# Patient Record
Sex: FEMALE | Race: BLACK OR AFRICAN AMERICAN | NOT HISPANIC OR LATINO
[De-identification: names, ages, dates, MRNs, and addresses within clinical notes are randomized per-mention and may not be internally consistent; named-entity substitution may affect disease eponyms.]

---

## 2017-01-30 PROBLEM — N93.9 ABNORMAL UTERINE AND VAGINAL BLEEDING, UNSPECIFIED: Status: ACTIVE | Noted: 2017-01-30

## 2017-01-30 PROBLEM — R09.89 OTHER SPECIFIED SYMPTOMS AND SIGNS INVOLVING THE CIRCULATORY AND RESPIRATORY SYSTEMS: Status: ACTIVE | Noted: 2017-01-30

## 2017-01-30 PROBLEM — K21.9 GASTRO-ESOPHAGEAL REFLUX DISEASE WITHOUT ESOPHAGITIS: Status: ACTIVE | Noted: 2017-01-30

## 2017-02-22 PROBLEM — K59.04 CHRONIC IDIOPATHIC CONSTIPATION: Status: ACTIVE | Noted: 2017-02-22

## 2017-03-04 PROBLEM — I42.0 DILATED CARDIOMYOPATHY: Status: ACTIVE | Noted: 2017-03-04

## 2017-03-04 PROBLEM — I10 ESSENTIAL (PRIMARY) HYPERTENSION: Status: ACTIVE | Noted: 2017-03-04

## 2017-03-07 PROBLEM — I34.0 NONRHEUMATIC MITRAL (VALVE) INSUFFICIENCY: Status: ACTIVE | Noted: 2017-03-07

## 2017-04-27 ENCOUNTER — RX ONLY (OUTPATIENT)
Age: 47
Setting detail: RX ONLY
End: 2017-04-27

## 2017-10-27 PROBLEM — D64.9 ANEMIA, UNSPECIFIED: Status: ACTIVE | Noted: 2017-10-27

## 2017-11-01 PROBLEM — D25.1 INTRAMURAL, SUBMUCOUS, AND SUBSEROUS LEIOMYOMA OF UTERUS: Status: ACTIVE | Noted: 2017-11-01

## 2017-11-01 PROBLEM — D25.2 INTRAMURAL, SUBMUCOUS, AND SUBSEROUS LEIOMYOMA OF UTERUS: Status: ACTIVE | Noted: 2017-11-01

## 2017-11-01 PROBLEM — D25.0 INTRAMURAL, SUBMUCOUS, AND SUBSEROUS LEIOMYOMA OF UTERUS: Status: ACTIVE | Noted: 2017-11-01

## 2017-12-18 ENCOUNTER — HOSPITAL ENCOUNTER (OUTPATIENT)
Dept: LAB | Age: 47
Discharge: HOME | End: 2017-12-18
Payer: COMMERCIAL

## 2017-12-18 DIAGNOSIS — I50.22 SYSTOLIC CHF, CHRONIC (HCC): ICD-10-CM

## 2017-12-18 PROBLEM — I49.3 PVC'S (PREMATURE VENTRICULAR CONTRACTIONS): Status: ACTIVE | Noted: 2017-12-18

## 2017-12-18 PROBLEM — I42.8 NICM (NONISCHEMIC CARDIOMYOPATHY) (HCC): Status: ACTIVE | Noted: 2017-12-18

## 2017-12-18 PROBLEM — I25.10 CORONARY ARTERY DISEASE INVOLVING NATIVE CORONARY ARTERY OF NATIVE HEART WITHOUT ANGINA PECTORIS: Status: ACTIVE | Noted: 2017-12-18

## 2017-12-18 PROBLEM — R53.82 CHRONIC FATIGUE: Status: ACTIVE | Noted: 2017-12-18

## 2017-12-18 LAB
ALBUMIN SERPL-MCNC: 3.2 G/DL (ref 3.5–5)
ALBUMIN/GLOB SERPL: 0.7 {RATIO}
ALP SERPL-CCNC: 69 U/L (ref 50–136)
ALT SERPL-CCNC: 19 U/L (ref 12–65)
ANION GAP SERPL CALC-SCNC: 5 MMOL/L
AST SERPL-CCNC: 14 U/L (ref 15–37)
BASOPHILS # BLD: 0.1 K/UL (ref 0–0.2)
BASOPHILS NFR BLD: 1 % (ref 0–2)
BILIRUB SERPL-MCNC: 0.2 MG/DL (ref 0.2–1.1)
BNP SERPL-MCNC: 12 PG/ML
BUN SERPL-MCNC: 16 MG/DL (ref 6–23)
CALCIUM SERPL-MCNC: 8.9 MG/DL (ref 8.3–10.4)
CHLORIDE SERPL-SCNC: 107 MMOL/L (ref 98–107)
CO2 SERPL-SCNC: 28 MMOL/L (ref 21–32)
CREAT SERPL-MCNC: 0.9 MG/DL (ref 0.6–1)
DIFFERENTIAL METHOD BLD: ABNORMAL
EOSINOPHIL # BLD: 0.2 K/UL (ref 0–0.8)
EOSINOPHIL NFR BLD: 2 % (ref 0.5–7.8)
ERYTHROCYTE [DISTWIDTH] IN BLOOD BY AUTOMATED COUNT: 17.9 % (ref 11.9–14.6)
GLOBULIN SER CALC-MCNC: 4.7 G/DL
GLUCOSE SERPL-MCNC: 118 MG/DL (ref 65–100)
HCT VFR BLD AUTO: 28.4 % (ref 35.8–46.3)
HGB BLD-MCNC: 8.9 G/DL (ref 11.7–15.4)
LYMPHOCYTES # BLD: 2 K/UL (ref 0.5–4.6)
LYMPHOCYTES NFR BLD: 23 % (ref 13–44)
MAGNESIUM SERPL-MCNC: 2.3 MG/DL (ref 1.8–2.4)
MCH RBC QN AUTO: 22.3 PG (ref 26.1–32.9)
MCHC RBC AUTO-ENTMCNC: 31.3 G/DL (ref 31.4–35)
MCV RBC AUTO: 71 FL (ref 79.6–97.8)
MONOCYTES # BLD: 0.7 K/UL (ref 0.1–1.3)
MONOCYTES NFR BLD: 7 % (ref 4–12)
NEUTS SEG # BLD: 6.1 K/UL (ref 1.7–8.2)
NEUTS SEG NFR BLD: 67 % (ref 43–78)
PLATELET # BLD AUTO: 475 K/UL (ref 150–450)
PMV BLD AUTO: 10.1 FL (ref 10.8–14.1)
POTASSIUM SERPL-SCNC: 3.6 MMOL/L (ref 3.5–5.1)
PROT SERPL-MCNC: 7.9 G/DL (ref 6.3–8.2)
RBC # BLD AUTO: 4 M/UL (ref 4.05–5.25)
SODIUM SERPL-SCNC: 140 MMOL/L (ref 136–145)
TSH SERPL DL<=0.005 MIU/L-ACNC: 0.75 UIU/ML (ref 0.36–3.74)
WBC # BLD AUTO: 9 K/UL (ref 4.3–11.1)

## 2017-12-18 PROCEDURE — 84443 ASSAY THYROID STIM HORMONE: CPT | Performed by: INTERNAL MEDICINE

## 2017-12-18 PROCEDURE — 83880 ASSAY OF NATRIURETIC PEPTIDE: CPT | Performed by: INTERNAL MEDICINE

## 2017-12-18 PROCEDURE — 85025 COMPLETE CBC W/AUTO DIFF WBC: CPT | Performed by: INTERNAL MEDICINE

## 2017-12-18 PROCEDURE — 80053 COMPREHEN METABOLIC PANEL: CPT | Performed by: INTERNAL MEDICINE

## 2017-12-18 PROCEDURE — 83735 ASSAY OF MAGNESIUM: CPT | Performed by: INTERNAL MEDICINE

## 2017-12-18 PROCEDURE — 36415 COLL VENOUS BLD VENIPUNCTURE: CPT | Performed by: INTERNAL MEDICINE

## 2017-12-19 NOTE — PROGRESS NOTES
Please call her, labs were good, but she can decrease lasix to every other day and PRN. DO not need lasix every day. If taking K replacement, only take on days she takes lasix. Will wait on echo and see back.    Thanks

## 2018-02-07 PROBLEM — L85.3 XEROSIS CUTIS: Status: ACTIVE | Noted: 2018-02-07

## 2018-02-07 PROBLEM — N93.9 ABNORMAL UTERINE AND VAGINAL BLEEDING, UNSPECIFIED: Status: ACTIVE | Noted: 2018-02-07

## 2018-02-07 PROBLEM — M25.569 PAIN IN UNSPECIFIED KNEE: Status: ACTIVE | Noted: 2018-02-07

## 2018-02-10 NOTE — PROGRESS NOTES
Called to pre-assess for PVC ablation with Dr Odilia Casillas, Scheduled 2/12/18. No answer & message left.

## 2018-02-11 ENCOUNTER — ANESTHESIA EVENT (OUTPATIENT)
Dept: SURGERY | Age: 48
End: 2018-02-11
Payer: COMMERCIAL

## 2018-02-12 ENCOUNTER — ANESTHESIA (OUTPATIENT)
Dept: SURGERY | Age: 48
End: 2018-02-12
Payer: COMMERCIAL

## 2018-02-12 ENCOUNTER — APPOINTMENT (OUTPATIENT)
Dept: GENERAL RADIOLOGY | Age: 48
End: 2018-02-12
Attending: INTERNAL MEDICINE
Payer: COMMERCIAL

## 2018-02-12 ENCOUNTER — APPOINTMENT (OUTPATIENT)
Dept: CARDIAC CATH/INVASIVE PROCEDURES | Age: 48
End: 2018-02-12
Payer: COMMERCIAL

## 2018-02-12 ENCOUNTER — HOSPITAL ENCOUNTER (OUTPATIENT)
Age: 48
Setting detail: OBSERVATION
Discharge: HOME | End: 2018-02-13
Attending: INTERNAL MEDICINE | Admitting: INTERNAL MEDICINE
Payer: COMMERCIAL

## 2018-02-12 PROBLEM — I48.91 ATRIAL FIBRILLATION (HCC): Status: ACTIVE | Noted: 2018-02-12

## 2018-02-12 PROBLEM — Z98.890 S/P ABLATION OF VENTRICULAR ARRHYTHMIA: Status: ACTIVE | Noted: 2018-02-12

## 2018-02-12 PROBLEM — Z86.79 S/P ABLATION OF VENTRICULAR ARRHYTHMIA: Status: ACTIVE | Noted: 2018-02-12

## 2018-02-12 LAB
ACT BLD: 164 SECS (ref 70–128)
ACT BLD: 213 SECS (ref 70–128)
ACT BLD: 219 SECS (ref 70–128)
ACT BLD: 257 SECS (ref 70–128)
ACT BLD: 307 SECS (ref 70–128)
ACT BLD: 307 SECS (ref 70–128)
ANION GAP SERPL CALC-SCNC: 8 MMOL/L (ref 7–16)
ATRIAL RATE: 61 BPM
ATRIAL RATE: 64 BPM
BUN SERPL-MCNC: 18 MG/DL (ref 6–23)
CALCIUM SERPL-MCNC: 8.8 MG/DL (ref 8.3–10.4)
CALCULATED P AXIS, ECG09: 25 DEGREES
CALCULATED P AXIS, ECG09: 61 DEGREES
CALCULATED R AXIS, ECG10: 37 DEGREES
CALCULATED R AXIS, ECG10: 50 DEGREES
CALCULATED T AXIS, ECG11: 32 DEGREES
CALCULATED T AXIS, ECG11: 39 DEGREES
CHLORIDE SERPL-SCNC: 107 MMOL/L (ref 98–107)
CO2 SERPL-SCNC: 24 MMOL/L (ref 21–32)
CREAT SERPL-MCNC: 1.23 MG/DL (ref 0.6–1)
DIAGNOSIS, 93000: NORMAL
DIAGNOSIS, 93000: NORMAL
ERYTHROCYTE [DISTWIDTH] IN BLOOD BY AUTOMATED COUNT: 18 % (ref 11.9–14.6)
GLUCOSE SERPL-MCNC: 88 MG/DL (ref 65–100)
HCT VFR BLD AUTO: 28.3 % (ref 35.8–46.3)
HGB BLD-MCNC: 8.8 G/DL (ref 11.7–15.4)
INR PPP: 1.1
MAGNESIUM SERPL-MCNC: 2.5 MG/DL (ref 1.8–2.4)
MCH RBC QN AUTO: 21.5 PG (ref 26.1–32.9)
MCHC RBC AUTO-ENTMCNC: 31.1 G/DL (ref 31.4–35)
MCV RBC AUTO: 69 FL (ref 79.6–97.8)
P-R INTERVAL, ECG05: 168 MS
P-R INTERVAL, ECG05: 176 MS
PLATELET # BLD AUTO: 455 K/UL (ref 150–450)
PMV BLD AUTO: 9.9 FL (ref 10.8–14.1)
POTASSIUM SERPL-SCNC: 4.9 MMOL/L (ref 3.5–5.1)
PROTHROMBIN TIME: 13.5 SEC (ref 11.5–14.5)
Q-T INTERVAL, ECG07: 422 MS
Q-T INTERVAL, ECG07: 430 MS
QRS DURATION, ECG06: 88 MS
QRS DURATION, ECG06: 90 MS
QTC CALCULATION (BEZET), ECG08: 432 MS
QTC CALCULATION (BEZET), ECG08: 435 MS
RBC # BLD AUTO: 4.1 M/UL (ref 4.05–5.25)
SODIUM SERPL-SCNC: 139 MMOL/L (ref 136–145)
VENTRICULAR RATE, ECG03: 61 BPM
VENTRICULAR RATE, ECG03: 64 BPM
WBC # BLD AUTO: 6.9 K/UL (ref 4.3–11.1)

## 2018-02-12 PROCEDURE — 77030027107 HC PTCH EXT REF CARTO3 J&J -F

## 2018-02-12 PROCEDURE — 85347 COAGULATION TIME ACTIVATED: CPT

## 2018-02-12 PROCEDURE — 80048 BASIC METABOLIC PNL TOTAL CA: CPT | Performed by: INTERNAL MEDICINE

## 2018-02-12 PROCEDURE — 74011250636 HC RX REV CODE- 250/636: Performed by: ANESTHESIOLOGY

## 2018-02-12 PROCEDURE — 76060000038 HC ANESTHESIA 3.5 TO 4 HR: Performed by: INTERNAL MEDICINE

## 2018-02-12 PROCEDURE — 93623 PRGRMD STIMJ&PACG IV RX NFS: CPT

## 2018-02-12 PROCEDURE — 85027 COMPLETE CBC AUTOMATED: CPT | Performed by: INTERNAL MEDICINE

## 2018-02-12 PROCEDURE — C1759 CATH, INTRA ECHOCARDIOGRAPHY: HCPCS

## 2018-02-12 PROCEDURE — 77030016570 HC BLNKT BAIR HGGR 3M -B: Performed by: NURSE ANESTHETIST, CERTIFIED REGISTERED

## 2018-02-12 PROCEDURE — 77030035291 HC TBNG PMP SMARTABLATE J&J -B

## 2018-02-12 PROCEDURE — C1733 CATH, EP, OTHR THAN COOL-TIP: HCPCS

## 2018-02-12 PROCEDURE — 51798 US URINE CAPACITY MEASURE: CPT

## 2018-02-12 PROCEDURE — 71045 X-RAY EXAM CHEST 1 VIEW: CPT

## 2018-02-12 PROCEDURE — 99218 HC RM OBSERVATION: CPT

## 2018-02-12 PROCEDURE — C1732 CATH, EP, DIAG/ABL, 3D/VECT: HCPCS

## 2018-02-12 PROCEDURE — 93654 COMPRE EP EVAL TX VT: CPT

## 2018-02-12 PROCEDURE — 77030034849

## 2018-02-12 PROCEDURE — 85610 PROTHROMBIN TIME: CPT | Performed by: INTERNAL MEDICINE

## 2018-02-12 PROCEDURE — 77030013687 HC GD NDL BARD -B

## 2018-02-12 PROCEDURE — C1894 INTRO/SHEATH, NON-LASER: HCPCS

## 2018-02-12 PROCEDURE — 76937 US GUIDE VASCULAR ACCESS: CPT

## 2018-02-12 PROCEDURE — 93662 INTRACARDIAC ECG (ICE): CPT

## 2018-02-12 PROCEDURE — 74011250636 HC RX REV CODE- 250/636

## 2018-02-12 PROCEDURE — 83735 ASSAY OF MAGNESIUM: CPT | Performed by: INTERNAL MEDICINE

## 2018-02-12 PROCEDURE — 93005 ELECTROCARDIOGRAM TRACING: CPT | Performed by: INTERNAL MEDICINE

## 2018-02-12 PROCEDURE — 93621 COMP EP EVL L PAC&REC C SINS: CPT

## 2018-02-12 PROCEDURE — 74011000250 HC RX REV CODE- 250

## 2018-02-12 PROCEDURE — 74011000258 HC RX REV CODE- 258

## 2018-02-12 RX ORDER — PROPOFOL 10 MG/ML
INJECTION, EMULSION INTRAVENOUS AS NEEDED
Status: DISCONTINUED | OUTPATIENT
Start: 2018-02-12 | End: 2018-02-12 | Stop reason: HOSPADM

## 2018-02-12 RX ORDER — DOBUTAMINE HYDROCHLORIDE 200 MG/100ML
INJECTION INTRAVENOUS
Status: DISCONTINUED | OUTPATIENT
Start: 2018-02-12 | End: 2018-02-12 | Stop reason: HOSPADM

## 2018-02-12 RX ORDER — PROPOFOL 10 MG/ML
INJECTION, EMULSION INTRAVENOUS
Status: DISCONTINUED | OUTPATIENT
Start: 2018-02-12 | End: 2018-02-12 | Stop reason: HOSPADM

## 2018-02-12 RX ORDER — FUROSEMIDE 40 MG/1
40 TABLET ORAL DAILY
Status: DISCONTINUED | OUTPATIENT
Start: 2018-02-13 | End: 2018-02-13 | Stop reason: HOSPADM

## 2018-02-12 RX ORDER — EPHEDRINE SULFATE 50 MG/ML
INJECTION, SOLUTION INTRAVENOUS AS NEEDED
Status: DISCONTINUED | OUTPATIENT
Start: 2018-02-12 | End: 2018-02-12 | Stop reason: HOSPADM

## 2018-02-12 RX ORDER — SODIUM CHLORIDE, SODIUM LACTATE, POTASSIUM CHLORIDE, CALCIUM CHLORIDE 600; 310; 30; 20 MG/100ML; MG/100ML; MG/100ML; MG/100ML
75 INJECTION, SOLUTION INTRAVENOUS CONTINUOUS
Status: DISCONTINUED | OUTPATIENT
Start: 2018-02-12 | End: 2018-02-13 | Stop reason: HOSPADM

## 2018-02-12 RX ORDER — HYDROMORPHONE HYDROCHLORIDE 2 MG/ML
0.5 INJECTION, SOLUTION INTRAMUSCULAR; INTRAVENOUS; SUBCUTANEOUS
Status: DISCONTINUED | OUTPATIENT
Start: 2018-02-12 | End: 2018-02-13 | Stop reason: HOSPADM

## 2018-02-12 RX ORDER — FLUMAZENIL 0.1 MG/ML
0.2 INJECTION INTRAVENOUS AS NEEDED
Status: DISCONTINUED | OUTPATIENT
Start: 2018-02-12 | End: 2018-02-13 | Stop reason: HOSPADM

## 2018-02-12 RX ORDER — DIPHENHYDRAMINE HYDROCHLORIDE 50 MG/ML
12.5 INJECTION, SOLUTION INTRAMUSCULAR; INTRAVENOUS
Status: DISCONTINUED | OUTPATIENT
Start: 2018-02-12 | End: 2018-02-13 | Stop reason: HOSPADM

## 2018-02-12 RX ORDER — ACETAMINOPHEN 325 MG/1
650 TABLET ORAL
Status: DISCONTINUED | OUTPATIENT
Start: 2018-02-12 | End: 2018-02-13 | Stop reason: HOSPADM

## 2018-02-12 RX ORDER — LOSARTAN POTASSIUM 50 MG/1
50 TABLET ORAL DAILY
Status: DISCONTINUED | OUTPATIENT
Start: 2018-02-13 | End: 2018-02-13 | Stop reason: HOSPADM

## 2018-02-12 RX ORDER — MIDAZOLAM HYDROCHLORIDE 1 MG/ML
2 INJECTION, SOLUTION INTRAMUSCULAR; INTRAVENOUS
Status: DISCONTINUED | OUTPATIENT
Start: 2018-02-12 | End: 2018-02-13 | Stop reason: HOSPADM

## 2018-02-12 RX ORDER — SODIUM CHLORIDE 0.9 % (FLUSH) 0.9 %
5-10 SYRINGE (ML) INJECTION AS NEEDED
Status: DISCONTINUED | OUTPATIENT
Start: 2018-02-12 | End: 2018-02-13 | Stop reason: HOSPADM

## 2018-02-12 RX ORDER — HEPARIN SODIUM 5000 [USP'U]/100ML
INJECTION, SOLUTION INTRAVENOUS
Status: DISCONTINUED | OUTPATIENT
Start: 2018-02-12 | End: 2018-02-12 | Stop reason: HOSPADM

## 2018-02-12 RX ORDER — NALOXONE HYDROCHLORIDE 0.4 MG/ML
0.1 INJECTION, SOLUTION INTRAMUSCULAR; INTRAVENOUS; SUBCUTANEOUS
Status: DISCONTINUED | OUTPATIENT
Start: 2018-02-12 | End: 2018-02-13 | Stop reason: HOSPADM

## 2018-02-12 RX ORDER — SODIUM CHLORIDE 0.9 % (FLUSH) 0.9 %
5-10 SYRINGE (ML) INJECTION EVERY 8 HOURS
Status: DISCONTINUED | OUTPATIENT
Start: 2018-02-12 | End: 2018-02-13 | Stop reason: HOSPADM

## 2018-02-12 RX ORDER — HYDROCODONE BITARTRATE AND ACETAMINOPHEN 5; 325 MG/1; MG/1
1 TABLET ORAL
Status: DISCONTINUED | OUTPATIENT
Start: 2018-02-12 | End: 2018-02-13 | Stop reason: HOSPADM

## 2018-02-12 RX ORDER — SPIRONOLACTONE 25 MG/1
25 TABLET ORAL DAILY
Status: DISCONTINUED | OUTPATIENT
Start: 2018-02-13 | End: 2018-02-13 | Stop reason: HOSPADM

## 2018-02-12 RX ORDER — OXYCODONE HYDROCHLORIDE 5 MG/1
10 TABLET ORAL
Status: DISCONTINUED | OUTPATIENT
Start: 2018-02-12 | End: 2018-02-13 | Stop reason: HOSPADM

## 2018-02-12 RX ORDER — SODIUM CHLORIDE, SODIUM LACTATE, POTASSIUM CHLORIDE, CALCIUM CHLORIDE 600; 310; 30; 20 MG/100ML; MG/100ML; MG/100ML; MG/100ML
75 INJECTION, SOLUTION INTRAVENOUS CONTINUOUS
Status: DISPENSED | OUTPATIENT
Start: 2018-02-12 | End: 2018-02-13

## 2018-02-12 RX ORDER — LIDOCAINE HYDROCHLORIDE 10 MG/ML
0.1 INJECTION INFILTRATION; PERINEURAL AS NEEDED
Status: DISCONTINUED | OUTPATIENT
Start: 2018-02-12 | End: 2018-02-13 | Stop reason: HOSPADM

## 2018-02-12 RX ORDER — HEPARIN SODIUM 1000 [USP'U]/ML
INJECTION, SOLUTION INTRAVENOUS; SUBCUTANEOUS AS NEEDED
Status: DISCONTINUED | OUTPATIENT
Start: 2018-02-12 | End: 2018-02-12 | Stop reason: HOSPADM

## 2018-02-12 RX ORDER — OXYCODONE HYDROCHLORIDE 5 MG/1
5 TABLET ORAL
Status: DISCONTINUED | OUTPATIENT
Start: 2018-02-12 | End: 2018-02-13 | Stop reason: HOSPADM

## 2018-02-12 RX ORDER — CARVEDILOL 6.25 MG/1
6.25 TABLET ORAL 2 TIMES DAILY WITH MEALS
Status: DISCONTINUED | OUTPATIENT
Start: 2018-02-12 | End: 2018-02-13 | Stop reason: HOSPADM

## 2018-02-12 RX ORDER — PROTAMINE SULFATE 10 MG/ML
INJECTION, SOLUTION INTRAVENOUS AS NEEDED
Status: DISCONTINUED | OUTPATIENT
Start: 2018-02-12 | End: 2018-02-12 | Stop reason: HOSPADM

## 2018-02-12 RX ADMIN — HYDROMORPHONE HYDROCHLORIDE 0.5 MG: 2 INJECTION, SOLUTION INTRAMUSCULAR; INTRAVENOUS; SUBCUTANEOUS at 16:32

## 2018-02-12 RX ADMIN — PROPOFOL 50 MG: 10 INJECTION, EMULSION INTRAVENOUS at 12:49

## 2018-02-12 RX ADMIN — PROPOFOL 50 MG: 10 INJECTION, EMULSION INTRAVENOUS at 16:24

## 2018-02-12 RX ADMIN — HEPARIN SODIUM 5000 UNITS: 1000 INJECTION, SOLUTION INTRAVENOUS; SUBCUTANEOUS at 14:24

## 2018-02-12 RX ADMIN — SODIUM CHLORIDE, SODIUM LACTATE, POTASSIUM CHLORIDE, AND CALCIUM CHLORIDE 75 ML/HR: 600; 310; 30; 20 INJECTION, SOLUTION INTRAVENOUS at 17:00

## 2018-02-12 RX ADMIN — PROTAMINE SULFATE 10 MG: 10 INJECTION, SOLUTION INTRAVENOUS at 15:31

## 2018-02-12 RX ADMIN — HEPARIN SODIUM 40 UNITS/KG/HR: 5000 INJECTION, SOLUTION INTRAVENOUS at 13:41

## 2018-02-12 RX ADMIN — DOBUTAMINE HYDROCHLORIDE 5 MCG/KG/MIN: 200 INJECTION INTRAVENOUS at 12:59

## 2018-02-12 RX ADMIN — CARVEDILOL 6.25 MG: 6.25 TABLET ORAL at 21:36

## 2018-02-12 RX ADMIN — PROPOFOL 30 MG: 10 INJECTION, EMULSION INTRAVENOUS at 14:34

## 2018-02-12 RX ADMIN — PROPOFOL 50 MG: 10 INJECTION, EMULSION INTRAVENOUS at 16:11

## 2018-02-12 RX ADMIN — PROPOFOL 50 MG: 10 INJECTION, EMULSION INTRAVENOUS at 16:03

## 2018-02-12 RX ADMIN — PROPOFOL 50 MG: 10 INJECTION, EMULSION INTRAVENOUS at 15:51

## 2018-02-12 RX ADMIN — PROPOFOL 50 MG: 10 INJECTION, EMULSION INTRAVENOUS at 16:19

## 2018-02-12 RX ADMIN — EPHEDRINE SULFATE 5 MG: 50 INJECTION, SOLUTION INTRAVENOUS at 14:18

## 2018-02-12 RX ADMIN — PROTAMINE SULFATE 10 MG: 10 INJECTION, SOLUTION INTRAVENOUS at 15:28

## 2018-02-12 RX ADMIN — PROTAMINE SULFATE 10 MG: 10 INJECTION, SOLUTION INTRAVENOUS at 15:52

## 2018-02-12 RX ADMIN — PROPOFOL 40 MG: 10 INJECTION, EMULSION INTRAVENOUS at 14:48

## 2018-02-12 RX ADMIN — PROPOFOL 50 MG: 10 INJECTION, EMULSION INTRAVENOUS at 16:15

## 2018-02-12 RX ADMIN — PROPOFOL 50 MG: 10 INJECTION, EMULSION INTRAVENOUS at 15:57

## 2018-02-12 RX ADMIN — PROPOFOL 160 MCG/KG/MIN: 10 INJECTION, EMULSION INTRAVENOUS at 12:49

## 2018-02-12 RX ADMIN — PROTAMINE SULFATE 10 MG: 10 INJECTION, SOLUTION INTRAVENOUS at 15:51

## 2018-02-12 RX ADMIN — Medication 10 ML: at 21:40

## 2018-02-12 RX ADMIN — SODIUM CHLORIDE, SODIUM LACTATE, POTASSIUM CHLORIDE, AND CALCIUM CHLORIDE: 600; 310; 30; 20 INJECTION, SOLUTION INTRAVENOUS at 14:59

## 2018-02-12 RX ADMIN — PROTAMINE SULFATE 10 MG: 10 INJECTION, SOLUTION INTRAVENOUS at 15:30

## 2018-02-12 RX ADMIN — PROTAMINE SULFATE 10 MG: 10 INJECTION, SOLUTION INTRAVENOUS at 15:32

## 2018-02-12 RX ADMIN — HEPARIN SODIUM 5000 UNITS: 1000 INJECTION, SOLUTION INTRAVENOUS; SUBCUTANEOUS at 13:59

## 2018-02-12 RX ADMIN — EPHEDRINE SULFATE 5 MG: 50 INJECTION, SOLUTION INTRAVENOUS at 14:13

## 2018-02-12 RX ADMIN — PROPOFOL 50 MG: 10 INJECTION, EMULSION INTRAVENOUS at 16:07

## 2018-02-12 RX ADMIN — HEPARIN SODIUM 18500 UNITS: 1000 INJECTION, SOLUTION INTRAVENOUS; SUBCUTANEOUS at 13:36

## 2018-02-12 RX ADMIN — PROTAMINE SULFATE 10 MG: 10 INJECTION, SOLUTION INTRAVENOUS at 15:29

## 2018-02-12 RX ADMIN — SODIUM CHLORIDE, SODIUM LACTATE, POTASSIUM CHLORIDE, AND CALCIUM CHLORIDE: 600; 310; 30; 20 INJECTION, SOLUTION INTRAVENOUS at 12:44

## 2018-02-12 NOTE — PROGRESS NOTES
TRANSFER - OUT REPORT:    Verbal report given to Dada Clancy RN(name) on Annie Harris  being transferred to telemetry(unit) for routine progression of care       Report consisted of patients Situation, Background, Assessment and   Recommendations(SBAR). Information from the following report(s) Kardex and Procedure Summary was reviewed with the receiving nurse. Lines:   Peripheral IV 02/12/18 Left Hand (Active)        Opportunity for questions and clarification was provided. Patient transported with:   Registered Nurse   Receiving RN in room to assess patient. Right groin without bleeding or hematoma.

## 2018-02-12 NOTE — IP AVS SNAPSHOT
[address] 
[phone] Patient: Tyler Daily MRN: [id number] QFY:4/73/[id number] About your hospitalization You were admitted on:  February 12, 2018 You last received care in the:  [facility] 3 TELEMETRY You were discharged on:  February 13, 2018 Why you were hospitalized Your primary diagnosis was:  Not on File Your diagnoses also included:  S/P Ablation Of Ventricular Arrhythmia, Atrial Fibrillation (Hcc) Follow-up Information Follow up With Details Comments Contact Info Tiffany Hernandez MD  As needed [address] [facility] A [facility] [address] 
[phone] Alisha Wood MD On 3/2/2018 1:15 pm 2 [address] [facility] [address] 
[phone] Your Scheduled Appointments Friday March 02, 2018  1:15 PM [facility] HOSPITAL FOLLOW-UP with Alisha Wood MD  
[facility] ([address] [place] [address]) 2 [facility] 
[address] [facility] 81  
[phone] Discharge Orders None A check hemant indicates which time of day the medication should be taken. My Medications CONTINUE taking these medications Instructions Each Dose to Equal  
 Morning Noon Evening Bedtime ATORVASTATIN PO Take  by mouth. Indications: PO once daily  
     
   
   
   
  
  
 carvedilol 6.25 mg tablet Commonly known as:  COREG  
   
 6.25 mg two (2) times a day. 6.25 mg  
    
  
   
   
  
   
  
 furosemide 40 mg tablet Commonly known as:  LASIX TAKE ONE TABLET BY MOUTH EVERY OTHER DAY  
     
  
   
   
   
  
 losartan 50 mg tablet Commonly known as:  COZAAR  
   
 50 mg daily. 50 mg MIRALAX 17 gram packet Generic drug:  polyethylene glycol Take 17 g by mouth daily. 17 g  
    
  
   
   
   
  
 spironolactone 25 mg tablet Commonly known as:  ALDACTONE Take 1 Tab by mouth daily. 25 mg Discharge Instructions Atrial Fibrillation: Care Instructions Your Care Instructions Atrial fibrillation is an irregular and often fast heartbeat. Treating this condition is important for several reasons. It can cause blood clots, which can travel from your heart to your brain and cause a stroke. If you have a fast heartbeat, you may feel lightheaded, dizzy, and weak. An irregular heartbeat can also increase your risk for heart failure. Atrial fibrillation is often the result of another heart condition, such as high blood pressure or coronary artery disease. Making changes to improve your heart condition will help you stay healthy and active. Follow-up care is a key part of your treatment and safety. Be sure to make and go to all appointments, and call your doctor if you are having problems. It's also a good idea to know your test results and keep a list of the medicines you take. How can you care for yourself at home? Medicines ? · Take your medicines exactly as prescribed. Call your doctor if you think you are having a problem with your medicine. You will get more details on the specific medicines your doctor prescribes. ? · If your doctor has given you a blood thinner to prevent a stroke, be sure you get instructions about how to take your medicine safely. Blood thinners can cause serious bleeding problems. ? · Do not take any vitamins, over-the-counter drugs, or herbal products without talking to your doctor first. ? Lifestyle changes ? · Do not smoke. Smoking can increase your chance of a stroke and heart attack. If you need help quitting, talk to your doctor about stop-smoking programs and medicines. These can increase your chances of quitting for good. ? · Eat a heart-healthy diet. ? · Stay at a healthy weight. Lose weight if you need to. ? · Limit alcohol to 2 drinks a day for men and 1 drink a day for women. Too much alcohol can cause health problems. ? · Avoid colds and flu. Get a pneumococcal vaccine shot. If you have had one before, ask your doctor whether you need another dose. Get a flu shot every year. If you must be around people with colds or flu, wash your hands often. Activity ? · If your doctor recommends it, get more exercise. Walking is a good choice. Bit by bit, increase the amount you walk every day. Try for at least 30 minutes on most days of the week. You also may want to swim, bike, or do other activities. Your doctor may suggest that you join a cardiac rehabilitation program so that you can have help increasing your physical activity safely. ? · Start light exercise if your doctor says it is okay. Even a small amount will help you get stronger, have more energy, and manage stress. Walking is an easy way to get exercise. Start out by walking a little more than you did in the hospital. Gradually increase the amount you walk. ? · When you exercise, watch for signs that your heart is working too hard. You are pushing too hard if you cannot talk while you are exercising. If you become short of breath or dizzy or have chest pain, sit down and rest immediately. ? · Check your pulse regularly. Place two fingers on the artery at the palm side of your wrist, in line with your thumb. If your heartbeat seems uneven or fast, talk to your doctor. When should you call for help? Call 911 anytime you think you may need emergency care. For example, call if: 
? · You have symptoms of a heart attack. These may include: ¨ Chest pain or pressure, or a strange feeling in the chest. 
¨ Sweating. ¨ Shortness of breath. ¨ Nausea or vomiting. ¨ Pain, pressure, or a strange feeling in the back, neck, jaw, or upper belly or in one or both shoulders or arms. ¨ Lightheadedness or sudden weakness. ¨ A fast or irregular heartbeat. After you call 911, the  may tell you to chew 1 adult-strength or 2 to 4 low-dose aspirin. Wait for an ambulance. Do not try to drive yourself. ? · You have symptoms of a stroke. These may include: 
¨ Sudden numbness, tingling, weakness, or loss of movement in your face, arm, or leg, especially on only one side of your body. ¨ Sudden vision changes. ¨ Sudden trouble speaking. ¨ Sudden confusion or trouble understanding simple statements. ¨ Sudden problems with walking or balance. ¨ A sudden, severe headache that is different from past headaches. ? · You passed out (lost consciousness). ?Call your doctor now or seek immediate medical care if: 
? · You have new or increased shortness of breath. ? · You feel dizzy or lightheaded, or you feel like you may faint. ? · Your heart rate becomes irregular. ? · You can feel your heart flutter in your chest or skip heartbeats. Tell your doctor if these symptoms are new or worse. ? Watch closely for changes in your health, and be sure to contact your doctor if you have any problems. Where can you learn more? Go to http://maddie-dilan.info/. Enter U020 in the search box to learn more about \"Atrial Fibrillation: Care Instructions. \" Current as of: September 21, 2016 Content Version: 11.4 © 3940-4548 [employer]. Care instructions adapted under license by [employer] (which disclaims liability or warranty for this information). If you have questions about a medical condition or this instruction, always ask your healthcare professional. [place] [address] any warranty or liability for your use of this inform DASH Diet: Care Instructions Your Care Instructions The DASH diet is an eating plan that can help lower your blood pressure. DASH stands for Dietary Approaches to Stop Hypertension. Hypertension is high blood pressure. The DASH diet focuses on eating foods that are high in calcium, potassium, and magnesium. These nutrients can lower blood pressure. The foods that are highest in these nutrients are fruits, vegetables, low-fat dairy products, nuts, seeds, and legumes. But taking calcium, potassium, and magnesium supplements instead of eating foods that are high in those nutrients does not have the same effect. The DASH diet also includes whole grains, fish, and poultry. The DASH diet is one of several lifestyle changes your doctor may recommend to lower your high blood pressure. Your doctor may also want you to decrease the amount of sodium in your diet. Lowering sodium while following the DASH diet can lower blood pressure even further than just the DASH diet alone. Follow-up care is a key part of your treatment and safety. Be sure to make and go to all appointments, and call your doctor if you are having problems. It's also a good idea to know your test results and keep a list of the medicines you take. How can you care for yourself at home? Following the DASH diet · Eat 4 to 5 servings of fruit each day. A serving is 1 medium-sized piece of fruit, ½ cup chopped or canned fruit, 1/4 cup dried fruit, or 4 ounces (½ cup) of fruit juice. Choose fruit more often than fruit juice. · Eat 4 to 5 servings of vegetables each day. A serving is 1 cup of lettuce or raw leafy vegetables, ½ cup of chopped or cooked vegetables, or 4 ounces (½ cup) of vegetable juice. Choose vegetables more often than vegetable juice. · Get 2 to 3 servings of low-fat and fat-free dairy each day. A serving is 8 ounces of milk, 1 cup of yogurt, or 1 ½ ounces of cheese. · Eat 6 to 8 servings of grains each day. A serving is 1 slice of bread, 1 ounce of dry cereal, or ½ cup of cooked rice, pasta, or cooked cereal. Try to choose whole-grain products as much as possible. · Limit lean meat, poultry, and fish to 2 servings each day.  A serving is 3 ounces, about the size of a deck of cards. · Eat 4 to 5 servings of nuts, seeds, and legumes (cooked dried beans, lentils, and split peas) each week. A serving is 1/3 cup of nuts, 2 tablespoons of seeds, or ½ cup of cooked beans or peas. · Limit fats and oils to 2 to 3 servings each day. A serving is 1 teaspoon of vegetable oil or 2 tablespoons of salad dressing. · Limit sweets and added sugars to 5 servings or less a week. A serving is 1 tablespoon jelly or jam, ½ cup sorbet, or 1 cup of lemonade. · Eat less than 2,300 milligrams (mg) of sodium a day. If you limit your sodium to 1,500 mg a day, you can lower your blood pressure even more. Tips for success · Start small. Do not try to make dramatic changes to your diet all at once. You might feel that you are missing out on your favorite foods and then be more likely to not follow the plan. Make small changes, and stick with them. Once those changes become habit, add a few more changes. · Try some of the following: ¨ Make it a goal to eat a fruit or vegetable at every meal and at snacks. This will make it easy to get the recommended amount of fruits and vegetables each day. ¨ Try yogurt topped with fruit and nuts for a snack or healthy dessert. ¨ Add lettuce, tomato, cucumber, and onion to sandwiches. ¨ Combine a ready-made pizza crust with low-fat mozzarella cheese and lots of vegetable toppings. Try using tomatoes, squash, spinach, broccoli, carrots, cauliflower, and onions. ¨ Have a variety of cut-up vegetables with a low-fat dip as an appetizer instead of chips and dip. ¨ Sprinkle sunflower seeds or chopped almonds over salads. Or try adding chopped walnuts or almonds to cooked vegetables. ¨ Try some vegetarian meals using beans and peas. Add garbanzo or kidney beans to salads. Make burritos and tacos with mashed mcdonald beans or black beans. Where can you learn more? Go to http://perkins-dilan.info/. Enter G797 in the search box to learn more about \"DASH Diet: Care Instructions. \" Current as of: September 21, 2016 Content Version: 11.4 © 1857-7921 10X10 Room. Care instructions adapted under license by [employer] (which disclaims liability or warranty for this information). If you have questions about a medical condition or this instruction, always ask your healthcare professional. [address] any warranty or liability for your use of this information. Cardiac Catheterization/Angiography Discharge Instructions *Check the puncture site frequently for swelling or bleeding. If you see any bleeding, lie down and apply pressure over the area with a clean town or washcloth. Notify your doctor for any redness, swelling, drainage or oozing from the puncture site. Notify your doctor for any fever or chills. *If the leg or arm with the puncture becomes cold, numb or painful, call Dr Chantelle Sweet at  [phone] *Activity should be limited for the next 48 hours. Climb stairs as little as possible and avoid any stooping, bending or strenuous activity for 48 hours. No heavy lifting (anything over 10 pounds) for three days. *Do not drive for 48 hours. *You may resume your usual diet. Drink more fluids than usual. 
 
*Have a responsible person drive you home and stay with you for at least 24 hours after your heart catheterization/angiography. *You may remove the bandage from your Right and Groin in 24 hours. You may shower in 24 hours. No tub baths, hot tubs or swimming for one week. Do not place any lotions, creams, powders, ointments over the puncture site for one week. You may place a clean band-aid over the puncture site each day for 5 days. Change this daily. [facility] [employer] [facility] Announcement  We are excited to announce that we are making your provider's discharge notes available to you in [employer]. You will see these notes when they are completed and signed by the physician that discharged you from your recent hospital stay. If you have any questions or concerns about any information you see in [employer], please call the Health Information Department where you were seen or reach out to your Primary Care Provider for more information about your plan of care. Introducing [facility]! Dear Mikayla: Thank you for requesting a [employer] account. Our records indicate that you already have an active [employer] account. You can access your account anytime at [email or web address] Did you know that you can access your hospital and ER discharge instructions at any time in [employer] Girls? You can also review all of your test results from your hospital stay or ER visit. Additional Information If you have questions, please visit the Frequently Asked Questions section of the [employer] website at [email or web address] Remember, [employer] is NOT to be used for urgent needs. For medical emergencies, dial 911. Now available from your iPhone and Android! Providers Seen During Your Hospitalization Provider Specialty Primary office phone Lashaun Webb MD Cardiology [phone] Your Primary Care Physician (PCP) Primary Care Physician Office Phone Office Fax [address] [phone] You are allergic to the following No active allergies Recent Documentation Height Weight Breastfeeding? BMI Smoking Status 1.778 m 114.9 kg No 36.33 kg/m2 Never Smoker Emergency Contacts Name Discharge Info Relation Home Work [employer] [facility]  Daughter [21] [phone] [facility]  Daughter [21] [phone] Patient Belongings The following personal items are in your possession at time of discharge: Dental Appliances: None  Visual Aid: Glasses, With patient      Home Medications: Locked   Jewelry: None  Clothing: Footwear, Shirt, Pants, With patient    Other Valuables: Cell Phone, With patient, Alana Munguia Please provide this summary of care documentation to your next provider. Signatures-by signing, you are acknowledging that this After Visit Summary has been reviewed with you and you have received a copy. Patient Signature:  ____________________________________________________________ Date:  ____________________________________________________________  
  
[facility] Provider Signature:  ____________________________________________________________ Date:  ____________________________________________________________

## 2018-02-12 NOTE — ANESTHESIA PREPROCEDURE EVALUATION
Anesthetic History   No history of anesthetic complications            Review of Systems / Medical History  Patient summary reviewed and pertinent labs reviewed    Pulmonary  Within defined limits                 Neuro/Psych   Within defined limits           Cardiovascular    Hypertension: well controlled      CHF  Dysrhythmias : PVC        Comments: Echo 1/18 - EF 30-35%, mild AI, mild MR   GI/Hepatic/Renal  Within defined limits              Endo/Other        Obesity     Other Findings            Physical Exam    Airway  Mallampati: II  TM Distance: > 6 cm  Neck ROM: normal range of motion   Mouth opening: Normal     Cardiovascular    Rhythm: irregular  Rate: normal         Dental  No notable dental hx       Pulmonary  Breath sounds clear to auscultation               Abdominal         Other Findings            Anesthetic Plan    ASA: 3  Anesthesia type: total IV anesthesia            Anesthetic plan and risks discussed with: Patient

## 2018-02-12 NOTE — IP AVS SNAPSHOT
[address] [place] [address] 
[phone] Patient: Lorenzo Dominique MRN: [id number] DEE:[birth year] A check hemant indicates which time of day the medication should be taken. My Medications CONTINUE taking these medications Instructions Each Dose to Equal  
 Morning Noon Evening Bedtime ATORVASTATIN PO Take  by mouth. Indications: PO once daily  
     
   
   
   
  
  
 carvedilol 6.25 mg tablet Commonly known as:  COREG  
   
 6.25 mg two (2) times a day. 6.25 mg  
    
  
   
   
  
   
  
 furosemide 40 mg tablet Commonly known as:  LASIX TAKE ONE TABLET BY MOUTH EVERY OTHER DAY  
     
  
   
   
   
  
 losartan 50 mg tablet Commonly known as:  COZAAR  
   
 50 mg daily. 50 mg MIRALAX 17 gram packet Generic drug:  polyethylene glycol Take 17 g by mouth daily. 17 g  
    
  
   
   
   
  
 spironolactone 25 mg tablet Commonly known as:  ALDACTONE Take 1 Tab by mouth daily.   
 25 mg

## 2018-02-12 NOTE — PROGRESS NOTES
TRANSFER - IN REPORT:    Verbal report received from [place] on Annie Harris being received from Cath Lab for routine progression of care. Report consisted of patients Situation, Background, Assessment and Recommendations(SBAR). Information from the following report(s) SBAR was reviewed. Opportunity for questions and clarification was provided. Assessment completed upon patients arrival to unit and care assumed. Patient received to room 303. Patient connected to monitor and assessment completed. Plan of care reviewed. Patient oriented to room and call light. Patient aware to use call light to communicate any chest pain or needs. Admission skin assessment completed with second RN and reveals the following: sacrum intact without redness or open areas. Heels intact and without redness bilaterally. Right groin site dressing clean, dry and intact.

## 2018-02-13 VITALS
HEART RATE: 58 BPM | BODY MASS INDEX: 36.25 KG/M2 | RESPIRATION RATE: 20 BRPM | DIASTOLIC BLOOD PRESSURE: 69 MMHG | SYSTOLIC BLOOD PRESSURE: 102 MMHG | OXYGEN SATURATION: 98 % | WEIGHT: 253.2 LBS | TEMPERATURE: 97.6 F | HEIGHT: 70 IN

## 2018-02-13 LAB
ANION GAP SERPL CALC-SCNC: 10 MMOL/L (ref 7–16)
ATRIAL RATE: 47 BPM
BUN SERPL-MCNC: 12 MG/DL (ref 6–23)
CALCIUM SERPL-MCNC: 8.4 MG/DL (ref 8.3–10.4)
CALCULATED P AXIS, ECG09: 27 DEGREES
CALCULATED R AXIS, ECG10: 37 DEGREES
CALCULATED T AXIS, ECG11: 31 DEGREES
CHLORIDE SERPL-SCNC: 108 MMOL/L (ref 98–107)
CO2 SERPL-SCNC: 22 MMOL/L (ref 21–32)
CREAT SERPL-MCNC: 0.85 MG/DL (ref 0.6–1)
DIAGNOSIS, 93000: NORMAL
GLUCOSE SERPL-MCNC: 77 MG/DL (ref 65–100)
MAGNESIUM SERPL-MCNC: 2.4 MG/DL (ref 1.8–2.4)
P-R INTERVAL, ECG05: 174 MS
POTASSIUM SERPL-SCNC: 4.4 MMOL/L (ref 3.5–5.1)
Q-T INTERVAL, ECG07: 486 MS
QRS DURATION, ECG06: 92 MS
QTC CALCULATION (BEZET), ECG08: 430 MS
SODIUM SERPL-SCNC: 140 MMOL/L (ref 136–145)
VENTRICULAR RATE, ECG03: 47 BPM

## 2018-02-13 PROCEDURE — 99218 HC RM OBSERVATION: CPT

## 2018-02-13 PROCEDURE — 83735 ASSAY OF MAGNESIUM: CPT | Performed by: INTERNAL MEDICINE

## 2018-02-13 PROCEDURE — 93005 ELECTROCARDIOGRAM TRACING: CPT | Performed by: INTERNAL MEDICINE

## 2018-02-13 PROCEDURE — 80048 BASIC METABOLIC PNL TOTAL CA: CPT | Performed by: INTERNAL MEDICINE

## 2018-02-13 PROCEDURE — 36415 COLL VENOUS BLD VENIPUNCTURE: CPT | Performed by: INTERNAL MEDICINE

## 2018-02-13 RX ADMIN — LOSARTAN POTASSIUM 50 MG: 50 TABLET ORAL at 08:28

## 2018-02-13 RX ADMIN — CARVEDILOL 6.25 MG: 6.25 TABLET ORAL at 08:27

## 2018-02-13 NOTE — DISCHARGE INSTRUCTIONS
Atrial Fibrillation: Care Instructions  Your Care Instructions    Atrial fibrillation is an irregular and often fast heartbeat. Treating this condition is important for several reasons. It can cause blood clots, which can travel from your heart to your brain and cause a stroke. If you have a fast heartbeat, you may feel lightheaded, dizzy, and weak. An irregular heartbeat can also increase your risk for heart failure. Atrial fibrillation is often the result of another heart condition, such as high blood pressure or coronary artery disease. Making changes to improve your heart condition will help you stay healthy and active. Follow-up care is a key part of your treatment and safety. Be sure to make and go to all appointments, and call your doctor if you are having problems. It's also a good idea to know your test results and keep a list of the medicines you take. How can you care for yourself at home? Medicines  ? · Take your medicines exactly as prescribed. Call your doctor if you think you are having a problem with your medicine. You will get more details on the specific medicines your doctor prescribes. ? · If your doctor has given you a blood thinner to prevent a stroke, be sure you get instructions about how to take your medicine safely. Blood thinners can cause serious bleeding problems. ? · Do not take any vitamins, over-the-counter drugs, or herbal products without talking to your doctor first.   ? Lifestyle changes  ? · Do not smoke. Smoking can increase your chance of a stroke and heart attack. If you need help quitting, talk to your doctor about stop-smoking programs and medicines. These can increase your chances of quitting for good. ? · Eat a heart-healthy diet. ? · Stay at a healthy weight. Lose weight if you need to.   ? · Limit alcohol to 2 drinks a day for men and 1 drink a day for women. Too much alcohol can cause health problems. ? · Avoid colds and flu.  Get a pneumococcal vaccine shot. If you have had one before, ask your doctor whether you need another dose. Get a flu shot every year. If you must be around people with colds or flu, wash your hands often. Activity  ? · If your doctor recommends it, get more exercise. Walking is a good choice. Bit by bit, increase the amount you walk every day. Try for at least 30 minutes on most days of the week. You also may want to swim, bike, or do other activities. Your doctor may suggest that you join a cardiac rehabilitation program so that you can have help increasing your physical activity safely. ? · Start light exercise if your doctor says it is okay. Even a small amount will help you get stronger, have more energy, and manage stress. Walking is an easy way to get exercise. Start out by walking a little more than you did in the hospital. Gradually increase the amount you walk. ? · When you exercise, watch for signs that your heart is working too hard. You are pushing too hard if you cannot talk while you are exercising. If you become short of breath or dizzy or have chest pain, sit down and rest immediately. ? · Check your pulse regularly. Place two fingers on the artery at the palm side of your wrist, in line with your thumb. If your heartbeat seems uneven or fast, talk to your doctor. When should you call for help? Call 911 anytime you think you may need emergency care. For example, call if:  ? · You have symptoms of a heart attack. These may include:  ¨ Chest pain or pressure, or a strange feeling in the chest.  ¨ Sweating. ¨ Shortness of breath. ¨ Nausea or vomiting. ¨ Pain, pressure, or a strange feeling in the back, neck, jaw, or upper belly or in one or both shoulders or arms. ¨ Lightheadedness or sudden weakness. ¨ A fast or irregular heartbeat. After you call 911, the  may tell you to chew 1 adult-strength or 2 to 4 low-dose aspirin. Wait for an ambulance. Do not try to drive yourself.    ? · You have symptoms of a stroke. These may include:  ¨ Sudden numbness, tingling, weakness, or loss of movement in your face, arm, or leg, especially on only one side of your body. ¨ Sudden vision changes. ¨ Sudden trouble speaking. ¨ Sudden confusion or trouble understanding simple statements. ¨ Sudden problems with walking or balance. ¨ A sudden, severe headache that is different from past headaches. ? · You passed out (lost consciousness). ?Call your doctor now or seek immediate medical care if:  ? · You have new or increased shortness of breath. ? · You feel dizzy or lightheaded, or you feel like you may faint. ? · Your heart rate becomes irregular. ? · You can feel your heart flutter in your chest or skip heartbeats. Tell your doctor if these symptoms are new or worse. ? Watch closely for changes in your health, and be sure to contact your doctor if you have any problems. Where can you learn more? Go to http://maddie-dilan.info/. Enter U020 in the search box to learn more about \"Atrial Fibrillation: Care Instructions. \"  Current as of: September 21, 2016  Content Version: 11.4  © 5719-2150 [employer]. Care instructions adapted under license by [employer] (which disclaims liability or warranty for this information). If you have questions about a medical condition or this instruction, always ask your healthcare professional. [address] any warranty or liability for your use of this inform     DASH Diet: Care Instructions  Your Care Instructions    The DASH diet is an eating plan that can help lower your blood pressure. DASH stands for Dietary Approaches to Stop Hypertension. Hypertension is high blood pressure. The DASH diet focuses on eating foods that are high in calcium, potassium, and magnesium. These nutrients can lower blood pressure.  The foods that are highest in these nutrients are fruits, vegetables, low-fat dairy products, nuts, seeds, and legumes. But taking calcium, potassium, and magnesium supplements instead of eating foods that are high in those nutrients does not have the same effect. The DASH diet also includes whole grains, fish, and poultry. The DASH diet is one of several lifestyle changes your doctor may recommend to lower your high blood pressure. Your doctor may also want you to decrease the amount of sodium in your diet. Lowering sodium while following the DASH diet can lower blood pressure even further than just the DASH diet alone. Follow-up care is a key part of your treatment and safety. Be sure to make and go to all appointments, and call your doctor if you are having problems. It's also a good idea to know your test results and keep a list of the medicines you take. How can you care for yourself at home? Following the DASH diet  · Eat 4 to 5 servings of fruit each day. A serving is 1 medium-sized piece of fruit, ½ cup chopped or canned fruit, 1/4 cup dried fruit, or 4 ounces (½ cup) of fruit juice. Choose fruit more often than fruit juice. · Eat 4 to 5 servings of vegetables each day. A serving is 1 cup of lettuce or raw leafy vegetables, ½ cup of chopped or cooked vegetables, or 4 ounces (½ cup) of vegetable juice. Choose vegetables more often than vegetable juice. · Get 2 to 3 servings of low-fat and fat-free dairy each day. A serving is 8 ounces of milk, 1 cup of yogurt, or 1 ½ ounces of cheese. · Eat 6 to 8 servings of grains each day. A serving is 1 slice of bread, 1 ounce of dry cereal, or ½ cup of cooked rice, pasta, or cooked cereal. Try to choose whole-grain products as much as possible. · Limit lean meat, poultry, and fish to 2 servings each day. A serving is 3 ounces, about the size of a deck of cards. · Eat 4 to 5 servings of nuts, seeds, and legumes (cooked dried beans, lentils, and split peas) each week. A serving is 1/3 cup of nuts, 2 tablespoons of seeds, or ½ cup of cooked beans or peas.   · Limit fats and oils to 2 to 3 servings each day. A serving is 1 teaspoon of vegetable oil or 2 tablespoons of salad dressing. · Limit sweets and added sugars to 5 servings or less a week. A serving is 1 tablespoon jelly or jam, ½ cup sorbet, or 1 cup of lemonade. · Eat less than 2,300 milligrams (mg) of sodium a day. If you limit your sodium to 1,500 mg a day, you can lower your blood pressure even more. Tips for success  · Start small. Do not try to make dramatic changes to your diet all at once. You might feel that you are missing out on your favorite foods and then be more likely to not follow the plan. Make small changes, and stick with them. Once those changes become habit, add a few more changes. · Try some of the following:  ¨ Make it a goal to eat a fruit or vegetable at every meal and at snacks. This will make it easy to get the recommended amount of fruits and vegetables each day. ¨ Try yogurt topped with fruit and nuts for a snack or healthy dessert. ¨ Add lettuce, tomato, cucumber, and onion to sandwiches. ¨ Combine a ready-made pizza crust with low-fat mozzarella cheese and lots of vegetable toppings. Try using tomatoes, squash, spinach, broccoli, carrots, cauliflower, and onions. ¨ Have a variety of cut-up vegetables with a low-fat dip as an appetizer instead of chips and dip. ¨ Sprinkle sunflower seeds or chopped almonds over salads. Or try adding chopped walnuts or almonds to cooked vegetables. ¨ Try some vegetarian meals using beans and peas. Add garbanzo or kidney beans to salads. Make burritos and tacos with mashed mcdonald beans or black beans. Where can you learn more? Go to http://maddie-dilan.info/. Enter A577 in the search box to learn more about \"DASH Diet: Care Instructions. \"  Current as of: September 21, 2016  Content Version: 11.4  © 1124-7875 [employer].  Care instructions adapted under license by [employer] (which disclaims liability or warranty for this information). If you have questions about a medical condition or this instruction, always ask your healthcare professional. [place] [address] any warranty or liability for your use of this information. Cardiac Catheterization/Angiography Discharge Instructions    *Check the puncture site frequently for swelling or bleeding. If you see any bleeding, lie down and apply pressure over the area with a clean town or washcloth. Notify your doctor for any redness, swelling, drainage or oozing from the puncture site. Notify your doctor for any fever or chills. *If the leg or arm with the puncture becomes cold, numb or painful, call Dr Asif Melchor at  [phone]    *Activity should be limited for the next 48 hours. Climb stairs as little as possible and avoid any stooping, bending or strenuous activity for 48 hours. No heavy lifting (anything over 10 pounds) for three days. *Do not drive for 48 hours. *You may resume your usual diet. Drink more fluids than usual.    *Have a responsible person drive you home and stay with you for at least 24 hours after your heart catheterization/angiography. *You may remove the bandage from your Right and Groin in 24 hours. You may shower in 24 hours. No tub baths, hot tubs or swimming for one week. Do not place any lotions, creams, powders, ointments over the puncture site for one week. You may place a clean band-aid over the puncture site each day for 5 days. Change this daily.

## 2018-02-13 NOTE — PROGRESS NOTES
Problem: Falls - Risk of  Goal: *Absence of Falls  Document Kush Fall Risk and appropriate interventions in the flowsheet. Outcome: Progressing Towards Goal  Fall Risk Interventions:            Medication Interventions: Patient to call before getting OOB         History of Falls Interventions:  Investigate reason for fall

## 2018-02-13 NOTE — PROGRESS NOTES
Discharge instructions reviewed with patient. Opportunity for questions allowed. Patient verbalized understanding of all new medications and discharge instructions.

## 2018-02-13 NOTE — PROGRESS NOTES
Bradford catheter removed that was placed post procedure for obstruction/retention. Patient educated to notify RN of next urge to urinate. Will monitor.

## 2018-02-13 NOTE — DISCHARGE SUMMARY
[facility] Discharge Summary     Patient ID:  Danie Tirado  [id number]  56 y.o.  1970    Admit date: 2/12/2018    Discharge date:  2/13/2018    Admitting Physician: Gustavo Grande MD     Discharge Physician: Jesse Layne NP/Dr. Dunne    Admission Diagnoses: PVC (premature ventricular contraction) [I49.3]    Discharge Diagnoses:    Diagnosis    S/P ablation of ventricular arrhythmia    [facility]    [facility]ICM [facility]ischemic cardiomyopathy) [facility]    Systolic CHF, chronic ([facility]    Coronary artery disease involving native coronary artery of native heart without angina pectoris    PVC's (premature ventricular contractions)    Chronic fatigue    Intramural, submucous, and subserous leiomyoma of uterus       Cardiology Procedures this admission:  PVC/VT ablation  Consults: None    Hospital Course: Patient was seen at the office of [facility] by Dr. Odilia Casillas for management of PVCs and NICM and was subsequently scheduled for an AM Admission ablation at [facility] on 2/12/2018. Patient underwent ablation by Dr. Odilia Casillas. Patient tolerated the procedure well and was taken to telemetry for recovery. The morning of discharge, patient was up feeling well without any complaints of chest pain or shortness of breath. Patient's right groin cath sites were clean, dry and intact without hematoma or bruit. Patient's labs were WNL. Patient was seen and examined by Dr. Julia Martínez and determined stable and ready for discharge. Patient was instructed on the importance of medication compliance. The patient will follow up with [facility] -- Dr. Odilia Casillas in 2-3 weeks. DISPOSITION: The patient is being discharged home in stable condition on a low saturated fat, low cholesterol and low salt diet. The patient is instructed to advance activities as tolerated to the limit of fatigue or shortness of breath.  The patient is instructed to avoid all heavy lifting, straining, stooping or squatting for 3-5 days. The patient is instructed to watch the cath site for bleeding/oozing; if seen, the patient is instructed to apply firm pressure with a clean cloth and call [facility] at [phone]. The patient is instructed to watch for signs of infection which include: increasing area of redness, fever/hot to touch or purulent drainage at the catheterization site. The patient is instructed not to soak in a bathtub for 7-10 days, but is cleared to shower. Discharge Exam:   Patient has been seen by Dr. Ree Callahan: see his progress note for exam details.     Visit Vitals    /65 (BP 1 Location: Left arm, BP Patient Position: At rest)    Pulse (!) 48    Temp 98.2 °F (36.8 °C)    Resp 20    Ht 5' 10\" (1.778 m)    Wt 109.3 kg (241 lb)    SpO2 99%    Breastfeeding No    BMI 34.58 kg/m2         Recent Results (from the past 24 hour(s))   EKG, 12 LEAD, INITIAL    Collection Time: 02/12/18  9:28 AM   Result Value Ref Range    Ventricular Rate 61 BPM    Atrial Rate 61 BPM    P-R Interval 168 ms    QRS Duration 90 ms    Q-T Interval 430 ms    QTC Calculation (Bezet) 432 ms    Calculated P Axis 25 degrees    Calculated R Axis 37 degrees    Calculated T Axis 39 degrees    Diagnosis       Normal sinus rhythm  Normal ECG  When compared with ECG of 31-DEC-2016 15:54,  Premature ventricular complexes are no longer Present  T wave inversion no longer evident in Inferior leads  T wave inversion no longer evident in Anterior leads  Confirmed by CLAUDIO JACK (), HARRY CALDERON ([id number]) on 2/12/2018 9:50:38 AM     CBC W/O DIFF    Collection Time: 02/12/18  9:44 AM   Result Value Ref Range    WBC 6.9 4.3 - 11.1 K/uL    RBC 4.10 4.05 - 5.25 M/uL    HGB 8.8 (L) 11.7 - 15.4 g/dL    HCT 28.3 (L) 35.8 - 46.3 %    MCV 69.0 (L) 79.6 - 97.8 FL    MCH 21.5 (L) 26.1 - 32.9 PG    MCHC 31.1 (L) 31.4 - 35.0 g/dL    RDW 18.0 (H) 11.9 - 14.6 %    PLATELET 891 (H) 013 - 450 K/uL    MPV 9.9 (L) 10.8 - 93.8 FL   METABOLIC PANEL, BASIC    Collection Time: 02/12/18  9:44 AM   Result Value Ref Range    Sodium 139 136 - 145 mmol/L    Potassium 4.9 3.5 - 5.1 mmol/L    Chloride 107 98 - 107 mmol/L    CO2 24 21 - 32 mmol/L    Anion gap 8 7 - 16 mmol/L    Glucose 88 65 - 100 mg/dL    BUN 18 6 - 23 MG/DL    Creatinine 1.23 (H) 0.6 - 1.0 MG/DL    GFR est AA 60 (L) >60 ml/min/1.73m2    GFR est non-AA 49 (L) >60 ml/min/1.73m2    Calcium 8.8 8.3 - 10.4 MG/DL   PROTHROMBIN TIME + INR    Collection Time: 02/12/18  9:44 AM   Result Value Ref Range    Prothrombin time 13.5 11.5 - 14.5 sec    INR 1.1     MAGNESIUM    Collection Time: 02/12/18  9:44 AM   Result Value Ref Range    Magnesium 2.5 (H) 1.8 - 2.4 mg/dL   POC ACTIVATED CLOTTING TIME    Collection Time: 02/12/18  1:55 PM   Result Value Ref Range    Activated Clotting Time (POC) 219 (H) 70 - 128 SECS   POC ACTIVATED CLOTTING TIME    Collection Time: 02/12/18  2:19 PM   Result Value Ref Range    Activated Clotting Time (POC) 257 (H) 70 - 128 SECS   POC ACTIVATED CLOTTING TIME    Collection Time: 02/12/18  2:56 PM   Result Value Ref Range    Activated Clotting Time (POC) 307 (H) 70 - 128 SECS   POC ACTIVATED CLOTTING TIME    Collection Time: 02/12/18  3:23 PM   Result Value Ref Range    Activated Clotting Time (POC) 307 (H) 70 - 128 SECS   POC ACTIVATED CLOTTING TIME    Collection Time: 02/12/18  3:46 PM   Result Value Ref Range    Activated Clotting Time (POC) 213 (H) 70 - 128 SECS   POC ACTIVATED CLOTTING TIME    Collection Time: 02/12/18  4:02 PM   Result Value Ref Range    Activated Clotting Time (POC) 164 (H) 70 - 128 SECS   EKG, 12 LEAD, INITIAL    Collection Time: 02/12/18  4:55 PM   Result Value Ref Range    Ventricular Rate 64 BPM    Atrial Rate 64 BPM    P-R Interval 176 ms    QRS Duration 88 ms    Q-T Interval 422 ms    QTC Calculation (Bezet) 435 ms    Calculated P Axis 61 degrees    Calculated R Axis 50 degrees    Calculated T Axis 32 degrees    Diagnosis Normal sinus rhythm  Normal ECG  When compared with ECG of 12-FEB-2018 09:28,  No significant change was found  Confirmed by ST REZA JUAREZ MD (), KEVIN PUTNAM ([id number]) on 2/12/2018 8:30:36 PM           Patient Instructions:   Current Discharge Medication List      CONTINUE these medications which have NOT CHANGED    Details   furosemide (LASIX) 40 mg tablet TAKE ONE TABLET BY MOUTH EVERY OTHER DAY  Refills: 3      spironolactone (ALDACTONE) 25 mg tablet Take 1 Tab by mouth daily. Qty: 30 Tab, Refills: 11      carvedilol (COREG) 6.25 mg tablet 6.25 mg two (2) times a day. losartan (COZAAR) 50 mg tablet 50 mg daily. ATORVASTATIN CALCIUM (ATORVASTATIN PO) Take  by mouth. Indications: PO once daily      polyethylene glycol (MIRALAX) 17 gram packet Take 17 g by mouth daily.                Signed:  Sierra Sales NP  2/13/2018  3:57 AM

## 2018-02-13 NOTE — ANESTHESIA POSTPROCEDURE EVALUATION
Post-Anesthesia Evaluation and Assessment    Patient: Desirae File MRN: [id number]  SSN: [id number]    YOB: 1970  Age: 50 y.o. Sex: female       Cardiovascular Function/Vital Signs  Visit Vitals    /76    Pulse 69    Temp 36.7 °C (98 °F)    Resp 17    Ht 5' 10\" (1.778 m)    Wt 109.3 kg (241 lb)    SpO2 98%    Breastfeeding No    BMI 34.58 kg/m2       Patient is status post total IV anesthesia anesthesia for Procedure(s):  PVC  ABLATION. Nausea/Vomiting: None    Postoperative hydration reviewed and adequate. Pain:  Pain Scale 1: Numeric (0 - 10) (02/12/18 1638)  Pain Intensity 1: 2 (02/12/18 1711)   Managed    Neurological Status:   Neuro (WDL): Within Defined Limits (02/12/18 1638)   At baseline    Mental Status and Level of Consciousness: Arousable    Pulmonary Status:   O2 Device: Room air (02/12/18 1638)   Adequate oxygenation and airway patent    Complications related to anesthesia: None    Post-anesthesia assessment completed.  No concerns    Signed By: Jimenez Apodaca MD     February 12, 2018

## 2018-02-13 NOTE — PROGRESS NOTES
Verbal bedside report received from [facility]. Assumed care of patient. R groin site visualized.

## 2018-05-07 PROBLEM — N92.0 MENORRHAGIA WITH REGULAR CYCLE: Status: ACTIVE | Noted: 2018-05-07

## 2018-05-11 ENCOUNTER — HOSPITAL ENCOUNTER (OUTPATIENT)
Dept: LAB | Age: 48
Discharge: HOME | End: 2018-05-11
Attending: INTERNAL MEDICINE
Payer: COMMERCIAL

## 2018-05-11 DIAGNOSIS — I50.22 SYSTOLIC CHF, CHRONIC (HCC): ICD-10-CM

## 2018-05-11 LAB
ALBUMIN SERPL-MCNC: 3.2 G/DL (ref 3.5–5)
ALBUMIN/GLOB SERPL: 0.7 {RATIO}
ALP SERPL-CCNC: 56 U/L (ref 50–136)
ALT SERPL-CCNC: 14 U/L (ref 12–65)
ANION GAP SERPL CALC-SCNC: 5 MMOL/L
AST SERPL-CCNC: 11 U/L (ref 15–37)
BASOPHILS # BLD: 0.1 K/UL (ref 0–0.2)
BASOPHILS NFR BLD: 1 % (ref 0–2)
BILIRUB SERPL-MCNC: 0.2 MG/DL (ref 0.2–1.1)
BNP SERPL-MCNC: 17 PG/ML
BUN SERPL-MCNC: 13 MG/DL (ref 6–23)
CALCIUM SERPL-MCNC: 8.8 MG/DL (ref 8.3–10.4)
CHLORIDE SERPL-SCNC: 109 MMOL/L (ref 98–107)
CO2 SERPL-SCNC: 26 MMOL/L (ref 21–32)
CREAT SERPL-MCNC: 0.9 MG/DL (ref 0.6–1)
DIFFERENTIAL METHOD BLD: ABNORMAL
EOSINOPHIL # BLD: 0.1 K/UL (ref 0–0.8)
EOSINOPHIL NFR BLD: 2 % (ref 0.5–7.8)
ERYTHROCYTE [DISTWIDTH] IN BLOOD BY AUTOMATED COUNT: 18 % (ref 11.9–14.6)
GLOBULIN SER CALC-MCNC: 4.3 G/DL
GLUCOSE SERPL-MCNC: 87 MG/DL (ref 65–100)
HCT VFR BLD AUTO: 25.6 % (ref 35.8–46.3)
HGB BLD-MCNC: 7.7 G/DL (ref 11.7–15.4)
LYMPHOCYTES # BLD: 1.4 K/UL (ref 0.5–4.6)
LYMPHOCYTES NFR BLD: 20 % (ref 13–44)
MAGNESIUM SERPL-MCNC: 2.2 MG/DL (ref 1.8–2.4)
MCH RBC QN AUTO: 20.2 PG (ref 26.1–32.9)
MCHC RBC AUTO-ENTMCNC: 30.1 G/DL (ref 31.4–35)
MCV RBC AUTO: 67.2 FL (ref 79.6–97.8)
MONOCYTES # BLD: 0.4 K/UL (ref 0.1–1.3)
MONOCYTES NFR BLD: 6 % (ref 4–12)
NEUTS SEG # BLD: 5.1 K/UL (ref 1.7–8.2)
NEUTS SEG NFR BLD: 71 % (ref 43–78)
PLATELET # BLD AUTO: 693 K/UL (ref 150–450)
PMV BLD AUTO: 9.6 FL (ref 10.8–14.1)
POTASSIUM SERPL-SCNC: 3.8 MMOL/L (ref 3.5–5.1)
PROT SERPL-MCNC: 7.5 G/DL (ref 6.3–8.2)
RBC # BLD AUTO: 3.81 M/UL (ref 4.05–5.25)
SODIUM SERPL-SCNC: 140 MMOL/L (ref 136–145)
TSH SERPL DL<=0.005 MIU/L-ACNC: 0.72 UIU/ML (ref 0.36–3.74)
WBC # BLD AUTO: 7 K/UL (ref 4.3–11.1)

## 2018-05-11 PROCEDURE — 83880 ASSAY OF NATRIURETIC PEPTIDE: CPT | Performed by: INTERNAL MEDICINE

## 2018-05-11 PROCEDURE — 36415 COLL VENOUS BLD VENIPUNCTURE: CPT | Performed by: INTERNAL MEDICINE

## 2018-05-11 PROCEDURE — 85025 COMPLETE CBC W/AUTO DIFF WBC: CPT | Performed by: INTERNAL MEDICINE

## 2018-05-11 PROCEDURE — 83735 ASSAY OF MAGNESIUM: CPT | Performed by: INTERNAL MEDICINE

## 2018-05-11 PROCEDURE — 84443 ASSAY THYROID STIM HORMONE: CPT | Performed by: INTERNAL MEDICINE

## 2018-05-11 PROCEDURE — 80053 COMPREHEN METABOLIC PANEL: CPT | Performed by: INTERNAL MEDICINE

## 2018-05-11 NOTE — PROGRESS NOTES
Please call her, Hb down to 7.7. This is making her weak. Needs to see Gyn doc soon, likely needs PRBC as well. Please follow up on this and see if Gyn doc will take care of this. I would keep lasix the same for now. We can arrange for her to get 2 units of PRBC if we need to on Monday or Tues, but see if Gyn office will address this first.  Need to follow up on this one later in the week as well.   Thanks

## 2018-05-15 NOTE — PROGRESS NOTES
I spoke with the patient and informed her of her lab results and Dr. Roberto Melton response. The patient stated that she will make an appointment with her GYN to f/u. Patient is not too keen on getting a transfusion and wants to discuss other options first. I informed the patient to let us know if she needs us to arrange her to get 2 units or if her GYN will handle it. Patient verbalized understanding and appreciation.

## 2018-07-03 ENCOUNTER — HOSPITAL ENCOUNTER (OUTPATIENT)
Dept: SURGERY | Age: 48
Discharge: HOME | End: 2018-07-03

## 2018-07-05 PROBLEM — E66.01 SEVERE OBESITY (BMI 35.0-39.9): Status: ACTIVE | Noted: 2018-07-05

## 2018-07-05 NOTE — H&P
Gynecology Major Surgery History and Physical    Aislinn Evans  [id number]    Subjective:      Chief complaint:  acute blood loss anemia and menorrhagia    Den Rodrigues is a 46 y.o.  female with a history of acute blood loss anemia and menorrhagia. Previous treatment measures include none. Ultrasound findings include ? Fibroid vs polyp. She is admitted for Procedure(s) (LRB):  DILATATION AND CURETTAGE HYSTEROSCOPY ENDOMETRIAL ABLATION/ [address] (N/A)    The current method of family planning is tubal ligation. Past Medical History:   Diagnosis Date    Abnormal Papanicolaou smear of cervix     Anemia     Cardiomyopathy ([address]     CHF (congestive heart failure) (HCC)     Fibroid, uterine     Hypercholesterolemia     Hypertension     Menorrhagia with regular cycle     Ovarian cyst     Uterine fibroid      Past Surgical History:   Procedure Laterality Date    HX HYSTEROSCOPY      HX TUBAL LIGATION      HX WISDOM TEETH EXTRACTION      LAP,TUBAL CAUTERY       OB History      Para Term  AB Living    5 3 3  2 3    SAB TAB Ectopic Molar Multiple Live Births    2     3          No Known Allergies    Cannot display prior to admission medications because the patient has not been admitted in this contact. Family History   Problem Relation Age of Onset    Heart Disease Mother     Cancer Father      Social History     Social History    Marital status: SINGLE     Spouse name: N/A    Number of children: N/A    Years of education: N/A     Occupational History    Not on file. Social History Main Topics    Smoking status: Never Smoker    Smokeless tobacco: Never Used    Alcohol use No    Drug use: No    Sexual activity: Not Currently     Birth control/ protection: Surgical     Other Topics Concern    Not on file     Social History Narrative         Review of Systems:  Pertinent items are noted in HPI. Objective:      There were no vitals filed for this visit.    Physical Exam:  General:  alert, cooperative, no distress, appears stated age   Lungs:  clear to auscultation bilaterally   Heart:  regular rate and rhythm, S1, S2 normal, no murmur, click, rub or gallop   Abdomen: soft, non-tender. Bowel sounds normal. No masses,  no organomegaly. Genitourinary: External genitalia: normal general appearance  Urinary system: urethral meatus normal  Vaginal: normal mucosa without prolapse or lesions  Cervix: normal appearance  Adnexa: normal bimanual exam  Uterus: normal single, nontender   Extremities:  extremities normal, atraumatic, no cyanosis or edema     Assessment:     anemia with menorrhagia       Plan:     1. Procedure(s) (LRB):  DILATATION AND CURETTAGE HYSTEROSCOPY ENDOMETRIAL ABLATION/ NOVASURE AND POSSIBLE MYOSURE (N/A)  Discussed the risk of surgery including the risks of bleeding, infection, DVT, and surgical injuries to internal organs including but not limited to the bowels, bladder, rectum, and female reproductive organs. The patient understands the risks, any and all questions were answered to the patient's satisfaction.

## 2018-07-06 VITALS — WEIGHT: 231 LBS | BODY MASS INDEX: 35.01 KG/M2 | HEIGHT: 68 IN

## 2018-07-06 NOTE — PERIOP NOTES
Patient verified name, , and surgery as listed in [facility]. Type 1B surgery, PAT phone assessment complete. Orders were received. Labs per surgeon: CBC  Labs per anesthesia protocol: K+  Pt instructed to come for lab work entrance B (Monday- Friday 8:30 AM- 4:00 PM) prior to surgery day. Pt voiced an understanding. Echo report from 3/28/18, cardiology office note from 18 and EKG's from 18, 18 and 18 placed on chart for anesthesia reference. Will have anesthesia review echo report with LVEF 45-50% and all cardiac records. Patient answered medical/surgical history questions at their best of ability. All prior to admission medications documented in [facility]. Patient instructed to take the following medications the day of surgery according to anesthesia guidelines with a small sip of water: coreg. Hold all vitamins 7 days prior to surgery and NSAIDS 5 days prior to surgery. Medications to be held- vitamin C. Patient instructed on the following:  Arrive at A Entrance, time of arrival to be called the day before by 1700  NPO after midnight including gum, mints, and ice chips  Responsible adult must drive patient to the hospital, stay during surgery, and patient will need supervision 24 hours after anesthesia  Use antibacterial soap in shower the night before surgery and on the morning of surgery  Leave all valuables (money and jewelry) at home but bring insurance card and ID on DOS  Do not wear make-up, nail polish, lotions, cologne, perfumes, powders, or oil on skin. Patient teach back successful and patient demonstrates knowledge of instruction.

## 2018-07-09 NOTE — PERIOP NOTES
, anesthesia, reviewed and ok'd for surgery echo (3/28/18), card note (6/27/18), ekg (5/11/18) - no new orders received. OK to proceed.

## 2018-07-10 ENCOUNTER — ANESTHESIA EVENT (OUTPATIENT)
Dept: SURGERY | Age: 48
End: 2018-07-10
Payer: COMMERCIAL

## 2018-07-10 ENCOUNTER — HOSPITAL ENCOUNTER (OUTPATIENT)
Dept: LAB | Age: 48
Discharge: HOME | End: 2018-07-10
Attending: OBSTETRICS & GYNECOLOGY
Payer: COMMERCIAL

## 2018-07-10 LAB — POTASSIUM SERPL-SCNC: 3.7 MMOL/L (ref 3.5–5.1)

## 2018-07-10 PROCEDURE — 36415 COLL VENOUS BLD VENIPUNCTURE: CPT | Performed by: ANESTHESIOLOGY

## 2018-07-10 PROCEDURE — 84132 ASSAY OF SERUM POTASSIUM: CPT | Performed by: ANESTHESIOLOGY

## 2018-07-11 ENCOUNTER — HOSPITAL ENCOUNTER (OUTPATIENT)
Age: 48
Setting detail: OUTPATIENT SURGERY
Discharge: HOME | End: 2018-07-11
Attending: OBSTETRICS & GYNECOLOGY | Admitting: OBSTETRICS & GYNECOLOGY
Payer: COMMERCIAL

## 2018-07-11 ENCOUNTER — ANESTHESIA (OUTPATIENT)
Dept: SURGERY | Age: 48
End: 2018-07-11
Payer: COMMERCIAL

## 2018-07-11 VITALS
DIASTOLIC BLOOD PRESSURE: 93 MMHG | BODY MASS INDEX: 35.58 KG/M2 | OXYGEN SATURATION: 100 % | RESPIRATION RATE: 14 BRPM | HEART RATE: 48 BPM | SYSTOLIC BLOOD PRESSURE: 169 MMHG | WEIGHT: 234 LBS | TEMPERATURE: 99 F

## 2018-07-11 DIAGNOSIS — N92.0 MENORRHAGIA WITH REGULAR CYCLE: Primary | ICD-10-CM

## 2018-07-11 LAB
ERYTHROCYTE [DISTWIDTH] IN BLOOD BY AUTOMATED COUNT: ABNORMAL % (ref 11.9–14.6)
HCG UR QL: NEGATIVE
HCT VFR BLD AUTO: 30.9 % (ref 35.8–46.3)
HGB BLD-MCNC: 9.8 G/DL (ref 11.7–15.4)
MCH RBC QN AUTO: 23.8 PG (ref 26.1–32.9)
MCHC RBC AUTO-ENTMCNC: 31.7 G/DL (ref 31.4–35)
MCV RBC AUTO: 75 FL (ref 79.6–97.8)
PLATELET # BLD AUTO: 409 K/UL (ref 150–450)
PMV BLD AUTO: 11 FL (ref 10.8–14.1)
RBC # BLD AUTO: 4.12 M/UL (ref 4.05–5.25)
WBC # BLD AUTO: 7.9 K/UL (ref 4.3–11.1)

## 2018-07-11 PROCEDURE — 77030032490 HC SLV COMPR SCD KNE COVD -B: Performed by: OBSTETRICS & GYNECOLOGY

## 2018-07-11 PROCEDURE — 74011000250 HC RX REV CODE- 250: Performed by: ANESTHESIOLOGY

## 2018-07-11 PROCEDURE — 74011250636 HC RX REV CODE- 250/636: Performed by: OBSTETRICS & GYNECOLOGY

## 2018-07-11 PROCEDURE — 74011000250 HC RX REV CODE- 250

## 2018-07-11 PROCEDURE — 77030020782 HC GWN BAIR PAWS FLX 3M -B: Performed by: ANESTHESIOLOGY

## 2018-07-11 PROCEDURE — 77030018836 HC SOL IRR NACL ICUM -A: Performed by: OBSTETRICS & GYNECOLOGY

## 2018-07-11 PROCEDURE — 88305 TISSUE EXAM BY PATHOLOGIST: CPT | Performed by: OBSTETRICS & GYNECOLOGY

## 2018-07-11 PROCEDURE — 76010000149 HC OR TIME 1 TO 1.5 HR: Performed by: OBSTETRICS & GYNECOLOGY

## 2018-07-11 PROCEDURE — 77030012317 HC CATH URET INT COVD -A: Performed by: OBSTETRICS & GYNECOLOGY

## 2018-07-11 PROCEDURE — 77030033136 HC TBNG INFLO AQUILEX ST HOLO -C: Performed by: OBSTETRICS & GYNECOLOGY

## 2018-07-11 PROCEDURE — 77030033137 HC TBNG OUTFLO AQUILEX ST HOLO -B: Performed by: OBSTETRICS & GYNECOLOGY

## 2018-07-11 PROCEDURE — 77030020143 HC AIRWY LARYN INTUB CGAS -A: Performed by: ANESTHESIOLOGY

## 2018-07-11 PROCEDURE — 74011250636 HC RX REV CODE- 250/636

## 2018-07-11 PROCEDURE — 85027 COMPLETE CBC AUTOMATED: CPT | Performed by: OBSTETRICS & GYNECOLOGY

## 2018-07-11 PROCEDURE — 81025 URINE PREGNANCY TEST: CPT

## 2018-07-11 PROCEDURE — 74011250636 HC RX REV CODE- 250/636: Performed by: ANESTHESIOLOGY

## 2018-07-11 PROCEDURE — 77030037417 HC DEV TISS RMVL HOLO -H: Performed by: OBSTETRICS & GYNECOLOGY

## 2018-07-11 PROCEDURE — 76210000016 HC OR PH I REC 1 TO 1.5 HR: Performed by: OBSTETRICS & GYNECOLOGY

## 2018-07-11 PROCEDURE — 77030034928 HC DEV UTER SURSND KT HOLO -G1: Performed by: OBSTETRICS & GYNECOLOGY

## 2018-07-11 PROCEDURE — 76060000033 HC ANESTHESIA 1 TO 1.5 HR: Performed by: OBSTETRICS & GYNECOLOGY

## 2018-07-11 PROCEDURE — 77030011640 HC PAD GRND REM COVD -A: Performed by: OBSTETRICS & GYNECOLOGY

## 2018-07-11 RX ORDER — SODIUM CHLORIDE 0.9 % (FLUSH) 0.9 %
5-10 SYRINGE (ML) INJECTION EVERY 8 HOURS
Status: DISCONTINUED | OUTPATIENT
Start: 2018-07-11 | End: 2018-07-11 | Stop reason: HOSPADM

## 2018-07-11 RX ORDER — OXYCODONE HYDROCHLORIDE 5 MG/1
5 TABLET ORAL
Status: DISCONTINUED | OUTPATIENT
Start: 2018-07-11 | End: 2018-07-11 | Stop reason: HOSPADM

## 2018-07-11 RX ORDER — DEXAMETHASONE SODIUM PHOSPHATE 4 MG/ML
INJECTION, SOLUTION INTRA-ARTICULAR; INTRALESIONAL; INTRAMUSCULAR; INTRAVENOUS; SOFT TISSUE AS NEEDED
Status: DISCONTINUED | OUTPATIENT
Start: 2018-07-11 | End: 2018-07-11 | Stop reason: HOSPADM

## 2018-07-11 RX ORDER — PROPOFOL 10 MG/ML
INJECTION, EMULSION INTRAVENOUS AS NEEDED
Status: DISCONTINUED | OUTPATIENT
Start: 2018-07-11 | End: 2018-07-11 | Stop reason: HOSPADM

## 2018-07-11 RX ORDER — HYDROMORPHONE HYDROCHLORIDE 2 MG/ML
0.5 INJECTION, SOLUTION INTRAMUSCULAR; INTRAVENOUS; SUBCUTANEOUS
Status: DISCONTINUED | OUTPATIENT
Start: 2018-07-11 | End: 2018-07-11 | Stop reason: HOSPADM

## 2018-07-11 RX ORDER — SODIUM CHLORIDE, SODIUM LACTATE, POTASSIUM CHLORIDE, CALCIUM CHLORIDE 600; 310; 30; 20 MG/100ML; MG/100ML; MG/100ML; MG/100ML
100 INJECTION, SOLUTION INTRAVENOUS CONTINUOUS
Status: DISCONTINUED | OUTPATIENT
Start: 2018-07-11 | End: 2018-07-11 | Stop reason: HOSPADM

## 2018-07-11 RX ORDER — SODIUM CHLORIDE 0.9 % (FLUSH) 0.9 %
5-10 SYRINGE (ML) INJECTION AS NEEDED
Status: DISCONTINUED | OUTPATIENT
Start: 2018-07-11 | End: 2018-07-11 | Stop reason: HOSPADM

## 2018-07-11 RX ORDER — LIDOCAINE HYDROCHLORIDE 10 MG/ML
0.1 INJECTION INFILTRATION; PERINEURAL AS NEEDED
Status: DISCONTINUED | OUTPATIENT
Start: 2018-07-11 | End: 2018-07-11 | Stop reason: HOSPADM

## 2018-07-11 RX ORDER — ALBUTEROL SULFATE 0.83 MG/ML
2.5 SOLUTION RESPIRATORY (INHALATION) AS NEEDED
Status: DISCONTINUED | OUTPATIENT
Start: 2018-07-11 | End: 2018-07-11 | Stop reason: HOSPADM

## 2018-07-11 RX ORDER — FENTANYL CITRATE 50 UG/ML
INJECTION, SOLUTION INTRAMUSCULAR; INTRAVENOUS AS NEEDED
Status: DISCONTINUED | OUTPATIENT
Start: 2018-07-11 | End: 2018-07-11 | Stop reason: HOSPADM

## 2018-07-11 RX ORDER — FENTANYL CITRATE 50 UG/ML
100 INJECTION, SOLUTION INTRAMUSCULAR; INTRAVENOUS ONCE
Status: DISCONTINUED | OUTPATIENT
Start: 2018-07-11 | End: 2018-07-11 | Stop reason: HOSPADM

## 2018-07-11 RX ORDER — MIDAZOLAM HYDROCHLORIDE 1 MG/ML
2 INJECTION, SOLUTION INTRAMUSCULAR; INTRAVENOUS
Status: DISCONTINUED | OUTPATIENT
Start: 2018-07-11 | End: 2018-07-11 | Stop reason: HOSPADM

## 2018-07-11 RX ORDER — OXYCODONE HYDROCHLORIDE 5 MG/1
10 TABLET ORAL
Status: DISCONTINUED | OUTPATIENT
Start: 2018-07-11 | End: 2018-07-11 | Stop reason: HOSPADM

## 2018-07-11 RX ORDER — CEFAZOLIN SODIUM/WATER 2 G/20 ML
2 SYRINGE (ML) INTRAVENOUS ONCE
Status: COMPLETED | OUTPATIENT
Start: 2018-07-11 | End: 2018-07-11

## 2018-07-11 RX ORDER — MIDAZOLAM HYDROCHLORIDE 1 MG/ML
2 INJECTION, SOLUTION INTRAMUSCULAR; INTRAVENOUS ONCE
Status: COMPLETED | OUTPATIENT
Start: 2018-07-11 | End: 2018-07-11

## 2018-07-11 RX ORDER — OXYCODONE AND ACETAMINOPHEN 5; 325 MG/1; MG/1
1 TABLET ORAL
Qty: 20 TAB | Refills: 0 | Status: SHIPPED | OUTPATIENT
Start: 2018-07-11 | End: 2018-07-30 | Stop reason: ALTCHOICE

## 2018-07-11 RX ORDER — ONDANSETRON 2 MG/ML
INJECTION INTRAMUSCULAR; INTRAVENOUS AS NEEDED
Status: DISCONTINUED | OUTPATIENT
Start: 2018-07-11 | End: 2018-07-11 | Stop reason: HOSPADM

## 2018-07-11 RX ORDER — NALOXONE HYDROCHLORIDE 0.4 MG/ML
0.1 INJECTION, SOLUTION INTRAMUSCULAR; INTRAVENOUS; SUBCUTANEOUS AS NEEDED
Status: DISCONTINUED | OUTPATIENT
Start: 2018-07-11 | End: 2018-07-11 | Stop reason: HOSPADM

## 2018-07-11 RX ORDER — LIDOCAINE HYDROCHLORIDE 20 MG/ML
INJECTION, SOLUTION EPIDURAL; INFILTRATION; INTRACAUDAL; PERINEURAL AS NEEDED
Status: DISCONTINUED | OUTPATIENT
Start: 2018-07-11 | End: 2018-07-11 | Stop reason: HOSPADM

## 2018-07-11 RX ORDER — ONDANSETRON 2 MG/ML
4 INJECTION INTRAMUSCULAR; INTRAVENOUS ONCE
Status: DISCONTINUED | OUTPATIENT
Start: 2018-07-11 | End: 2018-07-11 | Stop reason: HOSPADM

## 2018-07-11 RX ORDER — EPHEDRINE SULFATE 50 MG/ML
INJECTION, SOLUTION INTRAVENOUS AS NEEDED
Status: DISCONTINUED | OUTPATIENT
Start: 2018-07-11 | End: 2018-07-11 | Stop reason: HOSPADM

## 2018-07-11 RX ORDER — KETOROLAC TROMETHAMINE 30 MG/ML
INJECTION, SOLUTION INTRAMUSCULAR; INTRAVENOUS AS NEEDED
Status: DISCONTINUED | OUTPATIENT
Start: 2018-07-11 | End: 2018-07-11 | Stop reason: HOSPADM

## 2018-07-11 RX ORDER — DIPHENHYDRAMINE HYDROCHLORIDE 50 MG/ML
12.5 INJECTION, SOLUTION INTRAMUSCULAR; INTRAVENOUS
Status: DISCONTINUED | OUTPATIENT
Start: 2018-07-11 | End: 2018-07-11 | Stop reason: HOSPADM

## 2018-07-11 RX ADMIN — FENTANYL CITRATE 50 MCG: 50 INJECTION, SOLUTION INTRAMUSCULAR; INTRAVENOUS at 07:19

## 2018-07-11 RX ADMIN — LIDOCAINE HYDROCHLORIDE 100 MG: 20 INJECTION, SOLUTION EPIDURAL; INFILTRATION; INTRACAUDAL; PERINEURAL at 07:19

## 2018-07-11 RX ADMIN — EPHEDRINE SULFATE 10 MG: 50 INJECTION, SOLUTION INTRAVENOUS at 07:34

## 2018-07-11 RX ADMIN — EPHEDRINE SULFATE 10 MG: 50 INJECTION, SOLUTION INTRAVENOUS at 07:43

## 2018-07-11 RX ADMIN — PROPOFOL 200 MG: 10 INJECTION, EMULSION INTRAVENOUS at 07:19

## 2018-07-11 RX ADMIN — MIDAZOLAM HYDROCHLORIDE 2 MG: 1 INJECTION, SOLUTION INTRAMUSCULAR; INTRAVENOUS at 07:04

## 2018-07-11 RX ADMIN — FENTANYL CITRATE 25 MCG: 50 INJECTION, SOLUTION INTRAMUSCULAR; INTRAVENOUS at 07:54

## 2018-07-11 RX ADMIN — KETOROLAC TROMETHAMINE 30 MG: 30 INJECTION, SOLUTION INTRAMUSCULAR; INTRAVENOUS at 08:03

## 2018-07-11 RX ADMIN — DEXAMETHASONE SODIUM PHOSPHATE 5 MG: 4 INJECTION, SOLUTION INTRA-ARTICULAR; INTRALESIONAL; INTRAMUSCULAR; INTRAVENOUS; SOFT TISSUE at 07:29

## 2018-07-11 RX ADMIN — FENTANYL CITRATE 25 MCG: 50 INJECTION, SOLUTION INTRAMUSCULAR; INTRAVENOUS at 08:03

## 2018-07-11 RX ADMIN — SODIUM CHLORIDE, SODIUM LACTATE, POTASSIUM CHLORIDE, AND CALCIUM CHLORIDE 100 ML/HR: 600; 310; 30; 20 INJECTION, SOLUTION INTRAVENOUS at 07:03

## 2018-07-11 RX ADMIN — Medication 2 G: at 07:28

## 2018-07-11 RX ADMIN — ONDANSETRON 4 MG: 2 INJECTION INTRAMUSCULAR; INTRAVENOUS at 07:29

## 2018-07-11 RX ADMIN — LIDOCAINE HYDROCHLORIDE 0.1 ML: 10 INJECTION, SOLUTION INFILTRATION; PERINEURAL at 07:04

## 2018-07-11 NOTE — IP AVS SNAPSHOT
[address] [address] 
[phone] Patient: Edward Redd MRN: [id number] DPN:3/93/2000 About your hospitalization You were admitted on:  July 11, 2018 You last received care in the:  [facility] PACU You were discharged on:  July 11, 2018 Why you were hospitalized Your primary diagnosis was:  Not on File Follow-up Information Follow up With Details Comments Contact Info David Cadet, [address] [facility] [address] [facility] [address] 
[phone] Your Scheduled Appointments Monday July 30, 2018 11:00 AM EDT  
POST OP with Rebecca Altman MD  
[facility] ([address] [address] [address] [place] [id number] [phone] Tuesday August 14, 2018  2:15 PM EDT Office Visit with Josiane Carpio DO  
[facility] CARDIOLOGY [place] OFFICE ([address] [place] [address]) 2 [facility] 400 [facility] 81  
[phone] Discharge Orders None A check hemant indicates which time of day the medication should be taken. My Medications START taking these medications Instructions Each Dose to Equal  
 Morning Noon Evening Bedtime  
 oxyCODONE-acetaminophen 5-325 mg per tablet Commonly known as:  PERCOCET Your last dose was: Your next dose is: Take 1 Tab by mouth every four (4) hours as needed for Pain. Max Daily Amount: 6 Tabs. 1 Tab CHANGE how you take these medications Instructions Each Dose to Equal  
 Morning Noon Evening Bedtime  
 atorvastatin 40 mg tablet Commonly known as:  LIPITOR What changed:  when to take this Your last dose was: Your next dose is: Take 1 Tab by mouth daily. Indications: PO once daily 40 mg  
    
   
   
   
  
 furosemide 40 mg tablet Commonly known as:  LASIX What changed:  additional instructions Your last dose was: Your next dose is: TAKE ONE TABLET BY MOUTH EVERY OTHER DAY  
     
   
   
   
  
 spironolactone 25 mg tablet Commonly known as:  ALDACTONE What changed:   
- when to take this 
- additional instructions Your last dose was: Your next dose is: Take 1 Tab by mouth daily. 25 mg CONTINUE taking these medications Instructions Each Dose to Equal  
 Morning Noon Evening Bedtime  
 aspirin delayed-release 81 mg tablet Your last dose was: Your next dose is: Take 81 mg by mouth daily. 81 mg  
    
   
   
   
  
 carvedilol 6.25 mg tablet Commonly known as:  Eliot Chu Your last dose was: Your next dose is:    
   
   
 6.25 mg two (2) times a day. 6.25 mg  
    
   
   
   
  
 ferrous sulfate 325 mg (65 mg iron) Cper Your last dose was: Your next dose is: Take 1 Tab by mouth daily. 1 Tab  
    
   
   
   
  
 losartan 50 mg tablet Commonly known as:  COZAAR Your last dose was: Your next dose is: Take 50 mg by mouth daily. 50 mg MIRALAX 17 gram packet Generic drug:  polyethylene glycol Your last dose was: Your next dose is: Take 17 g by mouth daily as needed. 17 g  
    
   
   
   
  
 VITAMIN C 500 mg tablet Generic drug:  ascorbic acid (vitamin C) Your last dose was: Your next dose is: Take 500 mg by mouth daily. 500 mg Where to Get Your Medications Information on where to get these meds will be given to you by the nurse or doctor. ! Ask your nurse or doctor about these medications  
  oxyCODONE-acetaminophen 5-325 mg per tablet Opioid Education Prescription Opioids: What You Need to Know: Prescription opioids can be used to help relieve moderate-to-severe pain and are often prescribed following a surgery or injury, or for certain health conditions. These medications can be an important part of treatment but also come with serious risks. Opioids are strong pain medicines. Examples include hydrocodone, oxycodone, fentanyl, and morphine. Heroin is an example of an illegal opioid. It is important to work with your health care provider to make sure you are getting the safest, most effective care. WHAT ARE THE RISKS AND SIDE EFFECTS OF OPIOID USE? Prescription opioids carry serious risks of addiction and overdose, especially with prolonged use. An opioid overdose, often marked by slow breathing, can cause sudden death. The use of prescription opioids can have a number of side effects as well, even when taken as directed. · Tolerance-meaning you might need to take more of a medication for the same pain relief · Physical dependence-meaning you have symptoms of withdrawal when the medication is stopped. Withdrawal symptoms can include nausea, sweating, chills, diarrhea, stomach cramps, and muscle aches. Withdrawal can last up to several weeks, depending on which drug you took and how long you took it. · Increased sensitivity to pain · Constipation · Nausea, vomiting, and dry mouth · Sleepiness and dizziness · Confusion · Depression · Low levels of testosterone that can result in lower sex drive, energy, and strength · Itching and sweating RISKS ARE GREATER WITH:      
· History of drug misuse, substance use disorder, or overdose · Mental health conditions (such as depression or anxiety) · Sleep apnea · Older age (72 years or older) · Pregnancy Avoid alcohol while taking prescription opioids. Also, unless specifically advised by your health care provider, medications to avoid include: · Benzodiazepines (such as Xanax or Valium) · Muscle relaxants (such as Soma or Flexeril) · Hypnotics (such as Ambien or Lunesta) · Other prescription opioids KNOW YOUR OPTIONS Talk to your health care provider about ways to manage your pain that don't involve prescription opioids. Some of these options may actually work better and have fewer risks and side effects. Options may include: 
· Pain relievers such as acetaminophen, ibuprofen, and naproxen · Some medications that are also used for depression or seizures · Physical therapy and exercise · Counseling to help patients learn how to cope better with triggers of pain and stress. · Application of heat or cold compress · Massage therapy · Relaxation techniques Be Informed Make sure you know the name of your medication, how much and how often to take it, and its potential risks & side effects. IF YOU ARE PRESCRIBED OPIOIDS FOR PAIN: 
· Never take opioids in greater amounts or more often than prescribed. Remember the goal is not to be pain-free but to manage your pain at a tolerable level. · Follow up with your primary care provider to: · Work together to create a plan on how to manage your pain. · Talk about ways to help manage your pain that don't involve prescription opioids. · Talk about any and all concerns and side effects. · Help prevent misuse and abuse. · Never sell or share prescription opioids · Help prevent misuse and abuse. · Store prescription opioids in a secure place and out of reach of others (this may include visitors, children, friends, and family). · Safely dispose of unused/unwanted prescription opioids: Find your community drug take-back program or your pharmacy mail-back program, or flush them down the toilet, following guidance from the Food and Drug Administration (www.fda.gov/Drugs/ResourcesForYou). · Visit www.cdc.gov/drugoverdose to learn about the risks of opioid abuse and overdose.  
· If you believe you may be struggling with addiction, tell your health care provider and ask for guidance or call [address] at 1-129-012-NVAY. Discharge Instructions INSTRUCTIONS FOLLOWING HYSTEROSCOPY 
 
ACTIVITY  Limited activity today; increase as tolerated tomorrow, but no vigorous exercise  Shower only; no tub baths  No douches, tampons or intercourse until your doctor releases you (at least 2 weeks)  May return to work or school as directed by your doctor DIET  Clear liquids until no nausea or vomiting  Advance to regular diet as tolerated PAIN 
 Expect a moderate amount of pain.  Take pain medication as directed by your doctor. If no prescription for pain is sent     
   home with you, take the appropriate dose of your commonly used pain medication.  Call your doctor if pain is NOT relieved by medication.  DO NOT take aspirin or blood thinners until directed by your doctor. FOLLOW-UP PHONE CALLS  Calls will be made by nursing staff  If you have any problems, call your doctor as needed. CALL YOUR DOCTOR IF 
 Excessive bleeding that soaks a pad in an hour  Temperature of 101°F or above  Green or yellow, smelly discharge  Unable to urinate by bedtime  Nausea and vomiting that does not stop by bedtime AFTER ANESTHESIA  For the first 24 hours: DO NOT Drive, Drink alcoholic beverages, or Make important decisions.  Beware of dizziness following anesthesia and while taking pain medication.  Plan to stay tonight within 1 hours drive of the hospital 
 
 
 
 
  
  
  
[facility]! Dear Sherri Mullen: Thank you for requesting a [employer] account. Our records indicate that you already have an active [employer] account. You can access your account anytime at [email or web address] Did you know that you can access your hospital and ER discharge instructions at any time in [employer]?   You can also review all of your test results from your hospital stay or ER visit. Additional Information If you have questions, please visit the Frequently Asked Questions section of the MyChart website at [email or web address] Remember, [employer] is NOT to be used for urgent needs. For medical emergencies, dial 911. Now available from your iPhone and Android! Introducing Carroll Bello As a [employer] patient, I wanted to make you aware of our electronic visit tool called Carroll Bello. [employer] 24/7 allows you to connect within minutes with a medical provider 24 hours a day, seven days a week via a mobile device or tablet or logging into a secure website from your computer. You can access Carroll Bello from anywhere in the [place]. A virtual visit might be right for you when you have a simple condition and feel like you just dont want to get out of bed, or cant get away from work for an appointment, when your regular [employer] provider is not available (evenings, weekends or holidays), or when youre out of town and need minor care. Electronic visits cost only $49 and if the [employer] 24/7 provider determines a prescription is needed to treat your condition, one can be electronically transmitted to a nearby pharmacy*. Please take a moment to enroll today if you have not already done so. The enrollment process is free and takes just a few minutes. To enroll, please download the [employer] 24/7 matthias to your tablet or phone, or visit [email or web address] to enroll on your computer. And, as an [address] [place] [address] patient with a [employer] account, the results of your visits will be scanned into your electronic medical record and your primary care provider will be able to view the scanned results. We urge you to continue to see your regular [employer] provider for your ongoing medical care.   And while your primary care provider may not be the one available when you seek a Carroll Bello virtual visit, the peace of mind you get from getting a real diagnosis real time can be priceless. For more information on Carroll Bello, view our Frequently Asked Questions (FAQs) at [email or web address]. Sincerely, 
 
Santiago Miller MD 
Chief Medical Officer Romario English *:  certain medications cannot be prescribed via Carroll Bello Providers Seen During Your Hospitalization Provider Specialty Primary office phone Tomás Vasquez MD Obstetrics & Gynecology [phone] Your Primary Care Physician (PCP) Primary Care Physician Office Phone Office Fax [address] [facility], [address] [place] [address] [phone] You are allergic to the following No active allergies Recent Documentation Weight BMI OB Status Smoking Status 106.1 kg 35.58 kg/m2 Having regular periods Never Smoker Emergency Contacts Name Discharge Info Relation Home Work Mobile [place] CAREGIVER [3] Daughter [21] [phone] [facility] CAREGIVER [3] Daughter [21] [phone] Patient Belongings The following personal items are in your possession at time of discharge: 
  Dental Appliances: None Please provide this summary of care documentation to your next provider. Signatures-by signing, you are acknowledging that this After Visit Summary has been reviewed with you and you have received a copy. Patient Signature:  ____________________________________________________________ Date:  ____________________________________________________________  
  
Shaggy Lou Provider Signature:  ____________________________________________________________ Date:  ____________________________________________________________

## 2018-07-11 NOTE — ANESTHESIA POSTPROCEDURE EVALUATION
Post-Anesthesia Evaluation and Assessment    Patient: Margo Dahl MRN: [id number]  SSN: [id number]    YOB: 1970  Age: 46 y.o. Sex: female       Cardiovascular Function/Vital Signs  Visit Vitals    BP (!) 164/102 (BP 1 Location: Right arm, BP Patient Position: At rest)    Pulse 60    Temp 37.2 °C (99 °F)    Resp 14    Wt 106.1 kg (234 lb)    SpO2 100%    BMI 35.58 kg/m2       Patient is status post general anesthesia for Procedure(s):  DILATATION AND CURETTAGE HYSTEROSCOPY ENDOMETRIAL ABLATION, [address] Nausea/Vomiting: None    Postoperative hydration reviewed and adequate. Pain:  Pain Scale 1: Visual (07/11/18 0824)  Pain Intensity 1: 0 (07/11/18 0824)   Managed    Neurological Status:   Neuro (WDL): Exceptions to [facility] (07/11/18 5876)  Neuro  Neurologic State: Drowsy; Eyes open spontaneously (07/11/18 0824)  Cognition: Follows commands (07/11/18 0824)  LUE Motor Response: Purposeful (07/11/18 0824)  LLE Motor Response: Purposeful (07/11/18 0824)  RUE Motor Response: Purposeful (07/11/18 0824)  RLE Motor Response: Purposeful (07/11/18 0824)   At baseline    Mental Status and Level of Consciousness: Arousable    Pulmonary Status:   O2 Device: Room air (07/11/18 0854)   Adequate oxygenation and airway patent    Complications related to anesthesia: None    Post-anesthesia assessment completed.  No concerns    Signed By: Francheska Gottlieb MD     July 11, 2018

## 2018-07-11 NOTE — ANESTHESIA PREPROCEDURE EVALUATION
Anesthetic History               Review of Systems / Medical History  Patient summary reviewed, nursing notes reviewed and pertinent labs reviewed    Pulmonary        Sleep apnea: No treatment           Neuro/Psych              Cardiovascular    Hypertension: well controlled        Dysrhythmias : SVT  CAD    Exercise tolerance: >4 METS  Comments: Svt ablation   GI/Hepatic/Renal                Endo/Other             Other Findings              Physical Exam    Airway  Mallampati: II  TM Distance: 4 - 6 cm  Neck ROM: normal range of motion   Mouth opening: Normal     Cardiovascular  Regular rate and rhythm,  S1 and S2 normal,  no murmur, click, rub, or gallop             Dental  No notable dental hx       Pulmonary  Breath sounds clear to auscultation               Abdominal         Other Findings            Anesthetic Plan    ASA: 3  Anesthesia type: general          Induction: Intravenous  Anesthetic plan and risks discussed with: Patient

## 2018-07-11 NOTE — OP NOTES
Shanika Jesus  [id number]    Hysteroscopy and myosure procedure note    Pre-operative Diagnosis: menorrhaggia with anemia      Post-operative Diagnosis: uterine fibroids      Surgeon:  Miah Nation     Assistant:  Surgeon(s):  Miah Nation    Anesthesia: General    Procedure:  Hysteroscopy, myosure myomectomy, failed novasure       Procedure Detail:  The patient was taken to the operating room where she was placed in the supine position. After undergoing adequate general endotracheal anesthesia, she was placed up in the [facility] laparoscopy stirrups in the dorsal lithotomy position. The patient was then prepped and draped in the usual fashion as well as a Bradford catheter was placed into the bladder. Attention was first turned to the vagina where speculum was placed. The anterior lip of the cervix was grasped with a single tooth tenaculum. The cervix was stenotic, and then cervical dilatation was performed. The uterus sounded to 9cm in the anterior fashion. The cervix was dilated up to admit an operative hysteroscope using normal saline for distention media. Two uterine fibroids were noted was noted. Again both tubal ostia were visualized. The myosure scope was inserted and myosure reach was used to remove a 1cm fibroid near the left tubal ostia. A larger 3 cm fibroid was noted near the cervical junction and this was removed approximately 3/4 until a fluid deficit of 1800 was noted at this point we stopped to perform the novasure. Novasure was inserted, but cavity check could not pass despite placing tenaculum on the cervix. Never lost presure during hysteroscopy. For this reason Novasure was not performed. All instruments were removed from the vagina and patient was taken to recovery in stable condition.        Findings:  Intracavitary fibroids    Estimated Blood Loss:   200    Fluid deficit 1700cc    Pathology /Specimens:   fibroid            Signed By: Miah Nation

## 2018-07-30 NOTE — H&P
Gynecology Major Surgery History and Physical    Parth Jackson  [id number]    Subjective:      Chief complaint:  abnormal uterine bleeding and menorrhagia    Shiloh Hernandez is a 46 y.o.  female with a history of abnormal uterine bleeding, anemia and menorrhagia. Previous treatment measures include previous myomectomy and inability to pass cavity check here for return abblation. Ultrasound findings include fibroids. She is admitted for Procedure(s) (LRB):   HYSTEROSCOPY AND ENDOMETRIAL ABLATION / Adrian Mohs (N/A)    The current method of family planning is tubal ligation. Past Medical History:   Diagnosis Date    Abnormal Papanicolaou smear of cervix     Anemia     Iron supplements     Arthritis     Cardiomyopathy ([address]     Followed by [facility] cardiology     CHF (congestive heart failure) (HCC)     Fibroid, uterine     GERD (gastroesophageal reflux disease)     Controlled by prn OTC meds     Heart murmur     Echo 3/2018- LVEF 45-50%    Hypercholesterolemia     Controlled with meds     Hypertension     Controlled with meds     Menorrhagia with regular cycle     Ovarian cyst     PVCs (premature ventricular contractions)     Sleep apnea     Does not wear a c-pap, stopped wearing     Uterine fibroid      Past Surgical History:   Procedure Laterality Date    HX HYSTEROSCOPY      HX OTHER SURGICAL  2018    Ablation for pvc's     HX TUBAL LIGATION      HX WISDOM TEETH EXTRACTION      LAP,TUBAL CAUTERY       OB History      Para Term  AB Living    5 3 3  2 3    SAB TAB Ectopic Molar Multiple Live Births    2     3          No Known Allergies    Cannot display prior to admission medications because the patient has not been admitted in this contact.        Family History   Problem Relation Age of Onset    Heart Disease Mother     Cancer Father      Social History     Social History    Marital status: SINGLE     Spouse name: N/A    Number of children: N/A    Years of education: N/A     Occupational History    Not on file. Social History Main Topics    Smoking status: Never Smoker    Smokeless tobacco: Never Used    Alcohol use No    Drug use: No    Sexual activity: Not Currently     Birth control/ protection: Surgical     Other Topics Concern    Not on file     Social History Narrative         Review of Systems:  Pertinent items are noted in HPI. Objective: There were no vitals filed for this visit. Physical Exam:  General:  alert, cooperative, no distress, appears stated age   Lungs:  clear to auscultation bilaterally   Heart:  regular rate and rhythm, S1, S2 normal, no murmur, click, rub or gallop   Abdomen: soft, non-tender. Bowel sounds normal. No masses,  no organomegaly. Genitourinary: External genitalia: normal general appearance  Urinary system: urethral meatus normal  Vaginal: normal mucosa without prolapse or lesions  Cervix: normal appearance  Adnexa: normal bimanual exam  Uterus: normal single, nontender   Extremities:  extremities normal, atraumatic, no cyanosis or edema     Assessment:     abnormal uterine bleeding with acute blood loss anemia or menorrhagia       Plan:     1. Procedure(s) (LRB):   HYSTEROSCOPY AND ENDOMETRIAL ABLATION / Clarke Gentle (N/A)  Discussed the risk of surgery including the risks of bleeding, infection, DVT, and surgical injuries to internal organs including but not limited to the bowels, bladder, rectum, and female reproductive organs. The patient understands the risks, any and all questions were answered to the patient's satisfaction.

## 2018-08-03 ENCOUNTER — HOSPITAL ENCOUNTER (OUTPATIENT)
Dept: SURGERY | Age: 48
Discharge: HOME | End: 2018-08-03

## 2018-08-08 VITALS — HEIGHT: 68 IN | WEIGHT: 235 LBS | BODY MASS INDEX: 35.61 KG/M2

## 2018-08-08 NOTE — PERIOP NOTES
Patient verified name, , and surgery as listed in [facility]. Type 1b surgery, phone assessment complete. Orders NOT received. Labs per surgeon: none  Labs per anesthesia protocol: hemoglobin and potassium~pt to have drawn at outpt lab prior to surgery      Patient answered medical/surgical history questions at their best of ability. All prior to admission medications documented in [facility]. Patient instructed to take the following medications the day of surgery according to anesthesia guidelines with a small sip of water: Coreg . Hold all vitamins 7 days prior to surgery and NSAIDS 5 days prior to surgery. Medications to be held none    Patient instructed on the following:  Arrive at A Entrance, time of arrival to be called the day before by 1700  NPO after midnight including gum, mints, and ice chips  Responsible adult must drive patient to the hospital, stay during surgery, and patient will  need supervision 24 hours after anesthesia  Use antibacterial soap in shower the night before surgery and on the morning of surgery  Leave all valuables (money and jewelry) at home but bring insurance card and ID on       DOS  Do not wear make-up, nail polish, lotions, cologne, perfumes, powders, or oil on skin. Patient teach back successful and patient demonstrates knowledge of instruction.

## 2018-08-08 NOTE — PERIOP NOTES
EKG from 5/11/18 and 7/12/18 placed on chart along with ECHO 3/28/18 ,cardiac cath report 2/13/18 and latest office note from Dr Shayne Reese, cardiologist for anesthesia review per protocol. ECHO depicts ejection fraction of 45-50%.

## 2018-08-09 ENCOUNTER — HOSPITAL ENCOUNTER (OUTPATIENT)
Dept: LAB | Age: 48
Discharge: HOME | End: 2018-08-09
Attending: OBSTETRICS & GYNECOLOGY
Payer: COMMERCIAL

## 2018-08-09 LAB
HGB BLD-MCNC: 10.9 G/DL (ref 11.7–15.4)
POTASSIUM SERPL-SCNC: 4.1 MMOL/L (ref 3.5–5.1)

## 2018-08-09 PROCEDURE — 85018 HEMOGLOBIN: CPT

## 2018-08-09 PROCEDURE — 36415 COLL VENOUS BLD VENIPUNCTURE: CPT

## 2018-08-09 PROCEDURE — 84132 ASSAY OF SERUM POTASSIUM: CPT

## 2018-08-09 NOTE — PERIOP NOTES
Labs done today within anesthesia protocols.     Recent Results (from the past 12 hour(s))   HEMOGLOBIN    Collection Time: 08/09/18  1:38 PM   Result Value Ref Range    HGB 10.9 (L) 11.7 - 15.4 g/dL   POTASSIUM    Collection Time: 08/09/18  1:38 PM   Result Value Ref Range    Potassium 4.1 3.5 - 5.1 mmol/L

## 2018-08-10 ENCOUNTER — ANESTHESIA EVENT (OUTPATIENT)
Dept: SURGERY | Age: 48
End: 2018-08-10
Payer: COMMERCIAL

## 2018-08-10 ENCOUNTER — HOSPITAL ENCOUNTER (OUTPATIENT)
Age: 48
Setting detail: OUTPATIENT SURGERY
Discharge: HOME | End: 2018-08-10
Attending: OBSTETRICS & GYNECOLOGY | Admitting: OBSTETRICS & GYNECOLOGY
Payer: COMMERCIAL

## 2018-08-10 ENCOUNTER — ANESTHESIA (OUTPATIENT)
Dept: SURGERY | Age: 48
End: 2018-08-10
Payer: COMMERCIAL

## 2018-08-10 VITALS
WEIGHT: 235.9 LBS | OXYGEN SATURATION: 96 % | DIASTOLIC BLOOD PRESSURE: 83 MMHG | SYSTOLIC BLOOD PRESSURE: 169 MMHG | BODY MASS INDEX: 35.87 KG/M2 | TEMPERATURE: 98.4 F | RESPIRATION RATE: 16 BRPM | HEART RATE: 57 BPM

## 2018-08-10 DIAGNOSIS — N92.0 MENORRHAGIA WITH REGULAR CYCLE: Primary | ICD-10-CM

## 2018-08-10 LAB — HCG UR QL: NEGATIVE

## 2018-08-10 PROCEDURE — 77030032490 HC SLV COMPR SCD KNE COVD -B: Performed by: OBSTETRICS & GYNECOLOGY

## 2018-08-10 PROCEDURE — 76060000032 HC ANESTHESIA 0.5 TO 1 HR: Performed by: OBSTETRICS & GYNECOLOGY

## 2018-08-10 PROCEDURE — 74011250637 HC RX REV CODE- 250/637: Performed by: ANESTHESIOLOGY

## 2018-08-10 PROCEDURE — 77030020782 HC GWN BAIR PAWS FLX 3M -B: Performed by: ANESTHESIOLOGY

## 2018-08-10 PROCEDURE — 77030020143 HC AIRWY LARYN INTUB CGAS -A: Performed by: ANESTHESIOLOGY

## 2018-08-10 PROCEDURE — 77030034928 HC DEV UTER SURSND KT HOLO -G1: Performed by: OBSTETRICS & GYNECOLOGY

## 2018-08-10 PROCEDURE — 76210000016 HC OR PH I REC 1 TO 1.5 HR: Performed by: OBSTETRICS & GYNECOLOGY

## 2018-08-10 PROCEDURE — 74011250636 HC RX REV CODE- 250/636: Performed by: OBSTETRICS & GYNECOLOGY

## 2018-08-10 PROCEDURE — 77030019927 HC TBNG IRR CYSTO BAXT -A: Performed by: OBSTETRICS & GYNECOLOGY

## 2018-08-10 PROCEDURE — 74011250636 HC RX REV CODE- 250/636: Performed by: ANESTHESIOLOGY

## 2018-08-10 PROCEDURE — 81025 URINE PREGNANCY TEST: CPT

## 2018-08-10 PROCEDURE — 77030012317 HC CATH URET INT COVD -A: Performed by: OBSTETRICS & GYNECOLOGY

## 2018-08-10 PROCEDURE — 77030018836 HC SOL IRR NACL ICUM -A: Performed by: OBSTETRICS & GYNECOLOGY

## 2018-08-10 PROCEDURE — 74011250636 HC RX REV CODE- 250/636

## 2018-08-10 PROCEDURE — 76010000138 HC OR TIME 0.5 TO 1 HR: Performed by: OBSTETRICS & GYNECOLOGY

## 2018-08-10 RX ORDER — PROPOFOL 10 MG/ML
INJECTION, EMULSION INTRAVENOUS AS NEEDED
Status: DISCONTINUED | OUTPATIENT
Start: 2018-08-10 | End: 2018-08-10 | Stop reason: HOSPADM

## 2018-08-10 RX ORDER — KETOROLAC TROMETHAMINE 30 MG/ML
30 INJECTION, SOLUTION INTRAMUSCULAR; INTRAVENOUS AS NEEDED
Status: DISCONTINUED | OUTPATIENT
Start: 2018-08-10 | End: 2018-08-10 | Stop reason: HOSPADM

## 2018-08-10 RX ORDER — OXYCODONE AND ACETAMINOPHEN 5; 325 MG/1; MG/1
1 TABLET ORAL
Qty: 15 TAB | Refills: 0 | Status: SHIPPED | OUTPATIENT
Start: 2018-08-10 | End: 2019-02-14

## 2018-08-10 RX ORDER — DEXAMETHASONE SODIUM PHOSPHATE 4 MG/ML
INJECTION, SOLUTION INTRA-ARTICULAR; INTRALESIONAL; INTRAMUSCULAR; INTRAVENOUS; SOFT TISSUE AS NEEDED
Status: DISCONTINUED | OUTPATIENT
Start: 2018-08-10 | End: 2018-08-10 | Stop reason: HOSPADM

## 2018-08-10 RX ORDER — SODIUM CHLORIDE 0.9 % (FLUSH) 0.9 %
5-10 SYRINGE (ML) INJECTION AS NEEDED
Status: DISCONTINUED | OUTPATIENT
Start: 2018-08-10 | End: 2018-08-10 | Stop reason: HOSPADM

## 2018-08-10 RX ORDER — KETOROLAC TROMETHAMINE 30 MG/ML
INJECTION, SOLUTION INTRAMUSCULAR; INTRAVENOUS AS NEEDED
Status: DISCONTINUED | OUTPATIENT
Start: 2018-08-10 | End: 2018-08-10 | Stop reason: HOSPADM

## 2018-08-10 RX ORDER — SODIUM CHLORIDE, SODIUM LACTATE, POTASSIUM CHLORIDE, CALCIUM CHLORIDE 600; 310; 30; 20 MG/100ML; MG/100ML; MG/100ML; MG/100ML
100 INJECTION, SOLUTION INTRAVENOUS CONTINUOUS
Status: DISCONTINUED | OUTPATIENT
Start: 2018-08-10 | End: 2018-08-10 | Stop reason: HOSPADM

## 2018-08-10 RX ORDER — ONDANSETRON 2 MG/ML
INJECTION INTRAMUSCULAR; INTRAVENOUS AS NEEDED
Status: DISCONTINUED | OUTPATIENT
Start: 2018-08-10 | End: 2018-08-10 | Stop reason: HOSPADM

## 2018-08-10 RX ORDER — SODIUM CHLORIDE 0.9 % (FLUSH) 0.9 %
5-10 SYRINGE (ML) INJECTION EVERY 8 HOURS
Status: DISCONTINUED | OUTPATIENT
Start: 2018-08-10 | End: 2018-08-10 | Stop reason: HOSPADM

## 2018-08-10 RX ORDER — LIDOCAINE HYDROCHLORIDE 10 MG/ML
0.1 INJECTION INFILTRATION; PERINEURAL AS NEEDED
Status: DISCONTINUED | OUTPATIENT
Start: 2018-08-10 | End: 2018-08-10 | Stop reason: HOSPADM

## 2018-08-10 RX ORDER — CEFAZOLIN SODIUM/WATER 2 G/20 ML
2 SYRINGE (ML) INTRAVENOUS ONCE
Status: COMPLETED | OUTPATIENT
Start: 2018-08-10 | End: 2018-08-10

## 2018-08-10 RX ORDER — MIDAZOLAM HYDROCHLORIDE 1 MG/ML
2 INJECTION, SOLUTION INTRAMUSCULAR; INTRAVENOUS
Status: COMPLETED | OUTPATIENT
Start: 2018-08-10 | End: 2018-08-10

## 2018-08-10 RX ORDER — HYDROMORPHONE HYDROCHLORIDE 2 MG/ML
0.5 INJECTION, SOLUTION INTRAMUSCULAR; INTRAVENOUS; SUBCUTANEOUS
Status: DISCONTINUED | OUTPATIENT
Start: 2018-08-10 | End: 2018-08-10 | Stop reason: HOSPADM

## 2018-08-10 RX ORDER — LIDOCAINE HYDROCHLORIDE 20 MG/ML
INJECTION, SOLUTION EPIDURAL; INFILTRATION; INTRACAUDAL; PERINEURAL AS NEEDED
Status: DISCONTINUED | OUTPATIENT
Start: 2018-08-10 | End: 2018-08-10 | Stop reason: HOSPADM

## 2018-08-10 RX ORDER — NALOXONE HYDROCHLORIDE 0.4 MG/ML
0.1 INJECTION, SOLUTION INTRAMUSCULAR; INTRAVENOUS; SUBCUTANEOUS AS NEEDED
Status: DISCONTINUED | OUTPATIENT
Start: 2018-08-10 | End: 2018-08-10 | Stop reason: HOSPADM

## 2018-08-10 RX ORDER — ACETAMINOPHEN 500 MG
1000 TABLET ORAL ONCE
Status: COMPLETED | OUTPATIENT
Start: 2018-08-10 | End: 2018-08-10

## 2018-08-10 RX ORDER — OXYCODONE HYDROCHLORIDE 5 MG/1
10 TABLET ORAL
Status: DISCONTINUED | OUTPATIENT
Start: 2018-08-10 | End: 2018-08-10 | Stop reason: HOSPADM

## 2018-08-10 RX ADMIN — SODIUM CHLORIDE, SODIUM LACTATE, POTASSIUM CHLORIDE, AND CALCIUM CHLORIDE 100 ML/HR: 600; 310; 30; 20 INJECTION, SOLUTION INTRAVENOUS at 07:00

## 2018-08-10 RX ADMIN — Medication 2 G: at 07:16

## 2018-08-10 RX ADMIN — DEXAMETHASONE SODIUM PHOSPHATE 10 MG: 4 INJECTION, SOLUTION INTRA-ARTICULAR; INTRALESIONAL; INTRAMUSCULAR; INTRAVENOUS; SOFT TISSUE at 07:27

## 2018-08-10 RX ADMIN — ACETAMINOPHEN 1000 MG: 500 TABLET, FILM COATED ORAL at 07:00

## 2018-08-10 RX ADMIN — ONDANSETRON 4 MG: 2 INJECTION INTRAMUSCULAR; INTRAVENOUS at 07:27

## 2018-08-10 RX ADMIN — KETOROLAC TROMETHAMINE 30 MG: 30 INJECTION, SOLUTION INTRAMUSCULAR; INTRAVENOUS at 07:48

## 2018-08-10 RX ADMIN — HYDROMORPHONE HYDROCHLORIDE 0.5 MG: 2 INJECTION, SOLUTION INTRAMUSCULAR; INTRAVENOUS; SUBCUTANEOUS at 08:14

## 2018-08-10 RX ADMIN — HYDROMORPHONE HYDROCHLORIDE 0.5 MG: 2 INJECTION, SOLUTION INTRAMUSCULAR; INTRAVENOUS; SUBCUTANEOUS at 08:29

## 2018-08-10 RX ADMIN — LIDOCAINE HYDROCHLORIDE 80 MG: 20 INJECTION, SOLUTION EPIDURAL; INFILTRATION; INTRACAUDAL; PERINEURAL at 07:23

## 2018-08-10 RX ADMIN — PROPOFOL 200 MG: 10 INJECTION, EMULSION INTRAVENOUS at 07:23

## 2018-08-10 RX ADMIN — MIDAZOLAM HYDROCHLORIDE 2 MG: 1 INJECTION, SOLUTION INTRAMUSCULAR; INTRAVENOUS at 07:00

## 2018-08-10 NOTE — IP AVS SNAPSHOT
[address] [address] 
[phone] Patient: Berry Vela MRN: [id number] RII:7/89/1905 About your hospitalization You were admitted on:  August 10, 2018 You last received care in the:  [facility] PACU You were discharged on:  August 10, 2018 Why you were hospitalized Your primary diagnosis was:  Not on File Follow-up Information Follow up With Details Comments Contact Info Stephan Crowe, [address] [facility] [address] [facility] [address] 
[phone] Parisa Adams MD Schedule an appointment as soon as possible for a visit in 2 week(s)  [address] [place] [address] OB GYN Group [facility] [address] 
[phone] Your Scheduled Appointments Tuesday August 14, 2018  2:15 PM EDT Office Visit with Jeremy Garcia DO  
[facility] CARDIOLOGY [place] OFFICE ([address] [facility]) 2 [facility] 
[address] [facility]  
[phone] Wednesday August 22, 2018  3:45 PM EDT  
POST OP with Parisa Adams MD  
[facility] [address] [place] [id number] [phone] Discharge Orders None A check hemant indicates which time of day the medication should be taken. My Medications START taking these medications Instructions Each Dose to Equal  
 Morning Noon Evening Bedtime  
 oxyCODONE-acetaminophen 5-325 mg per tablet Commonly known as:  PERCOCET Your last dose was: Your next dose is: Take 1 Tab by mouth every four (4) hours as needed for Pain. Max Daily Amount: 6 Tabs. 1 Tab CHANGE how you take these medications Instructions Each Dose to Equal  
 Morning Noon Evening Bedtime  
 atorvastatin 40 mg tablet Commonly known as:  LIPITOR What changed:  when to take this Your last dose was: Your next dose is: Take 1 Tab by mouth daily. Indications: PO once daily 40 mg  
    
   
   
   
  
 carvedilol 6.25 mg tablet Commonly known as:  Carmina Winn What changed:  additional instructions Your last dose was: Your next dose is: Take 1 Tab by mouth two (2) times a day. 6.25 mg  
    
   
   
   
  
 furosemide 40 mg tablet Commonly known as:  LASIX What changed:  additional instructions Your last dose was: Your next dose is: TAKE ONE TABLET BY MOUTH EVERY OTHER DAY  
     
   
   
   
  
 spironolactone 25 mg tablet Commonly known as:  ALDACTONE What changed:   
- when to take this 
- additional instructions Your last dose was: Your next dose is: Take 1 Tab by mouth daily. 25 mg CONTINUE taking these medications Instructions Each Dose to Equal  
 Morning Noon Evening Bedtime  
 aspirin delayed-release 81 mg tablet Your last dose was: Your next dose is: Take 81 mg by mouth daily. Stopped taking \"awhile ago\" re: menstrual bleeding frequency 81 mg  
    
   
   
   
  
 ferrous sulfate 325 mg (65 mg iron) Cper Your last dose was: Your next dose is: Take 1 Tab by mouth daily. Indications: Iron Deficiency Anemia 1 Tab  
    
   
   
   
  
 losartan 50 mg tablet Commonly known as:  COZAAR Your last dose was: Your next dose is: Take 50 mg by mouth daily. 50 mg MIRALAX 17 gram packet Generic drug:  polyethylene glycol Your last dose was: Your next dose is: Take 17 g by mouth daily as needed. 17 g  
    
   
   
   
  
 VITAMIN C 500 mg tablet Generic drug:  ascorbic acid (vitamin C) Your last dose was: Your next dose is: Take 500 mg by mouth daily.   
 500 mg  
    
   
   
   
  
  
  
 Where to Get Your Medications Information on where to get these meds will be given to you by the nurse or doctor. ! Ask your nurse or doctor about these medications  
  oxyCODONE-acetaminophen 5-325 mg per tablet Opioid Education Prescription Opioids: What You Need to Know: 
 
Prescription opioids can be used to help relieve moderate-to-severe pain and are often prescribed following a surgery or injury, or for certain health conditions. These medications can be an important part of treatment but also come with serious risks. Opioids are strong pain medicines. Examples include hydrocodone, oxycodone, fentanyl, and morphine. Heroin is an example of an illegal opioid. It is important to work with your health care provider to make sure you are getting the safest, most effective care. WHAT ARE THE RISKS AND SIDE EFFECTS OF OPIOID USE? Prescription opioids carry serious risks of addiction and overdose, especially with prolonged use. An opioid overdose, often marked by slow breathing, can cause sudden death. The use of prescription opioids can have a number of side effects as well, even when taken as directed. · Tolerance-meaning you might need to take more of a medication for the same pain relief · Physical dependence-meaning you have symptoms of withdrawal when the medication is stopped. Withdrawal symptoms can include nausea, sweating, chills, diarrhea, stomach cramps, and muscle aches. Withdrawal can last up to several weeks, depending on which drug you took and how long you took it. · Increased sensitivity to pain · Constipation · Nausea, vomiting, and dry mouth · Sleepiness and dizziness · Confusion · Depression · Low levels of testosterone that can result in lower sex drive, energy, and strength · Itching and sweating RISKS ARE GREATER WITH:      
· History of drug misuse, substance use disorder, or overdose · Mental health conditions (such as depression or anxiety) · Sleep apnea · Older age (72 years or older) · Pregnancy Avoid alcohol while taking prescription opioids. Also, unless specifically advised by your health care provider, medications to avoid include: · Benzodiazepines (such as Xanax or Valium) · Muscle relaxants (such as Soma or Flexeril) · Hypnotics (such as Ambien or Lunesta) · Other prescription opioids KNOW YOUR OPTIONS Talk to your health care provider about ways to manage your pain that don't involve prescription opioids. Some of these options may actually work better and have fewer risks and side effects. Options may include: 
· Pain relievers such as acetaminophen, ibuprofen, and naproxen · Some medications that are also used for depression or seizures · Physical therapy and exercise · Counseling to help patients learn how to cope better with triggers of pain and stress. · Application of heat or cold compress · Massage therapy · Relaxation techniques Be Informed Make sure you know the name of your medication, how much and how often to take it, and its potential risks & side effects. IF YOU ARE PRESCRIBED OPIOIDS FOR PAIN: 
· Never take opioids in greater amounts or more often than prescribed. Remember the goal is not to be pain-free but to manage your pain at a tolerable level. · Follow up with your primary care provider to: · Work together to create a plan on how to manage your pain. · Talk about ways to help manage your pain that don't involve prescription opioids. · Talk about any and all concerns and side effects. · Help prevent misuse and abuse. · Never sell or share prescription opioids · Help prevent misuse and abuse. · Store prescription opioids in a secure place and out of reach of others (this may include visitors, children, friends, and family).  
· Safely dispose of unused/unwanted prescription opioids: Find your community drug take-back program or your pharmacy mail-back program, or flush them down the toilet, following guidance from the Food and Drug Administration (www.fda.gov/Drugs/ResourcesForYou). · Visit www.cdc.gov/drugoverdose to learn about the risks of opioid abuse and overdose. · If you believe you may be struggling with addiction, tell your health care provider and ask for guidance or call [employer] at 7-261-693-SLHJ. Discharge Instructions Endometrial Ablation: What to Expect at [facility] Your Recovery Endometrial ablation is a procedure to treat very heavy menstrual bleeding or other abnormal bleeding in the uterus. During ablation, the lining of the uterus is destroyed. The lining heals by scarring. The scarring reduces or prevents bleeding. You may have cramps and vaginal bleeding for several days. You may also have watery vaginal discharge mixed with blood for a few days. It may take a few days to 2 weeks to recover. This care sheet gives you a general idea about how long it will take for you to recover. But each person recovers at a different pace. Follow the steps below to feel better as quickly as possible. How can you care for yourself at home? Activity 
  · Rest when you feel tired. Getting enough sleep will help you recover.  
  · Most women are able to return to work on the day after the procedure.  
  · You may shower and take baths as usual.  
  · Ask your doctor when it is okay for you to have sex. Diet 
  · You can eat your normal diet. If your stomach is upset, try bland, low-fat foods like plain rice, broiled chicken, toast, and yogurt.  
  · You may notice that your bowel movements are not regular right after the procedure. This is common. Try to avoid constipation and straining with bowel movements. You may want to take a fiber supplement every day. If you have not had a bowel movement after a couple of days, ask your doctor about taking a mild laxative. Medicines 
  · Your doctor will tell you if and when you can restart your medicines. He or she will also give you instructions about taking any new medicines.  
  · If you take blood thinners, such as warfarin (Coumadin), clopidogrel (Plavix), or aspirin, be sure to talk to your doctor. He or she will tell you if and when to start taking those medicines again. Make sure that you understand exactly what your doctor wants you to do.  
  · Take pain medicines exactly as directed. ¨ If the doctor gave you a prescription medicine for pain, take it as prescribed. ¨ If you are not taking a prescription pain medicine, ask your doctor if you can take an over-the-counter medicine.  
  · If you think your pain medicine is making you sick to your stomach: 
¨ Take your medicine after meals (unless your doctor has told you not to). ¨ Ask your doctor for a different pain medicine.  
  · If your doctor prescribed antibiotics, take them as directed. Do not stop taking them just because you feel better. You need to take the full course of antibiotics. Other instructions 
  · You may have some light vaginal bleeding. Wear sanitary pads if needed. Do not douche or use tampons until your doctor says it is okay.  
  · You may want to use a heating pad on your belly to help with pain. Use a low heat setting.  
  · Talk with your doctor about birth control. Endometrial ablation usually causes infertility, but pregnancy may still be possible. And the pregnancy could have severe problems. Follow-up care is a key part of your treatment and safety. Be sure to make and go to all appointments, and call your doctor if you are having problems. It's also a good idea to know your test results and keep a list of the medicines you take. When should you call for help? Call 911 anytime you think you may need emergency care. For example, call if: 
  · You passed out (lost consciousness).  
  · You have chest pain, are short of breath, or cough up blood.  
 Call your doctor now or seek immediate medical care if: 
  · You have pain that does not get better after you take pain medicine.  
  · You cannot pass stools or gas.  
  · You have vaginal discharge that has increased in amount or smells bad.  
  · You are sick to your stomach or cannot drink fluids.  
  · You have signs of infection, such as: 
¨ Increased pain, swelling, warmth, or redness. ¨ A fever.  
  · You have bright red vaginal bleeding that soaks one or more pads in an hour, or you have large clots.  
  · You have signs of a blood clot in your leg (called a deep vein thrombosis), such as: 
¨ Pain in your calf, back of the knee, thigh, or groin. ¨ Redness and swelling in your leg.  
 Watch closely for any changes in your health, and be sure to contact your doctor if you have any problems. Where can you learn more? Go to http://maddie-dilan.info/. Enter V790 in the search box to learn more about \"Endometrial Ablation: What to Expect at Home. \" Current as of: October 6, 2017 Content Version: 11.7 © 2483-0757 [employer]. Care instructions adapted under license by [employer] (which disclaims liability or warranty for this information). If you have questions about a medical condition or this instruction, always ask your healthcare professional. [address] [address] any warranty or liability for your use of this information. After general anesthesia or intravenous sedation, for 24 hours or while taking prescription Narcotics: · Limit your activities · Do not drive and operate hazardous machinery · Do not make important personal or business decisions · Do  not drink alcoholic beverages · If you have not urinated within 8 hours after discharge, please contact your surgeon on call. *  Please give a list of your current medications to your Primary Care Provider. *  Please update this list whenever your medications are discontinued, doses are 
    changed, or new medications (including over-the-counter products) are added. *  Please carry medication information at all times in case of emergency situations. These are general instructions for a healthy lifestyle: No smoking/ No tobacco products/ Avoid exposure to second hand smoke Surgeon General's Warning:  Quitting smoking now greatly reduces serious risk to your health. Obesity, smoking, and sedentary lifestyle greatly increases your risk for illness A healthy diet, regular physical exercise & weight monitoring are important for maintaining a healthy lifestyle You may be retaining fluid if you have a history of heart failure or if you experience any of the following symptoms:  Weight gain of 3 pounds or more overnight or 5 pounds in a week, increased swelling in our hands or feet or shortness of breath while lying flat in bed. Please call your doctor as soon as you notice any of these symptoms; do not wait until your next office visit. Recognize signs and symptoms of STROKE: 
F-face looks uneven A-arms unable to move or move unevenly S-speech slurred or non-existent T-time-call 911 as soon as signs and symptoms begin-DO NOT go Back to bed or wait to see if you get better-TIME IS BRAIN. Introducing [facility]! Dear Roshan Lane: Thank you for requesting a [employer] account. Our records indicate that you already have an active [employer] account. You can access your account anytime at [email or web address] Did you know that you can access your hospital and ER discharge instructions at any time in [employer]? You can also review all of your test results from your hospital stay or ER visit. Additional Information If you have questions, please visit the Frequently Asked Questions section of the MyChart website at [email or web address] Remember, [employer] is NOT to be used for urgent needs. For medical emergencies, dial 911. Now available from your iPhone and Android! Introducing Carroll Bello As a [facility] patient, I wanted to make you aware of our electronic visit tool called Carroll Bello. [facility] 24/7 allows you to connect within minutes with a medical provider 24 hours a day, seven days a week via a mobile device or tablet or logging into a secure website from your computer. You can access Carroll Bello from anywhere in the [place]. A virtual visit might be right for you when you have a simple condition and feel like you just dont want to get out of bed, or cant get away from work for an appointment, when your regular [facility] provider is not available (evenings, weekends or holidays), or when youre out of town and need minor care. Electronic visits cost only $49 and if the [facility] 24/7 provider determines a prescription is needed to treat your condition, one can be electronically transmitted to a nearby pharmacy*. Please take a moment to enroll today if you have not already done so. The enrollment process is free and takes just a few minutes. To enroll, please download the [facility] 24/HALGI matthias to your tablet or phone, or visit [email or web address] to enroll on your computer. And, as an [address] [place] [address] patient with a [employer] account, the results of your visits will be scanned into your electronic medical record and your primary care provider will be able to view the scanned results. We urge you to continue to see your regular [facility] provider for your ongoing medical care.   And while your primary care provider may not be the one available when you seek a Carroll Bello virtual visit, the peace of mind you get from getting a real diagnosis real time can be priceless. For more information on Carroll Bello, view our Frequently Asked Questions (FAQs) at [email or web address]. Sincerely, 
 
Arvell Najjar, MD 
Chief Medical Officer Romario English *:  certain medications cannot be prescribed via Carroll Bello Providers Seen During Your Hospitalization Provider Specialty Primary office phone Ari Vaca MD Obstetrics & Gynecology [phone] Your Primary Care Physician (PCP) Primary Care Physician Office Phone Office Fax [address] [facility], [address] [place] [address] [phone] You are allergic to the following No active allergies Recent Documentation Weight BMI OB Status Smoking Status 107 kg 35.87 kg/m2 Having regular periods Never Smoker Emergency Contacts Name Discharge Info Relation Home Work Mobile [place] CAREGIVER [3] Daughter [21] [phone] 1 Boss[address] Blaine CAREGIVER [3] Daughter [21] [phone] Patient Belongings The following personal items are in your possession at time of discharge: 
  Dental Appliances: None  Visual Aid: Glasses Please provide this summary of care documentation to your next provider. Signatures-by signing, you are acknowledging that this After Visit Summary has been reviewed with you and you have received a copy. Patient Signature:  ____________________________________________________________ Date:  ____________________________________________________________  
  
Chavez Bone Provider Signature:  ____________________________________________________________ Date:  ____________________________________________________________

## 2018-08-10 NOTE — OP NOTES
Serg Hardin  [id number]    Hysteroscopy, D & C, Novasure Ablation procedure note    Pre-operative Diagnosis: menorrhaggia      Post-operative Diagnosis: same      Surgeon:  Svetlana Duran     Assistant:  Surgeon(s):  Svetlana Duran    Anesthesia: General    Procedure:   DILATATION AND CURETTAGE, Hysteroscopy, Novasure Ablation     Procedure Detail:  The patient was taken to the operating room where she was placed in the supine position. After undergoing adequate general endotracheal anesthesia, [facility] in [facility] lithotomy position. The patient was then prepped and draped in the usual fashion as well as a in and out cath was performed. Attention was first turned to the vagina where speculum was placed. The anterior lip of the cervix was grasped with a single tooth tenaculum. Cervical dilatation was performed. The uterus sounded to 8cm in the anterior fashion and uterine length was noted to be 4cm. The cervix was dilated up to admit a diagnostic hysteroscope and novasure device using normal saline for distention media. Both tubal ostia were visualized. The hysteroscope was then removed, and endometrial curreting was performed. Novasure was inserted, cavity length was set to 5 and device was deployed. Motion was used to allow device to fan and cavity width was set to 4.6 cm. Cavity check was performed and cavity integrity was confirmed. Burn cycle was then performed. Cycle was at power of 127 boone and length of 0 minutes 46 seconds. Device was closed and removed from uterus. Hysteroscope was reinserted and complete burn patter was noted. All instruments were removed from the vagina and hemostasis of the tenaculum site was noted.         Findings: small fibroid    Estimated Blood Loss:   50    Pathology /Specimens:   endometrial curretings      Signed By: Svetlana Duran

## 2018-08-10 NOTE — DISCHARGE INSTRUCTIONS
Endometrial Ablation: What to Expect at [facility]  Endometrial ablation is a procedure to treat very heavy menstrual bleeding or other abnormal bleeding in the uterus. During ablation, the lining of the uterus is destroyed. The lining heals by scarring. The scarring reduces or prevents bleeding. You may have cramps and vaginal bleeding for several days. You may also have watery vaginal discharge mixed with blood for a few days. It may take a few days to 2 weeks to recover. This care sheet gives you a general idea about how long it will take for you to recover. But each person recovers at a different pace. Follow the steps below to feel better as quickly as possible. How can you care for yourself at home? Activity    · Rest when you feel tired. Getting enough sleep will help you recover.     · Most women are able to return to work on the day after the procedure.     · You may shower and take baths as usual.     · Ask your doctor when it is okay for you to have sex. Diet    · You can eat your normal diet. If your stomach is upset, try bland, low-fat foods like plain rice, broiled chicken, toast, and yogurt.     · You may notice that your bowel movements are not regular right after the procedure. This is common. Try to avoid constipation and straining with bowel movements. You may want to take a fiber supplement every day. If you have not had a bowel movement after a couple of days, ask your doctor about taking a mild laxative. Medicines    · Your doctor will tell you if and when you can restart your medicines. He or she will also give you instructions about taking any new medicines.     · If you take blood thinners, such as warfarin (Coumadin), clopidogrel (Plavix), or aspirin, be sure to talk to your doctor. He or she will tell you if and when to start taking those medicines again.  Make sure that you understand exactly what your doctor wants you to do.     · Take pain medicines exactly as directed. ¨ If the doctor gave you a prescription medicine for pain, take it as prescribed. ¨ If you are not taking a prescription pain medicine, ask your doctor if you can take an over-the-counter medicine.     · If you think your pain medicine is making you sick to your stomach:  ¨ Take your medicine after meals (unless your doctor has told you not to). ¨ Ask your doctor for a different pain medicine.     · If your doctor prescribed antibiotics, take them as directed. Do not stop taking them just because you feel better. You need to take the full course of antibiotics. Other instructions    · You may have some light vaginal bleeding. Wear sanitary pads if needed. Do not douche or use tampons until your doctor says it is okay.     · You may want to use a heating pad on your belly to help with pain. Use a low heat setting.     · Talk with your doctor about birth control. Endometrial ablation usually causes infertility, but pregnancy may still be possible. And the pregnancy could have severe problems. Follow-up care is a key part of your treatment and safety. Be sure to make and go to all appointments, and call your doctor if you are having problems. It's also a good idea to know your test results and keep a list of the medicines you take. When should you call for help? Call 911 anytime you think you may need emergency care. For example, call if:    · You passed out (lost consciousness).     · You have chest pain, are short of breath, or cough up blood.    Call your doctor now or seek immediate medical care if:    · You have pain that does not get better after you take pain medicine.     · You cannot pass stools or gas.     · You have vaginal discharge that has increased in amount or smells bad.     · You are sick to your stomach or cannot drink fluids.     · You have signs of infection, such as:  ¨ Increased pain, swelling, warmth, or redness.   ¨ A fever.     · You have bright red vaginal bleeding that soaks one or more pads in an hour, or you have large clots.     · You have signs of a blood clot in your leg (called a deep vein thrombosis), such as:  ¨ Pain in your calf, back of the knee, thigh, or groin. ¨ Redness and swelling in your leg.    Watch closely for any changes in your health, and be sure to contact your doctor if you have any problems. Where can you learn more? Go to http://maddie-dilan.info/. Enter Q165 in the search box to learn more about \"Endometrial Ablation: What to Expect at Home. \"  Current as of: October 6, 2017  Content Version: 11.7  © 5337-4223 [employer]. Care instructions adapted under license by [employer] (which disclaims liability or warranty for this information). If you have questions about a medical condition or this instruction, always ask your healthcare professional. [address] any warranty or liability for your use of this information. After general anesthesia or intravenous sedation, for 24 hours or while taking prescription Narcotics:  · Limit your activities  · Do not drive and operate hazardous machinery  · Do not make important personal or business decisions  · Do  not drink alcoholic beverages  · If you have not urinated within 8 hours after discharge, please contact your surgeon on call. *  Please give a list of your current medications to your Primary Care Provider. *  Please update this list whenever your medications are discontinued, doses are      changed, or new medications (including over-the-counter products) are added. *  Please carry medication information at all times in case of emergency situations. These are general instructions for a healthy lifestyle:  No smoking/ No tobacco products/ Avoid exposure to second hand smoke  Surgeon General's Warning:  Quitting smoking now greatly reduces serious risk to your health.   Obesity, smoking, and sedentary lifestyle greatly increases your risk for illness  A healthy diet, regular physical exercise & weight monitoring are important for maintaining a healthy lifestyle    You may be retaining fluid if you have a history of heart failure or if you experience any of the following symptoms:  Weight gain of 3 pounds or more overnight or 5 pounds in a week, increased swelling in our hands or feet or shortness of breath while lying flat in bed. Please call your doctor as soon as you notice any of these symptoms; do not wait until your next office visit. Recognize signs and symptoms of STROKE:  F-face looks uneven  A-arms unable to move or move unevenly  S-speech slurred or non-existent  T-time-call 911 as soon as signs and symptoms begin-DO NOT go       Back to bed or wait to see if you get better-TIME IS BRAIN.

## 2018-08-10 NOTE — ANESTHESIA PREPROCEDURE EVALUATION
Anesthetic History               Review of Systems / Medical History  Patient summary reviewed, nursing notes reviewed and pertinent labs reviewed    Pulmonary        Sleep apnea: No treatment           Neuro/Psych              Cardiovascular    Hypertension: well controlled      CHF  Dysrhythmias (s/p SVT ablation Feb 2018)   CAD (cardiomyopathy -- recent improvement in LVEF)    Exercise tolerance[de-identified] Borderline 4 METS  Comments: TTE March 2018:  LVEF 40-45%.   Improved from 30-35% in Jan   GI/Hepatic/Renal     GERD           Endo/Other        Obesity and arthritis     Other Findings            Physical Exam    Airway  Mallampati: I  TM Distance: 4 - 6 cm  Neck ROM: normal range of motion   Mouth opening: Normal     Cardiovascular  Regular rate and rhythm,  S1 and S2 normal,  no murmur, click, rub, or gallop             Dental         Pulmonary  Breath sounds clear to auscultation               Abdominal  GI exam deferred       Other Findings            Anesthetic Plan    ASA: 3  Anesthesia type: general            Anesthetic plan and risks discussed with: Patient    Daughters present

## 2018-08-10 NOTE — ANESTHESIA POSTPROCEDURE EVALUATION
Post-Anesthesia Evaluation and Assessment    Patient: Luisana Barnett MRN: [id number]  SSN: [id number]    YOB: 1970  Age: 46 y.o. Sex: female       Cardiovascular Function/Vital Signs  Visit Vitals    /83    Pulse (!) 57    Temp 36.9 °C (98.4 °F)    Resp 16    Wt 107 kg (235 lb 14.4 oz)    SpO2 96%    BMI 35.87 kg/m2       Patient is status post general anesthesia for Procedure(s): HYSTEROSCOPY AND ENDOMETRIAL Peola Mas / Maxi Willie. Nausea/Vomiting: None    Postoperative hydration reviewed and adequate. Pain:  Pain Scale 1: Numeric (0 - 10) (08/10/18 0814)  Pain Intensity 1: 7 (08/10/18 0814)   Managed    Neurological Status:   Neuro (WDL): Exceptions to [facility] (08/10/18 0754)  Neuro  Neurologic State: Eyes open to stimulus (08/10/18 0754)  Orientation Level: Oriented to person (08/10/18 0754)  Cognition: Follows commands (08/10/18 0754)   At baseline    Mental Status and Level of Consciousness: Arousable    Pulmonary Status:   O2 Device: Room air (08/10/18 0840)   Adequate oxygenation and airway patent    Complications related to anesthesia: None    Post-anesthesia assessment completed.  No concerns    Signed By: Lupis Singh MD     August 10, 2018

## 2018-11-07 PROBLEM — Z02.71 ENCOUNTER FOR DISABILITY DETERMINATION: Status: ACTIVE | Noted: 2018-11-07

## 2018-11-13 ENCOUNTER — HOSPITAL ENCOUNTER (OUTPATIENT)
Dept: INFUSION THERAPY | Age: 48
End: 2018-11-13

## 2018-11-16 ENCOUNTER — HOSPITAL ENCOUNTER (OUTPATIENT)
Dept: LAB | Age: 48
Discharge: HOME | End: 2018-11-16
Payer: COMMERCIAL

## 2018-11-16 DIAGNOSIS — I50.22 SYSTOLIC CHF, CHRONIC (HCC): ICD-10-CM

## 2018-11-16 LAB
ALBUMIN SERPL-MCNC: 3.3 G/DL (ref 3.5–5)
ALBUMIN/GLOB SERPL: 0.8 {RATIO}
ALP SERPL-CCNC: 49 U/L (ref 50–136)
ALT SERPL-CCNC: 17 U/L (ref 12–65)
ANION GAP SERPL CALC-SCNC: 8 MMOL/L
AST SERPL-CCNC: 14 U/L (ref 15–37)
BASOPHILS # BLD: 0.1 K/UL (ref 0–0.2)
BASOPHILS NFR BLD: 1 % (ref 0–2)
BILIRUB SERPL-MCNC: 0.3 MG/DL (ref 0.2–1.1)
BNP SERPL-MCNC: 62 PG/ML
BUN SERPL-MCNC: 13 MG/DL (ref 6–23)
CALCIUM SERPL-MCNC: 8.6 MG/DL (ref 8.3–10.4)
CHLORIDE SERPL-SCNC: 108 MMOL/L (ref 98–107)
CO2 SERPL-SCNC: 26 MMOL/L (ref 21–32)
CREAT SERPL-MCNC: 0.8 MG/DL (ref 0.6–1)
DIFFERENTIAL METHOD BLD: ABNORMAL
EOSINOPHIL # BLD: 0.1 K/UL (ref 0–0.8)
EOSINOPHIL NFR BLD: 1 % (ref 0.5–7.8)
ERYTHROCYTE [DISTWIDTH] IN BLOOD BY AUTOMATED COUNT: 15.5 %
GLOBULIN SER CALC-MCNC: 3.9 G/DL
GLUCOSE SERPL-MCNC: 95 MG/DL (ref 65–100)
HCT VFR BLD AUTO: 33.7 % (ref 35.8–46.3)
HGB BLD-MCNC: 10.6 G/DL (ref 11.7–15.4)
IMM GRANULOCYTES # BLD: 0 K/UL (ref 0–0.5)
IMM GRANULOCYTES NFR BLD AUTO: 0 % (ref 0–5)
LYMPHOCYTES # BLD: 1.4 K/UL (ref 0.5–4.6)
LYMPHOCYTES NFR BLD: 22 % (ref 13–44)
MAGNESIUM SERPL-MCNC: 2.2 MG/DL (ref 1.8–2.4)
MCH RBC QN AUTO: 26.2 PG (ref 26.1–32.9)
MCHC RBC AUTO-ENTMCNC: 31.5 G/DL (ref 31.4–35)
MCV RBC AUTO: 83.4 FL (ref 79.6–97.8)
MONOCYTES # BLD: 0.4 K/UL (ref 0.1–1.3)
MONOCYTES NFR BLD: 6 % (ref 4–12)
NEUTS SEG # BLD: 4.5 K/UL (ref 1.7–8.2)
NEUTS SEG NFR BLD: 70 % (ref 43–78)
NRBC # BLD: 0 K/UL (ref 0–0.2)
PLATELET # BLD AUTO: 348 K/UL (ref 150–450)
PMV BLD AUTO: 10.1 FL (ref 9.4–12.3)
POTASSIUM SERPL-SCNC: 3.7 MMOL/L (ref 3.5–5.1)
PROT SERPL-MCNC: 7.2 G/DL (ref 6.3–8.2)
RBC # BLD AUTO: 4.04 M/UL (ref 4.05–5.2)
SODIUM SERPL-SCNC: 142 MMOL/L (ref 136–145)
TSH SERPL DL<=0.005 MIU/L-ACNC: 0.58 UIU/ML (ref 0.36–3.74)
WBC # BLD AUTO: 6.5 K/UL (ref 4.3–11.1)

## 2018-11-16 PROCEDURE — 83735 ASSAY OF MAGNESIUM: CPT

## 2018-11-16 PROCEDURE — 36415 COLL VENOUS BLD VENIPUNCTURE: CPT

## 2018-11-16 PROCEDURE — 83880 ASSAY OF NATRIURETIC PEPTIDE: CPT

## 2018-11-16 PROCEDURE — 84443 ASSAY THYROID STIM HORMONE: CPT

## 2018-11-16 PROCEDURE — 80053 COMPREHEN METABOLIC PANEL: CPT

## 2018-11-16 PROCEDURE — 85025 COMPLETE CBC W/AUTO DIFF WBC: CPT

## 2018-11-16 NOTE — PROGRESS NOTES
Please call her, labs were otherwise good. Hb stable around 10. Good news. We will get echo as planned and see back.   Thanks

## 2018-11-27 ENCOUNTER — HOSPITAL ENCOUNTER (OUTPATIENT)
Dept: INFUSION THERAPY | Age: 48
End: 2018-11-27

## 2018-11-30 ENCOUNTER — HOSPITAL ENCOUNTER (OUTPATIENT)
Dept: INFUSION THERAPY | Age: 48
Discharge: HOME | End: 2018-11-30
Payer: COMMERCIAL

## 2018-11-30 VITALS
HEART RATE: 68 BPM | RESPIRATION RATE: 20 BRPM | BODY MASS INDEX: 36.4 KG/M2 | TEMPERATURE: 98.4 F | SYSTOLIC BLOOD PRESSURE: 141 MMHG | WEIGHT: 239.4 LBS | DIASTOLIC BLOOD PRESSURE: 80 MMHG | OXYGEN SATURATION: 100 %

## 2018-11-30 PROCEDURE — 96365 THER/PROPH/DIAG IV INF INIT: CPT

## 2018-11-30 PROCEDURE — 96361 HYDRATE IV INFUSION ADD-ON: CPT

## 2018-11-30 PROCEDURE — 74011250636 HC RX REV CODE- 250/636: Performed by: OBSTETRICS & GYNECOLOGY

## 2018-11-30 RX ADMIN — SODIUM CHLORIDE 500 ML: 900 INJECTION, SOLUTION INTRAVENOUS at 07:55

## 2018-11-30 RX ADMIN — FERRIC CARBOXYMALTOSE INJECTION 750 MG: 50 INJECTION, SOLUTION INTRAVENOUS at 08:00

## 2018-11-30 NOTE — PROGRESS NOTES
Problem: Knowledge Deficit Goal: *Verbalizes understanding of procedures and medications Outcome: Progressing Towards Goal 
Verbalizes/demonstrates understanding of purpose/procedure/potential side effects of injectafer.

## 2018-11-30 NOTE — PROGRESS NOTES
Pt arrived ambulatory today at [phone], to receive IV injectafer. Pt tolerated without difficulty. Patient discharged via ambulatory accompanied by daughter. Instructed to notify physician of any problems, questions or concerns. Allowed opportunity for patient/family to ask questions. Verbalized understanding. Next appointment is Dec 7 at 3 [facility] with Romaine 64Gregory.

## 2018-12-07 ENCOUNTER — HOSPITAL ENCOUNTER (OUTPATIENT)
Dept: INFUSION THERAPY | Age: 48
Discharge: HOME | End: 2018-12-07
Payer: COMMERCIAL

## 2018-12-07 VITALS
TEMPERATURE: 98.3 F | RESPIRATION RATE: 18 BRPM | HEART RATE: 61 BPM | OXYGEN SATURATION: 100 % | SYSTOLIC BLOOD PRESSURE: 158 MMHG | DIASTOLIC BLOOD PRESSURE: 91 MMHG

## 2018-12-07 PROCEDURE — 96365 THER/PROPH/DIAG IV INF INIT: CPT

## 2018-12-07 PROCEDURE — 74011250636 HC RX REV CODE- 250/636: Performed by: OBSTETRICS & GYNECOLOGY

## 2018-12-07 RX ORDER — SODIUM CHLORIDE 0.9 % (FLUSH) 0.9 %
10 SYRINGE (ML) INJECTION AS NEEDED
Status: ACTIVE | OUTPATIENT
Start: 2018-12-07 | End: 2018-12-07

## 2018-12-07 RX ADMIN — Medication 10 ML: at 08:01

## 2018-12-07 RX ADMIN — FERRIC CARBOXYMALTOSE INJECTION 750 MG: 50 INJECTION, SOLUTION INTRAVENOUS at 07:40

## 2018-12-07 RX ADMIN — Medication 10 ML: at 07:35

## 2018-12-07 NOTE — PROGRESS NOTES
Arrived to the [facility]. Injectafer completed. Patient tolerated well. Any issues or concerns during appointment: none. Discharged ambulatory accompanied by family.

## 2019-01-28 ENCOUNTER — HOSPITAL ENCOUNTER (OUTPATIENT)
Dept: SURGERY | Age: 49
Discharge: HOME | End: 2019-01-28
Payer: COMMERCIAL

## 2019-01-28 VITALS
TEMPERATURE: 97.4 F | HEART RATE: 75 BPM | DIASTOLIC BLOOD PRESSURE: 81 MMHG | WEIGHT: 245.38 LBS | RESPIRATION RATE: 14 BRPM | HEIGHT: 68 IN | BODY MASS INDEX: 37.19 KG/M2 | OXYGEN SATURATION: 100 % | SYSTOLIC BLOOD PRESSURE: 147 MMHG

## 2019-01-28 LAB
CREAT SERPL-MCNC: 0.82 MG/DL (ref 0.6–1)
HGB BLD-MCNC: 11.6 G/DL (ref 11.7–15.4)
POTASSIUM SERPL-SCNC: 3.5 MMOL/L (ref 3.5–5.1)

## 2019-01-28 PROCEDURE — 82565 ASSAY OF CREATININE: CPT

## 2019-01-28 PROCEDURE — 85018 HEMOGLOBIN: CPT

## 2019-01-28 PROCEDURE — 84132 ASSAY OF SERUM POTASSIUM: CPT

## 2019-01-28 NOTE — PERIOP NOTES
Patient verified name and  Order for consent NOT found in EHR- unable to determine if it matches case posting; patient verified. Type 2 surgery, PAT assessment complete. Labs per surgeon: no orders in EMR at this time Labs per anesthesia protocol: hgb, K+, creatinine- results pending EKG: multiple done 2018-2018- all reviewed by anesthesia (Dr Zohaib Loera and Dr Kavon Lozoya) prior to hysteroscopy 2018. ECHO done 18- EF 40-45%- no change from previous ECHO reviewed by anesthesia 2018. Pt states she had cardiac clearance with Dr Ariella Soto 2018. Office note 18 states: 
IMPRESSION:  
  
1. HTN:  Changed aldactone to every day, will increase ARB today to 100mg. Add norvasc 5 as needed. Follow at home, EF the same. Needs stress mgmt, needs better diet. Follow for now. 2. SHF/NICM: needs another surgery, EF stable, remain on coreg, aldactone and ARB. On lasix every other day, follow K and Cr. Follow for now. Walking needed. Heart failure history and medications were reviewed with the patient today. Action plan for diuretics was reviewed (if needed) with the patient, including importance of daily weights and low sodium diet. Importance of BP control as well. The patient has been instructed to call our office with worsening shortness of breath, edema or other heart failure related signs/symptoms. All questions were answered with the patient voicing understanding. 3. CAD:  Needs ASA 81 when bleeding better. Remain on BB, statin. No NST or LHC now. The patient has been instructed to call with any angina or equivalent as reviewed today. All questions were answered with the patient voicing complete understanding. 4. Anemia:  Follow, stable now. Needs surgery now. Hibiclens and instructions given per hospital policy.  
 
Patient provided with and instructed on educational handouts including Guide to Surgery, Pain Management, Hand Hygiene, Blood Transfusion Education, and [place] [facility] Brochure. Patient answered medical/surgical history questions at their best of ability. All prior to admission medications documented in [facility]. Original medication prescription bottles NOT visualized during patient appointment. Patient instructed to hold all vitamins 7 days prior to surgery and NSAIDS 5 days prior to surgery, patient verbalized understanding. Prescription medications to hold: lasix dos, losartan dos, spironolactone dos. Patient instructed to continue previous medications as prescribed prior to surgery and to take the following medications the day of surgery according to anesthesia guidelines with a small sip of water: coreg, percocet. Patient teach back successful and patient demonstrates knowledge of instructions.

## 2019-01-28 NOTE — PERIOP NOTES
Lab results within anesthesia guidelines. Recent Results (from the past 12 hour(s)) HEMOGLOBIN Collection Time: 01/28/19  3:42 PM  
Result Value Ref Range HGB 11.6 (L) 11.7 - 15.4 g/dL POTASSIUM Collection Time: 01/28/19  3:42 PM  
Result Value Ref Range Potassium 3.5 3.5 - 5.1 mmol/L  
CREATININE Collection Time: 01/28/19  3:42 PM  
Result Value Ref Range  Creatinine 0.82 0.6 - 1.0 MG/DL

## 2019-02-05 NOTE — H&P
Subjective:     Rasheed Larose, MRN: [id number], is a 46 y.o.  female presents with AUB with anemia, due to multiple large uterine fibroids. She had an endometrial ablation which has failed. She is taking fe pills and has rec'd IV Fe as well. Was seen by her cardiologist and cleared for surgery. Oris Solano gradually worsening course. See office notes on care, including office pelvic U/S confirming large fibroids.     Patient Active Problem List    Diagnosis Date Noted    S/P ablation of ventricular arrhythmia 02/12/2018     Priority: 1 - One    Severe obesity (BMI 35.0-39.9) 07/05/2018    Menorrhagia with regular cycle 05/07/2018    Atrial fibrillation ([address] 02/12/2018    NICM (nonischemic cardiomyopathy) ([address] 74/78/6728    Systolic CHF, chronic ([address] 12/18/2017    Coronary artery disease involving native coronary artery of native heart without angina pectoris 12/18/2017    PVC's (premature ventricular contractions) 12/18/2017    Chronic fatigue 12/18/2017    Intramural, submucous, and subserous leiomyoma of uterus 11/01/2017     Past Medical History:   Diagnosis Date    Abnormal Papanicolaou smear of cervix     Anemia     Iron supplements     Arthritis     CAD (coronary artery disease)     no stents; [facility] 3/2017 mod LAD dz; [facility] Dr Safia Mcd[facility]    Cardiomyopathy [facility])     Followed by [facility] cardiology     CHF (congestive heart failure) ([address]     ECHO 12/18/18: EF 40-45%; managed with medication and followed by [facility]    Fibroid, uterine     GERD (gastroesophageal reflux disease)     sometimes- managed with diet    Heart murmur     ECHO 12/18/18- EF 40-45%, mild MR, mild TR    Hypercholesterolemia     Controlled with meds     Hypertension     Controlled with meds     Menorrhagia with regular cycle     Morbid obesity ([address]     Ovarian cyst     PVCs (premature ventricular contractions)     Sleep apnea     uses cpap    Uterine fibroid       Past Surgical History: Procedure Laterality Date    HX AFIB ABLATION  02/2018    Ablation for pvc's     HX HYSTEROSCOPY      HX TUBAL LIGATION      HX WISDOM TEETH EXTRACTION        [unfilled]  No Known Allergies   Social History     Tobacco Use    Smoking status: Never Smoker    Smokeless tobacco: Never Used   Substance Use Topics    Alcohol use: No      Family History   Problem Relation Age of Onset    Heart Disease Mother     Cancer Father         Prenatal Labs: No results found for: RUBELLAEXT, GRBSEXT, HBSAGEXT, HIVEXT, RPREXT, GONNOEXT, CHLAMEXT     Review of Systems  Constitutional: negative  Respiratory: negative  Cardiovascular: negative  Musculoskeletal:negative    Objective:     No data found. No intake or output data in the 24 hours ending 02/05/19 1219  There were no vitals taken for this visit. General appearance: alert, cooperative, no distress, appears stated age, moderately obese  Head: Normocephalic, without obvious abnormality, atraumatic  Back: symmetric, no curvature. ROM normal. No CVA tenderness. Lungs: clear to auscultation bilaterally  Heart: regular rate and rhythm, S1, S2 normal, no murmur, click, rub or gallop  Abdomen: soft, non-tender. Bowel sounds normal. No masses,  no organomegaly  Pelvic: External genitalia normal, Vagina normal without discharge, exam obscured by obesity, positive findings: uterine enlargement or uterine fibroids palpable, cervix nl  Extremities: extremities normal, atraumatic, no cyanosis or edema  Pulses: 2+ and symmetric  Skin: Skin color, texture, turgor normal. No rashes or lesions      Assessment:     Active Problems:    * No active hospital problems. *      AUB with anemia due to large uterine fibroids. Proceed with hysterectomy, patient prefers to keep cervix if normal. Also retain ovaries. Understands risk of ovarian cancer in future. . Discussed risks of infection, DVT, damage to bowel/bladder/other internal organs, bleeding/transfusion, scar tissue/adhesions.  All questions answered, will proceed. Plan:     Supracervical abdominal hysterectomy under general anesthesia    We discussed removal of ovaries vs retention, pt will probably have removed, will decide now at preop.

## 2019-02-11 ENCOUNTER — ANESTHESIA EVENT (OUTPATIENT)
Dept: SURGERY | Age: 49
DRG: 742 | End: 2019-02-11
Payer: COMMERCIAL

## 2019-02-12 ENCOUNTER — HOSPITAL ENCOUNTER (INPATIENT)
Age: 49
LOS: 2 days | Discharge: HOME | DRG: 742 | End: 2019-02-14
Attending: OBSTETRICS & GYNECOLOGY | Admitting: OBSTETRICS & GYNECOLOGY
Payer: COMMERCIAL

## 2019-02-12 ENCOUNTER — ANESTHESIA (OUTPATIENT)
Dept: SURGERY | Age: 49
DRG: 742 | End: 2019-02-12
Payer: COMMERCIAL

## 2019-02-12 DIAGNOSIS — D25.0 INTRAMURAL, SUBMUCOUS, AND SUBSEROUS LEIOMYOMA OF UTERUS: Primary | ICD-10-CM

## 2019-02-12 DIAGNOSIS — D25.2 INTRAMURAL, SUBMUCOUS, AND SUBSEROUS LEIOMYOMA OF UTERUS: Primary | ICD-10-CM

## 2019-02-12 DIAGNOSIS — D25.1 INTRAMURAL, SUBMUCOUS, AND SUBSEROUS LEIOMYOMA OF UTERUS: Primary | ICD-10-CM

## 2019-02-12 PROBLEM — Z90.711 S/P ABDOMINAL SUPRACERVICAL SUBTOTAL HYSTERECTOMY: Status: ACTIVE | Noted: 2019-02-12

## 2019-02-12 PROBLEM — N93.9 ABNORMAL UTERINE BLEEDING (AUB): Status: ACTIVE | Noted: 2019-02-12

## 2019-02-12 LAB
ABO + RH BLD: NORMAL
BLOOD GROUP ANTIBODIES SERPL: NORMAL
HCG UR QL: NEGATIVE
SPECIMEN EXP DATE BLD: NORMAL

## 2019-02-12 PROCEDURE — 74011000250 HC RX REV CODE- 250

## 2019-02-12 PROCEDURE — 88307 TISSUE EXAM BY PATHOLOGIST: CPT

## 2019-02-12 PROCEDURE — 77030002974 HC SUT PLN J&J -A: Performed by: OBSTETRICS & GYNECOLOGY

## 2019-02-12 PROCEDURE — 36415 COLL VENOUS BLD VENIPUNCTURE: CPT

## 2019-02-12 PROCEDURE — 74011250637 HC RX REV CODE- 250/637

## 2019-02-12 PROCEDURE — 81025 URINE PREGNANCY TEST: CPT

## 2019-02-12 PROCEDURE — 77030018836 HC SOL IRR NACL ICUM -A: Performed by: OBSTETRICS & GYNECOLOGY

## 2019-02-12 PROCEDURE — 77030034849: Performed by: OBSTETRICS & GYNECOLOGY

## 2019-02-12 PROCEDURE — 74011250636 HC RX REV CODE- 250/636: Performed by: OBSTETRICS & GYNECOLOGY

## 2019-02-12 PROCEDURE — 0UT90ZL RESECTION OF UTERUS, SUPRACERVICAL, OPEN APPROACH: ICD-10-PCS | Performed by: OBSTETRICS & GYNECOLOGY

## 2019-02-12 PROCEDURE — 77030011266 HC ELECTRD BLD INSL COVD -A: Performed by: OBSTETRICS & GYNECOLOGY

## 2019-02-12 PROCEDURE — 65270000029 HC RM PRIVATE

## 2019-02-12 PROCEDURE — 77030003029 HC SUT VCRL J&J -B: Performed by: OBSTETRICS & GYNECOLOGY

## 2019-02-12 PROCEDURE — 77030002966 HC SUT PDS J&J -A: Performed by: OBSTETRICS & GYNECOLOGY

## 2019-02-12 PROCEDURE — 77030019908 HC STETH ESOPH SIMS -A: Performed by: ANESTHESIOLOGY

## 2019-02-12 PROCEDURE — 77030031139 HC SUT VCRL2 J&J -A: Performed by: OBSTETRICS & GYNECOLOGY

## 2019-02-12 PROCEDURE — 0UT70ZZ RESECTION OF BILATERAL FALLOPIAN TUBES, OPEN APPROACH: ICD-10-PCS | Performed by: OBSTETRICS & GYNECOLOGY

## 2019-02-12 PROCEDURE — 76210000006 HC OR PH I REC 0.5 TO 1 HR: Performed by: OBSTETRICS & GYNECOLOGY

## 2019-02-12 PROCEDURE — 86900 BLOOD TYPING SEROLOGIC ABO: CPT

## 2019-02-12 PROCEDURE — 76060000034 HC ANESTHESIA 1.5 TO 2 HR: Performed by: OBSTETRICS & GYNECOLOGY

## 2019-02-12 PROCEDURE — 74011250636 HC RX REV CODE- 250/636: Performed by: ANESTHESIOLOGY

## 2019-02-12 PROCEDURE — C1765 ADHESION BARRIER: HCPCS | Performed by: OBSTETRICS & GYNECOLOGY

## 2019-02-12 PROCEDURE — 77030002888 HC SUT CHRMC J&J -A: Performed by: OBSTETRICS & GYNECOLOGY

## 2019-02-12 PROCEDURE — 77030032490 HC SLV COMPR SCD KNE COVD -B: Performed by: OBSTETRICS & GYNECOLOGY

## 2019-02-12 PROCEDURE — 0UT20ZZ RESECTION OF BILATERAL OVARIES, OPEN APPROACH: ICD-10-PCS | Performed by: OBSTETRICS & GYNECOLOGY

## 2019-02-12 PROCEDURE — 74011250636 HC RX REV CODE- 250/636

## 2019-02-12 PROCEDURE — 77030011278 HC ELECTRD LIG IMPT COVD -F: Performed by: OBSTETRICS & GYNECOLOGY

## 2019-02-12 PROCEDURE — 77030039425 HC BLD LARYNG TRULITE DISP TELE -A: Performed by: ANESTHESIOLOGY

## 2019-02-12 PROCEDURE — 77030037088 HC TUBE ENDOTRACH ORAL NSL COVD-A: Performed by: ANESTHESIOLOGY

## 2019-02-12 PROCEDURE — 77030020255 HC SOL INJ LR 1000ML BG

## 2019-02-12 PROCEDURE — 74011250637 HC RX REV CODE- 250/637: Performed by: ANESTHESIOLOGY

## 2019-02-12 PROCEDURE — 74011250637 HC RX REV CODE- 250/637: Performed by: OBSTETRICS & GYNECOLOGY

## 2019-02-12 PROCEDURE — 77030039266 HC ADH SKN EXOFIN S2SG -A: Performed by: OBSTETRICS & GYNECOLOGY

## 2019-02-12 PROCEDURE — 76010000162 HC OR TIME 1.5 TO 2 HR INTENSV-TIER 1: Performed by: OBSTETRICS & GYNECOLOGY

## 2019-02-12 PROCEDURE — 77030020782 HC GWN BAIR PAWS FLX 3M -B: Performed by: ANESTHESIOLOGY

## 2019-02-12 RX ORDER — LIDOCAINE HYDROCHLORIDE 20 MG/ML
INJECTION, SOLUTION EPIDURAL; INFILTRATION; INTRACAUDAL; PERINEURAL AS NEEDED
Status: DISCONTINUED | OUTPATIENT
Start: 2019-02-12 | End: 2019-02-12 | Stop reason: HOSPADM

## 2019-02-12 RX ORDER — SODIUM CHLORIDE, SODIUM LACTATE, POTASSIUM CHLORIDE, CALCIUM CHLORIDE 600; 310; 30; 20 MG/100ML; MG/100ML; MG/100ML; MG/100ML
75 INJECTION, SOLUTION INTRAVENOUS CONTINUOUS
Status: DISCONTINUED | OUTPATIENT
Start: 2019-02-12 | End: 2019-02-12 | Stop reason: HOSPADM

## 2019-02-12 RX ORDER — DIPHENHYDRAMINE HCL 25 MG
25 CAPSULE ORAL
Status: DISCONTINUED | OUTPATIENT
Start: 2019-02-12 | End: 2019-02-14 | Stop reason: HOSPADM

## 2019-02-12 RX ORDER — PROMETHAZINE HYDROCHLORIDE 25 MG/1
25 TABLET ORAL
Status: DISCONTINUED | OUTPATIENT
Start: 2019-02-12 | End: 2019-02-14 | Stop reason: HOSPADM

## 2019-02-12 RX ORDER — ROCURONIUM BROMIDE 10 MG/ML
INJECTION, SOLUTION INTRAVENOUS AS NEEDED
Status: DISCONTINUED | OUTPATIENT
Start: 2019-02-12 | End: 2019-02-12 | Stop reason: HOSPADM

## 2019-02-12 RX ORDER — DEXAMETHASONE SODIUM PHOSPHATE 4 MG/ML
INJECTION, SOLUTION INTRA-ARTICULAR; INTRALESIONAL; INTRAMUSCULAR; INTRAVENOUS; SOFT TISSUE AS NEEDED
Status: DISCONTINUED | OUTPATIENT
Start: 2019-02-12 | End: 2019-02-12 | Stop reason: HOSPADM

## 2019-02-12 RX ORDER — NEOSTIGMINE METHYLSULFATE 1 MG/ML
INJECTION INTRAVENOUS AS NEEDED
Status: DISCONTINUED | OUTPATIENT
Start: 2019-02-12 | End: 2019-02-12 | Stop reason: HOSPADM

## 2019-02-12 RX ORDER — OXYCODONE HYDROCHLORIDE 5 MG/1
10 TABLET ORAL
Status: DISCONTINUED | OUTPATIENT
Start: 2019-02-12 | End: 2019-02-12 | Stop reason: HOSPADM

## 2019-02-12 RX ORDER — OXYCODONE HYDROCHLORIDE 5 MG/1
10 TABLET ORAL
Status: DISCONTINUED | OUTPATIENT
Start: 2019-02-12 | End: 2019-02-14 | Stop reason: HOSPADM

## 2019-02-12 RX ORDER — IBUPROFEN 800 MG/1
800 TABLET ORAL
Status: DISCONTINUED | OUTPATIENT
Start: 2019-02-12 | End: 2019-02-14 | Stop reason: HOSPADM

## 2019-02-12 RX ORDER — SODIUM CHLORIDE 0.9 % (FLUSH) 0.9 %
5-40 SYRINGE (ML) INJECTION EVERY 8 HOURS
Status: DISCONTINUED | OUTPATIENT
Start: 2019-02-12 | End: 2019-02-14 | Stop reason: HOSPADM

## 2019-02-12 RX ORDER — NALOXONE HYDROCHLORIDE 0.4 MG/ML
0.4 INJECTION, SOLUTION INTRAMUSCULAR; INTRAVENOUS; SUBCUTANEOUS AS NEEDED
Status: DISCONTINUED | OUTPATIENT
Start: 2019-02-12 | End: 2019-02-14 | Stop reason: HOSPADM

## 2019-02-12 RX ORDER — ONDANSETRON 2 MG/ML
INJECTION INTRAMUSCULAR; INTRAVENOUS AS NEEDED
Status: DISCONTINUED | OUTPATIENT
Start: 2019-02-12 | End: 2019-02-12 | Stop reason: HOSPADM

## 2019-02-12 RX ORDER — SODIUM CHLORIDE, SODIUM LACTATE, POTASSIUM CHLORIDE, CALCIUM CHLORIDE 600; 310; 30; 20 MG/100ML; MG/100ML; MG/100ML; MG/100ML
125 INJECTION, SOLUTION INTRAVENOUS CONTINUOUS
Status: DISCONTINUED | OUTPATIENT
Start: 2019-02-12 | End: 2019-02-13

## 2019-02-12 RX ORDER — SODIUM CHLORIDE 0.9 % (FLUSH) 0.9 %
5-40 SYRINGE (ML) INJECTION EVERY 8 HOURS
Status: DISCONTINUED | OUTPATIENT
Start: 2019-02-12 | End: 2019-02-12 | Stop reason: HOSPADM

## 2019-02-12 RX ORDER — OXYCODONE HYDROCHLORIDE 5 MG/1
5 TABLET ORAL
Status: DISCONTINUED | OUTPATIENT
Start: 2019-02-12 | End: 2019-02-12 | Stop reason: HOSPADM

## 2019-02-12 RX ORDER — ACETAMINOPHEN 10 MG/ML
1000 INJECTION, SOLUTION INTRAVENOUS
Status: COMPLETED | OUTPATIENT
Start: 2019-02-12 | End: 2019-02-12

## 2019-02-12 RX ORDER — HYDROMORPHONE HYDROCHLORIDE 2 MG/ML
INJECTION, SOLUTION INTRAMUSCULAR; INTRAVENOUS; SUBCUTANEOUS AS NEEDED
Status: DISCONTINUED | OUTPATIENT
Start: 2019-02-12 | End: 2019-02-12 | Stop reason: HOSPADM

## 2019-02-12 RX ORDER — FLUMAZENIL 0.1 MG/ML
0.2 INJECTION INTRAVENOUS AS NEEDED
Status: DISCONTINUED | OUTPATIENT
Start: 2019-02-12 | End: 2019-02-12 | Stop reason: HOSPADM

## 2019-02-12 RX ORDER — GLYCOPYRROLATE 0.2 MG/ML
INJECTION INTRAMUSCULAR; INTRAVENOUS AS NEEDED
Status: DISCONTINUED | OUTPATIENT
Start: 2019-02-12 | End: 2019-02-12 | Stop reason: HOSPADM

## 2019-02-12 RX ORDER — GABAPENTIN 300 MG/1
600 CAPSULE ORAL ONCE
Status: COMPLETED | OUTPATIENT
Start: 2019-02-12 | End: 2019-02-12

## 2019-02-12 RX ORDER — MIDAZOLAM HYDROCHLORIDE 1 MG/ML
2 INJECTION, SOLUTION INTRAMUSCULAR; INTRAVENOUS
Status: COMPLETED | OUTPATIENT
Start: 2019-02-12 | End: 2019-02-12

## 2019-02-12 RX ORDER — PROPOFOL 10 MG/ML
INJECTION, EMULSION INTRAVENOUS AS NEEDED
Status: DISCONTINUED | OUTPATIENT
Start: 2019-02-12 | End: 2019-02-12 | Stop reason: HOSPADM

## 2019-02-12 RX ORDER — LIDOCAINE HYDROCHLORIDE 10 MG/ML
0.1 INJECTION INFILTRATION; PERINEURAL AS NEEDED
Status: DISCONTINUED | OUTPATIENT
Start: 2019-02-12 | End: 2019-02-12 | Stop reason: HOSPADM

## 2019-02-12 RX ORDER — SODIUM CHLORIDE 0.9 % (FLUSH) 0.9 %
5-40 SYRINGE (ML) INJECTION AS NEEDED
Status: DISCONTINUED | OUTPATIENT
Start: 2019-02-12 | End: 2019-02-12 | Stop reason: HOSPADM

## 2019-02-12 RX ORDER — FENTANYL CITRATE 50 UG/ML
INJECTION, SOLUTION INTRAMUSCULAR; INTRAVENOUS AS NEEDED
Status: DISCONTINUED | OUTPATIENT
Start: 2019-02-12 | End: 2019-02-12 | Stop reason: HOSPADM

## 2019-02-12 RX ORDER — DIPHENHYDRAMINE HYDROCHLORIDE 50 MG/ML
12.5 INJECTION, SOLUTION INTRAMUSCULAR; INTRAVENOUS
Status: DISCONTINUED | OUTPATIENT
Start: 2019-02-12 | End: 2019-02-12 | Stop reason: HOSPADM

## 2019-02-12 RX ORDER — HYDROMORPHONE HYDROCHLORIDE 2 MG/ML
0.5 INJECTION, SOLUTION INTRAMUSCULAR; INTRAVENOUS; SUBCUTANEOUS
Status: DISCONTINUED | OUTPATIENT
Start: 2019-02-12 | End: 2019-02-12 | Stop reason: HOSPADM

## 2019-02-12 RX ORDER — ZOLPIDEM TARTRATE 5 MG/1
5 TABLET ORAL
Status: DISCONTINUED | OUTPATIENT
Start: 2019-02-12 | End: 2019-02-14 | Stop reason: HOSPADM

## 2019-02-12 RX ORDER — SODIUM CHLORIDE 0.9 % (FLUSH) 0.9 %
5-40 SYRINGE (ML) INJECTION AS NEEDED
Status: DISCONTINUED | OUTPATIENT
Start: 2019-02-12 | End: 2019-02-14 | Stop reason: HOSPADM

## 2019-02-12 RX ORDER — DOCUSATE SODIUM 100 MG/1
100 CAPSULE, LIQUID FILLED ORAL 2 TIMES DAILY
Status: DISCONTINUED | OUTPATIENT
Start: 2019-02-12 | End: 2019-02-14 | Stop reason: HOSPADM

## 2019-02-12 RX ORDER — CEFAZOLIN SODIUM/WATER 2 G/20 ML
2 SYRINGE (ML) INTRAVENOUS ONCE
Status: COMPLETED | OUTPATIENT
Start: 2019-02-12 | End: 2019-02-12

## 2019-02-12 RX ORDER — NALOXONE HYDROCHLORIDE 0.4 MG/ML
0.1 INJECTION, SOLUTION INTRAMUSCULAR; INTRAVENOUS; SUBCUTANEOUS
Status: DISCONTINUED | OUTPATIENT
Start: 2019-02-12 | End: 2019-02-12 | Stop reason: HOSPADM

## 2019-02-12 RX ADMIN — SODIUM CHLORIDE, SODIUM LACTATE, POTASSIUM CHLORIDE, AND CALCIUM CHLORIDE: 600; 310; 30; 20 INJECTION, SOLUTION INTRAVENOUS at 08:20

## 2019-02-12 RX ADMIN — GLYCOPYRROLATE 0.6 MG: 0.2 INJECTION INTRAMUSCULAR; INTRAVENOUS at 08:40

## 2019-02-12 RX ADMIN — HYDROMORPHONE HYDROCHLORIDE 0.2 MG: 2 INJECTION, SOLUTION INTRAMUSCULAR; INTRAVENOUS; SUBCUTANEOUS at 08:46

## 2019-02-12 RX ADMIN — HYDROMORPHONE HYDROCHLORIDE 0.5 MG: 2 INJECTION, SOLUTION INTRAMUSCULAR; INTRAVENOUS; SUBCUTANEOUS at 09:16

## 2019-02-12 RX ADMIN — DOCUSATE SODIUM 100 MG: 100 CAPSULE, LIQUID FILLED ORAL at 18:31

## 2019-02-12 RX ADMIN — GABAPENTIN 600 MG: 300 CAPSULE ORAL at 06:27

## 2019-02-12 RX ADMIN — ROCURONIUM BROMIDE 50 MG: 10 INJECTION, SOLUTION INTRAVENOUS at 07:20

## 2019-02-12 RX ADMIN — OXYCODONE HYDROCHLORIDE 5 MG: 5 TABLET ORAL at 18:31

## 2019-02-12 RX ADMIN — ACETAMINOPHEN 1000 MG: 10 INJECTION, SOLUTION INTRAVENOUS at 09:32

## 2019-02-12 RX ADMIN — LIDOCAINE HYDROCHLORIDE 0.1 ML: 10 INJECTION, SOLUTION INFILTRATION; PERINEURAL at 06:28

## 2019-02-12 RX ADMIN — SODIUM CHLORIDE, SODIUM LACTATE, POTASSIUM CHLORIDE, AND CALCIUM CHLORIDE 125 ML/HR: 600; 310; 30; 20 INJECTION, SOLUTION INTRAVENOUS at 15:20

## 2019-02-12 RX ADMIN — LIDOCAINE HYDROCHLORIDE 100 MG: 20 INJECTION, SOLUTION EPIDURAL; INFILTRATION; INTRACAUDAL; PERINEURAL at 07:20

## 2019-02-12 RX ADMIN — HYDROMORPHONE HYDROCHLORIDE 0.2 MG: 2 INJECTION, SOLUTION INTRAMUSCULAR; INTRAVENOUS; SUBCUTANEOUS at 08:30

## 2019-02-12 RX ADMIN — NEOSTIGMINE METHYLSULFATE 5 MG: 1 INJECTION INTRAVENOUS at 08:40

## 2019-02-12 RX ADMIN — IBUPROFEN 800 MG: 800 TABLET, FILM COATED ORAL at 19:57

## 2019-02-12 RX ADMIN — HYDROMORPHONE HYDROCHLORIDE 0.4 MG: 2 INJECTION, SOLUTION INTRAMUSCULAR; INTRAVENOUS; SUBCUTANEOUS at 08:45

## 2019-02-12 RX ADMIN — ONDANSETRON 4 MG: 2 INJECTION INTRAMUSCULAR; INTRAVENOUS at 07:20

## 2019-02-12 RX ADMIN — FENTANYL CITRATE 100 MCG: 50 INJECTION, SOLUTION INTRAMUSCULAR; INTRAVENOUS at 07:50

## 2019-02-12 RX ADMIN — SODIUM CHLORIDE, SODIUM LACTATE, POTASSIUM CHLORIDE, AND CALCIUM CHLORIDE 75 ML/HR: 600; 310; 30; 20 INJECTION, SOLUTION INTRAVENOUS at 06:28

## 2019-02-12 RX ADMIN — MIDAZOLAM 2 MG: 1 INJECTION INTRAMUSCULAR; INTRAVENOUS at 07:07

## 2019-02-12 RX ADMIN — SODIUM CHLORIDE, SODIUM LACTATE, POTASSIUM CHLORIDE, AND CALCIUM CHLORIDE: 600; 310; 30; 20 INJECTION, SOLUTION INTRAVENOUS at 07:14

## 2019-02-12 RX ADMIN — FENTANYL CITRATE 100 MCG: 50 INJECTION, SOLUTION INTRAMUSCULAR; INTRAVENOUS at 07:20

## 2019-02-12 RX ADMIN — Medication 2 G: at 07:15

## 2019-02-12 RX ADMIN — PROPOFOL 200 MG: 10 INJECTION, EMULSION INTRAVENOUS at 07:20

## 2019-02-12 RX ADMIN — OXYCODONE HYDROCHLORIDE 5 MG: 5 TABLET ORAL at 22:41

## 2019-02-12 RX ADMIN — DEXAMETHASONE SODIUM PHOSPHATE 10 MG: 4 INJECTION, SOLUTION INTRA-ARTICULAR; INTRALESIONAL; INTRAMUSCULAR; INTRAVENOUS; SOFT TISSUE at 07:20

## 2019-02-12 RX ADMIN — HYDROMORPHONE HYDROCHLORIDE 0.2 MG: 2 INJECTION, SOLUTION INTRAMUSCULAR; INTRAVENOUS; SUBCUTANEOUS at 08:20

## 2019-02-12 RX ADMIN — IBUPROFEN 800 MG: 800 TABLET, FILM COATED ORAL at 13:34

## 2019-02-12 NOTE — ANESTHESIA PREPROCEDURE EVALUATION
Anesthetic History No history of anesthetic complications Review of Systems / Medical History Patient summary reviewed and pertinent labs reviewed Pulmonary Sleep apnea: CPAP Neuro/Psych Within defined limits Cardiovascular Hypertension: well controlled CHF Dysrhythmias (s/p ablation 2/18) : PVC 
CAD (moderate non-obstructive dz on cath 2017) Comments: Echo 12/18 - EF 45%, mild MR, mild TR  
GI/Hepatic/Renal 
Within defined limits Endo/Other Obesity and arthritis Other Findings Physical Exam 
 
Airway Mallampati: II 
TM Distance: > 6 cm Neck ROM: normal range of motion Mouth opening: Normal 
 
 Cardiovascular Rhythm: regular Rate: normal 
 
 
 
 Dental 
No notable dental hx Pulmonary Breath sounds clear to auscultation Abdominal 
 
 
 
 Other Findings Anesthetic Plan ASA: 3 Anesthesia type: general 
 
 
Post-op pain plan if not by surgeon: peripheral nerve block single Anesthetic plan and risks discussed with: Patient and Son / Daughter Bilateral TAP blocks for post-op pain

## 2019-02-12 NOTE — BRIEF OP NOTE
BRIEF OPERATIVE NOTE    Date of Procedure: 2/12/2019   Preoperative Diagnosis: Abnormal uterine bleeding (AUB) [N93.9]  Status post endometrial ablation [Z98.890]  Postoperative Diagnosis: Abnormal uterine bleeding (AUB) [N93.9]  Status post endometrial ablation [Z98.890]    Procedure(s):  SUPRACERVICAL HYSTERECTOMY ABDOMINAL TOTAL WITH BILATERAL SALPINGO OOPHORECTOMY  Surgeon(s) and Role:     * Josh Munoz MD - Primary         Surgical Assistant: JONO Rivera    Surgical Staff:  Circ-1: Ross Mccoy RN  Scrub Tech-1: Tammie Oneal  Scrub Tech-2: Sandra Hill  Event Time In Time Out   Incision Start 6810    Incision Close 0845      Anesthesia: General   Estimated Blood Loss: 100cc  Specimens:   ID Type Source Tests Collected by Time Destination   1 : uterud, bilateral tubes and ovaries Fresh   Josh Munoz MD 2/12/2019 9372 Pathology      Findings: enlarged uterus/fibroids   Complications: none  Implants: * No implants in log *

## 2019-02-12 NOTE — PERIOP NOTES
TRANSFER - OUT REPORT:    Verbal report given to Greg Rivera on Judith Claros  being transferred to 4th floor for routine post - op       Report consisted of patients Situation, Background, Assessment and   Recommendations(SBAR). Information from the following report(s) SBAR, Kardex, OR Summary, Intake/Output and MAR was reviewed with the receiving nurse. Lines:   Peripheral IV 02/12/19 Left Hand (Active)   Site Assessment Clean, dry, & intact 2/12/2019  9:23 AM   Phlebitis Assessment 0 2/12/2019  9:23 AM   Infiltration Assessment 0 2/12/2019  9:23 AM   Dressing Status Clean, dry, & intact 2/12/2019  9:23 AM   Dressing Type Transparent;Tape 2/12/2019  9:23 AM   Hub Color/Line Status Green; Infusing 2/12/2019  9:23 AM        Opportunity for questions and clarification was provided.       Patient transported with:   [employer]

## 2019-02-12 NOTE — ANESTHESIA POSTPROCEDURE EVALUATION
Procedure(s): 
SUPRACERVICAL HYSTERECTOMY ABDOMINAL TOTAL WITH BILATERAL SALPINGO OOPHORECTOMY. Anesthesia Post Evaluation Multimodal analgesia: multimodal analgesia used between 6 hours prior to anesthesia start to PACU discharge Patient location during evaluation: PACU Patient participation: complete - patient participated Level of consciousness: awake and awake and alert Pain management: adequate Airway patency: patent Anesthetic complications: no 
Cardiovascular status: acceptable Respiratory status: acceptable Hydration status: acceptable Post anesthesia nausea and vomiting:  none Visit Vitals /67 (BP 1 Location: Right arm, BP Patient Position: At rest) Pulse (!) 49 Temp 37 °C (98.6 °F) Resp 16 SpO2 97%

## 2019-02-12 NOTE — ANESTHESIA PROCEDURE NOTES
Bilateral TAP Blocks Start time: 2/12/2019 7:25 AM 
End time: 2/12/2019 7:31 AM 
Performed by: Mattie English MD 
Authorized by: Mattie English MD  
 
 
Pre-procedure: Indications: at surgeon's request and post-op pain management Preanesthetic Checklist: patient identified, risks and benefits discussed, timeout performed, anesthesia consent given and patient being monitored Timeout Time: 07:24 Block Type:  
Block Type:  TAP Laterality:  Left and right Monitoring:  Standard ASA monitoring, continuous pulse ox, frequent vital sign checks, heart rate and oxygen Injection Technique:  Single shot Procedures: ultrasound guided Patient Position: supine Prep: chlorhexidine Location:  Abdominal 
Needle Type:  Stimuplex Needle Gauge:  22 G Needle Localization:  Ultrasound guidance Assessment: 
Number of attempts:  1 Injection Assessment:  Incremental injection every 5 mL, negative aspiration for blood, local visualized surrounding nerve on ultrasound, no intravascular symptoms and ultrasound image on chart Patient tolerance:  Patient tolerated the procedure well with no immediate complications 20 ml to each side

## 2019-02-12 NOTE — ROUTINE PROCESS
SBAR IN Report: Mother    Verbal report received from Minal Haro, [address] [place] [address] (full name & credentials) on this patient, who is now being transferred from [facility] ([facility] for routine progression of care. The patient is wearing a green \"Anesthesia-Duramorph\" band. Report consisted of patient's Situation, Background, Assessment and Recommendations (SBAR). Information from the SBAR and the [place] Report was reviewed with the transferring nurse; opportunity for questions and clarification provided.

## 2019-02-13 LAB
ALBUMIN SERPL-MCNC: 2.9 G/DL (ref 3.5–5)
ALBUMIN/GLOB SERPL: 0.8 {RATIO}
ALP SERPL-CCNC: 59 U/L (ref 50–130)
ALT SERPL-CCNC: 25 U/L (ref 12–65)
ANION GAP SERPL CALC-SCNC: 7 MMOL/L
AST SERPL-CCNC: 23 U/L (ref 15–37)
BASOPHILS # BLD: 0 K/UL (ref 0–0.2)
BASOPHILS NFR BLD: 0 % (ref 0–2)
BILIRUB SERPL-MCNC: 0.2 MG/DL (ref 0.2–1.1)
BNP SERPL-MCNC: 109 PG/ML
BUN SERPL-MCNC: 16 MG/DL (ref 6–23)
CALCIUM SERPL-MCNC: 8.5 MG/DL (ref 8.3–10.4)
CHLORIDE SERPL-SCNC: 108 MMOL/L (ref 98–107)
CO2 SERPL-SCNC: 27 MMOL/L (ref 21–32)
CREAT SERPL-MCNC: 0.9 MG/DL (ref 0.6–1)
DIFFERENTIAL METHOD BLD: ABNORMAL
EOSINOPHIL # BLD: 0.1 K/UL (ref 0–0.8)
EOSINOPHIL NFR BLD: 1 % (ref 0.5–7.8)
ERYTHROCYTE [DISTWIDTH] IN BLOOD BY AUTOMATED COUNT: 15.4 % (ref 11.9–14.6)
GLOBULIN SER CALC-MCNC: 3.8 G/DL (ref 2.3–3.5)
GLUCOSE SERPL-MCNC: 117 MG/DL (ref 65–100)
HCT VFR BLD AUTO: 32.5 % (ref 35.8–46.3)
HCT VFR BLD AUTO: 33.4 % (ref 35.8–46.3)
HGB BLD-MCNC: 10.2 G/DL (ref 11.7–15.4)
HGB BLD-MCNC: 10.3 G/DL (ref 11.7–15.4)
IMM GRANULOCYTES # BLD AUTO: 0 K/UL (ref 0–0.5)
IMM GRANULOCYTES NFR BLD AUTO: 0 % (ref 0–5)
LYMPHOCYTES # BLD: 1.3 K/UL (ref 0.5–4.6)
LYMPHOCYTES NFR BLD: 15 % (ref 13–44)
MAGNESIUM SERPL-MCNC: 2.5 MG/DL (ref 1.8–2.4)
MCH RBC QN AUTO: 26.9 PG (ref 26.1–32.9)
MCHC RBC AUTO-ENTMCNC: 31.4 G/DL (ref 31.4–35)
MCV RBC AUTO: 85.8 FL (ref 79.6–97.8)
MONOCYTES # BLD: 0.6 K/UL (ref 0.1–1.3)
MONOCYTES NFR BLD: 7 % (ref 4–12)
NEUTS SEG # BLD: 6.9 K/UL (ref 1.7–8.2)
NEUTS SEG NFR BLD: 77 % (ref 43–78)
NRBC # BLD: 0 K/UL (ref 0–0.2)
PLATELET # BLD AUTO: 345 K/UL (ref 150–450)
PMV BLD AUTO: 10.8 FL (ref 9.4–12.3)
POTASSIUM SERPL-SCNC: 3.9 MMOL/L (ref 3.5–5.1)
PROT SERPL-MCNC: 6.7 G/DL
RBC # BLD AUTO: 3.79 M/UL (ref 4.05–5.2)
SODIUM SERPL-SCNC: 142 MMOL/L (ref 136–145)
TSH SERPL DL<=0.005 MIU/L-ACNC: 0.5 UIU/ML
WBC # BLD AUTO: 8.9 K/UL (ref 4.3–11.1)

## 2019-02-13 PROCEDURE — 83735 ASSAY OF MAGNESIUM: CPT

## 2019-02-13 PROCEDURE — 85018 HEMOGLOBIN: CPT

## 2019-02-13 PROCEDURE — 84443 ASSAY THYROID STIM HORMONE: CPT

## 2019-02-13 PROCEDURE — 74011250637 HC RX REV CODE- 250/637: Performed by: HOSPITALIST

## 2019-02-13 PROCEDURE — 36415 COLL VENOUS BLD VENIPUNCTURE: CPT

## 2019-02-13 PROCEDURE — 74011250637 HC RX REV CODE- 250/637: Performed by: OBSTETRICS & GYNECOLOGY

## 2019-02-13 PROCEDURE — 85025 COMPLETE CBC W/AUTO DIFF WBC: CPT

## 2019-02-13 PROCEDURE — 83880 ASSAY OF NATRIURETIC PEPTIDE: CPT

## 2019-02-13 PROCEDURE — 65270000029 HC RM PRIVATE

## 2019-02-13 PROCEDURE — 93005 ELECTROCARDIOGRAM TRACING: CPT | Performed by: HOSPITALIST

## 2019-02-13 PROCEDURE — 80053 COMPREHEN METABOLIC PANEL: CPT

## 2019-02-13 RX ORDER — POTASSIUM CHLORIDE 20 MEQ/1
40 TABLET, EXTENDED RELEASE ORAL
Status: COMPLETED | OUTPATIENT
Start: 2019-02-13 | End: 2019-02-13

## 2019-02-13 RX ORDER — ATORVASTATIN CALCIUM 40 MG/1
40 TABLET, FILM COATED ORAL
Status: DISCONTINUED | OUTPATIENT
Start: 2019-02-13 | End: 2019-02-14 | Stop reason: HOSPADM

## 2019-02-13 RX ORDER — CARVEDILOL 6.25 MG/1
6.25 TABLET ORAL 2 TIMES DAILY WITH MEALS
Status: DISCONTINUED | OUTPATIENT
Start: 2019-02-13 | End: 2019-02-14 | Stop reason: HOSPADM

## 2019-02-13 RX ORDER — LOSARTAN POTASSIUM 50 MG/1
100 TABLET ORAL DAILY
Status: DISCONTINUED | OUTPATIENT
Start: 2019-02-13 | End: 2019-02-14 | Stop reason: HOSPADM

## 2019-02-13 RX ORDER — FUROSEMIDE 40 MG/1
40 TABLET ORAL ONCE
Status: DISPENSED | OUTPATIENT
Start: 2019-02-13 | End: 2019-02-13

## 2019-02-13 RX ORDER — SPIRONOLACTONE 25 MG/1
25 TABLET ORAL DAILY
Status: DISCONTINUED | OUTPATIENT
Start: 2019-02-13 | End: 2019-02-14 | Stop reason: HOSPADM

## 2019-02-13 RX ADMIN — OXYCODONE HYDROCHLORIDE 5 MG: 5 TABLET ORAL at 03:52

## 2019-02-13 RX ADMIN — IBUPROFEN 800 MG: 800 TABLET, FILM COATED ORAL at 09:33

## 2019-02-13 RX ADMIN — ATORVASTATIN CALCIUM 40 MG: 40 TABLET, FILM COATED ORAL at 21:48

## 2019-02-13 RX ADMIN — SPIRONOLACTONE 25 MG: 25 TABLET ORAL at 14:09

## 2019-02-13 RX ADMIN — POTASSIUM CHLORIDE 40 MEQ: 20 TABLET, EXTENDED RELEASE ORAL at 22:11

## 2019-02-13 RX ADMIN — DOCUSATE SODIUM 100 MG: 100 CAPSULE, LIQUID FILLED ORAL at 09:34

## 2019-02-13 RX ADMIN — IBUPROFEN 800 MG: 800 TABLET, FILM COATED ORAL at 03:52

## 2019-02-13 RX ADMIN — DOCUSATE SODIUM 100 MG: 100 CAPSULE, LIQUID FILLED ORAL at 18:22

## 2019-02-13 RX ADMIN — LOSARTAN POTASSIUM 100 MG: 50 TABLET ORAL at 15:15

## 2019-02-13 RX ADMIN — CARVEDILOL 6.25 MG: 6.25 TABLET, FILM COATED ORAL at 18:22

## 2019-02-13 RX ADMIN — IBUPROFEN 800 MG: 800 TABLET, FILM COATED ORAL at 15:15

## 2019-02-13 NOTE — PROGRESS NOTES
Spoke with Dr. Elif Atkins via telephone in regards to patient current medications and what she normally takes at home. Patient blood pressure is beginning to creep up. Per Dr. Elif Atkins, patient may restart medications that she normally takes at home for BP control. Aldactone 25mg PO daily, Lasix 40mg PO every other day and Coreg 6.25mg PO BID. Patient may also have her Lipitor 40mg at nighttime. Telephone order with read back.

## 2019-02-13 NOTE — PROGRESS NOTES
POD # 1 Supracervical Hysterectomy/BSO  Doing very well, no complaints. Amb/void well, needs to walk more today later. Discussed surgical findings, HRT at 2 week check. AVSS abdomen benign, incision CDI.  No DVT  Hgb 10.3  Plan is to ambulate and keep good pain control, may d/c am

## 2019-02-13 NOTE — PROGRESS NOTES
Dr. Elif Atkins called for clarification of fluid orders. Orders received to cap IV and d/c fluids as long as patient is taking po fluids without difficulty.   Primary RN made aware of MD request.

## 2019-02-13 NOTE — OP NOTES
[place]  OPERATIVE REPORT    Name:  Paul Jeffrey  MR#:  [id number]  :  1970  ACCOUNT #:  [de-identified]  DATE OF SERVICE:  2019      PREOPERATIVE DIAGNOSIS:  Enlarged uterus, failed ablation with abnormal uterine  bleeding. POSTOPERATIVE DIAGNOSIS:  Enlarged uterus, failed ablation with abnormal uterine  bleeding. PROCEDURE PERFORMED:  Total abdominal supracervical hysterectomy with bilateral  salpingo-oophorectomy. SURGEON:  Mone Nicole MD    ASSISTANT:  Rachna Rivera DO    ESTIMATED BLOOD LOSS:  100 mL. COMPLICATIONS:  None. TISSUE:  Uterus and bilateral fallopian tubes and ovaries. FINDINGS:  Very enlarged uterus with fibroids, normal tubes and ovaries. ANESTHESIA:  General.    DRAINS:  [place]. SPECIMENS REMOVED:  _uterus, fallopian tubes and ovaries____. IMPLANTS:  None. PROCEDURE:  After informed consent, the patient was taken to the operating room and  given general anesthesia. She was prepped and draped in the usual sterile fashion in  the dorsal supine position. The Pfannenstiel skin incision was made with a knife  through the skin and subcutaneous tissue to the fascia. This was extended  bilaterally with Philippe scissors. The rectus muscles were then divided in the midline,  and peritoneum was entered. This was divided superiorly and inferiorly avoiding  bowel, bladder, and omentum. Examination revealed the uterus to be enlarged, and  tenaculum was placed on the fundus to elevate it out of the abdominal cavity. We did  not need O'Oscar-O'Mcclain retractor. At this time, the LigaSure impact device was  used to cauterize and transect the left infundibulopelvic ligament. This time also  the round ligament was cauterized and cut. The broad ligament was then taken down in  a similar fashion. Then, the peritoneum was taken off of the lower uterine segment,  pushing the bladder down.   On the right side, we decided to transect the  utero-ovarian ligament and then the round ligament, and go back to get the ovary  later just for exposure purposes. Once the round ligament was transected, the broad  ligament was taken down and then, the peritoneum was taken off of the lower uterine  segment. This exposed the uterine vessels, and these were clamped and cauterized. The same was done on the left side. We continued to transect parametrial tissue down  to the level of the cervix. Once this was accomplished, the Bovie was then used to  transect the uterus away from the cervical stump, uterus was removed from the  operative field. The cervix was then grasped with a tenaculum, and the endocervix  was then cauterized with the Bovie to attempt to kill remaining endometrial tissue. Because there was some minimal bleeding, the cervix was oversewn in an  anteroposterior fashion with interrupted sutures of 0 Vicryl. Irrigation was carried  out, no bleeding was noted, so the cervical stump was then covered with Interceed. Attention was turned to the right ovary and tube. This was pulled away from the  sidewall and then, the infundibulopelvic ligament was cauterized and cut with the  LigaSure device. There was good hemostasis. Final inspection revealed no accidental  damage to any other organs. Therefore, the closure was begun as the bowel was placed  in its normal anatomical position. Two lap scours had been placed to push the bowel  up and these were removed. The peritoneum was closed with 2-0 chromic in a running  stitch. The rectus muscle was brought to the midline with interrupted sutures of 2-0  chromic. The abdominal count was then correct at this time. The fascia was closed  with 0 PDS in a running stitch. Subcutaneous tissues were irrigated, remained  hemostatic, and then closed with interrupted sutures of 2-0 plain. The skin was  closed with 4-0 Vicryl in a subcuticular stitch and reinforced with Dermabond.   The  counts were correct again, and the patient was awakened and taken to the recovery  room in stable condition.       Nick Padgett MD      [id number]  D:  02/12/2019 13:53  T:  02/13/2019 1:04  JOB #:  [id number]

## 2019-02-14 VITALS
BODY MASS INDEX: 39.04 KG/M2 | OXYGEN SATURATION: 98 % | HEART RATE: 80 BPM | WEIGHT: 253 LBS | RESPIRATION RATE: 17 BRPM | TEMPERATURE: 98.1 F | DIASTOLIC BLOOD PRESSURE: 72 MMHG | SYSTOLIC BLOOD PRESSURE: 153 MMHG

## 2019-02-14 LAB
ATRIAL RATE: 59 BPM
CALCULATED P AXIS, ECG09: 19 DEGREES
CALCULATED R AXIS, ECG10: 11 DEGREES
CALCULATED T AXIS, ECG11: 5 DEGREES
DIAGNOSIS, 93000: NORMAL
P-R INTERVAL, ECG05: 172 MS
Q-T INTERVAL, ECG07: 432 MS
QRS DURATION, ECG06: 96 MS
QTC CALCULATION (BEZET), ECG08: 427 MS
VENTRICULAR RATE, ECG03: 59 BPM

## 2019-02-14 PROCEDURE — 74011250637 HC RX REV CODE- 250/637: Performed by: OBSTETRICS & GYNECOLOGY

## 2019-02-14 RX ORDER — IBUPROFEN 800 MG/1
800 TABLET ORAL
Qty: 30 TAB | Refills: 0 | Status: SHIPPED | OUTPATIENT
Start: 2019-02-14 | End: 2020-08-16

## 2019-02-14 RX ORDER — OXYCODONE HYDROCHLORIDE 10 MG/1
10 TABLET ORAL
Qty: 20 TAB | Refills: 0 | Status: SHIPPED | OUTPATIENT
Start: 2019-02-14 | End: 2022-01-04

## 2019-02-14 RX ADMIN — LOSARTAN POTASSIUM 100 MG: 50 TABLET ORAL at 08:25

## 2019-02-14 RX ADMIN — DOCUSATE SODIUM 100 MG: 100 CAPSULE, LIQUID FILLED ORAL at 08:16

## 2019-02-14 RX ADMIN — OXYCODONE HYDROCHLORIDE 5 MG: 5 TABLET ORAL at 07:36

## 2019-02-14 RX ADMIN — IBUPROFEN 800 MG: 800 TABLET, FILM COATED ORAL at 07:36

## 2019-02-14 RX ADMIN — CARVEDILOL 6.25 MG: 6.25 TABLET, FILM COATED ORAL at 08:16

## 2019-02-14 NOTE — PROGRESS NOTES
pt c/o continual heart palpitations especially worsening within the hour. This RN took set of vitals- /62 pulse-62, o2 99%, temp- 98.2 O. Assessed pt and auscultated heart for 1 minute. One significant pause in rhythm heard. Called dr brice to notify of pt complaint. Donavon aware and request to call dr rodriguez to notify. voicemail left with dr rodriguez. Awaiting callback.

## 2019-02-14 NOTE — CONSULTS
Hospitalist H&P/Consult Note     Admit Date:  2019  5:39 AM   Name:  Alfredo Marsh   Age:  46 y.o.  :  1970   MRN:  [id number]   PCP:  Franco Miller MD  Treatment Team: Attending Provider: Omega Cunningham MD; Consulting Provider: Slim Rush MD    HPI:   Patient is a 45 y/o female with hx CAD, CHF (EF 40-45%), PVCs, HTN, HLD who underwent hysterectomy yesterday for dysfunctional uterine bleeding. Patient had reported palpitations tonight and when vitals taken by nursing this pm noticed pauses or irregularities in heart tones. We were consulted for this. Patient denies any chest pain or shortness of breath. Having usual post-operative pain. STAT EKG ordered which shows sinus rhythm with unifocal PVCs. She has no fever and BP stable at 147/76. Patient did not receive her usual am dose of carvedilol today but pm dose was resumed this evening. She has no other complaints at this time. She follows with Dr. Elke Weston of [facility]. Next appointment in about 3 months. 10 systems reviewed and negative except as noted in HPI.   Past Medical History:   Diagnosis Date    Abnormal Papanicolaou smear of cervix     Anemia     Iron supplements     Arthritis     CAD (coronary artery disease)     no stents; [facility] 3/2017 mod LAD dz; [place] Dr Elke Weston [place] [facility]    Cardiomyopathy [facility]     [facility]ed by [facility]     CHF (congestive heart failure) ([facility]     ECHO 18: EF 40-45%; managed with medication and followed by [facility] 6, uterine     GERD (gastroesophageal reflux disease)     sometimes- managed with diet    Heart murmur     ECHO 18- EF 40-45%, mild MR, mild TR    Hypercholesterolemia     Controlled with meds     Hypertension     Controlled with meds     Menorrhagia with regular cycle     Morbid obesity ([address]     Ovarian cyst     PVCs (premature ventricular contractions)     Sleep apnea     uses cpap    Uterine fibroid Past Surgical History:   Procedure Laterality Date    HX AFIB ABLATION  02/2018    Ablation for pvc's     HX HYSTEROSCOPY      HX TUBAL LIGATION      HX WISDOM TEETH EXTRACTION        Prior to Admission Medications   Prescriptions Last Dose Informant Patient Reported? Taking?   ascorbic acid, vitamin C, (VITAMIN C) 500 mg tablet 2/10/2019  Yes No   Sig: Take 500 mg by mouth daily. aspirin delayed-release 81 mg tablet Unknown at Unknown time  Yes No   Sig: Take 81 mg by mouth daily. Stopped taking \"awhile ago\" re: menstrual bleeding frequency   atorvastatin (LIPITOR) 40 mg tablet 2/10/2019  No No   Sig: Take 1 Tab by mouth daily. Indications: PO once daily   Patient taking differently: Take 40 mg by mouth nightly. Indications: PO once daily   carvedilol (COREG) 6.25 mg tablet 2/12/2019 at Unknown time  No Yes   Sig: Take 1 Tab by mouth two (2) times a day. Patient taking differently: Take 6.25 mg by mouth two (2) times a day. Take / use AM day of surgery  per anesthesia protocols. ferrous sulfate 325 mg (65 mg iron) cpER 2/5/2019 at Unknown time  Yes Yes   Sig: Take 1 Tab by mouth every other day. furosemide (LASIX) 40 mg tablet 2/10/2019  No No   Sig: TAKE ONE TABLET BY MOUTH EVERY OTHER DAY   Patient taking differently: Take 20 mg by mouth every other day. TAKE ONE TABLET BY MOUTH EVERY OTHER DAY   losartan (COZAAR) 100 mg tablet 2/11/2019 at Unknown time  No Yes   Sig: Take 1 Tab by mouth daily. oxyCODONE-acetaminophen (PERCOCET) 5-325 mg per tablet 2/12/2019 at Unknown time  No Yes   Sig: Take 1 Tab by mouth every four (4) hours as needed for Pain. Max Daily Amount: 6 Tabs. polyethylene glycol (MIRALAX) 17 gram packet Unknown at Unknown time  Yes No   Sig: Take 17 g by mouth daily as needed. spironolactone (ALDACTONE) 25 mg tablet 2/11/2019 at Unknown time  No Yes   Sig: Take 1 Tab by mouth daily. Patient taking differently: Take 12.5 mg by mouth every other day.    tranexamic acid (LYSTEDA) 650 mg tab tablet 2/5/2019 at Unknown time  No Yes   Sig: Take 2 Tabs by mouth three (3) times daily. Patient taking differently: Take 1,300 mg by mouth as needed. Facility-Administered Medications: None     No Known Allergies   Social History     Tobacco Use    Smoking status: Never Smoker    Smokeless tobacco: Never Used   Substance Use Topics    Alcohol use: No      Family History   Problem Relation Age of Onset    Heart Disease Mother     Cancer Father         There is no immunization history on file for this patient. Objective:     Patient Vitals for the past 24 hrs:   Temp Pulse Resp BP SpO2   02/13/19 2048 -- -- -- 147/76 --   02/13/19 2030 98.6 °F (37 °C) 84 20 183/83 99 %   02/13/19 1500 98 °F (36.7 °C) 62 17 138/69 100 %   02/13/19 1135 -- 61 16 140/63 100 %   02/13/19 1122 98 °F (36.7 °C) 61 17 165/79 97 %   02/13/19 0718 97.9 °F (36.6 °C) 60 18 154/89 100 %   02/13/19 0300 97.5 °F (36.4 °C) 65 18 144/62 100 %   02/12/19 2300 98.2 °F (36.8 °C) (!) 55 19 152/76 96 %     Oxygen Therapy  O2 Sat (%): 99 % (02/13/19 2030)  O2 Device: Room air (02/12/19 1045)  O2 Flow Rate (L/min): 2 l/min (02/12/19 1732)    Intake/Output Summary (Last 24 hours) at 2/13/2019 2153  Last data filed at 2/13/2019 0600  Gross per 24 hour   Intake 300 ml   Output 400 ml   Net -100 ml       Physical Exam:  General:    Well nourished. Alert. No JVD   Eyes:   Normal sclera. Extraocular movements intact. ENT:  Normocephalic, atraumatic. Moist mucous membranes  CV:   Bradycardic, occasional ectopy, no S3 gallop    Lungs:  CTAB. Clear No wheezing, rhonchi, or rales. Abdomen: Soft, nontender, nondistended. Bowel sounds normal.   Extremities: Warm and dry. No cyanosis or edema. Neurologic: CN II-XII grossly intact. Sensation intact. Skin:     No rashes or jaundice. No wounds. Psych:  Normal mood and affect. I reviewed the labs, imaging, EKGs, telemetry, and other studies done this admission.   Data Review: Recent Results (from the past 24 hour(s))   HGB & HCT    Collection Time: 02/13/19  6:30 AM   Result Value Ref Range    HGB 10.3 (L) 11.7 - 15.4 g/dL    HCT 33.4 (L) 35.8 - 46.3 %   CBC WITH AUTOMATED DIFF    Collection Time: 02/13/19  8:52 PM   Result Value Ref Range    WBC 8.9 4.3 - 11.1 K/uL    RBC 3.79 (L) 4.05 - 5.2 M/uL    HGB 10.2 (L) 11.7 - 15.4 g/dL    HCT 32.5 (L) 35.8 - 46.3 %    MCV 85.8 79.6 - 97.8 FL    MCH 26.9 26.1 - 32.9 PG    MCHC 31.4 31.4 - 35.0 g/dL    RDW 15.4 (H) 11.9 - 14.6 %    PLATELET 211 716 - 117 K/uL    MPV 10.8 9.4 - 12.3 FL    ABSOLUTE NRBC 0.00 0.0 - 0.2 K/uL    DF AUTOMATED      NEUTROPHILS 77 43 - 78 %    LYMPHOCYTES 15 13 - 44 %    MONOCYTES 7 4.0 - 12.0 %    EOSINOPHILS 1 0.5 - 7.8 %    BASOPHILS 0 0.0 - 2.0 %    IMMATURE GRANULOCYTES 0 0.0 - 5.0 %    ABS. NEUTROPHILS 6.9 1.7 - 8.2 K/UL    ABS. LYMPHOCYTES 1.3 0.5 - 4.6 K/UL    ABS. MONOCYTES 0.6 0.1 - 1.3 K/UL    ABS. EOSINOPHILS 0.1 0.0 - 0.8 K/UL    ABS. BASOPHILS 0.0 0.0 - 0.2 K/UL    ABS. IMM. GRANS. 0.0 0.0 - 0.5 K/UL   METABOLIC PANEL, COMPREHENSIVE    Collection Time: 02/13/19  8:52 PM   Result Value Ref Range    Sodium 142 136 - 145 mmol/L    Potassium 3.9 3.5 - 5.1 mmol/L    Chloride 108 (H) 98 - 107 mmol/L    CO2 27 21 - 32 mmol/L    Anion gap 7 mmol/L    Glucose 117 (H) 65 - 100 mg/dL    BUN 16 6 - 23 MG/DL    Creatinine 0.90 0.6 - 1.0 MG/DL    GFR est AA >60 >60 ml/min/1.73m2    GFR est non-AA >60 ml/min/1.73m2    Calcium 8.5 8.3 - 10.4 MG/DL    Bilirubin, total 0.2 0.2 - 1.1 MG/DL    ALT (SGPT) 25 12 - 65 U/L    AST (SGOT) 23 15 - 37 U/L    Alk.  phosphatase 59 50 - 130 U/L    Protein, total 6.7 g/dL    Albumin 2.9 (L) 3.5 - 5.0 g/dL    Globulin 3.8 (H) 2.3 - 3.5 g/dL    A-G Ratio 0.8     MAGNESIUM    Collection Time: 02/13/19  8:52 PM   Result Value Ref Range    Magnesium 2.5 (H) 1.8 - 2.4 mg/dL   TSH 3RD GENERATION    Collection Time: 02/13/19  8:52 PM   Result Value Ref Range    TSH 0.500 uIU/mL   BNP Collection Time: 02/13/19  8:52 PM   Result Value Ref Range     pg/mL       Imaging Studies:  CXR Results  (Last 48 hours)    None        CT Results  (Last 48 hours)    None          Assessment and Plan:     Hospital Problems as of 2/13/2019 Date Reviewed: 1/28/2019          Codes Class Noted - Resolved POA    Abnormal uterine bleeding (AUB) ICD-10-CM: N93.9  ICD-9-CM: 626.9  2/12/2019 - Present Unknown        * (Principal) S/P abdominal supracervical subtotal hysterectomy ICD-10-CM: Z90.711  ICD-9-CM: V88.02  2/12/2019 - Present Unknown        Severe obesity with body mass index (BMI) of 35.0 to 39.9 with serious comorbidity ([facility] ICD-10-CM: E66.01  ICD-9-CM: 278.01  7/5/2018 - Present Yes        NICM (nonischemic cardiomyopathy) ([facility] ICD-10-CM: I42.8  ICD-9-CM: 425.4  12/18/2017 - Present Yes        Systolic CHF, chronic ([facility] ICD-10-CM: I50.22  ICD-9-CM: 428.22, 428.0  12/18/2017 - Present Yes        PVC's (premature ventricular contractions) ICD-10-CM: I49.3  ICD-9-CM: 427.69  12/18/2017 - Present Yes              PLAN:  · Patient is seen regarding palpitations  · EKG shows sinus rhythm with unifocal PVCs  · She had missed her am dose of carvedilol, now resumed  · With cardiomyopathy ideal Hgb above 10 (10.2 currently)  · Keep magnesium above 2 (currently 2.5)  · Keep potassium above 4 (3.9 now, will give PO KCL)  · Avoid stimulants such as caffeine  · TSH is normal 0.500.  BNP pending but does not appear to have overt CHF  · Would stop her maintenance IF fluids with hx CHF  · Resume her lasix and aldactone  · Continue CPAP @ HS for THEE  · On SCDs for DVT prophylaxis  · Follow-up outpatient with Cardiology, Dr. Bernice Amador      Estimated LOS:  Per primary team    Signed:  Nicole Kayser, MD

## 2019-02-14 NOTE — DISCHARGE SUMMARY
Discharge Summary     Name: Edward Redd MRN: [id number]  SSN: [id number]    YOB: 1970  Age: 46 y.o. Sex: female      Allergies: Patient has no known allergies. Admit Date: 2/12/2019    Discharge Date: 2/14/2019      Admitting Physician: Ana Chopra MD     * Admission Diagnoses: Fibroids    * Discharge Diagnoses:   Hospital Problems as of 2/14/2019 Date Reviewed: 1/28/2019          Codes Class Noted - Resolved POA    Abnormal uterine bleeding (AUB) ICD-10-CM: N93.9  ICD-9-CM: 626.9  2/12/2019 - Present Unknown        * (Principal) S/P abdominal supracervical subtotal hysterectomy ICD-10-CM: Z90.711  ICD-9-CM: V88.02  2/12/2019 - Present Unknown        Severe obesity with body mass index (BMI) of 35.0 to 39.9 with serious comorbidity ([facility] ICD-10-CM: E66.01  ICD-9-CM: 278.01  7/5/2018 - Present Yes        NICM (nonischemic cardiomyopathy) ([facility] ICD-10-CM: I42.8  ICD-9-CM: 425.4  12/18/2017 - Present Yes        Systolic CHF, chronic (HCC) ICD-10-CM: I50.22  ICD-9-CM: 428.22, 428.0  12/18/2017 - Present Yes        PVC's (premature ventricular contractions) ICD-10-CM: I49.3  ICD-9-CM: 427.69  12/18/2017 - Present Yes               * Procedures: Total Abdominal Hysterectomy with Bilateral Salpingo-Oophorectomy  SUPRACERVICAL    * Discharge Condition: Good    [facility] Course: Normal hospital course for this procedure.     Significant Diagnostic Studies:   Recent Results (from the past 24 hour(s))   CBC WITH AUTOMATED DIFF    Collection Time: 02/13/19  8:52 PM   Result Value Ref Range    WBC 8.9 4.3 - 11.1 K/uL    RBC 3.79 (L) 4.05 - 5.2 M/uL    HGB 10.2 (L) 11.7 - 15.4 g/dL    HCT 32.5 (L) 35.8 - 46.3 %    MCV 85.8 79.6 - 97.8 FL    MCH 26.9 26.1 - 32.9 PG    MCHC 31.4 31.4 - 35.0 g/dL    RDW 15.4 (H) 11.9 - 14.6 %    PLATELET 190 732 - 194 K/uL    MPV 10.8 9.4 - 12.3 FL    ABSOLUTE NRBC 0.00 0.0 - 0.2 K/uL    DF AUTOMATED      NEUTROPHILS 77 43 - 78 %    LYMPHOCYTES 15 13 - 44 %    MONOCYTES 7 4.0 - 12.0 %    EOSINOPHILS 1 0.5 - 7.8 %    BASOPHILS 0 0.0 - 2.0 %    IMMATURE GRANULOCYTES 0 0.0 - 5.0 %    ABS. NEUTROPHILS 6.9 1.7 - 8.2 K/UL    ABS. LYMPHOCYTES 1.3 0.5 - 4.6 K/UL    ABS. MONOCYTES 0.6 0.1 - 1.3 K/UL    ABS. EOSINOPHILS 0.1 0.0 - 0.8 K/UL    ABS. BASOPHILS 0.0 0.0 - 0.2 K/UL    ABS. IMM. GRANS. 0.0 0.0 - 0.5 K/UL   METABOLIC PANEL, COMPREHENSIVE    Collection Time: 02/13/19  8:52 PM   Result Value Ref Range    Sodium 142 136 - 145 mmol/L    Potassium 3.9 3.5 - 5.1 mmol/L    Chloride 108 (H) 98 - 107 mmol/L    CO2 27 21 - 32 mmol/L    Anion gap 7 mmol/L    Glucose 117 (H) 65 - 100 mg/dL    BUN 16 6 - 23 MG/DL    Creatinine 0.90 0.6 - 1.0 MG/DL    GFR est AA >60 >60 ml/min/1.73m2    GFR est non-AA >60 ml/min/1.73m2    Calcium 8.5 8.3 - 10.4 MG/DL    Bilirubin, total 0.2 0.2 - 1.1 MG/DL    ALT (SGPT) 25 12 - 65 U/L    AST (SGOT) 23 15 - 37 U/L    Alk.  phosphatase 59 50 - 130 U/L    Protein, total 6.7 g/dL    Albumin 2.9 (L) 3.5 - 5.0 g/dL    Globulin 3.8 (H) 2.3 - 3.5 g/dL    A-G Ratio 0.8     MAGNESIUM    Collection Time: 02/13/19  8:52 PM   Result Value Ref Range    Magnesium 2.5 (H) 1.8 - 2.4 mg/dL   TSH 3RD GENERATION    Collection Time: 02/13/19  8:52 PM   Result Value Ref Range    TSH 0.500 uIU/mL   BNP    Collection Time: 02/13/19  8:52 PM   Result Value Ref Range     pg/mL   EKG, 12 LEAD, INITIAL    Collection Time: 02/13/19  9:02 PM   Result Value Ref Range    Ventricular Rate 59 BPM    Atrial Rate 59 BPM    P-R Interval 172 ms    QRS Duration 96 ms    Q-T Interval 432 ms    QTC Calculation (Bezet) 427 ms    Calculated P Axis 19 degrees    Calculated R Axis 11 degrees    Calculated T Axis 5 degrees    Diagnosis       Sinus bradycardia with occasional Premature ventricular complexes  Minimal voltage criteria for LVH, may be normal variant  Borderline ECG  When compared with ECG of 13-FEB-2019 21:01,  Premature ventricular complexes are now Present  Confirmed by Freddy Acre ([id number]) on 2/14/2019 7:07:55 AM         * Disposition: Home    Discharge Medications:   Current Discharge Medication List      START taking these medications    Details   ibuprofen (MOTRIN) 800 mg tablet Take 1 Tab by mouth every eight (8) hours as needed. Qty: 30 Tab, Refills: 0      oxyCODONE IR (ROXICODONE) 10 mg tab immediate release tablet Take 1 Tab by mouth every six (6) hours as needed. Max Daily Amount: 40 mg.  Qty: 20 Tab, Refills: 0    Associated Diagnoses: Intramural, submucous, and subserous leiomyoma of uterus         CONTINUE these medications which have NOT CHANGED    Details   losartan (COZAAR) 100 mg tablet Take 1 Tab by mouth daily. Qty: 90 Tab, Refills: 3      carvedilol (COREG) 6.25 mg tablet Take 1 Tab by mouth two (2) times a day. Qty: 60 Tab, Refills: 5      ferrous sulfate 325 mg (65 mg iron) cpER Take 1 Tab by mouth every other day. spironolactone (ALDACTONE) 25 mg tablet Take 1 Tab by mouth daily. Qty: 30 Tab, Refills: 11      aspirin delayed-release 81 mg tablet Take 81 mg by mouth daily. Stopped taking \"awhile ago\" re: menstrual bleeding frequency      furosemide (LASIX) 40 mg tablet TAKE ONE TABLET BY MOUTH EVERY OTHER DAY  Qty: 90 Tab, Refills: 3      atorvastatin (LIPITOR) 40 mg tablet Take 1 Tab by mouth daily. Indications: PO once daily  Qty: 30 Tab, Refills: 5      ascorbic acid, vitamin C, (VITAMIN C) 500 mg tablet Take 500 mg by mouth daily. polyethylene glycol (MIRALAX) 17 gram packet Take 17 g by mouth daily as needed. STOP taking these medications       tranexamic acid (LYSTEDA) 650 mg tab tablet Comments:   Reason for Stopping:         oxyCODONE-acetaminophen (PERCOCET) 5-325 mg per tablet Comments:   Reason for Stopping:                * Follow-up Care/Patient Instructions: Activity: No sex, douching, or tampons for 6 weeks or as directed by your physician. No heavy lifting for 6 weeks. No driving while taking pain medication.   Diet: Resume pre-hospital diet  Wound Care: Keep wound clean and dry    Follow-up Information     Follow up With Specialties Details Why Contact Info    Mattie Fallon MD Obstetrics & Gynecology, Gynecology, Obstetrics   [address] [place] [address]  [address], Hernandez Huang MD Internal Medicine   [address] [place] [address]  [phone]             Signed By:  Luciana Burks MD     February 14, 2019

## 2019-02-14 NOTE — DISCHARGE INSTRUCTIONS
Patient Education        Abdominal Hysterectomy: What to Expect at [address] can expect to feel better and stronger each day, although you may need pain medicine for a week or two. You may get tired easily or have less energy than usual. This may last for several weeks after surgery. You will probably notice that your belly is swollen and puffy. This is common. The swelling will take several weeks to go down. It may take about 4 to 6 weeks to fully recover. It is important to avoid lifting while you are recovering so that you can heal.  This care sheet gives you a general idea about how long it will take for you to recover. But each person recovers at a different pace. Follow the steps below to get better as quickly as possible. How can you care for yourself at home? Activity    · Rest when you feel tired. Getting enough sleep will help you recover.     · Try to walk each day. Start by walking a little more than you did the day before. Bit by bit, increase the amount you walk. Walking boosts blood flow and helps prevent pneumonia and constipation.     · Avoid lifting anything that would make you strain. This may include a child, heavy grocery bags and milk containers, a heavy briefcase or backpack, cat litter or dog food bags, or a vacuum .     · Avoid strenuous activities, such as biking, jogging, weight lifting, or aerobic exercise, until your doctor says it is okay.     · You may shower. Pat the cut (incision) dry. Do not take a bath for the first 2 weeks, or until your doctor tells you it is okay.     · Ask your doctor when you can drive again.     · You will probably need to take 2 to 4 weeks off from work. It depends on the type of work you do and how you feel.     · Your doctor will tell you when you can have sex again. Diet    · You can eat your normal diet.  If your stomach is upset, try bland, low-fat foods like plain rice, broiled chicken, toast, and yogurt.     · Drink plenty of fluids (unless your doctor tells you not to).     · You may notice that your bowel movements are not regular right after your surgery. This is common. Try to avoid constipation and straining with bowel movements. You may want to take a fiber supplement every day. If you have not had a bowel movement after a couple of days, ask your doctor about taking a mild laxative. Medicines    · Your doctor will tell you if and when you can restart your medicines. He or she will also give you instructions about taking any new medicines.     · If you take blood thinners, such as warfarin (Coumadin), clopidogrel (Plavix), or aspirin, be sure to talk to your doctor. He or she will tell you if and when to start taking those medicines again. Make sure that you understand exactly what your doctor wants you to do.     · Be safe with medicines. Take pain medicines exactly as directed. ? If the doctor gave you a prescription medicine for pain, take it as prescribed. ? If you are not taking a prescription pain medicine, ask your doctor if you can take an over-the-counter medicine.     · If your doctor prescribed antibiotics, take them as directed. Do not stop taking them just because you feel better. You need to take the full course of antibiotics.     · If you think your pain medicine is making you sick to your stomach:  ? Take your medicine after meals (unless your doctor has told you not to). ? Ask your doctor for a different pain medicine. Incision care    · If you have strips of tape on the cut (incision) the doctor made, leave the tape on for a week or until it falls off. Or follow your doctor's instructions for removing the tape.     · Wash the area daily with warm, soapy water, and pat it dry. Don't use hydrogen peroxide or alcohol, which can slow healing. You may cover the area with a gauze bandage if it weeps or rubs against clothing. Change the bandage every day.     · Keep the area clean and dry.    Other instructions    · You may have some light vaginal bleeding. Wear sanitary pads if needed. Do not douche or use tampons. Follow-up care is a key part of your treatment and safety. Be sure to make and go to all appointments, and call your doctor if you are having problems. It's also a good idea to know your test results and keep a list of the medicines you take. When should you call for help? Call 911 anytime you think you may need emergency care. For example, call if:    · You passed out (lost consciousness).     · You have chest pain, are short of breath, or cough up blood.    Call your doctor now or seek immediate medical care if:    · You have pain that does not get better after you take pain medicine.     · You cannot pass stools or gas.     · You have vaginal discharge that has increased in amount or smells bad.     · You are sick to your stomach or cannot drink fluids.     · You have loose stitches, or your incision comes open.     · Bright red blood has soaked through the bandage over your incision.     · You have signs of infection, such as:  ? Increased pain, swelling, warmth, or redness. ? Red streaks leading from the incision. ? Pus draining from the incision. ? A fever.     · You have bright red vaginal bleeding that soaks one or more pads in an hour, or you have large clots.     · You have signs of a blood clot in your leg (called a deep vein thrombosis), such as:  ? Pain in your calf, back of the knee, thigh, or groin. ? Redness and swelling in your leg.    Watch closely for changes in your health, and be sure to contact your doctor if you have any problems. Where can you learn more? Go to http://maddie-dilan.info/. Enter M280 in the search box to learn more about \"Abdominal Hysterectomy: What to Expect at Home. \"  Current as of: May 14, 2018  Content Version: 11.9  © 2499-7157 [employer], [email or web address].  Care instructions adapted under license by [employer] (which disclaims liability or warranty for this information). If you have questions about a medical condition or this instruction, always ask your healthcare professional. [address] any warranty or liability for your use of this information.

## 2019-02-28 PROBLEM — I50.22 CHRONIC SYSTOLIC (CONGESTIVE) HEART FAILURE: Status: ACTIVE | Noted: 2019-02-28

## 2019-03-08 ENCOUNTER — APPOINTMENT (OUTPATIENT)
Dept: GENERAL RADIOLOGY | Age: 49
End: 2019-03-08
Attending: EMERGENCY MEDICINE
Payer: COMMERCIAL

## 2019-03-08 ENCOUNTER — HOSPITAL ENCOUNTER (EMERGENCY)
Age: 49
Discharge: HOME | End: 2019-03-09
Attending: EMERGENCY MEDICINE
Payer: COMMERCIAL

## 2019-03-08 DIAGNOSIS — R00.2 PALPITATIONS: Primary | ICD-10-CM

## 2019-03-08 LAB
BASOPHILS # BLD: 0.1 K/UL (ref 0–0.2)
BASOPHILS NFR BLD: 1 % (ref 0–2)
DIFFERENTIAL METHOD BLD: ABNORMAL
EOSINOPHIL # BLD: 0.3 K/UL (ref 0–0.8)
EOSINOPHIL NFR BLD: 3 % (ref 0.5–7.8)
ERYTHROCYTE [DISTWIDTH] IN BLOOD BY AUTOMATED COUNT: 14.7 % (ref 11.9–14.6)
HCT VFR BLD AUTO: 36.1 % (ref 35.8–46.3)
HGB BLD-MCNC: 11.4 G/DL (ref 11.7–15.4)
IMM GRANULOCYTES # BLD AUTO: 0 K/UL (ref 0–0.5)
IMM GRANULOCYTES NFR BLD AUTO: 0 % (ref 0–5)
LYMPHOCYTES # BLD: 1.9 K/UL (ref 0.5–4.6)
LYMPHOCYTES NFR BLD: 21 % (ref 13–44)
MCH RBC QN AUTO: 26.7 PG (ref 26.1–32.9)
MCHC RBC AUTO-ENTMCNC: 31.6 G/DL (ref 31.4–35)
MCV RBC AUTO: 84.5 FL (ref 79.6–97.8)
MONOCYTES # BLD: 0.7 K/UL (ref 0.1–1.3)
MONOCYTES NFR BLD: 7 % (ref 4–12)
NEUTS SEG # BLD: 6.3 K/UL (ref 1.7–8.2)
NEUTS SEG NFR BLD: 68 % (ref 43–78)
NRBC # BLD: 0 K/UL (ref 0–0.2)
PLATELET # BLD AUTO: 316 K/UL (ref 150–450)
PMV BLD AUTO: 10.9 FL (ref 9.4–12.3)
RBC # BLD AUTO: 4.27 M/UL (ref 4.05–5.2)
WBC # BLD AUTO: 9.2 K/UL (ref 4.3–11.1)

## 2019-03-08 PROCEDURE — 99285 EMERGENCY DEPT VISIT HI MDM: CPT | Performed by: EMERGENCY MEDICINE

## 2019-03-08 PROCEDURE — 71046 X-RAY EXAM CHEST 2 VIEWS: CPT

## 2019-03-08 PROCEDURE — 85379 FIBRIN DEGRADATION QUANT: CPT

## 2019-03-08 PROCEDURE — 93005 ELECTROCARDIOGRAM TRACING: CPT | Performed by: EMERGENCY MEDICINE

## 2019-03-08 PROCEDURE — 85025 COMPLETE CBC W/AUTO DIFF WBC: CPT

## 2019-03-08 PROCEDURE — 80053 COMPREHEN METABOLIC PANEL: CPT

## 2019-03-08 PROCEDURE — 83880 ASSAY OF NATRIURETIC PEPTIDE: CPT

## 2019-03-08 PROCEDURE — 83735 ASSAY OF MAGNESIUM: CPT

## 2019-03-08 PROCEDURE — 84484 ASSAY OF TROPONIN QUANT: CPT

## 2019-03-09 ENCOUNTER — APPOINTMENT (OUTPATIENT)
Dept: CT IMAGING | Age: 49
End: 2019-03-09
Attending: EMERGENCY MEDICINE
Payer: COMMERCIAL

## 2019-03-09 VITALS
DIASTOLIC BLOOD PRESSURE: 62 MMHG | SYSTOLIC BLOOD PRESSURE: 147 MMHG | HEART RATE: 64 BPM | BODY MASS INDEX: 37.89 KG/M2 | TEMPERATURE: 98 F | WEIGHT: 250 LBS | RESPIRATION RATE: 25 BRPM | HEIGHT: 68 IN | OXYGEN SATURATION: 98 %

## 2019-03-09 LAB
ALBUMIN SERPL-MCNC: 3.4 G/DL (ref 3.5–5)
ALBUMIN/GLOB SERPL: 0.9 {RATIO} (ref 1.2–3.5)
ALP SERPL-CCNC: 70 U/L (ref 50–136)
ALT SERPL-CCNC: 24 U/L (ref 12–65)
ANION GAP SERPL CALC-SCNC: 6 MMOL/L (ref 7–16)
AST SERPL-CCNC: 12 U/L (ref 15–37)
ATRIAL RATE: 66 BPM
ATRIAL RATE: 67 BPM
BILIRUB SERPL-MCNC: 0.2 MG/DL (ref 0.2–1.1)
BNP SERPL-MCNC: 51 PG/ML
BUN SERPL-MCNC: 19 MG/DL (ref 6–23)
CALCIUM SERPL-MCNC: 8.7 MG/DL (ref 8.3–10.4)
CALCULATED P AXIS, ECG09: 56 DEGREES
CALCULATED P AXIS, ECG09: 74 DEGREES
CALCULATED R AXIS, ECG10: 35 DEGREES
CALCULATED R AXIS, ECG10: 42 DEGREES
CALCULATED T AXIS, ECG11: 28 DEGREES
CALCULATED T AXIS, ECG11: 28 DEGREES
CHLORIDE SERPL-SCNC: 110 MMOL/L (ref 98–107)
CO2 SERPL-SCNC: 28 MMOL/L (ref 21–32)
CREAT SERPL-MCNC: 0.95 MG/DL (ref 0.6–1)
D DIMER PPP FEU-MCNC: 0.64 UG/ML(FEU)
DIAGNOSIS, 93000: NORMAL
DIAGNOSIS, 93000: NORMAL
GLOBULIN SER CALC-MCNC: 3.9 G/DL (ref 2.3–3.5)
GLUCOSE SERPL-MCNC: 101 MG/DL (ref 65–100)
MAGNESIUM SERPL-MCNC: 2.2 MG/DL (ref 1.8–2.4)
P-R INTERVAL, ECG05: 172 MS
P-R INTERVAL, ECG05: 176 MS
POTASSIUM SERPL-SCNC: 3.9 MMOL/L (ref 3.5–5.1)
PROT SERPL-MCNC: 7.3 G/DL (ref 6.3–8.2)
Q-T INTERVAL, ECG07: 412 MS
Q-T INTERVAL, ECG07: 420 MS
QRS DURATION, ECG06: 100 MS
QRS DURATION, ECG06: 92 MS
QTC CALCULATION (BEZET), ECG08: 431 MS
QTC CALCULATION (BEZET), ECG08: 443 MS
SODIUM SERPL-SCNC: 144 MMOL/L (ref 136–145)
TROPONIN I BLD-MCNC: 0 NG/ML (ref 0.02–0.05)
TROPONIN I SERPL-MCNC: <0.02 NG/ML (ref 0.02–0.05)
VENTRICULAR RATE, ECG03: 66 BPM
VENTRICULAR RATE, ECG03: 67 BPM

## 2019-03-09 PROCEDURE — 74011636320 HC RX REV CODE- 636/320: Performed by: EMERGENCY MEDICINE

## 2019-03-09 PROCEDURE — 93005 ELECTROCARDIOGRAM TRACING: CPT | Performed by: EMERGENCY MEDICINE

## 2019-03-09 PROCEDURE — 71260 CT THORAX DX C+: CPT

## 2019-03-09 PROCEDURE — 74011000258 HC RX REV CODE- 258: Performed by: EMERGENCY MEDICINE

## 2019-03-09 PROCEDURE — 84484 ASSAY OF TROPONIN QUANT: CPT

## 2019-03-09 RX ORDER — SODIUM CHLORIDE 0.9 % (FLUSH) 0.9 %
10 SYRINGE (ML) INJECTION
Status: COMPLETED | OUTPATIENT
Start: 2019-03-09 | End: 2019-03-09

## 2019-03-09 RX ADMIN — Medication 10 ML: at 02:14

## 2019-03-09 RX ADMIN — IOPAMIDOL 100 ML: 755 INJECTION, SOLUTION INTRAVENOUS at 02:14

## 2019-03-09 RX ADMIN — SODIUM CHLORIDE 100 ML: 900 INJECTION, SOLUTION INTRAVENOUS at 02:14

## 2019-03-09 NOTE — ED NOTES
I have reviewed discharge instructions with the patient. The patient verbalized understanding. Patient left ED via Discharge Method: ambulatory to Home with (insert name of family/friend, self, transport family). Opportunity for questions and clarification provided. Patient given 0 scripts. To continue your aftercare when you leave the hospital, you may receive an automated call from our care team to check in on how you are doing. This is a free service and part of our promise to provide the best care and service to meet your aftercare needs.  If you have questions, or wish to unsubscribe from this service please call [phone]. Thank you for Choosing our [facility] Emergency Department.

## 2019-03-09 NOTE — ED TRIAGE NOTES
EMS: Pt arriving from [facility] via [facility]. Pt received 324 ASA between self and . PT has not been taking lasix because of increased peeing to the point of loss of normal function. Pt has extensive cardiac hx. [facility]. Pt originally requested to go to [address] because of recent surgery. Pt presents with palpitations. Pt A&O x4.  said they saw bigeminy, EMS has not seen any. Pt denies N/V. Pt denies radiation of pain. Pt confirms intermittent pressure (when pain hits 8/10) (per EMS coincides with PVCs).

## 2019-03-09 NOTE — DISCHARGE INSTRUCTIONS
Follow-up with cardiology on Monday. Return if symptoms worsen or progress in any way. Palpitations: Care Instructions  Your Care Instructions    Heart palpitations are the uncomfortable sensation that your heart is beating fast or irregularly. You might feel pounding or fluttering in your chest. It might feel like your heart is skipping a beat. Although palpitations may be caused by a heart problem, they also occur because of stress, fatigue, or use of alcohol, caffeine, or nicotine. Many medicines, including diet pills, antihistamines, decongestants, and some herbal products, can cause heart palpitations. Nearly everyone has palpitations from time to time. Depending on your symptoms, your doctor may need to do more tests to try to find the cause of your palpitations. Follow-up care is a key part of your treatment and safety. Be sure to make and go to all appointments, and call your doctor if you are having problems. It's also a good idea to know your test results and keep a list of the medicines you take. How can you care for yourself at home? · Avoid caffeine, nicotine, and excess alcohol. · Do not take illegal drugs, such as methamphetamines and cocaine. · Do not take weight loss or diet medicines unless you talk with your doctor first.  · Get plenty of sleep. · Do not overeat. · If you have palpitations again, take deep breaths and try to relax. This may slow a racing heart. · If you start to feel lightheaded, lie down to avoid injuries that might result if you pass out and fall down. · Keep a record of your palpitations and bring it to your next doctor's appointment. Write down:  ? The date and time. ? Your pulse. (If your heart is beating fast, it may be hard to count your pulse.)  ? What you were doing when the palpitations started. ? How long the palpitations lasted. ? Any other symptoms. · If an activity causes palpitations, slow down or stop.  Talk to your doctor before you do that activity again. · Take your medicines exactly as prescribed. Call your doctor if you think you are having a problem with your medicine. When should you call for help? Call 911 anytime you think you may need emergency care. For example, call if:    · You passed out (lost consciousness).     · You have symptoms of a heart attack. These may include:  ? Chest pain or pressure, or a strange feeling in the chest.  ? Sweating. ? Shortness of breath. ? Pain, pressure, or a strange feeling in the back, neck, jaw, or upper belly or in one or both shoulders or arms. ? Lightheadedness or sudden weakness. ? A fast or irregular heartbeat. After you call 911, the  may tell you to chew 1 adult-strength or 2 to 4 low-dose aspirin. Wait for an ambulance. Do not try to drive yourself.     · You have symptoms of a stroke. These may include:  ? Sudden numbness, tingling, weakness, or loss of movement in your face, arm, or leg, especially on only one side of your body. ? Sudden vision changes. ? Sudden trouble speaking. ? Sudden confusion or trouble understanding simple statements. ? Sudden problems with walking or balance. ? A sudden, severe headache that is different from past headaches.    Call your doctor now or seek immediate medical care if:    · You have heart palpitations and:  ? Are dizzy or lightheaded, or you feel like you may faint. ? Have new or increased shortness of breath.    Watch closely for changes in your health, and be sure to contact your doctor if:    · You continue to have heart palpitations. Where can you learn more? Go to http://maddie-dilan.info/. Enter R508 in the search box to learn more about \"Palpitations: Care Instructions. \"  Current as of: July 22, 2018  Content Version: 11.9  © 2553-8376 [employer]. Care instructions adapted under license by [employer] (which disclaims liability or warranty for this information).  If you have questions about a medical condition or this instruction, always ask your healthcare professional. [place] [address] any warranty or liability for your use of this information.

## 2019-03-27 PROBLEM — I48.91 ATRIAL FIBRILLATION (HCC): Status: RESOLVED | Noted: 2018-02-12 | Resolved: 2019-03-27

## 2020-01-09 ENCOUNTER — HOSPITAL ENCOUNTER (EMERGENCY)
Age: 50
Discharge: HOME | End: 2020-01-09
Attending: EMERGENCY MEDICINE
Payer: COMMERCIAL

## 2020-01-09 ENCOUNTER — APPOINTMENT (OUTPATIENT)
Dept: GENERAL RADIOLOGY | Age: 50
End: 2020-01-09
Attending: EMERGENCY MEDICINE
Payer: COMMERCIAL

## 2020-01-09 VITALS
SYSTOLIC BLOOD PRESSURE: 182 MMHG | WEIGHT: 250 LBS | HEART RATE: 60 BPM | DIASTOLIC BLOOD PRESSURE: 88 MMHG | BODY MASS INDEX: 37.89 KG/M2 | HEIGHT: 68 IN | TEMPERATURE: 98.2 F | RESPIRATION RATE: 16 BRPM | OXYGEN SATURATION: 99 %

## 2020-01-09 DIAGNOSIS — R07.9 CHEST PAIN, UNSPECIFIED TYPE: Primary | ICD-10-CM

## 2020-01-09 LAB
ALBUMIN SERPL-MCNC: 3.3 G/DL (ref 3.5–5)
ALBUMIN/GLOB SERPL: 0.7 {RATIO} (ref 1.2–3.5)
ALP SERPL-CCNC: 60 U/L (ref 50–136)
ALT SERPL-CCNC: 16 U/L (ref 12–65)
ANION GAP SERPL CALC-SCNC: 3 MMOL/L (ref 7–16)
AST SERPL-CCNC: 16 U/L (ref 15–37)
BASOPHILS # BLD: 0 K/UL (ref 0–0.2)
BASOPHILS NFR BLD: 0 % (ref 0–2)
BILIRUB SERPL-MCNC: 0.2 MG/DL (ref 0.2–1.1)
BUN SERPL-MCNC: 17 MG/DL (ref 6–23)
CALCIUM SERPL-MCNC: 9.7 MG/DL (ref 8.3–10.4)
CHLORIDE SERPL-SCNC: 110 MMOL/L (ref 98–107)
CO2 SERPL-SCNC: 31 MMOL/L (ref 21–32)
CREAT SERPL-MCNC: 0.95 MG/DL (ref 0.6–1)
DIFFERENTIAL METHOD BLD: ABNORMAL
EOSINOPHIL # BLD: 0.2 K/UL (ref 0–0.8)
EOSINOPHIL NFR BLD: 2 % (ref 0.5–7.8)
ERYTHROCYTE [DISTWIDTH] IN BLOOD BY AUTOMATED COUNT: 14.8 % (ref 11.9–14.6)
GLOBULIN SER CALC-MCNC: 4.5 G/DL (ref 2.3–3.5)
GLUCOSE SERPL-MCNC: 84 MG/DL (ref 65–100)
HCT VFR BLD AUTO: 39.6 % (ref 35.8–46.3)
HGB BLD-MCNC: 12.5 G/DL (ref 11.7–15.4)
IMM GRANULOCYTES # BLD AUTO: 0 K/UL (ref 0–0.5)
IMM GRANULOCYTES NFR BLD AUTO: 0 % (ref 0–5)
LYMPHOCYTES # BLD: 1.9 K/UL (ref 0.5–4.6)
LYMPHOCYTES NFR BLD: 20 % (ref 13–44)
MCH RBC QN AUTO: 26.6 PG (ref 26.1–32.9)
MCHC RBC AUTO-ENTMCNC: 31.6 G/DL (ref 31.4–35)
MCV RBC AUTO: 84.3 FL (ref 79.6–97.8)
MONOCYTES # BLD: 0.6 K/UL (ref 0.1–1.3)
MONOCYTES NFR BLD: 7 % (ref 4–12)
NEUTS SEG # BLD: 6.6 K/UL (ref 1.7–8.2)
NEUTS SEG NFR BLD: 71 % (ref 43–78)
NRBC # BLD: 0 K/UL (ref 0–0.2)
PLATELET # BLD AUTO: 362 K/UL (ref 150–450)
PMV BLD AUTO: 10.7 FL (ref 9.4–12.3)
POTASSIUM SERPL-SCNC: 4.2 MMOL/L (ref 3.5–5.1)
PROT SERPL-MCNC: 7.8 G/DL (ref 6.3–8.2)
RBC # BLD AUTO: 4.7 M/UL (ref 4.05–5.2)
SODIUM SERPL-SCNC: 144 MMOL/L (ref 136–145)
TROPONIN I SERPL-MCNC: <0.02 NG/ML (ref 0.02–0.05)
TROPONIN I SERPL-MCNC: <0.02 NG/ML (ref 0.02–0.05)
WBC # BLD AUTO: 9.3 K/UL (ref 4.3–11.1)

## 2020-01-09 PROCEDURE — 84484 ASSAY OF TROPONIN QUANT: CPT

## 2020-01-09 PROCEDURE — 99284 EMERGENCY DEPT VISIT MOD MDM: CPT | Performed by: EMERGENCY MEDICINE

## 2020-01-09 PROCEDURE — 85025 COMPLETE CBC W/AUTO DIFF WBC: CPT

## 2020-01-09 PROCEDURE — 80053 COMPREHEN METABOLIC PANEL: CPT

## 2020-01-09 PROCEDURE — 93005 ELECTROCARDIOGRAM TRACING: CPT | Performed by: EMERGENCY MEDICINE

## 2020-01-09 PROCEDURE — 71045 X-RAY EXAM CHEST 1 VIEW: CPT

## 2020-01-10 LAB
ATRIAL RATE: 68 BPM
CALCULATED P AXIS, ECG09: 47 DEGREES
CALCULATED R AXIS, ECG10: 38 DEGREES
CALCULATED T AXIS, ECG11: 22 DEGREES
DIAGNOSIS, 93000: NORMAL
P-R INTERVAL, ECG05: 172 MS
Q-T INTERVAL, ECG07: 420 MS
QRS DURATION, ECG06: 96 MS
QTC CALCULATION (BEZET), ECG08: 446 MS
VENTRICULAR RATE, ECG03: 68 BPM

## 2020-01-10 NOTE — ED TRIAGE NOTES
Patient arrives to ED from home complaining of tightness in her chest and SOB for the past few days. Patient states it has progressively gotten worse and felt like she needed to be seen. Patient has history of CHF, CAD, and cardiomyopathy. Patient states she has been compliant with her medications. Patient is no longer on lasix. Patient states they took her off of that months ago. Patient denies swelling or cough. Denies nausea.

## 2020-01-10 NOTE — DISCHARGE INSTRUCTIONS

## 2020-01-10 NOTE — ED PROVIDER NOTES
78-year-old -American female with history of hypertension and cardiomyopathy presents with intermittent chest pain over the past several months. Pain is described as a heaviness and at times worse radiates into her neck. States it is variable in onset and is not consistently related to activity. She does report mild shortness of breath. No fever or cough. No leg pain or swelling. Patient was seen at urgent care today and sent here for further work-up. He states that she has seen her cardiologist within the past month and was told that symptoms are not cardiac. The history is provided by the patient. Chest Pain    Associated symptoms include shortness of breath. Pertinent negatives include no abdominal pain, no back pain, no cough, no fever, no headaches and no nausea.         Past Medical History:   Diagnosis Date    Abnormal Papanicolaou smear of cervix     Anemia     Iron supplements     Arthritis     CAD (coronary artery disease)     no stents; [facility] 3/2017 mod LAD dz; [place]akenna [place]    Cardiomyopathy [facility])     [facility]ed by [facility]     CHF (congestive heart failure) ([facility] [address]     ECHO 12/18/18: EF 40-45%; managed with medication and followed by [facility]    Fibroid, uterine     GERD (gastroesophageal reflux disease)     sometimes- managed with diet    Heart murmur     ECHO 12/18/18- EF 40-45%, mild MR, mild TR    Hypercholesterolemia     Controlled with meds     Hypertension     Controlled with meds     Menorrhagia with regular cycle     Morbid obesity ([address]     Ovarian cyst     PVCs (premature ventricular contractions)     Sleep apnea     uses cpap    Uterine fibroid        Past Surgical History:   Procedure Laterality Date    HX AFIB ABLATION  02/2018    Ablation for pvc's     HX HYSTEROSCOPY      HX LILIANA AND BSO  02/12/2019    Supracervical    HX TUBAL LIGATION      HX WISDOM TEETH EXTRACTION           Family History:   Problem Relation Age of Onset    Heart Disease Mother     Cancer Father         Lymphoma       Social History     Socioeconomic History    Marital status: SINGLE     Spouse name: Not on file    Number of children: Not on file    Years of education: Not on file    Highest education level: Not on file   Occupational History    Not on file   Social Needs    Financial resource strain: Not on file    Food insecurity:     Worry: Not on file     Inability: Not on file    Transportation needs:     Medical: Not on file     Non-medical: Not on file   Tobacco Use    Smoking status: Never Smoker    Smokeless tobacco: Never Used   Substance and Sexual Activity    Alcohol use: No    Drug use: No    Sexual activity: Not Currently     Birth control/protection: Surgical     Comment: tubal / hysterectomy   Lifestyle    Physical activity:     Days per week: Not on file     Minutes per session: Not on file    Stress: Not on file   Relationships    Social connections:     Talks on phone: Not on file     Gets together: Not on file     Attends Judaism service: Not on file     Active member of club or organization: Not on file     Attends meetings of clubs or organizations: Not on file     Relationship status: Not on file    Intimate partner violence:     Fear of current or ex partner: Not on file     Emotionally abused: Not on file     Physically abused: Not on file     Forced sexual activity: Not on file   Other Topics Concern    Not on file   Social History Narrative    Not on file         ALLERGIES: Patient has no known allergies. Review of Systems   Constitutional: Negative for fever. HENT: Negative for congestion. Eyes: Negative for visual disturbance. Respiratory: Positive for shortness of breath. Negative for cough. Cardiovascular: Positive for chest pain. Negative for leg swelling. Gastrointestinal: Negative for abdominal pain and nausea. Genitourinary: Negative for dysuria.    Musculoskeletal: Negative for back pain and neck pain. Skin: Negative for rash. Neurological: Negative for headaches. Psychiatric/Behavioral: Negative for confusion. Vitals:    01/09/20 2013 01/09/20 2047 01/09/20 2118   BP: 199/90  (!) 187/92   Pulse: 65  (!) 59   Resp: 16     Temp: 98 °F (36.7 °C)     SpO2: 98% 98% 99%   Weight: 113.4 kg (250 lb)     Height: 5' 8\" (1.727 m)              Physical Exam  Vitals signs and nursing note reviewed. Constitutional:       General: She is not in acute distress. Appearance: She is well-developed. She is not toxic-appearing. HENT:      Head: Normocephalic and atraumatic. Nose: Nose normal.      Mouth/Throat:      Mouth: Mucous membranes are moist.      Pharynx: Oropharynx is clear. Eyes:      Conjunctiva/sclera: Conjunctivae normal.      Pupils: Pupils are equal, round, and reactive to light. Neck:      Musculoskeletal: Normal range of motion and neck supple. Cardiovascular:      Rate and Rhythm: Normal rate and regular rhythm. Heart sounds: No murmur. Pulmonary:      Effort: Pulmonary effort is normal.      Breath sounds: Normal breath sounds. No wheezing or rales. Abdominal:      General: There is no distension. Palpations: Abdomen is soft. Tenderness: There is no tenderness. Musculoskeletal:         General: No swelling or tenderness. Skin:     General: Skin is warm and dry. Neurological:      Mental Status: She is alert. Psychiatric:         Mood and Affect: Mood normal.         Behavior: Behavior normal.          MDM  Number of Diagnoses or Management Options  Chest pain, unspecified type:   Diagnosis management comments: EKG normal sinus rhythm without ST changes or ectopy. Lab work including troponin normal.  Chest x-ray stable cardiomegaly without acute abnormality. Repeat troponin also negative. Patient appears safe for discharge home. Advised to follow-up with cardiology.        Amount and/or Complexity of Data Reviewed  Clinical lab tests: ordered and reviewed  Tests in the radiology section of CPT®: ordered and reviewed  Independent visualization of images, tracings, or specimens: yes    Risk of Complications, Morbidity, and/or Mortality  Presenting problems: moderate  Diagnostic procedures: moderate  Management options: moderate           Procedures

## 2020-01-10 NOTE — ED NOTES
I have reviewed discharge instructions with the patient. The patient verbalized understanding. Patient left ED via Discharge Method: ambulatory to Home with daughter. Opportunity for questions and clarification provided. Patient given 0 scripts. To continue your aftercare when you leave the hospital, you may receive an automated call from our care team to check in on how you are doing. This is a free service and part of our promise to provide the best care and service to meet your aftercare needs.  If you have questions, or wish to unsubscribe from this service please call [phone]. Thank you for Choosing our [facility] Emergency Department.

## 2020-02-14 PROBLEM — I25.119 CORONARY ARTERY DISEASE INVOLVING NATIVE CORONARY ARTERY OF NATIVE HEART WITH ANGINA PECTORIS (HCC): Status: ACTIVE | Noted: 2017-12-18

## 2020-03-07 NOTE — PROGRESS NOTES
Called to pre-assess for [facility] poss with Dr Gavin Klein , Scheduled 3/9/20. No answer & message left.

## 2020-03-09 ENCOUNTER — HOSPITAL ENCOUNTER (OUTPATIENT)
Dept: CARDIAC CATH/INVASIVE PROCEDURES | Age: 50
Discharge: HOME | End: 2020-03-09
Attending: INTERNAL MEDICINE | Admitting: INTERNAL MEDICINE
Payer: COMMERCIAL

## 2020-03-09 VITALS
HEART RATE: 55 BPM | DIASTOLIC BLOOD PRESSURE: 99 MMHG | TEMPERATURE: 98.2 F | BODY MASS INDEX: 38.04 KG/M2 | WEIGHT: 251 LBS | HEIGHT: 68 IN | RESPIRATION RATE: 18 BRPM | OXYGEN SATURATION: 98 % | SYSTOLIC BLOOD PRESSURE: 167 MMHG

## 2020-03-09 LAB
ANION GAP SERPL CALC-SCNC: 3 MMOL/L (ref 7–16)
ATRIAL RATE: 55 BPM
BUN SERPL-MCNC: 19 MG/DL (ref 6–23)
CALCIUM SERPL-MCNC: 8.9 MG/DL (ref 8.3–10.4)
CALCULATED P AXIS, ECG09: 33 DEGREES
CALCULATED R AXIS, ECG10: 19 DEGREES
CALCULATED T AXIS, ECG11: -13 DEGREES
CHLORIDE SERPL-SCNC: 111 MMOL/L (ref 98–107)
CO2 SERPL-SCNC: 28 MMOL/L (ref 21–32)
CREAT SERPL-MCNC: 0.9 MG/DL (ref 0.6–1)
DIAGNOSIS, 93000: NORMAL
ERYTHROCYTE [DISTWIDTH] IN BLOOD BY AUTOMATED COUNT: 15 % (ref 11.9–14.6)
GLUCOSE SERPL-MCNC: 95 MG/DL (ref 65–100)
HCT VFR BLD AUTO: 38 % (ref 35.8–46.3)
HGB BLD-MCNC: 12.1 G/DL (ref 11.7–15.4)
INR PPP: 0.9
MAGNESIUM SERPL-MCNC: 2.4 MG/DL (ref 1.8–2.4)
MCH RBC QN AUTO: 26.5 PG (ref 26.1–32.9)
MCHC RBC AUTO-ENTMCNC: 31.8 G/DL (ref 31.4–35)
MCV RBC AUTO: 83.3 FL (ref 79.6–97.8)
NRBC # BLD: 0 K/UL (ref 0–0.2)
P-R INTERVAL, ECG05: 176 MS
PLATELET # BLD AUTO: 278 K/UL (ref 150–450)
PMV BLD AUTO: 10.9 FL (ref 9.4–12.3)
POTASSIUM SERPL-SCNC: 4.6 MMOL/L (ref 3.5–5.1)
PROTHROMBIN TIME: 12.7 SEC (ref 12–14.7)
Q-T INTERVAL, ECG07: 436 MS
QRS DURATION, ECG06: 98 MS
QTC CALCULATION (BEZET), ECG08: 417 MS
RBC # BLD AUTO: 4.56 M/UL (ref 4.05–5.2)
SODIUM SERPL-SCNC: 142 MMOL/L (ref 136–145)
VENTRICULAR RATE, ECG03: 55 BPM
WBC # BLD AUTO: 8.2 K/UL (ref 4.3–11.1)

## 2020-03-09 PROCEDURE — 83735 ASSAY OF MAGNESIUM: CPT

## 2020-03-09 PROCEDURE — 93458 L HRT ARTERY/VENTRICLE ANGIO: CPT

## 2020-03-09 PROCEDURE — 99152 MOD SED SAME PHYS/QHP 5/>YRS: CPT

## 2020-03-09 PROCEDURE — 74011250636 HC RX REV CODE- 250/636: Performed by: INTERNAL MEDICINE

## 2020-03-09 PROCEDURE — 74011000250 HC RX REV CODE- 250: Performed by: INTERNAL MEDICINE

## 2020-03-09 PROCEDURE — 77030016699 HC CATH ANGI DX INFN1 CARD -A

## 2020-03-09 PROCEDURE — 99153 MOD SED SAME PHYS/QHP EA: CPT

## 2020-03-09 PROCEDURE — C1894 INTRO/SHEATH, NON-LASER: HCPCS

## 2020-03-09 PROCEDURE — 74011636320 HC RX REV CODE- 636/320: Performed by: INTERNAL MEDICINE

## 2020-03-09 PROCEDURE — 80048 BASIC METABOLIC PNL TOTAL CA: CPT

## 2020-03-09 PROCEDURE — 85027 COMPLETE CBC AUTOMATED: CPT

## 2020-03-09 PROCEDURE — 77030029997 HC DEV COM RDL R BND TELE -B

## 2020-03-09 PROCEDURE — 85610 PROTHROMBIN TIME: CPT

## 2020-03-09 PROCEDURE — 93005 ELECTROCARDIOGRAM TRACING: CPT | Performed by: INTERNAL MEDICINE

## 2020-03-09 PROCEDURE — C1769 GUIDE WIRE: HCPCS

## 2020-03-09 RX ORDER — MORPHINE SULFATE 2 MG/ML
1 INJECTION, SOLUTION INTRAMUSCULAR; INTRAVENOUS
Status: CANCELLED | OUTPATIENT
Start: 2020-03-09

## 2020-03-09 RX ORDER — SODIUM CHLORIDE 0.9 % (FLUSH) 0.9 %
5 SYRINGE (ML) INJECTION AS NEEDED
Status: DISCONTINUED | OUTPATIENT
Start: 2020-03-09 | End: 2020-03-09 | Stop reason: HOSPADM

## 2020-03-09 RX ORDER — SODIUM CHLORIDE 0.9 % (FLUSH) 0.9 %
5-40 SYRINGE (ML) INJECTION AS NEEDED
Status: CANCELLED | OUTPATIENT
Start: 2020-03-09

## 2020-03-09 RX ORDER — SODIUM CHLORIDE 9 MG/ML
75 INJECTION, SOLUTION INTRAVENOUS CONTINUOUS
Status: DISCONTINUED | OUTPATIENT
Start: 2020-03-09 | End: 2020-03-09 | Stop reason: HOSPADM

## 2020-03-09 RX ORDER — HEPARIN SODIUM 10000 [USP'U]/ML
3000 INJECTION, SOLUTION INTRAVENOUS; SUBCUTANEOUS
Status: DISCONTINUED | OUTPATIENT
Start: 2020-03-09 | End: 2020-03-09 | Stop reason: HOSPADM

## 2020-03-09 RX ORDER — HEPARIN SODIUM 200 [USP'U]/100ML
2 INJECTION, SOLUTION INTRAVENOUS CONTINUOUS
Status: DISCONTINUED | OUTPATIENT
Start: 2020-03-09 | End: 2020-03-09 | Stop reason: HOSPADM

## 2020-03-09 RX ORDER — SODIUM CHLORIDE 0.9 % (FLUSH) 0.9 %
5-40 SYRINGE (ML) INJECTION EVERY 8 HOURS
Status: CANCELLED | OUTPATIENT
Start: 2020-03-09

## 2020-03-09 RX ORDER — NALOXONE HYDROCHLORIDE 0.4 MG/ML
0.4 INJECTION, SOLUTION INTRAMUSCULAR; INTRAVENOUS; SUBCUTANEOUS AS NEEDED
Status: CANCELLED | OUTPATIENT
Start: 2020-03-09

## 2020-03-09 RX ORDER — GUAIFENESIN 100 MG/5ML
324 LIQUID (ML) ORAL ONCE
Status: DISCONTINUED | OUTPATIENT
Start: 2020-03-09 | End: 2020-03-09 | Stop reason: HOSPADM

## 2020-03-09 RX ORDER — ACETAMINOPHEN 325 MG/1
650 TABLET ORAL
Status: CANCELLED | OUTPATIENT
Start: 2020-03-09

## 2020-03-09 RX ORDER — SODIUM CHLORIDE 9 MG/ML
75 INJECTION, SOLUTION INTRAVENOUS CONTINUOUS
Status: CANCELLED | OUTPATIENT
Start: 2020-03-09

## 2020-03-09 RX ORDER — HYDROCODONE BITARTRATE AND ACETAMINOPHEN 5; 325 MG/1; MG/1
1 TABLET ORAL
Status: CANCELLED | OUTPATIENT
Start: 2020-03-09

## 2020-03-09 RX ORDER — ONDANSETRON 2 MG/ML
4 INJECTION INTRAMUSCULAR; INTRAVENOUS
Status: CANCELLED | OUTPATIENT
Start: 2020-03-09 | End: 2020-03-10

## 2020-03-09 RX ORDER — LIDOCAINE HYDROCHLORIDE 10 MG/ML
2-20 INJECTION, SOLUTION EPIDURAL; INFILTRATION; INTRACAUDAL; PERINEURAL
Status: DISCONTINUED | OUTPATIENT
Start: 2020-03-09 | End: 2020-03-09 | Stop reason: HOSPADM

## 2020-03-09 RX ORDER — FENTANYL CITRATE 50 UG/ML
25-100 INJECTION, SOLUTION INTRAMUSCULAR; INTRAVENOUS
Status: DISCONTINUED | OUTPATIENT
Start: 2020-03-09 | End: 2020-03-09 | Stop reason: HOSPADM

## 2020-03-09 RX ORDER — MIDAZOLAM HYDROCHLORIDE 1 MG/ML
.5-2 INJECTION, SOLUTION INTRAMUSCULAR; INTRAVENOUS
Status: DISCONTINUED | OUTPATIENT
Start: 2020-03-09 | End: 2020-03-09 | Stop reason: HOSPADM

## 2020-03-09 RX ADMIN — MIDAZOLAM 2 MG: 1 INJECTION INTRAMUSCULAR; INTRAVENOUS at 11:18

## 2020-03-09 RX ADMIN — IOPAMIDOL 90 ML: 755 INJECTION, SOLUTION INTRAVENOUS at 11:41

## 2020-03-09 RX ADMIN — FENTANYL CITRATE 50 MCG: 50 INJECTION, SOLUTION INTRAMUSCULAR; INTRAVENOUS at 11:08

## 2020-03-09 RX ADMIN — MIDAZOLAM 2 MG: 1 INJECTION INTRAMUSCULAR; INTRAVENOUS at 11:08

## 2020-03-09 RX ADMIN — SODIUM CHLORIDE 75 ML/HR: 900 INJECTION, SOLUTION INTRAVENOUS at 09:12

## 2020-03-09 RX ADMIN — HEPARIN SODIUM 3000 UNITS: 10000 INJECTION INTRAVENOUS; SUBCUTANEOUS at 11:16

## 2020-03-09 RX ADMIN — MIDAZOLAM 2 MG: 1 INJECTION INTRAMUSCULAR; INTRAVENOUS at 11:13

## 2020-03-09 RX ADMIN — VERAPAMIL HYDROCHLORIDE 2 ML: 5 INJECTION INTRAVENOUS at 11:16

## 2020-03-09 RX ADMIN — FENTANYL CITRATE 50 MCG: 50 INJECTION, SOLUTION INTRAMUSCULAR; INTRAVENOUS at 11:13

## 2020-03-09 RX ADMIN — HEPARIN SODIUM 2 ML/HR: 5000 INJECTION, SOLUTION INTRAVENOUS; SUBCUTANEOUS at 11:17

## 2020-03-09 RX ADMIN — LIDOCAINE HYDROCHLORIDE 4 ML: 10 INJECTION, SOLUTION EPIDURAL; INFILTRATION; INTRACAUDAL; PERINEURAL at 11:13

## 2020-03-09 NOTE — PROCEDURES
Brief Cardiac Procedure Note    Patient: Pedro Kamara MRN: [id number]  SSN: [id number]    YOB: 1970  Age: 44 y.o. Sex: female      Date of Procedure: 3/9/2020     Pre-procedure Diagnosis: Congestive Heart Failure    Post-procedure Diagnosis: Congestive Heart Failure and Coronary Artery Disease    Reason for Procedure: LV Dysfunction    Procedure: Left Heart Catheterization    Brief Description of Procedure: LHC via R radial artery, selective coronary angiography. Performed By: Renae Calderon MD     Assistants: None    Anesthesia: Moderate Sedation    Estimated Blood Loss: Less than 10 mL      Specimens: None    Implants: None    Findings: LM normal, LAD 40% mid, Circ luminal irregs, RCA normal, (RCA anomolous take off from Left cusp), LVEF 20-25%, LVEFDP 23 mmHg. Complications: None, (Radial artery spasm prior to cocktail administration)    Recommendations: Continue medical therapy.     Signed By: Renae Calderon MD     March 9, 2020

## 2020-03-09 NOTE — PROGRESS NOTES
TRANSFER - OUT REPORT:    Verbal report given to Sameera RN(name) on [facility] [facility]  being transferred to CPRU(unit) for routine progression of care       Report consisted of patients Situation, Background, Assessment and   Recommendations(SBAR). Information from the following report(s) Procedure Summary, MAR and Cardiac Rhythm SB was reviewed with the receiving nurse. Lines:   Peripheral IV 03/09/20 Left Antecubital (Active)        Opportunity for questions and clarification was provided.       Patient transported with:   O2 @ 2 liters  Registered Nurse     [facility] Dr Ruben Gann only  Right radial access - R band @ 1142 w/ 12 ml air in band - site C/D/I  Versed 6 mg IV  Fent 100 mcg IV Telephone Encounter by Lauren Gomez at 06/05/17 09:42 AM     Author:  Lauren Gomez Service:  (none) Author Type:  Ordering User     Filed:  06/05/17 09:50 AM Encounter Date:  6/5/2017 Status:  Signed     :  Lauren Gomez (Ordering User)            Pt calling states he was exerting himself with physical labor and got the shock, review of RT shows intermittent 2:1 and 1:1 A Flutter at rates of 230bpm, attempted 1 ATP with acceleration 36J shock, converted to Sinus Rhythm, pt denies chest pain, SOB. Pt is scheduled for AV node ablation and upgrade to CRT-D on 6/12/2017. Presenting rhythm next day cont to be sinus rhythm. Report in [facility].    Pt will send a transmission from home later today. Pt c/o of left shoulder and back discomfort upper left, asked if pt could have over done with physical labor on the 6/2/17, pt states, \"maybe\". Instructed pt on how to send a remote transmission. Pt voiced understanding.[DS1.1M]       Revision History        User Key Date/Time User Provider Type Action    > DS1.1 06/05/17 09:50 AM Lauren Gomez Ordering User Sign    M - Manual

## 2020-03-09 NOTE — DISCHARGE INSTRUCTIONS

## 2020-03-09 NOTE — PROGRESS NOTES
[facility] Procedural findings communicated. Intra procedural  medication administration reviewed. Progression of care discussed.      Patient received into CPRU Gray 4 post sheath removal.     right Radial access site without bleeding or swelling     TR band dry and intact     Patient instructed to limit movement to right upper extremity    Routine post procedural vital signs and site assessment initiated

## 2020-03-09 NOTE — PROGRESS NOTES
Patient received to [address] [place] [address] room # 12  Ambulatory from [facility]. Patient scheduled for [facility] today with Dr Aisha Barrera. Procedure reviewed & questions answered, voiced good understanding consent obtained & placed on chart. All medications and medical history reviewed. Will prep patient per orders. Patient & family updated on plan of care.       The patient has a fraility score of 3-MANAGING WELL, based on reports no recent falls

## 2020-03-10 NOTE — PROCEDURES
[facility]  CARDIAC CATH    Name:  Surendra Barker  MR#:  [id number]  :  1970  ACCOUNT #:  [de-identified]  DATE OF SERVICE:  2020    REFERRING PHYSICIAN:  Jordan Veloz DO    PROCEDURES PERFORMED:  Left heart catheterization with selective coronary angiography via the right radial artery. PREOPERATIVE DIAGNOSIS:  Systolic heart failure. POSTOPERATIVE DIAGNOSIS:  Nonischemic systolic heart failure. SURGEON:  Renae Calderon MD    ASSISTANT:  None. ESTIMATED BLOOD LOSS:  Less than 5 mL. SPECIMENS REMOVED:  None. COMPLICATIONS:  None. IMPLANTS:  None. ANESTHESIA:  Sedation, the patient received 6 mg Versed and 100 mcg of fentanyl, was monitored for conscious sedation beginning at 11:08 and ending at 11:41 by nurse, Manuel Alicia. PROCEDURE:  After informed consent, the patient was prepped and draped in the usual sterile fashion. The right wrist was infiltrated with lidocaine. The right radial artery was accessed by the modified Seldinger technique with a 6-Thai sheath. A total of 90 mL of Isovue contrast used in the entire procedure. Terumo band was used for hemostasis. CATHETERS USED:  Included a 5-Thai JL-3.5, 5-Thai JR-5, 5-Thai angled pigtail catheter. FINDINGS:  Left ventricle:  Left ventricular ejection fraction is estimated at 20-25% with diffuse hypokinesis. LVEDP:  23 mmHg. Left main:  Normal.    Left anterior descending artery had a 40% stenosis in the mid vessel. The circumflex had mild luminal irregularities throughout. The right coronary artery was a dominant artery. 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    CONCLUSIONS:  This is a 42-year-old female with known systolic heart failure who was found to have mild nonobstructive coronary artery disease by heart catheterization.       Tasia Fountain MD      [id number]  D:  2020 11:58  T:  03/10/2020 1: 1600 [address] #:  [id number]

## 2020-03-12 ENCOUNTER — RX ONLY (OUTPATIENT)
Age: 50
Setting detail: RX ONLY
End: 2020-03-12

## 2020-05-19 ENCOUNTER — RX ONLY (OUTPATIENT)
Age: 50
Setting detail: RX ONLY
End: 2020-05-19

## 2020-05-21 ENCOUNTER — RX ONLY (OUTPATIENT)
Age: 50
Setting detail: RX ONLY
End: 2020-05-21

## 2020-06-22 ENCOUNTER — RX ONLY (OUTPATIENT)
Age: 50
Setting detail: RX ONLY
End: 2020-06-22

## 2020-08-16 ENCOUNTER — APPOINTMENT (OUTPATIENT)
Dept: GENERAL RADIOLOGY | Age: 50
End: 2020-08-16
Attending: EMERGENCY MEDICINE
Payer: COMMERCIAL

## 2020-08-16 ENCOUNTER — HOSPITAL ENCOUNTER (EMERGENCY)
Age: 50
Discharge: HOME | End: 2020-08-16
Attending: EMERGENCY MEDICINE
Payer: COMMERCIAL

## 2020-08-16 VITALS
HEART RATE: 64 BPM | HEIGHT: 68 IN | OXYGEN SATURATION: 99 % | SYSTOLIC BLOOD PRESSURE: 152 MMHG | TEMPERATURE: 98.4 F | RESPIRATION RATE: 16 BRPM | WEIGHT: 240 LBS | BODY MASS INDEX: 36.37 KG/M2 | DIASTOLIC BLOOD PRESSURE: 78 MMHG

## 2020-08-16 DIAGNOSIS — S20.219A CONTUSION OF CHEST WALL, UNSPECIFIED LATERALITY, INITIAL ENCOUNTER: ICD-10-CM

## 2020-08-16 DIAGNOSIS — S29.019A ACUTE THORACIC MYOFASCIAL STRAIN, INITIAL ENCOUNTER: Primary | ICD-10-CM

## 2020-08-16 DIAGNOSIS — S39.012A ACUTE MYOFASCIAL STRAIN OF LUMBAR REGION, INITIAL ENCOUNTER: ICD-10-CM

## 2020-08-16 LAB
ATRIAL RATE: 50 BPM
CALCULATED P AXIS, ECG09: 49 DEGREES
CALCULATED R AXIS, ECG10: 55 DEGREES
CALCULATED T AXIS, ECG11: 0 DEGREES
DIAGNOSIS, 93000: NORMAL
P-R INTERVAL, ECG05: 170 MS
Q-T INTERVAL, ECG07: 428 MS
QRS DURATION, ECG06: 100 MS
QTC CALCULATION (BEZET), ECG08: 390 MS
VENTRICULAR RATE, ECG03: 50 BPM

## 2020-08-16 PROCEDURE — 99283 EMERGENCY DEPT VISIT LOW MDM: CPT

## 2020-08-16 PROCEDURE — 71046 X-RAY EXAM CHEST 2 VIEWS: CPT

## 2020-08-16 PROCEDURE — 93005 ELECTROCARDIOGRAM TRACING: CPT | Performed by: EMERGENCY MEDICINE

## 2020-08-16 RX ORDER — CYCLOBENZAPRINE HCL 10 MG
10 TABLET ORAL
Qty: 15 TAB | Refills: 0 | Status: SHIPPED | OUTPATIENT
Start: 2020-08-16 | End: 2022-01-04
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
## 2020-08-16 NOTE — DISCHARGE INSTRUCTIONS
Tylenol or ibuprofen. Muscle relaxer. Try heating pad or hot water bottle. Recheck with your doctor 4 to 5 days if not improving. Recheck sooner for worse or worrisome symptoms. Patient Education        Back Strain: Care Instructions  Overview     A back strain happens when you overstretch, or pull, a muscle in your back. You may hurt your back in an accident or when you exercise or lift something. Sometimes you may not know how you hurt your back. Most back pain will get better with rest and time. You can take care of yourself at home to help your back heal.  Follow-up care is a key part of your treatment and safety. Be sure to make and go to all appointments, and call your doctor if you are having problems. It's also a good idea to know your test results and keep a list of the medicines you take. How can you care for yourself at home? · Try to stay as active as you can, but stop or reduce any activity that causes pain. · Put ice or a cold pack on the sore muscle for 10 to 20 minutes at a time to stop swelling. Try this every 1 to 2 hours for 3 days (when you are awake) or until the swelling goes down. Put a thin cloth between the ice pack and your skin. · After 2 or 3 days, apply a heating pad on low or a warm cloth to your back. Some doctors suggest that you go back and forth between hot and cold treatments. · Take pain medicines exactly as directed. ? If the doctor gave you a prescription medicine for pain, take it as prescribed. ? If you are not taking a prescription pain medicine, ask your doctor if you can take an over-the-counter medicine. · Try sleeping on your side with a pillow between your legs. Or put a pillow under your knees when you lie on your back. These measures can ease pain in your lower back. · Return to your usual level of activity slowly. When should you call for help? [id number] anytime you think you may need emergency care.  For example, call if:  · You are unable to move a leg at all. Call your doctor now or seek immediate medical care if:  · You have new or worse symptoms in your legs, belly, or buttocks. Symptoms may include:  ? Numbness or tingling. ? Weakness. ? Pain. · You lose bladder or bowel control. Watch closely for changes in your health, and be sure to contact your doctor if:  · You have a fever, lose weight, or don't feel well. · You are not getting better as expected. Where can you learn more? Go to http://www.gray.com/  Enter Q272 in the search box to learn more about \"Back Strain: Care Instructions. \"  Current as of: March 2, 2020               Content Version: 12.5  © 2006-2020 [employer]. Care instructions adapted under license by [employer] (which disclaims liability or warranty for this information). If you have questions about a medical condition or this instruction, always ask your healthcare professional. [place] [address] any warranty or liability for your use of this information. Patient Education        Chest Contusion: Care Instructions  Your Care Instructions  A chest contusion, or bruise, is caused by a fall or direct blow to the chest. Car crashes, falls, getting punched, and injury from bicycle handlebars are common causes of chest contusions. A very forceful blow to the chest can injure the heart or blood vessels in the chest, the lungs, the airway, the liver, or the spleen. Pain may be caused by an injury to muscles, cartilage, or ribs. Deep breathing, coughing, or sneezing can increase your pain. Lying on the injured area also can cause pain. Follow-up care is a key part of your treatment and safety. Be sure to make and go to all appointments, and call your doctor if you are having problems. It's also a good idea to know your test results and keep a list of the medicines you take. How can you care for yourself at home? · Rest and protect the injured or sore area. Stop, change, or take a break from any activity that may be causing your pain. · Put ice or a cold pack on the area for 10 to 20 minutes at a time. Put a thin cloth between the ice and your skin. · After 2 or 3 days, if your swelling is gone, apply a heating pad set on low or a warm cloth to your chest. Some doctors suggest that you go back and forth between hot and cold. Put a thin cloth between the heating pad and your skin. · Do not wrap or tape your ribs for support. This may cause you to take smaller breaths, which could increase your risk of pneumonia and lung collapse. · Ask your doctor if you can take an over-the-counter pain medicine, such as acetaminophen (Tylenol), ibuprofen (Advil, Motrin), or naproxen (Aleve). Be safe with medicines. Read and follow all instructions on the label. · Take your medicines exactly as prescribed. Call your doctor if you think you are having a problem with your medicine. · Gentle stretching and massage may help you feel better after a few days of rest. Stretch slowly to the point just before discomfort begins, then hold the stretch for at least 15 to 30 seconds. Do this 3 or 4 times per day. · As your pain gets better, slowly return to your normal activities. Be patient, because chest bruises can take weeks or months to heal. Any increased pain may be a sign that you need to rest a while longer. When should you call for help? [id number] anytime you think you may need emergency care. For example, call if:  · You have severe trouble breathing. · You cough up blood. Call your doctor now or seek immediate medical care if:  · You have belly pain. · You are dizzy or lightheaded, or you feel like you may faint. · You develop new symptoms with the chest pain. · Your chest pain gets worse. · You have a fever. · You have some shortness of breath. · You have a cough that brings up mucus from the lungs.   Watch closely for changes in your health, and be sure to contact your doctor if:  · Your chest pain is not improving after 1 week. Where can you learn more? Go to http://maddie-dilan.info/  Enter I174 in the search box to learn more about \"Chest Contusion: Care Instructions. \"  Current as of: June 26, 2019               Content Version: 12.5  © 5307-3676 [employer]. Care instructions adapted under license by [employer] (which disclaims liability or warranty for this information). If you have questions about a medical condition or this instruction, always ask your healthcare professional. [address] any warranty or liability for your use of this information.
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
## 2020-08-16 NOTE — ED NOTES
I have reviewed discharge instructions with the patient. The patient verbalized understanding. Patient left ED via Discharge Method: ambulatory to Home with herself. Opportunity for questions and clarification provided. Patient given 1 scripts. To continue your aftercare when you leave the hospital, you may receive an automated call from our care team to check in on how you are doing. This is a free service and part of our promise to provide the best care and service to meet your aftercare needs.  If you have questions, or wish to unsubscribe from this service please call [phone]. Thank you for Choosing our [facility] Emergency Department.

## 2020-08-19 ENCOUNTER — RX ONLY (OUTPATIENT)
Age: 50
Setting detail: RX ONLY
End: 2020-08-19

## 2020-08-22 ENCOUNTER — RX ONLY (OUTPATIENT)
Age: 50
Setting detail: RX ONLY
End: 2020-08-22

## 2020-08-25 ENCOUNTER — RX ONLY (OUTPATIENT)
Age: 50
Setting detail: RX ONLY
End: 2020-08-25

## 2020-09-14 PROBLEM — M76.02 GLUTEAL TENDINITIS, LEFT HIP: Status: ACTIVE | Noted: 2020-09-14

## 2020-10-22 ENCOUNTER — RX ONLY (OUTPATIENT)
Age: 50
Setting detail: RX ONLY
End: 2020-10-22

## 2020-10-27 ENCOUNTER — RX ONLY (OUTPATIENT)
Age: 50
Setting detail: RX ONLY
End: 2020-10-27

## 2020-10-27 PROBLEM — L30.9 DERMATITIS, UNSPECIFIED: Status: ACTIVE | Noted: 2020-10-27

## 2020-11-10 PROBLEM — I25.118 CORONARY ARTERY DISEASE OF NATIVE ARTERY OF NATIVE HEART WITH STABLE ANGINA PECTORIS (HCC): Status: ACTIVE | Noted: 2017-12-18

## 2020-11-13 ENCOUNTER — RX ONLY (OUTPATIENT)
Age: 50
Setting detail: RX ONLY
End: 2020-11-13

## 2020-11-13 PROBLEM — M25.859 OTHER SPECIFIED JOINT DISORDERS, UNSPECIFIED HIP: Status: ACTIVE | Noted: 2020-11-13

## 2020-11-18 ENCOUNTER — RX ONLY (OUTPATIENT)
Age: 50
Setting detail: RX ONLY
End: 2020-11-18

## 2020-11-18 PROBLEM — R73.03 PREDIABETES: Status: ACTIVE | Noted: 2020-11-18

## 2020-12-15 PROBLEM — E66.9 OBESITY (BMI 30-39.9): Status: ACTIVE | Noted: 2020-12-15

## 2021-01-19 ENCOUNTER — APPOINTMENT (RX ONLY)
Dept: URBAN - METROPOLITAN AREA CLINIC 349 | Facility: CLINIC | Age: 51
Setting detail: DERMATOLOGY
End: 2021-01-19

## 2021-01-19 DIAGNOSIS — L30.9 DERMATITIS, UNSPECIFIED: ICD-10-CM

## 2021-01-19 PROCEDURE — ? COUNSELING

## 2021-01-19 PROCEDURE — ? OTHER

## 2021-01-19 PROCEDURE — 99202 OFFICE O/P NEW SF 15 MIN: CPT

## 2021-01-19 ASSESSMENT — LOCATION DETAILED DESCRIPTION DERM: LOCATION DETAILED: LEFT RIB CAGE

## 2021-01-19 ASSESSMENT — LOCATION ZONE DERM: LOCATION ZONE: TRUNK

## 2021-01-19 ASSESSMENT — LOCATION SIMPLE DESCRIPTION DERM: LOCATION SIMPLE: ABDOMEN

## 2021-01-19 NOTE — HPI: RASH
What Type Of Note Output Would You Prefer (Optional)?: Bullet Format
Is The Patient Presenting As Previously Scheduled?: Yes
How Severe Is Your Rash?: mild
Is This A New Presentation, Or A Follow-Up?: Rash
Additional History: Pt states she’s has a rash for 3 months that keeps coming back around the neck and under breast

## 2021-01-19 NOTE — PROCEDURE: OTHER
Detail Level: Zone
Render Risk Assessment In Note?: yes
Other (Free Text): Pt will call when she is having a flare so Dilcia can perform a punch biopsy. Pt has been applying steroid therefore we will need patient need a washout period before biopsy \\n\\nDiscontinue topical cortisone, no oral prednisone before biopsy. Patient is comfortable with this plan so we can get an accurate diagnosis.\\n\\nNot much present today
Note Text (......Xxx Chief Complaint.): This diagnosis correlates with the

## 2021-04-19 PROBLEM — G47.33 OSA (OBSTRUCTIVE SLEEP APNEA): Status: ACTIVE | Noted: 2021-04-19

## 2021-07-19 ENCOUNTER — APPOINTMENT (RX ONLY)
Dept: URBAN - METROPOLITAN AREA CLINIC 349 | Facility: CLINIC | Age: 51
Setting detail: DERMATOLOGY
End: 2021-07-19

## 2021-07-19 DIAGNOSIS — L30.9 DERMATITIS, UNSPECIFIED: ICD-10-CM

## 2021-07-19 PROCEDURE — 11104 PUNCH BX SKIN SINGLE LESION: CPT

## 2021-07-19 PROCEDURE — A4550 SURGICAL TRAYS: HCPCS

## 2021-07-19 PROCEDURE — ? PRESCRIPTION

## 2021-07-19 PROCEDURE — ? BIOPSY BY PUNCH METHOD

## 2021-07-19 PROCEDURE — ? COUNSELING

## 2021-07-19 PROCEDURE — ? SEPARATE AND IDENTIFIABLE DOCUMENTATION

## 2021-07-19 PROCEDURE — ? PRESCRIPTION MEDICATION MANAGEMENT

## 2021-07-19 PROCEDURE — 99213 OFFICE O/P EST LOW 20 MIN: CPT | Mod: 25

## 2021-07-19 RX ORDER — FLUTICASONE PROPIONATE 0.05 MG/G
OINTMENT TOPICAL
Qty: 1 | Refills: 2 | Status: ERX | COMMUNITY
Start: 2021-07-19

## 2021-07-19 RX ADMIN — FLUTICASONE PROPIONATE: 0.05 OINTMENT TOPICAL at 00:00

## 2021-07-19 ASSESSMENT — LOCATION DETAILED DESCRIPTION DERM
LOCATION DETAILED: RIGHT ANTERIOR DISTAL THIGH
LOCATION DETAILED: RIGHT DISTAL POSTERIOR THIGH

## 2021-07-19 ASSESSMENT — LOCATION ZONE DERM: LOCATION ZONE: LEG

## 2021-07-19 ASSESSMENT — LOCATION SIMPLE DESCRIPTION DERM
LOCATION SIMPLE: RIGHT THIGH
LOCATION SIMPLE: RIGHT POSTERIOR THIGH

## 2021-07-19 NOTE — PROCEDURE: PRESCRIPTION MEDICATION MANAGEMENT
Render In Strict Bullet Format?: No
Detail Level: Zone
Initiate Treatment: Fluticasone oint apply twice daily to affected areas for two weeks

## 2021-07-19 NOTE — PROCEDURE: BIOPSY BY PUNCH METHOD
Anesthesia Volume In Cc (Will Not Render If 0): 1
X Size Of Lesion In Cm (Optional): 0
Dressing: pressure dressing
Post-Care Instructions: I reviewed with the patient in detail post-care instructions. Patient is to keep the biopsy site dry overnight, and then apply bacitracin twice daily until healed. Patient may apply hydrogen peroxide soaks to remove any crusting.
Epidermal Sutures: 4-0 Ethilon
Validate Note Data (See Information Below): Yes
Billing Type: Third-Party Bill
Validate That Anesthesia Is Not 0: No
Accession #: [id number]
Biopsy Type: H and E
Anesthesia Type: 2% lidocaine with epinephrine
Wound Care: Vaseline
Notification Instructions: Patient will be notified of biopsy results. However, patient instructed to call the office if not contacted within 2 weeks.
Hemostasis: None
Home Suture Removal Text: Patient was provided a home suture removal kit and will remove their sutures at home.  If they have any questions or difficulties they will call the office.
Information: Selecting Yes will display possible errors in your note based on the variables you have selected. This validation is only offered as a suggestion for you. PLEASE NOTE THAT THE VALIDATION TEXT WILL BE REMOVED WHEN YOU FINALIZE YOUR NOTE. IF YOU WANT TO FAX A PRELIMINARY NOTE YOU WILL NEED TO TOGGLE THIS TO 'NO' IF YOU DO NOT WANT IT IN YOUR FAXED NOTE.
Detail Level: Detailed
Punch Size In Mm: 4
Consent: Written consent was obtained and risks were reviewed including but not limited to scarring, infection, bleeding, scabbing, incomplete removal, nerve damage and allergy to anesthesia.
Suture Removal: 12 days

## 2021-07-19 NOTE — HPI: RASH
Is The Patient Presenting As Previously Scheduled?: Yes
How Severe Is Your Rash?: mild
Is This A New Presentation, Or A Follow-Up?: Follow Up Rash
Additional History: Patient has been using Benadryl gel for the itching. The rash started flaring on Friday. Patient states she was seen a few months ago with a rash but doesn’t know if it’s the same.

## 2021-08-02 ENCOUNTER — APPOINTMENT (RX ONLY)
Dept: URBAN - METROPOLITAN AREA CLINIC 349 | Facility: CLINIC | Age: 51
Setting detail: DERMATOLOGY
End: 2021-08-02

## 2021-08-02 DIAGNOSIS — Z48.02 ENCOUNTER FOR REMOVAL OF SUTURES: ICD-10-CM

## 2021-08-02 DIAGNOSIS — T07XXXA INSECT BITE, NONVENOMOUS, OF OTHER, MULTIPLE, AND UNSPECIFIED SITES, WITHOUT MENTION OF INFECTION: ICD-10-CM

## 2021-08-02 PROBLEM — S70.361A INSECT BITE (NONVENOMOUS), RIGHT THIGH, INITIAL ENCOUNTER: Status: ACTIVE | Noted: 2021-08-02

## 2021-08-02 PROCEDURE — ? COUNSELING

## 2021-08-02 PROCEDURE — ? SUTURE REMOVAL (GLOBAL PERIOD)

## 2021-08-02 PROCEDURE — ? PRESCRIPTION MEDICATION MANAGEMENT

## 2021-08-02 PROCEDURE — 99024 POSTOP FOLLOW-UP VISIT: CPT

## 2021-08-02 ASSESSMENT — LOCATION SIMPLE DESCRIPTION DERM: LOCATION SIMPLE: RIGHT POSTERIOR THIGH

## 2021-08-02 ASSESSMENT — LOCATION DETAILED DESCRIPTION DERM: LOCATION DETAILED: RIGHT DISTAL POSTERIOR THIGH

## 2021-08-02 ASSESSMENT — LOCATION ZONE DERM: LOCATION ZONE: LEG

## 2021-08-02 NOTE — PROCEDURE: PRESCRIPTION MEDICATION MANAGEMENT
Detail Level: Zone
Render In Strict Bullet Format?: No
Continue Regimen: Topical steroid as directed

## 2021-08-02 NOTE — PROCEDURE: SUTURE REMOVAL (GLOBAL PERIOD)
Add 48864 Cpt? (Important Note: In 2017 The Use Of 21738 Is Being Tracked By Cms To Determine Future Global Period Reimbursement For Global Periods): yes
Detail Level: Detailed

## 2021-11-18 ENCOUNTER — HOSPITAL ENCOUNTER (OUTPATIENT)
Dept: MAMMOGRAPHY | Age: 51
Discharge: HOME | End: 2021-11-18
Attending: OBSTETRICS & GYNECOLOGY
Payer: MEDICAID

## 2021-11-18 DIAGNOSIS — Z12.31 VISIT FOR SCREENING MAMMOGRAM: ICD-10-CM

## 2021-11-18 PROCEDURE — 77067 SCR MAMMO BI INCL CAD: CPT

## 2022-01-04 ENCOUNTER — HOSPITAL ENCOUNTER (OUTPATIENT)
Dept: SURGERY | Age: 52
Discharge: HOME | End: 2022-01-04
Payer: MEDICARE

## 2022-01-04 ENCOUNTER — HOSPITAL ENCOUNTER (OUTPATIENT)
Dept: REHABILITATION | Age: 52
Discharge: HOME | End: 2022-01-04
Payer: MEDICARE

## 2022-01-04 VITALS
OXYGEN SATURATION: 100 % | HEIGHT: 68 IN | HEART RATE: 59 BPM | DIASTOLIC BLOOD PRESSURE: 86 MMHG | WEIGHT: 252 LBS | RESPIRATION RATE: 18 BRPM | SYSTOLIC BLOOD PRESSURE: 170 MMHG | TEMPERATURE: 97.7 F | BODY MASS INDEX: 38.19 KG/M2

## 2022-01-04 DIAGNOSIS — Z91.14 NONCOMPLIANCE WITH CPAP TREATMENT: ICD-10-CM

## 2022-01-04 DIAGNOSIS — G47.33 OSA (OBSTRUCTIVE SLEEP APNEA): Primary | ICD-10-CM

## 2022-01-04 LAB
ANION GAP SERPL CALC-SCNC: 3 MMOL/L (ref 7–16)
APTT PPP: 31.1 SEC (ref 24.1–35.1)
BACTERIA SPEC CULT: NORMAL
BASOPHILS # BLD: 0.1 K/UL (ref 0–0.2)
BASOPHILS NFR BLD: 1 % (ref 0–2)
BUN SERPL-MCNC: 15 MG/DL (ref 6–23)
CALCIUM SERPL-MCNC: 9.3 MG/DL (ref 8.3–10.4)
CHLORIDE SERPL-SCNC: 106 MMOL/L (ref 98–107)
CO2 SERPL-SCNC: 31 MMOL/L (ref 21–32)
CREAT SERPL-MCNC: 0.7 MG/DL (ref 0.6–1)
DIFFERENTIAL METHOD BLD: ABNORMAL
EOSINOPHIL # BLD: 0.1 K/UL (ref 0–0.8)
EOSINOPHIL NFR BLD: 2 % (ref 0.5–7.8)
ERYTHROCYTE [DISTWIDTH] IN BLOOD BY AUTOMATED COUNT: 14.9 % (ref 11.9–14.6)
EST. AVERAGE GLUCOSE BLD GHB EST-MCNC: 117 MG/DL
GLUCOSE SERPL-MCNC: 86 MG/DL (ref 65–100)
HBA1C MFR BLD: 5.7 % (ref 4.2–6.3)
HCT VFR BLD AUTO: 39.8 % (ref 35.8–46.3)
HGB BLD-MCNC: 12.3 G/DL (ref 11.7–15.4)
IMM GRANULOCYTES # BLD AUTO: 0 K/UL (ref 0–0.5)
IMM GRANULOCYTES NFR BLD AUTO: 0 % (ref 0–5)
INR PPP: 1
LYMPHOCYTES # BLD: 1.3 K/UL (ref 0.5–4.6)
LYMPHOCYTES NFR BLD: 18 % (ref 13–44)
MCH RBC QN AUTO: 26.3 PG (ref 26.1–32.9)
MCHC RBC AUTO-ENTMCNC: 30.9 G/DL (ref 31.4–35)
MCV RBC AUTO: 85 FL (ref 79.6–97.8)
MONOCYTES # BLD: 0.5 K/UL (ref 0.1–1.3)
MONOCYTES NFR BLD: 7 % (ref 4–12)
NEUTS SEG # BLD: 5.1 K/UL (ref 1.7–8.2)
NEUTS SEG NFR BLD: 73 % (ref 43–78)
NRBC # BLD: 0 K/UL (ref 0–0.2)
PLATELET # BLD AUTO: 312 K/UL (ref 150–450)
PMV BLD AUTO: 10.8 FL (ref 9.4–12.3)
POTASSIUM SERPL-SCNC: 3.9 MMOL/L (ref 3.5–5.1)
PROTHROMBIN TIME: 13.6 SEC (ref 12.6–14.5)
RBC # BLD AUTO: 4.68 M/UL (ref 4.05–5.2)
SERVICE CMNT-IMP: NORMAL
SODIUM SERPL-SCNC: 140 MMOL/L (ref 136–145)
WBC # BLD AUTO: 7 K/UL (ref 4.3–11.1)

## 2022-01-04 PROCEDURE — 80048 BASIC METABOLIC PNL TOTAL CA: CPT

## 2022-01-04 PROCEDURE — 85025 COMPLETE CBC W/AUTO DIFF WBC: CPT

## 2022-01-04 PROCEDURE — 83036 HEMOGLOBIN GLYCOSYLATED A1C: CPT

## 2022-01-04 PROCEDURE — 85730 THROMBOPLASTIN TIME PARTIAL: CPT

## 2022-01-04 PROCEDURE — 36415 COLL VENOUS BLD VENIPUNCTURE: CPT

## 2022-01-04 PROCEDURE — 97161 PT EVAL LOW COMPLEX 20 MIN: CPT

## 2022-01-04 PROCEDURE — 77030027138 HC INCENT SPIROMETER -A

## 2022-01-04 PROCEDURE — 87641 MR-STAPH DNA AMP PROBE: CPT

## 2022-01-04 PROCEDURE — 94760 N-INVAS EAR/PLS OXIMETRY 1: CPT

## 2022-01-04 PROCEDURE — 85610 PROTHROMBIN TIME: CPT

## 2022-01-04 RX ORDER — CYANOCOBALAMIN (VITAMIN B-12) 500 MCG
400 TABLET ORAL DAILY
COMMUNITY

## 2022-01-04 RX ORDER — DICLOFENAC SODIUM 10 MG/G
GEL TOPICAL AS NEEDED
COMMUNITY

## 2022-01-04 RX ORDER — CEFAZOLIN SODIUM/WATER 2 G/20 ML
2 SYRINGE (ML) INTRAVENOUS ONCE
Status: CANCELLED | OUTPATIENT
Start: 2022-01-04 | End: 2022-01-04

## 2022-01-04 RX ORDER — ONDANSETRON 4 MG/1
4 TABLET, FILM COATED ORAL
COMMUNITY
End: 2022-04-21

## 2022-01-04 NOTE — PERIOP NOTES
Patient verified name and . Order for consent found in EHR and matches case posting; patient verified. Type 3 surgery, joint assessment complete. Labs per surgeon: CBC,BMP, PT/PTT, HgbA1c; results pending. T&S DOS and POC glucose DOS; orders signed and held in EHR. Labs per anesthesia protocol: no additional  EKG: Completed 21; results within anesthesia limits. Echo (10/4/21) needs to be reviewed by anesthesia-charge nurse to f/u. Cardiology office note (10/13/21), Stress (21), and Cath (3/9/20) located in EHR if needed for reference. Patient had partial toenail removed on operative side last week. Pt instructed to show Dr. Nataly Espinoza at pre-op appointment on 22. Pt verbalized understanding. Staff message sent to Duke Braswell, assistant to Dr. Nataly Espinoza, informing of the above. Patient COVID test date 22 at 56; Patient aware. The testing center [facility] [address], [place]  and telephone number [phone] provided to the patient if not appointment found. MRSA/MSSA swab collected; pharmacy to review and dose antibiotic as appropriate. Hospital approved surgical skin cleanser and instructions to return bottle on DOS given per hospital policy. Patient provided with handouts including Guide to Surgery, Pain Management, Hand Hygiene, Blood Transfusion Education, and [facility] Brochure. Patient answered medical/surgical history questions at their best of ability. All prior to admission medications documented in [facility]. Original medication prescription bottle NOT visualized during patient appointment. Patient instructed to hold all vitamins, supplements, herbals 3 weeks prior to surgery and NSAIDS 5 days prior to surgery. Patient teach back successful and patient demonstrates knowledge of instruction.

## 2022-01-04 NOTE — PROGRESS NOTES
01/04/22 0900   Oxygen Therapy   O2 Sat (%) 97 %   Pulse via Oximetry 63 beats per minute   O2 Device None (Room air)   Pre-Treatment   Breath Sounds Bilateral Clear;Diminished   Pt's symptoms include:    Snoring  Tiredness- excessive daytime sleepiness  Observed apnea  Waking up from sleep gasping or choking  HTN  GERD  Neck size      38        Cm   Modified Pritchard stage 4  SACS Score 16  STOP BANG 6    Height    5  '  8  \"   Weight  252   lbs  BMI 38.32      Refer patient for sleep study based on above assessment. Initial respiratory Assessment completed with pt. Pt was interviewed and evaluated in Joint camp prior to surgery. Patient ID:  Lisa Lui  [id number]  42 y.o.  1970  Surgeon: Dr. Ada Saavedra  Date of Surgery: 1/26/2022  Procedure:  Total Right Knee Arthroplasty  Primary Care Physician: Bhargav Lantigua [phone]  Specialists:     Pt taught proper COUGH technique  DIAPHRAGMATIC BREATHING EXERCISE INSTRUCTIONS GIVEN    History of smoking:   DENIES                 Quit date:         Secondhand smoke:DENIES    Past procedures with Oxygen desaturation or delayed awakening:DENIES    Past Medical History:   Diagnosis Date    Abnormal Papanicolaou smear of cervix     Adverse effect of anesthesia     \"takes more\" for anesthesia to be effective     Arthritis     CAD (coronary artery disease)     no stents; [facility] 3/2017 mod LAD dz; [place] [facility]    [facility]     ECHO 12/18/18: EF 40-45%; managed with medication and [facility]    Fibroid, uterine     GERD (gastroesophageal reflux disease)     diet controlled     H/O echocardiogram 10/04/2021    EF 40-45%    Heart murmur     Echo (10/4/21) EF 40-45% trace regurgitation in mitral and tricuspid valve    History of anemia     Hypercholesterolemia     Controlled with meds     Hypertension     Controlled with meds     Menorrhagia with regular cycle     [facility]bid [facility]     NICM (nonischemic cardiomyopathy) ([facility]     [facility] by [facility]     THEE on CPAP     Ovarian cyst     PVCs (premature ventricular contractions)     Uterine fibroid       RUL GRANULOMA  Respiratory history:DENIES SOB                                                                 Respiratory meds:  DENIES    FAMILY PRESENT:             NO     PAST SLEEP STUDY:        YES                   2018 AHI 12.2   HX OF THEE:                        YES         - STATES SOMETHING WAS WRONG WITH CPAP AND DOES NOT HAVE ANYMORE. RELATIONSHIP OF CARDIOMYOPATHY AND A-FIB WITH UNTREATED THEE EXPLAINED TO PT.        THEE assessment:                                               SLEEPS ON      BACK                PHYSICAL EXAM   Body mass index is 38.32 kg/m².    Visit Vitals  BP (!) 170/86 (BP 1 Location: Left lower arm, BP Patient Position: Sitting)   Pulse (!) 59   Temp 97.7 °F (36.5 °C)   Resp 18   Ht 5' 8\" (1.727 m)   Wt 114.3 kg (252 lb)   SpO2 100%   BMI 38.32 kg/m²     Neck circumference:  38    cm    Loud snoring:                                                 YES          Witnessed apnea or wakening gasping or choking:              APNEA  Awakens with headaches:                                               DENIES  Morning or daytime tiredness/ sleepiness:                              TIRED  Dry mouth or sore throat in morning:            YES                                             Pritchard stage:  4                                   SACS score:16  Stop Bang   STOP-BANG  Does the patient snore loudly (louder than talking or loud enough to be heard through closed doors)?: Yes  Does the patient often feel tired, fatigued, or sleepy during the daytime, even after a \"good\" night's sleep?: Yes  Has anyone ever observed the patient stop breathing during their sleep? : Yes  Does the patient have or are they being treated for high blood pressure?: Yes  Is the patient's BMI greater than 35?: Yes  Is your neck circumference greater than 17 inches (Male) or 16 inches (Female)?: No  Is the patient older than 48?: Yes  Is the patient male?: No  THEE Score: 6  Has the patient been referred to Sleep Medicine?: Yes  Has the patient previously been diagnosed with Obstructive Sleep Apnea?: Yes  Treated or Untreated?: Untreated                            PT NON-COMPLIANT with CPAP. Dangers of non-compliance with treatment of THEE explained to pt. THEE handouts given to pt. ALBUTEROL Q 6 PRN  CONT. SAT MONITOR HS after surgery. O2 @ 3 lpm NC HS   RESPIRATORY ASSESSMENT Q SHIFT. Referrals:  [facility]- PT WOULD LIKE TO DO BEFORE SURGERY 1/20/2022  Pt.  Phone Number:  [phone]

## 2022-01-04 NOTE — PERIOP NOTES
The below lab results are within anesthesia limits. Results routed via [address] [facility] to patient's PCP per surgeon's request.     Recent Results (from the past 12 hour(s))   CBC WITH AUTOMATED DIFF    Collection Time: 01/04/22  9:25 AM   Result Value Ref Range    WBC 7.0 4.3 - 11.1 K/uL    RBC 4.68 4.05 - 5.2 M/uL    HGB 12.3 11.7 - 15.4 g/dL    HCT 39.8 35.8 - 46.3 %    MCV 85.0 79.6 - 97.8 FL    MCH 26.3 26.1 - 32.9 PG    MCHC 30.9 (L) 31.4 - 35.0 g/dL    RDW 14.9 (H) 11.9 - 14.6 %    PLATELET 555 800 - 860 K/uL    MPV 10.8 9.4 - 12.3 FL    ABSOLUTE NRBC 0.00 0.0 - 0.2 K/uL    DF AUTOMATED      NEUTROPHILS 73 43 - 78 %    LYMPHOCYTES 18 13 - 44 %    MONOCYTES 7 4.0 - 12.0 %    EOSINOPHILS 2 0.5 - 7.8 %    BASOPHILS 1 0.0 - 2.0 %    IMMATURE GRANULOCYTES 0 0.0 - 5.0 %    ABS. NEUTROPHILS 5.1 1.7 - 8.2 K/UL    ABS. LYMPHOCYTES 1.3 0.5 - 4.6 K/UL    ABS. MONOCYTES 0.5 0.1 - 1.3 K/UL    ABS. EOSINOPHILS 0.1 0.0 - 0.8 K/UL    ABS. BASOPHILS 0.1 0.0 - 0.2 K/UL    ABS. IMM.  GRANS. 0.0 0.0 - 0.5 K/UL   PROTHROMBIN TIME + INR    Collection Time: 01/04/22  9:25 AM   Result Value Ref Range    Prothrombin time 13.6 12.6 - 14.5 sec    INR 1.0     PTT    Collection Time: 01/04/22  9:25 AM   Result Value Ref Range    aPTT 31.1 24.1 - 18.3 SEC   METABOLIC PANEL, BASIC    Collection Time: 01/04/22  9:25 AM   Result Value Ref Range    Sodium 140 136 - 145 mmol/L    Potassium 3.9 3.5 - 5.1 mmol/L    Chloride 106 98 - 107 mmol/L    CO2 31 21 - 32 mmol/L    Anion gap 3 (L) 7 - 16 mmol/L    Glucose 86 65 - 100 mg/dL    BUN 15 6 - 23 MG/DL    Creatinine 0.70 0.6 - 1.0 MG/DL    GFR est AA >60 >60 ml/min/1.73m2    GFR est non-AA >60 >60 ml/min/1.73m2    Calcium 9.3 8.3 - 10.4 MG/DL   HEMOGLOBIN A1C WITH EAG    Collection Time: 01/04/22  9:25 AM   Result Value Ref Range    Hemoglobin A1c 5.7 4.20 - 6.30 %    Est. average glucose 117 mg/dL

## 2022-01-04 NOTE — PERIOP NOTES
PLEASE CONTINUE TAKING ALL PRESCRIPTION MEDICATIONS UP TO THE DAY OF SURGERY UNLESS OTHERWISE DIRECTED BELOW. DISCONTINUE all vitamins, herbals and supplements 21 days prior to surgery. DISCONTINUE Non-Steriodal Anti-Inflammatory (NSAIDS) such as Advil, Ibuprofen, and Aleve 5 days prior to surgery. Home Medications to HOLD      All vitamins, supplements, and herbals stop 21 days prior to surgery   All NSAIDs such as Advil, Aleve, Ibuprofen, Diclofenac gel, Naproxen, etc. Stop 5 days prior to surgery. *IT IS OK TO TAKE TYLENOL*     Home Medications to take  the day of surgery   Entresto, Amlodipine, 81 mg Aspirin, Ondansetron (Zofran) if needed        Comments   *The day before surgery, 1/25/22, take Acetaminophen (Tylenol) 1000mg in the morning and again at bedtime. Can substitute Tylenol 650 mg.*   BRING: CPAP, bottle of soap (Dynahex), and incentive spirometer           Please do not bring home medications with you on the day of surgery unless otherwise directed by your nurse. If you are instructed to bring home medications, please give them to your nurse as they will be administered by the nursing staff. If you have any questions, please call [facility] [phone] or [facility] [phone]. Copy of above instructions given to patient.

## 2022-01-04 NOTE — PROGRESS NOTES
Anthony Canas  : (30 y.o.) [facility] at [address], [facility].  [phone]       Physical Therapy Prehab Plan of Treatment and Evaluation Summary:2022    ICD-10: Treatment Diagnosis:   · Pain in Right Knee (M25.561)  · Stiffness of Right Knee, Not elsewhere classified (M25.661)  Precautions/Allergies:   Atorvastatin and Metformin  MEDICAL/REFERRING DIAGNOSIS:  Unilateral primary osteoarthritis, right knee [M17.11]  REFERRING PHYSICIAN: Raimundo Beckwith MD  DATE OF SURGERY: 22    Assessment:   Comments:  She is here alone. She is having a R TKA and then has her L TKA scheduled for 22. She will go home at [place] with the support of her daughter and a friend. She has her DME. PROBLEM LIST (Impacting functional limitations):  Ms. Canas presents with the following right lower extremity(s) problems:  1. Strength  2. Range of Motion  3. Home Exercise Program  4. Pain   INTERVENTIONS PLANNED:  1. Home Exercise Program  2. Educational Discussion      TREATMENT PLAN: Effective Dates: 2022 TO 2022. Frequency/Duration: Patient to continue to perform home exercise program at least twice per day up until her surgery. GOALS: (Goals have been discussed and agreed upon with patient.)  Discharge Goals: Time Frame: 1 Day  1. Patient will demonstrate independence with a home exercise program designed to increase strength, range of motion and pain control to minimize functional deficits and optimize patient for total joint replacement. Rehabilitation Potential For Stated Goals: Good  Regarding Anthony Woodard's therapy, I certify that the treatment plan above will be carried out by a therapist or under their direction.   Thank you for this referral,  Rome Raphael, PT               HISTORY:   Present Symptoms:  Pain Intensity 1: 9 (at its worst)  Pain Location 1: Knee  Pain Orientation 1: Anterior,Medial,Right   History of Present Injury/Illness (Reason for Referral):  Medical/Referring Diagnosis: Unilateral primary osteoarthritis, right knee [M17.11]   Past Medical History/Comorbidities:   Ms. Myla Jiang  has a past medical history of Abnormal Papanicolaou smear of cervix, Anemia, Arthritis, CAD (coronary artery disease), CHF (congestive heart failure) ([facility] Fibroid, uterine, GERD (gastroesophageal reflux disease), H/O echocardiogram (10/04/2021), Heart murmur, Hypercholesterolemia, Hypertension, Menorrhagia with regular cycle, Morbid obesity ([facility] NICM (nonischemic cardiomyopathy) ([facility] THEE on CPAP, Ovarian cyst, PVCs (premature ventricular contractions), and Uterine fibroid. She has no past medical history of Difficult intubation, Malignant hyperthermia due to anesthesia, Nausea & vomiting, or Pseudocholinesterase deficiency. Ms. Myla Jiang  has a past surgical history that includes hx tubal ligation; hx wisdom teeth extraction; hx hysteroscopy; hx afib ablation (02/2018); hx nicole and bso (02/12/2019); hx other surgical; and hx heart catheterization. Social History/Living Environment:   Home Environment: Private residence  # Steps to Enter: 0  One/Two Story Residence: One story  Living Alone: No  Support Systems: Child(althea);  Friend/Neighbor  Patient Expects to be Discharged to[de-identified] House  Current DME Used/Available at Home: Aliyah Hendricks, rolling; Cane, straight; Commode, bedside  Tub or Shower Type: Tub/Shower combination    Work/Activity:  disabled  Dominant Side:  RIGHT  Current Medications:  See Pre-assessment nursing note   Number of Personal Factors/Comorbidities that affect the Plan of Care: 1-2: MODERATE COMPLEXITY   EXAMINATION:   ADLs (Current Functional Status):   Ambulation:  [x] Independent  [] Walk Indoors Only  [] Walk Outdoors  [] Use Assistive Device  [] Use Wheelchair Only Dressing:  [x] Independent  Requires Assistance from Someone for:  [] Sock/Shoes  [] Pants  [] Everything   Bathing/Showering:   [x] Independent  [] Requires Assistance from Someone  [] [address]:  [] Routine house and yard work  [x] Light Housework Only  [] None   Observation/Orthostatic Postural Assessment:    Leg length discrepancy, right,Genu valgus right (R LE 1/8\" shorter than L)  ROM/Flexibility:   AROM: Generally decreased, functional (8-110 knee)                       RLE AROM  R Knee Flexion: 106  R Knee Extension: 5   Strength:   Strength: Generally decreased, functional (L hip/ankle 4; knee 3+)              RLE Strength  R Hip Flexion: 4  R Knee Extension: 3+  R Ankle Dorsiflexion: 4   Functional Mobility:    Sensation: Intact (L LE)    Stand to Sit: Independent  Sit to Stand: Independent  Stand Pivot Transfers: Independent  Distance (ft): 300 Feet (ft)  Ambulation - Level of Assistance: Independent  Speed/Janna: Pace decreased (<100 feet/min)  Gait Abnormalities: Antalgic          Balance:    Sitting: Intact  Standing: Intact   Body Structures Involved:  1. Bones  2. Joints  3. Muscles Body Functions Affected:  1. Movement Related Activities and Participation Affected:  1. General Tasks and Demands  2. Mobility   Number of elements that affect the Plan of Care: 4+: HIGH COMPLEXITY   CLINICAL PRESENTATION:   Presentation: Stable and uncomplicated: LOW COMPLEXITY   CLINICAL DECISION MAKING:   Tool Used: Knee injury and Osteoarthritis Outcome Score for Joint Replacement (KOOS, JR)  Score:  Initial: 20 (Interval: 36.391) 1/4/2022 Most Recent:    Interpretation of Score: The KOOS, JR contains 7 items from the original KOOS survey. Items are coded from 0 to 4, none to extreme respectively. Seng Michaud is scored by summing the raw response (range 0-28) and then converting it to an interval score using the table provided below. The interval score ranges from 0 to 100 where 0 represents total knee disability and 100 represents perfect knee health. Medical Necessity:   · Ms. Myla Jiang is expected to optimize her lower extremity strength and ROM in preparation for joint replacement surgery. Reason for Services/Other Comments:  · Achieve baseline assesment of musculoskeletal system, functional mobility and home environment. , educate in PT HEP in preparation for surgery, educate in hospital plan of care. Use of outcome tool(s) and clinical judgement create a POC that gives a: Clear prediction of patient's progress: LOW COMPLEXITY   TREATMENT:   Treatment/Session Assessment:  Patient was instructed in PT- HEP to increase strength and ROM in LEs. Answered all questions. · Post session pain:  3  · Compliance with Program/Exercises: compliant most of the time.   Total Treatment Duration:  PT Patient Time In/Time Out  Time In: 0915  Time Out: 1100 Sloan Celis, PT

## 2022-01-05 PROBLEM — Z91.14 NONCOMPLIANCE WITH CPAP TREATMENT: Status: ACTIVE | Noted: 2022-01-05

## 2022-01-05 NOTE — PERIOP NOTES
Dr. Shayy Lawson reviewed chart - Echo 10/4/21 & Cardiology  Note 10/13/21. No order received. Ok to proceed.

## 2022-01-05 NOTE — ADVANCED PRACTICE NURSE
Total Joint Surgery Preoperative Chart Review      Patient ID:  Sivakumar Houser  [id number]  60 y.o.  1970  Surgeon: Dr. Jhon Kirk  Date of Surgery: 1/26/2022  Procedure: Total Left Knee Arthroplasty  Primary Care Physician: Rex Nickerson [phone]  Specialty Physician(s):      Subjective:   Sivakumar Houser is a 46 y.o. BLACK/ female who presents for preoperative evaluation for Total Left Knee arthroplasty. This is a preoperative chart review note based on data collected by the nurse at the surgical Pre-Assessment visit.     Past Medical History:   Diagnosis Date    Abnormal Papanicolaou smear of cervix     Adverse effect of anesthesia     \"takes more\" for anesthesia to be effective     Arthritis     CAD (coronary artery disease)     no stents; [facility] 3/2017 mod LAD dz; followed by Dr Christel Gonsalez ([facility])    CHF (congestive heart failure) ([facility]     ECHO 12/18/18: EF 40-45%; managed with medication and followed by [facility], uterine     GERD (gastroesophageal reflux disease)     diet controlled     H/O echocardiogram 10/04/2021    EF 40-45%    Heart murmur     Echo (10/4/21) EF 40-45% trace regurgitation in mitral and tricuspid valve    History of anemia     Hypercholesterolemia     Controlled with meds     Hypertension     Controlled with meds     Menorrhagia with regular cycle     Morbid obesity ([address]     NICM (nonischemic cardiomyopathy) ([facility]     Followed by [facility] cardiology     THEE on CPAP     Ovarian cyst     PVCs (premature ventricular contractions)     Uterine fibroid       Past Surgical History:   Procedure Laterality Date    HX AFIB ABLATION  02/2018    Ablation for pvc's     HX HEART CATHETERIZATION      HX HYSTEROSCOPY      HX ORTHOPAEDIC Right     toe removal     HX OTHER SURGICAL      left hip and gluteal surgery    HX LILIANA AND BSO  02/12/2019    Supracervical    HX TUBAL LIGATION      HX WISDOM TEETH EXTRACTION Family History   Problem Relation Age of Onset    Heart Disease Mother     Cancer Father         Lymphoma    Breast Cancer Neg Hx       Social History     Tobacco Use    Smoking status: Never Smoker    Smokeless tobacco: Never Used   Substance Use Topics    Alcohol use: No       Prior to Admission medications    Medication Sig Start Date End Date Taking? Authorizing Provider   neomycin-bacitracnZn-polymyxnB (NEOSPORIN) 3.5-400-5,000 mg-unit-unit oipk ointment Apply 1 Packet to affected area two (2) times a day. Yes Provider, Historical   cholecalciferol (VITAMIN D3) (400 Units /10 mcg) tab tablet Take 400 Units by mouth daily. Yes Provider, Historical   diclofenac (VOLTAREN) 1 % gel Apply  to affected area four (4) times daily. Yes Provider, Historical   ondansetron hcl (Zofran) 4 mg tablet Take 4 mg by mouth every eight (8) hours as needed for Nausea or Vomiting. Yes Provider, Historical   simvastatin (ZOCOR) 40 mg tablet Take 20 mg by mouth nightly. 5/27/21  Yes Provider, Historical   carvediloL (COREG) 12.5 mg tablet Take 1 Tab by mouth two (2) times daily (with meals). Patient taking differently: Take 12.5 mg by mouth nightly. 3/19/21  Yes Huber Barrera,    spironolactone (ALDACTONE) 25 mg tablet Take 1 Tab by mouth daily. Patient taking differently: Take 25 mg by mouth daily as needed. 2/19/21  Yes Huber Barrera,    amLODIPine (NORVASC) 5 mg tablet Take 1 Tab by mouth daily. 2/15/21  Yes Huber Barrera,    sacubitriL-valsartan [facility] Mandlexi)  mg tablet Take 1 Tab by mouth two (2) times a day. Patient taking differently: Take 1 Tab by mouth two (2) times a day. Indications: 1 tablet am, 1/2 tablet pm 1/26/21  Yes Sosa BAIRD,    aspirin delayed-release 81 mg tablet Take 81 mg by mouth daily. Yes Provider, Historical   ascorbic acid, vitamin C, (VITAMIN C) 500 mg tablet Take 500 mg by mouth daily.    Yes Provider, Historical     Allergies   Allergen Reactions    Atorvastatin Myalgia    Metformin Other (comments)          Objective:     Physical Exam:   No data found. ECG:    EKG Results     None          Data Review:   Labs:   Labs reviewed    Problem List:  )  Patient Active Problem List   Diagnosis Code    Intramural, submucous, and subserous leiomyoma of uterus D25.1, D25.0, D25.2    NICM (nonischemic cardiomyopathy) ([facility]) K63.0    Systolic CHF, chronic ([facility]) I50.22    Coronary artery disease of native artery of native heart with stable angina pectoris ([facility]) I25.118    PVC's (premature ventricular contractions) I49.3    Chronic fatigue R53.82    S/P ablation of ventricular arrhythmia Z98.890, Z86.79    Menorrhagia with regular cycle N92.0    Severe obesity with body mass index (BMI) of 35.0 to 39.9 with serious comorbidity ([facility]) E66.01    Abnormal uterine bleeding (AUB) N93.9    S/P abdominal supracervical subtotal hysterectomy Z90.711    Obesity (BMI 30-39. 9) E66.9    THEE (obstructive sleep apnea) G47.33    Noncompliance with CPAP treatment Z91.14       Total Joint Surgery Pre-Assessment Recommendations: The patient is not compliant with wearing CPAP. Recommend oxygen saturation monitoring during hospitalization and oxygen at 3 liter via nc qhs. [facility]. She would like prior to surgery.    IP respiratory assessment every shift      Signed By: ELIANA Rouse    January 5, 2022

## 2022-01-07 ENCOUNTER — APPOINTMENT (RX ONLY)
Dept: URBAN - METROPOLITAN AREA CLINIC 349 | Facility: CLINIC | Age: 52
Setting detail: DERMATOLOGY
End: 2022-01-07

## 2022-01-07 DIAGNOSIS — L20.89 OTHER ATOPIC DERMATITIS: ICD-10-CM | Status: INADEQUATELY CONTROLLED

## 2022-01-07 PROBLEM — L20.84 INTRINSIC (ALLERGIC) ECZEMA: Status: ACTIVE | Noted: 2022-01-07

## 2022-01-07 PROCEDURE — 99214 OFFICE O/P EST MOD 30 MIN: CPT

## 2022-01-07 PROCEDURE — ? TREATMENT REGIMEN

## 2022-01-07 PROCEDURE — ? COUNSELING

## 2022-01-07 PROCEDURE — ? PRESCRIPTION

## 2022-01-07 PROCEDURE — ? PRESCRIPTION MEDICATION MANAGEMENT

## 2022-01-07 RX ORDER — TRIAMCINOLONE ACETONIDE 1 MG/G
CREAM TOPICAL
Qty: 80 | Refills: 2 | Status: ERX | COMMUNITY
Start: 2022-01-07

## 2022-01-07 RX ADMIN — TRIAMCINOLONE ACETONIDE: 1 CREAM TOPICAL at 00:00

## 2022-01-07 ASSESSMENT — LOCATION SIMPLE DESCRIPTION DERM
LOCATION SIMPLE: POSTERIOR NECK
LOCATION SIMPLE: RIGHT UPPER BACK

## 2022-01-07 ASSESSMENT — LOCATION DETAILED DESCRIPTION DERM
LOCATION DETAILED: RIGHT MEDIAL TRAPEZIAL NECK
LOCATION DETAILED: RIGHT INFERIOR UPPER BACK

## 2022-01-07 ASSESSMENT — LOCATION ZONE DERM
LOCATION ZONE: TRUNK
LOCATION ZONE: NECK

## 2022-01-07 NOTE — PROCEDURE: TREATMENT REGIMEN
Otc Regimen: All free detergent \\nCerave moisturizer
Samples Given: Cerave moisturizer
Detail Level: Zone

## 2022-01-19 ENCOUNTER — HOSPITAL ENCOUNTER (OUTPATIENT)
Dept: SLEEP MEDICINE | Age: 52
Discharge: HOME | End: 2022-01-19
Payer: MEDICARE

## 2022-01-19 PROCEDURE — 95806 SLEEP STUDY UNATT&RESP EFFT: CPT

## 2022-01-19 NOTE — H&P (VIEW-ONLY)
[id number] [facility]  Pre Operative History and Physical Exam    Patient ID:  Nawaf Pimentel  [id number]  11 y.o.  1970    Today: January 19, 2022           CC: Right knee pain    HPI:   The patient has end stage arthritis of the right knee. The patient was evaluated and examined during a consultation prior to this office visit. There have been no changes to the patient's orthopedic condition since the initial consultation. The patient has failed previous conservative treatment for this condition including antiinflammatories , and lifestyle modifications. The necessity for joint replacement is present.  The patient will be admitted the day of surgery for right knee replacement    Past Medical/Surgical History:  Past Medical History:   Diagnosis Date    Abnormal Papanicolaou smear of cervix     Adverse effect of anesthesia     \"takes more\" for anesthesia to be effective     Arthritis     CAD (coronary artery disease)     no stents; [facility] 3/2017 mod LAD dz; followed by Dr Sandrita Salazar ([facility] cardio)    CHF (congestive heart failure) ([facility]     ECHO 12/18/18: EF 40-45%; managed with medication and followed by Linda 6, uterine     GERD (gastroesophageal reflux disease)     diet controlled     H/O echocardiogram 10/04/2021    EF 40-45%    Heart murmur     Echo (10/4/21) EF 40-45% trace regurgitation in mitral and tricuspid valve    History of anemia     Hypercholesterolemia     Controlled with meds     Hypertension     Controlled with meds     Menorrhagia with regular cycle     Morbid obesity ([address]     NICM (nonischemic cardiomyopathy) ([facility] [address]     Followed by [facility] cardiology     THEE on CPAP     Ovarian cyst     PVCs (premature ventricular contractions)     Uterine fibroid      Past Surgical History:   Procedure Laterality Date    HX AFIB ABLATION  02/2018    Ablation for pvc's     HX HEART CATHETERIZATION      HX HYSTEROSCOPY      HX ORTHOPAEDIC Right     toe removal  HX OTHER SURGICAL      left hip and gluteal surgery    HX LILIANA AND BSO  02/12/2019    Supracervical    HX TUBAL LIGATION      HX WISDOM TEETH EXTRACTION          Allergies: Allergies   Allergen Reactions    Atorvastatin Myalgia    Metformin Other (comments)        Physical Exam:   General: NAD, Alert, Oriented, Appears their stated age     [de-identified]: NC/AT    Skin: No rashes, lesions or wounds seen      Psych: normal affect      Heart: Regular Rate, Rhythm     Lungs: unlabored respirations, no wheezing    Abdomen: Soft and non-distended     Ortho: Pain with limited ROM of the right knee    Neuro: no focal defects, moving extremities equally    Lymph: no lymphadenopathy     Meds:   Current Outpatient Medications   Medication Sig    neomycin-bacitracnZn-polymyxnB (NEOSPORIN) 3.5-400-5,000 mg-unit-unit oipk ointment Apply 1 Packet to affected area two (2) times a day.  cholecalciferol (VITAMIN D3) (400 Units /10 mcg) tab tablet Take 400 Units by mouth daily.  diclofenac (VOLTAREN) 1 % gel Apply  to affected area four (4) times daily.  ondansetron hcl (Zofran) 4 mg tablet Take 4 mg by mouth every eight (8) hours as needed for Nausea or Vomiting.  simvastatin (ZOCOR) 40 mg tablet Take 20 mg by mouth nightly.  carvediloL (COREG) 12.5 mg tablet Take 1 Tab by mouth two (2) times daily (with meals). (Patient taking differently: Take 12.5 mg by mouth nightly.)    spironolactone (ALDACTONE) 25 mg tablet Take 1 Tab by mouth daily. (Patient taking differently: Take 25 mg by mouth daily as needed.)    amLODIPine (NORVASC) 5 mg tablet Take 1 Tab by mouth daily.  sacubitriL-valsartan (Entresto)  mg tablet Take 1 Tab by mouth two (2) times a day. (Patient taking differently: Take 1 Tab by mouth two (2) times a day. Indications: 1 tablet am, 1/2 tablet pm)    aspirin delayed-release 81 mg tablet Take 81 mg by mouth daily.     ascorbic acid, vitamin C, (VITAMIN C) 500 mg tablet Take 500 mg by mouth daily. No current facility-administered medications for this visit. Labs:  Hospital Outpatient Visit on 01/04/2022   Component Date Value Ref Range Status    WBC 01/04/2022 7.0  4.3 - 11.1 K/uL Final    RBC 01/04/2022 4.68  4.05 - 5.2 M/uL Final    HGB 01/04/2022 12.3  11.7 - 15.4 g/dL Final    HCT 01/04/2022 39.8  35.8 - 46.3 % Final    MCV 01/04/2022 85.0  79.6 - 97.8 FL Final    MCH 01/04/2022 26.3  26.1 - 32.9 PG Final    MCHC 01/04/2022 30.9* 31.4 - 35.0 g/dL Final    RDW 01/04/2022 14.9* 11.9 - 14.6 % Final    PLATELET 04/14/1651 865  150 - 450 K/uL Final    MPV 01/04/2022 10.8  9.4 - 12.3 FL Final    ABSOLUTE NRBC 01/04/2022 0.00  0.0 - 0.2 K/uL Final    **Note: Absolute NRBC parameter is now reported with Hemogram**    DF 01/04/2022 AUTOMATED    Final    NEUTROPHILS 01/04/2022 73  43 - 78 % Final    LYMPHOCYTES 01/04/2022 18  13 - 44 % Final    MONOCYTES 01/04/2022 7  4.0 - 12.0 % Final    EOSINOPHILS 01/04/2022 2  0.5 - 7.8 % Final    BASOPHILS 01/04/2022 1  0.0 - 2.0 % Final    IMMATURE GRANULOCYTES 01/04/2022 0  0.0 - 5.0 % Final    ABS. NEUTROPHILS 01/04/2022 5.1  1.7 - 8.2 K/UL Final    ABS. LYMPHOCYTES 01/04/2022 1.3  0.5 - 4.6 K/UL Final    ABS. MONOCYTES 01/04/2022 0.5  0.1 - 1.3 K/UL Final    ABS. EOSINOPHILS 01/04/2022 0.1  0.0 - 0.8 K/UL Final    ABS. BASOPHILS 01/04/2022 0.1  0.0 - 0.2 K/UL Final    ABS. IMM.  GRANS. 01/04/2022 0.0  0.0 - 0.5 K/UL Final    Prothrombin time 01/04/2022 13.6  12.6 - 14.5 sec Final    INR 01/04/2022 1.0    Final    Comment: Suggested therapeutic INR range:  Venous thrombosis and embolus  2.0-3.0  Prosthetic heart valve         2.5-3.5  ** Note new reference range and method **      aPTT 01/04/2022 31.1  24.1 - 35.1 SEC Final    Comment: Heparin Therapeutic Range = 74 - 123 seconds  In addition to factor deficiency, monitoring heparin therapy, etc., evaluation of a prolonged aPTT result should include consideration of preanalytic variables such as heparin flush contamination, specimen integrity issues, etc.      Sodium 01/04/2022 140  136 - 145 mmol/L Final    Potassium 01/04/2022 3.9  3.5 - 5.1 mmol/L Final    Chloride 01/04/2022 106  98 - 107 mmol/L Final    CO2 01/04/2022 31  21 - 32 mmol/L Final    Anion gap 01/04/2022 3* 7 - 16 mmol/L Final    Glucose 01/04/2022 86  65 - 100 mg/dL Final    Comment: 47 - 60 mg/dl Consistent with, but not fully diagnostic of hypoglycemia. 101 - 125 mg/dl Impaired fasting glucose/consistent with pre-diabetes mellitus  > 126 mg/dl Fasting glucose consistent with overt diabetes mellitus      BUN 01/04/2022 15  6 - 23 MG/DL Final    Creatinine 01/04/2022 0.70  0.6 - 1.0 MG/DL Final    GFR est AA 01/04/2022 >60  >60 ml/min/1.73m2 Final    GFR est non-AA 01/04/2022 >60  >60 ml/min/1.73m2 Final    Comment: (NOTE)  Estimated GFR is calculated using the Modification of Diet in Renal   Disease (MDRD) Study equation, reported for both  Americans   (GFRAA) and non- Americans (GFRNA), and normalized to 1.73m2   body surface area. The physician must decide which value applies to   the patient. The MDRD study equation should only be used in   individuals age 25 or older. It has not been validated for the   following: pregnant women, patients with serious comorbid conditions,   or on certain medications, or persons with extremes of body size,   muscle mass, or nutritional status.  Calcium 01/04/2022 9.3  8.3 - 10.4 MG/DL Final    Hemoglobin A1c 01/04/2022 5.7  4.20 - 6.30 % Final    Est. average glucose 01/04/2022 117  mg/dL Final    Comment: (NOTE)  The eAG should be interpreted with patient characteristics in mind   since ethnicity, interindividual differences, red cell lifespan,   variation in rates of glycation, etc. may affect the validity of the   calculation.  Special Requests: 01/04/2022 NO SPECIAL REQUESTS    Final    Culture result: 01/04/2022 SA target not detected. A MRSA NEGATIVE, SA NEGATIVE test result does not preclude MRSA or SA nasal colonization. Final                 Patient Active Problem List   Diagnosis Code    Intramural, submucous, and subserous leiomyoma of uterus D25.1, D25.0, D25.2    NICM (nonischemic cardiomyopathy) ([facility]) C02.2    Systolic CHF, chronic ([facility]) I50.22    Coronary artery disease of native artery of native heart with stable angina pectoris ([facility]) I25.118    PVC's (premature ventricular contractions) I49.3    Chronic fatigue R53.82    S/P ablation of ventricular arrhythmia Z98.890, Z86.79    Menorrhagia with regular cycle N92.0    Severe obesity with body mass index (BMI) of 35.0 to 39.9 with serious comorbidity ([facility]) E66.01    Abnormal uterine bleeding (AUB) N93.9    S/P abdominal supracervical subtotal hysterectomy Z90.711    Obesity (BMI 30-39. 9) E66.9    THEE (obstructive sleep apnea) G47.33    Noncompliance with CPAP treatment Z91.14         Assessment:   1. Arthritis of the right knee      Plan:    1. Proceed with scheduled right knee replacement      The patient was counseled at length about the risks of flo Covid-19 during their perioperative period and any recovery window from their procedure. The patient was made aware that flo Covid-19  may worsen their prognosis for recovering from their procedure and lend to a higher morbidity and/or mortality risk. All material risks, benefits, and reasonable alternatives including postponing the procedure were discussed. The patient does  wish to proceed with the procedure at this time.          Signed By: MANUEL Nelson  January 19, 2022

## 2022-01-25 ENCOUNTER — ANESTHESIA EVENT (OUTPATIENT)
Dept: SURGERY | Age: 52
End: 2022-01-25
Payer: MEDICARE

## 2022-01-26 ENCOUNTER — ANESTHESIA (OUTPATIENT)
Dept: SURGERY | Age: 52
End: 2022-01-26
Payer: MEDICARE

## 2022-01-26 ENCOUNTER — HOSPITAL ENCOUNTER (OUTPATIENT)
Age: 52
Discharge: HOME HEALTH | End: 2022-01-27
Attending: ORTHOPAEDIC SURGERY | Admitting: ORTHOPAEDIC SURGERY
Payer: MEDICARE

## 2022-01-26 ENCOUNTER — HOME HEALTH ADMISSION (OUTPATIENT)
Dept: HOME HEALTH SERVICES | Facility: HOME HEALTH | Age: 52
End: 2022-01-26
Payer: MEDICARE

## 2022-01-26 DIAGNOSIS — Z96.651 STATUS POST RIGHT KNEE REPLACEMENT: Primary | ICD-10-CM

## 2022-01-26 PROBLEM — M17.11 OSTEOARTHRITIS OF RIGHT KNEE: Status: ACTIVE | Noted: 2022-01-26

## 2022-01-26 LAB — HGB BLD-MCNC: 11.5 G/DL (ref 11.7–15.4)

## 2022-01-26 PROCEDURE — 77030029828 HC FEM TIB CKPNT KT DISP STRY -B: Performed by: ORTHOPAEDIC SURGERY

## 2022-01-26 PROCEDURE — 77030031139 HC SUT VCRL2 J&J -A: Performed by: ORTHOPAEDIC SURGERY

## 2022-01-26 PROCEDURE — 94760 N-INVAS EAR/PLS OXIMETRY 1: CPT

## 2022-01-26 PROCEDURE — 77030002912 HC SUT ETHBND J&J -A: Performed by: ORTHOPAEDIC SURGERY

## 2022-01-26 PROCEDURE — 74011000250 HC RX REV CODE- 250: Performed by: NURSE ANESTHETIST, CERTIFIED REGISTERED

## 2022-01-26 PROCEDURE — 76010010054 HC POST OP PAIN BLOCK: Performed by: ORTHOPAEDIC SURGERY

## 2022-01-26 PROCEDURE — 77030018836 HC SOL IRR NACL ICUM -A: Performed by: ORTHOPAEDIC SURGERY

## 2022-01-26 PROCEDURE — 76210000006 HC OR PH I REC 0.5 TO 1 HR: Performed by: ORTHOPAEDIC SURGERY

## 2022-01-26 PROCEDURE — 74011000250 HC RX REV CODE- 250: Performed by: ORTHOPAEDIC SURGERY

## 2022-01-26 PROCEDURE — 74011250636 HC RX REV CODE- 250/636: Performed by: PHYSICIAN ASSISTANT

## 2022-01-26 PROCEDURE — 77030007880 HC KT SPN EPDRL BBMI -B: Performed by: ANESTHESIOLOGY

## 2022-01-26 PROCEDURE — 27447 TOTAL KNEE ARTHROPLASTY: CPT | Performed by: ORTHOPAEDIC SURGERY

## 2022-01-26 PROCEDURE — 77030008462 HC STPLR SKN PROX J&J -A: Performed by: ORTHOPAEDIC SURGERY

## 2022-01-26 PROCEDURE — C1776 JOINT DEVICE (IMPLANTABLE): HCPCS | Performed by: ORTHOPAEDIC SURGERY

## 2022-01-26 PROCEDURE — 77030012935 HC DRSG AQUACEL BMS -B: Performed by: ORTHOPAEDIC SURGERY

## 2022-01-26 PROCEDURE — 77030019557 HC ELECTRD VES SEAL MEDT -F: Performed by: ORTHOPAEDIC SURGERY

## 2022-01-26 PROCEDURE — 74011250637 HC RX REV CODE- 250/637: Performed by: PHYSICIAN ASSISTANT

## 2022-01-26 PROCEDURE — 77030040922 HC BLNKT HYPOTHRM STRY -A: Performed by: ANESTHESIOLOGY

## 2022-01-26 PROCEDURE — 74011250636 HC RX REV CODE- 250/636: Performed by: ORTHOPAEDIC SURGERY

## 2022-01-26 PROCEDURE — 77030039760: Performed by: ORTHOPAEDIC SURGERY

## 2022-01-26 PROCEDURE — 76060000035 HC ANESTHESIA 2 TO 2.5 HR: Performed by: ORTHOPAEDIC SURGERY

## 2022-01-26 PROCEDURE — 97161 PT EVAL LOW COMPLEX 20 MIN: CPT

## 2022-01-26 PROCEDURE — 85018 HEMOGLOBIN: CPT

## 2022-01-26 PROCEDURE — 76942 ECHO GUIDE FOR BIOPSY: CPT | Performed by: ORTHOPAEDIC SURGERY

## 2022-01-26 PROCEDURE — 97530 THERAPEUTIC ACTIVITIES: CPT

## 2022-01-26 PROCEDURE — 74011000250 HC RX REV CODE- 250: Performed by: ANESTHESIOLOGY

## 2022-01-26 PROCEDURE — 74011000258 HC RX REV CODE- 258: Performed by: ORTHOPAEDIC SURGERY

## 2022-01-26 PROCEDURE — 77030003665 HC NDL SPN BBMI -A: Performed by: ANESTHESIOLOGY

## 2022-01-26 PROCEDURE — 77030003602 HC NDL NRV BLK BBMI -B: Performed by: ANESTHESIOLOGY

## 2022-01-26 PROCEDURE — 74011250636 HC RX REV CODE- 250/636: Performed by: ANESTHESIOLOGY

## 2022-01-26 PROCEDURE — 77030003028 HC SUT VCRL J&J -A: Performed by: ORTHOPAEDIC SURGERY

## 2022-01-26 PROCEDURE — 94762 N-INVAS EAR/PLS OXIMTRY CONT: CPT

## 2022-01-26 PROCEDURE — 20985 CPTR-ASST DIR MS PX: CPT | Performed by: ORTHOPAEDIC SURGERY

## 2022-01-26 PROCEDURE — 74011000250 HC RX REV CODE- 250: Performed by: PHYSICIAN ASSISTANT

## 2022-01-26 PROCEDURE — 74011250636 HC RX REV CODE- 250/636: Performed by: NURSE ANESTHETIST, CERTIFIED REGISTERED

## 2022-01-26 PROCEDURE — 36415 COLL VENOUS BLD VENIPUNCTURE: CPT

## 2022-01-26 PROCEDURE — 2709999900 HC NON-CHARGEABLE SUPPLY: Performed by: ORTHOPAEDIC SURGERY

## 2022-01-26 PROCEDURE — 76010000876 HC OR TIME 2 TO 2.5HR INTENSV - TIER 2: Performed by: ORTHOPAEDIC SURGERY

## 2022-01-26 PROCEDURE — 77030038692 HC WND DEB SYS IRMX -B: Performed by: ORTHOPAEDIC SURGERY

## 2022-01-26 PROCEDURE — 77030029820: Performed by: ORTHOPAEDIC SURGERY

## 2022-01-26 PROCEDURE — 77030038149 HC BLD SAW SAG STRY -D: Performed by: ORTHOPAEDIC SURGERY

## 2022-01-26 PROCEDURE — 77030040361 HC SLV COMPR DVT MDII -B

## 2022-01-26 PROCEDURE — 2709999900 HC NON-CHARGEABLE SUPPLY

## 2022-01-26 DEVICE — TIBIAL BEARING INSERT
Type: IMPLANTABLE DEVICE | Site: KNEE | Status: FUNCTIONAL
Brand: TRIATHLON

## 2022-01-26 DEVICE — TIBIAL COMPONENT
Type: IMPLANTABLE DEVICE | Site: KNEE | Status: FUNCTIONAL
Brand: TRIATHLON

## 2022-01-26 DEVICE — CRUCIATE RETAINING FEMORAL
Type: IMPLANTABLE DEVICE | Site: KNEE | Status: FUNCTIONAL
Brand: TRIATHLON

## 2022-01-26 DEVICE — KNEE K2 TOT HEMI ADV CMTLS -- IMPL CAPPED K2: Type: IMPLANTABLE DEVICE | Status: FUNCTIONAL

## 2022-01-26 RX ORDER — AMOXICILLIN 250 MG
2 CAPSULE ORAL DAILY
Status: DISCONTINUED | OUTPATIENT
Start: 2022-01-27 | End: 2022-01-27 | Stop reason: HOSPADM

## 2022-01-26 RX ORDER — DEXAMETHASONE SODIUM PHOSPHATE 4 MG/ML
INJECTION, SOLUTION INTRA-ARTICULAR; INTRALESIONAL; INTRAMUSCULAR; INTRAVENOUS; SOFT TISSUE
Status: COMPLETED | OUTPATIENT
Start: 2022-01-26 | End: 2022-01-26

## 2022-01-26 RX ORDER — PROPOFOL 10 MG/ML
INJECTION, EMULSION INTRAVENOUS AS NEEDED
Status: DISCONTINUED | OUTPATIENT
Start: 2022-01-26 | End: 2022-01-26 | Stop reason: HOSPADM

## 2022-01-26 RX ORDER — SODIUM CHLORIDE, SODIUM LACTATE, POTASSIUM CHLORIDE, CALCIUM CHLORIDE 600; 310; 30; 20 MG/100ML; MG/100ML; MG/100ML; MG/100ML
75 INJECTION, SOLUTION INTRAVENOUS CONTINUOUS
Status: DISCONTINUED | OUTPATIENT
Start: 2022-01-26 | End: 2022-01-26 | Stop reason: HOSPADM

## 2022-01-26 RX ORDER — OXYCODONE HYDROCHLORIDE 5 MG/1
10 TABLET ORAL
Status: DISCONTINUED | OUTPATIENT
Start: 2022-01-26 | End: 2022-01-27 | Stop reason: HOSPADM

## 2022-01-26 RX ORDER — HYDROMORPHONE HYDROCHLORIDE 1 MG/ML
0.5 INJECTION, SOLUTION INTRAMUSCULAR; INTRAVENOUS; SUBCUTANEOUS
Status: DISCONTINUED | OUTPATIENT
Start: 2022-01-26 | End: 2022-01-26 | Stop reason: HOSPADM

## 2022-01-26 RX ORDER — BUPIVACAINE HYDROCHLORIDE 7.5 MG/ML
INJECTION, SOLUTION INTRASPINAL
Status: COMPLETED | OUTPATIENT
Start: 2022-01-26 | End: 2022-01-26

## 2022-01-26 RX ORDER — AMLODIPINE BESYLATE 5 MG/1
5 TABLET ORAL DAILY
Status: DISCONTINUED | OUTPATIENT
Start: 2022-01-27 | End: 2022-01-27 | Stop reason: HOSPADM

## 2022-01-26 RX ORDER — ACETAMINOPHEN 650 MG/1
650 SUPPOSITORY RECTAL ONCE
Status: DISCONTINUED | OUTPATIENT
Start: 2022-01-26 | End: 2022-01-26

## 2022-01-26 RX ORDER — ASPIRIN 81 MG/1
81 TABLET ORAL EVERY 12 HOURS
Status: DISCONTINUED | OUTPATIENT
Start: 2022-01-26 | End: 2022-01-27 | Stop reason: HOSPADM

## 2022-01-26 RX ORDER — DIPHENHYDRAMINE HCL 25 MG
25 CAPSULE ORAL
Status: DISCONTINUED | OUTPATIENT
Start: 2022-01-26 | End: 2022-01-27 | Stop reason: HOSPADM

## 2022-01-26 RX ORDER — DEXAMETHASONE SODIUM PHOSPHATE 100 MG/10ML
10 INJECTION INTRAMUSCULAR; INTRAVENOUS ONCE
Status: DISCONTINUED | OUTPATIENT
Start: 2022-01-27 | End: 2022-01-27 | Stop reason: HOSPADM

## 2022-01-26 RX ORDER — KETOROLAC TROMETHAMINE 30 MG/ML
INJECTION, SOLUTION INTRAMUSCULAR; INTRAVENOUS AS NEEDED
Status: DISCONTINUED | OUTPATIENT
Start: 2022-01-26 | End: 2022-01-26 | Stop reason: HOSPADM

## 2022-01-26 RX ORDER — ROPIVACAINE HYDROCHLORIDE 2 MG/ML
INJECTION, SOLUTION EPIDURAL; INFILTRATION; PERINEURAL AS NEEDED
Status: DISCONTINUED | OUTPATIENT
Start: 2022-01-26 | End: 2022-01-26 | Stop reason: HOSPADM

## 2022-01-26 RX ORDER — CARVEDILOL 6.25 MG/1
12.5 TABLET ORAL
Status: DISCONTINUED | OUTPATIENT
Start: 2022-01-26 | End: 2022-01-27 | Stop reason: HOSPADM

## 2022-01-26 RX ORDER — MIDAZOLAM HYDROCHLORIDE 1 MG/ML
INJECTION, SOLUTION INTRAMUSCULAR; INTRAVENOUS AS NEEDED
Status: DISCONTINUED | OUTPATIENT
Start: 2022-01-26 | End: 2022-01-26 | Stop reason: HOSPADM

## 2022-01-26 RX ORDER — HYDROCODONE BITARTRATE AND ACETAMINOPHEN 5; 325 MG/1; MG/1
2 TABLET ORAL AS NEEDED
Status: DISCONTINUED | OUTPATIENT
Start: 2022-01-26 | End: 2022-01-26 | Stop reason: HOSPADM

## 2022-01-26 RX ORDER — EPHEDRINE SULFATE/0.9% NACL/PF 50 MG/5 ML
SYRINGE (ML) INTRAVENOUS AS NEEDED
Status: DISCONTINUED | OUTPATIENT
Start: 2022-01-26 | End: 2022-01-26 | Stop reason: HOSPADM

## 2022-01-26 RX ORDER — HYDROMORPHONE HYDROCHLORIDE 1 MG/ML
1 INJECTION, SOLUTION INTRAMUSCULAR; INTRAVENOUS; SUBCUTANEOUS
Status: DISCONTINUED | OUTPATIENT
Start: 2022-01-26 | End: 2022-01-27 | Stop reason: HOSPADM

## 2022-01-26 RX ORDER — LIDOCAINE HYDROCHLORIDE 10 MG/ML
0.1 INJECTION INFILTRATION; PERINEURAL AS NEEDED
Status: DISCONTINUED | OUTPATIENT
Start: 2022-01-26 | End: 2022-01-26 | Stop reason: HOSPADM

## 2022-01-26 RX ORDER — ONDANSETRON 4 MG/1
4 TABLET, ORALLY DISINTEGRATING ORAL
Status: DISCONTINUED | OUTPATIENT
Start: 2022-01-26 | End: 2022-01-27 | Stop reason: HOSPADM

## 2022-01-26 RX ORDER — SODIUM CHLORIDE 0.9 % (FLUSH) 0.9 %
5-40 SYRINGE (ML) INJECTION EVERY 8 HOURS
Status: DISCONTINUED | OUTPATIENT
Start: 2022-01-26 | End: 2022-01-27 | Stop reason: HOSPADM

## 2022-01-26 RX ORDER — CEFAZOLIN SODIUM/WATER 2 G/20 ML
2 SYRINGE (ML) INTRAVENOUS EVERY 8 HOURS
Status: COMPLETED | OUTPATIENT
Start: 2022-01-26 | End: 2022-01-27

## 2022-01-26 RX ORDER — OXYCODONE HYDROCHLORIDE 5 MG/1
5 TABLET ORAL
Status: DISCONTINUED | OUTPATIENT
Start: 2022-01-26 | End: 2022-01-26 | Stop reason: HOSPADM

## 2022-01-26 RX ORDER — TRANEXAMIC ACID 100 MG/ML
INJECTION, SOLUTION INTRAVENOUS AS NEEDED
Status: DISCONTINUED | OUTPATIENT
Start: 2022-01-26 | End: 2022-01-26 | Stop reason: HOSPADM

## 2022-01-26 RX ORDER — MIDAZOLAM HYDROCHLORIDE 1 MG/ML
4 INJECTION, SOLUTION INTRAMUSCULAR; INTRAVENOUS ONCE
Status: COMPLETED | OUTPATIENT
Start: 2022-01-26 | End: 2022-01-26

## 2022-01-26 RX ORDER — SPIRONOLACTONE 25 MG/1
25 TABLET ORAL
Status: DISCONTINUED | OUTPATIENT
Start: 2022-01-26 | End: 2022-01-27 | Stop reason: HOSPADM

## 2022-01-26 RX ORDER — MIDAZOLAM HYDROCHLORIDE 1 MG/ML
2 INJECTION, SOLUTION INTRAMUSCULAR; INTRAVENOUS
Status: DISCONTINUED | OUTPATIENT
Start: 2022-01-26 | End: 2022-01-26 | Stop reason: HOSPADM

## 2022-01-26 RX ORDER — SODIUM CHLORIDE 9 MG/ML
100 INJECTION, SOLUTION INTRAVENOUS CONTINUOUS
Status: DISCONTINUED | OUTPATIENT
Start: 2022-01-26 | End: 2022-01-27 | Stop reason: HOSPADM

## 2022-01-26 RX ORDER — ATORVASTATIN CALCIUM 10 MG/1
20 TABLET, FILM COATED ORAL
Status: DISCONTINUED | OUTPATIENT
Start: 2022-01-26 | End: 2022-01-26

## 2022-01-26 RX ORDER — CEFAZOLIN SODIUM/WATER 2 G/20 ML
2 SYRINGE (ML) INTRAVENOUS ONCE
Status: COMPLETED | OUTPATIENT
Start: 2022-01-26 | End: 2022-01-26

## 2022-01-26 RX ORDER — NALOXONE HYDROCHLORIDE 0.4 MG/ML
.2-.4 INJECTION, SOLUTION INTRAMUSCULAR; INTRAVENOUS; SUBCUTANEOUS
Status: DISCONTINUED | OUTPATIENT
Start: 2022-01-26 | End: 2022-01-27 | Stop reason: HOSPADM

## 2022-01-26 RX ORDER — SODIUM CHLORIDE 0.9 % (FLUSH) 0.9 %
5-40 SYRINGE (ML) INJECTION AS NEEDED
Status: DISCONTINUED | OUTPATIENT
Start: 2022-01-26 | End: 2022-01-27 | Stop reason: HOSPADM

## 2022-01-26 RX ORDER — FENTANYL CITRATE 50 UG/ML
100 INJECTION, SOLUTION INTRAMUSCULAR; INTRAVENOUS ONCE
Status: COMPLETED | OUTPATIENT
Start: 2022-01-26 | End: 2022-01-26

## 2022-01-26 RX ORDER — ACETAMINOPHEN 500 MG
1000 TABLET ORAL EVERY 6 HOURS
Status: DISCONTINUED | OUTPATIENT
Start: 2022-01-26 | End: 2022-01-27 | Stop reason: HOSPADM

## 2022-01-26 RX ORDER — ACETAMINOPHEN 325 MG/1
975 TABLET ORAL ONCE
Status: DISCONTINUED | OUTPATIENT
Start: 2022-01-26 | End: 2022-01-26

## 2022-01-26 RX ADMIN — Medication 5 MG: at 13:27

## 2022-01-26 RX ADMIN — PHENYLEPHRINE HYDROCHLORIDE 50 MCG: 10 INJECTION INTRAVENOUS at 13:27

## 2022-01-26 RX ADMIN — OXYCODONE HYDROCHLORIDE 10 MG: 5 TABLET ORAL at 23:13

## 2022-01-26 RX ADMIN — Medication 1 AMPULE: at 20:49

## 2022-01-26 RX ADMIN — BUPIVACAINE HYDROCHLORIDE IN DEXTROSE 14 MG: 7.5 INJECTION, SOLUTION SUBARACHNOID at 13:02

## 2022-01-26 RX ADMIN — Medication 81 MG: at 20:49

## 2022-01-26 RX ADMIN — SODIUM CHLORIDE, PRESERVATIVE FREE 10 ML: 5 INJECTION INTRAVENOUS at 21:03

## 2022-01-26 RX ADMIN — OXYCODONE HYDROCHLORIDE 10 MG: 5 TABLET ORAL at 18:55

## 2022-01-26 RX ADMIN — CARVEDILOL 12.5 MG: 6.25 TABLET, FILM COATED ORAL at 20:49

## 2022-01-26 RX ADMIN — SODIUM CHLORIDE, SODIUM LACTATE, POTASSIUM CHLORIDE, AND CALCIUM CHLORIDE: 600; 310; 30; 20 INJECTION, SOLUTION INTRAVENOUS at 14:48

## 2022-01-26 RX ADMIN — ROPIVACAINE HYDROCHLORIDE 20 ML: 2 INJECTION, SOLUTION EPIDURAL; INFILTRATION at 12:09

## 2022-01-26 RX ADMIN — PROPOFOL 30 MG: 10 INJECTION, EMULSION INTRAVENOUS at 13:09

## 2022-01-26 RX ADMIN — TRANEXAMIC ACID 1 G: 100 INJECTION, SOLUTION INTRAVENOUS at 13:41

## 2022-01-26 RX ADMIN — MIDAZOLAM 2 MG: 1 INJECTION INTRAMUSCULAR; INTRAVENOUS at 13:00

## 2022-01-26 RX ADMIN — CEFAZOLIN SODIUM 2 G: 10 INJECTION, POWDER, FOR SOLUTION INTRAVENOUS at 21:02

## 2022-01-26 RX ADMIN — Medication 3 AMPULE: at 11:04

## 2022-01-26 RX ADMIN — DEXAMETHASONE SODIUM PHOSPHATE 4 MG: 4 INJECTION, SOLUTION INTRAMUSCULAR; INTRAVENOUS at 12:09

## 2022-01-26 RX ADMIN — PROPOFOL 75 MCG/KG/MIN: 10 INJECTION, EMULSION INTRAVENOUS at 13:10

## 2022-01-26 RX ADMIN — ACETAMINOPHEN 1000 MG: 500 TABLET, FILM COATED ORAL at 18:55

## 2022-01-26 RX ADMIN — Medication 2 G: at 13:40

## 2022-01-26 RX ADMIN — FENTANYL CITRATE 50 MCG: 50 INJECTION, SOLUTION INTRAMUSCULAR; INTRAVENOUS at 12:07

## 2022-01-26 RX ADMIN — HYDROMORPHONE HYDROCHLORIDE 1 MG: 1 INJECTION, SOLUTION INTRAMUSCULAR; INTRAVENOUS; SUBCUTANEOUS at 20:49

## 2022-01-26 RX ADMIN — ACETAMINOPHEN 1000 MG: 500 TABLET, FILM COATED ORAL at 23:13

## 2022-01-26 RX ADMIN — SODIUM CHLORIDE, SODIUM LACTATE, POTASSIUM CHLORIDE, AND CALCIUM CHLORIDE 75 ML/HR: 600; 310; 30; 20 INJECTION, SOLUTION INTRAVENOUS at 11:08

## 2022-01-26 RX ADMIN — SODIUM CHLORIDE, PRESERVATIVE FREE 10 ML: 5 INJECTION INTRAVENOUS at 18:55

## 2022-01-26 RX ADMIN — MIDAZOLAM 4 MG: 1 INJECTION INTRAMUSCULAR; INTRAVENOUS at 12:07

## 2022-01-26 NOTE — OP NOTES
[address] [place] [address] Robotic Assisted Total Knee Arthroplasty: Posterior Cruciate Retaining       Patient:Nadine Iraheta   : 1970  Medical Record Number:[id number]  Pre-operative Diagnosis:  Primary osteoarthritis of right knee [M17.11]  Post-operative Diagnosis: Primary osteoarthritis of right knee [M17.11]  Location: [address] [place] [address]  Surgeon: Best Shelton MD   Assistant: Jenn houser    Anesthesia: Spinal and ACB    Indications: Patient has end stage arthritis. They have tried and failed conservative management. Procedure:Procedure(s) (LRB):  RIGHT KNEE ARTHROPLASTY TOTAL ROBOTIC ASSISTED ARJUN/BOBBI  , ADDUCTOR CANAL BLOCK (Right)            CPT- 09265- Total knee arthroplasty           58772- Other procedures on musculoskeletal system            0055T- Computer assisted surgical navigation   The complexity of the total joint surgery requires the use of a first assistant for positioning, retraction and expertise in closure. Tourniquet Time: 0 minutes  EBL: 250 cc  Findings: severe degenerative arthritis with loss of cartilage in weight bearing compartments of the knee, no patellar osteophytes with good patella cartilage, posterior femoral osteophytes   BMI: Body mass index is 39.05 kg/m². Addie Taylor was brought to the operating room and positioned on the operating table. She was anesthestized with anesthesia. IV antibiotics were administered. Prior to the incision being made a timeout was called identifying the patient, procedure ,operative side and surgeon The operative leg was prepped and draped in the usual sterile manner. An anterior longitudinal incision was accomplished just medial to the tibial tubercle and extending approximal 6 centimeters proximal to the superior pole of the patella. A medial parapatellar capsular incision was performed. The medial capsular flap was elevated around to the insertion of the semimembranous tendon.   The patella was everted and the knee flexed and externally rotated. The medial and external menisci were excised. The lateral half of the fat pad excised and the patella femoral ligament was released. The anterior cruciate ligament was resect and the posterior cruciate ligament was retained. The femoral and tibial arrays were pinned in place and registered with the [employer]. The patient landmarks were collected and the tibial and femoral checkpoints were registered and verified. The preresection balancing was performed. The distal femur was addressed first. Utilizing the [facility] robotic arm the distal femoral cut was made. The anterior and posterior cuts were then made. The osteophytes were removed from the tibial and femoral surfaces. The tibia was then addressed. The [employer] robotic arm was then used to make the measured resection of the tibia. The tibia was sized. The tibial base plate was pinned into place with the appropriate external rotation and stem site prepared. A trial femoral component and poly was placed. A preliminary range of motion was accomplished with the trial components. The patient was found to obtain full extension as well as appropriate flexion. The patient's ligaments were stable in flexion and extension to medial and lateral stressing and the alignment was through the appropriate mechanical axis. Additional surgical procedures included: none. A trial reduction of the patella revealed appropriate tracking through the patellofemoral groove with no lateral retinacular release being accomplished. All trial components were removed. The real implants were opened: Sizes listed below. The knee was irrigated. There were no femoral deficiencies. There were no tibial deficiencies. No augmentation was utilized. The tibial and femoral components were impacted into place.      Courtney Amador knee was placed through range of motion and noted to be stable as mentioned above with the trail components. The wound was dry, therefore no drain was used. The operative knee was injected with 60 cc of Naropin, 10 cc's of morphine and 1 cc of 30 mg of Toradol. The knee was then soaked with a diluted betadine solution for approximately 3 min. This was then thoroughly irrigated. The capsular layer was closed using a #1 stratafix suture. Then, 1 gram (100 mg/ml) of Transexamic Acid was injected into the joint space. The subcutaneous layers were closed using 2-0 Stratafix. Finally the skin was closed using 3-0 Vicryl and staples which were applied in occlusive fashion and sterile bandage applied. An Iceman cryo pad was applied on the operative leg. Sponge count and needle counts were correct. Sivakumar Houser left the operating room     Implants:   Implant Name Type Inv.  Item Serial No.  Lot No. LRB No. Used Action   COMPNT FEM CR TRIATHLN 5 R PA --  - [id number]  COMPNT FEM CR TRIATHLN 5 R PA --   [employer]_ [id number] Right 1 Implanted   BASEPLT TIB PC TRITNM SZ 5 -- TRIATHLON - [id number]  BASEPLT TIB PC TRITNM SZ 5 -- TRIATHLON  [employer]_WD [id number] Right 1 Implanted   INSERT TIB SZ 5 THK9MM UNIV KNEE POLYETH CNDYL STBL AUSTIN NEUT - [id number]  INSERT TIB SZ 5 THK9MM UNIV KNEE POLYETH CNDYL STBL AUSTIN NEUT  [employer] HOW_ [id number] Right 1 Implanted         Signed By: Evelyn Garber MD   1/26/2022,  2:34 PM

## 2022-01-26 NOTE — CONSULTS
[address] [facility] HOSPITALIST CONSULT      Patient ID:  NAME:  Chip David   Age/Sex: 46 y.o. female  :   1970   MRN:   [id number]    Admission Date: 2022  Consult Date: 2022  Chief Complaint: Right knee pain  Reason for Admission: Status post right knee replacement  Reason for Consult: Chronic sCHF, THEE    Assessment/Plan (MDM):     Principal Problem:    Status post right knee replacement (2022)  - Per primary team  - PT/OT  - Pain control    Active Problems:    Systolic CHF, chronic ([facility] [address] (2017)  - No acute issues  - Continue Coreg  - Continue Entresto      Coronary artery disease of native artery of native heart with stable angina pectoris ([facility] (2017)  - No acute CP  - Continue Entresto  - Continue ASA      Obesity (BMI 30-39.9) (12/15/2020)      THEE (obstructive sleep apnea) (2021)      Osteoarthritis of right knee (2022)      Diet: ADULT DIET Regular; Low Fat/Low Chol/High Fiber/2 gm Na  VTE ppx: ASA BID per Ortho    PCP: Dewayne Santana MD    Attending MD: Arina Zarco MD    Treatment Team: Attending Provider: Arina Zarco MD; Utilization Review: Chaim Rodriguez RN; Consulting Provider: Alyssa Smith DO; Occupational Therapist: Frankey Slice, [place]; : Randol Severs; Physical Therapist: Yuriy De La Cruz PT    HPI:       Chip David is a 46 y.o. female with a PMH of chronic sCHF and CAD who is being seen for medical management. She had a right TKA for OA. She complains of  3/10 pain the right knee. There were no marquise-operative complications. EBL 250cc. Denies CP/SOB. Denies F/C. Denies N/V/D.     Complete ROS done and is as stated in HPI or otherwise negative    Past Medical History:   Diagnosis Date    Abnormal Papanicolaou smear of cervix     Adverse effect of anesthesia     \"takes more\" for anesthesia to be effective     Arthritis     CAD (coronary artery disease)     no stents; [facility] 3/2017 mod LAD dz; followed by Dr Edmund Mcgee ([facility] cardio)    CHF (congestive heart failure) ([facility] [address]     ECHO 12/18/18: EF 40-45%; managed with medication and followed by Linda 6, uterine     GERD (gastroesophageal reflux disease)     diet controlled     H/O echocardiogram 10/04/2021    EF 40-45%    Heart murmur     Echo (10/4/21) EF 40-45% trace regurgitation in mitral and tricuspid valve    History of anemia     Hypercholesterolemia     Controlled with meds     Hypertension     Controlled with meds     Menorrhagia with regular cycle     Morbid obesity ([facility] [address]     NICM (nonischemic cardiomyopathy) ([facility] [address]     Followed by [facility] cardiology     THEE on CPAP     Ovarian cyst     PVCs (premature ventricular contractions)     Uterine fibroid         Past Surgical History:   Procedure Laterality Date    HX AFIB ABLATION  02/2018    Ablation for pvc's     HX HEART CATHETERIZATION      HX HYSTEROSCOPY      HX ORTHOPAEDIC Right     toe removal     HX OTHER SURGICAL      left hip and gluteal surgery    HX LILIANA AND BSO  02/12/2019    Supracervical    HX TUBAL LIGATION      HX WISDOM TEETH EXTRACTION          Prior to Admission Medications   Prescriptions Last Dose Informant Patient Reported? Taking? amLODIPine (NORVASC) 5 mg tablet 1/26/2022 at Unknown time  No Yes   Sig: Take 1 Tab by mouth daily. ascorbic acid, vitamin C, (VITAMIN C) 500 mg tablet 1/19/2022 at Unknown time  Yes Yes   Sig: Take 500 mg by mouth daily. aspirin delayed-release 81 mg tablet 1/26/2022 at Unknown time  Yes Yes   Sig: Take 81 mg by mouth daily. carvediloL (COREG) 12.5 mg tablet 1/25/2022 at Unknown time  No Yes   Sig: Take 1 Tab by mouth two (2) times daily (with meals). Patient taking differently: Take 12.5 mg by mouth nightly. cholecalciferol (VITAMIN D3) (400 Units /10 mcg) tab tablet 1/19/2022 at Unknown time  Yes Yes   Sig: Take 400 Units by mouth daily.    diclofenac (VOLTAREN) 1 % gel 1/19/2022 at Unknown time  Yes Yes   Sig: Apply  to affected area four (4) times daily. neomycin-bacitracnZn-polymyxnB (NEOSPORIN) 3.5-400-5,000 mg-unit-unit oipk ointment Not Taking at Unknown time  Yes No   Sig: Apply 1 Packet to affected area two (2) times a day. Patient not taking: Reported on 2022   ondansetron hcl (Zofran) 4 mg tablet Not Taking at Unknown time  Yes No   Sig: Take 4 mg by mouth every eight (8) hours as needed for Nausea or Vomiting. Patient not taking: Reported on 2022   sacubitriL-valsartan (Entresto)  mg tablet 2022 at Unknown time  No Yes   Sig: Take 1 Tab by mouth two (2) times a day. Patient taking differently: Take 1 Tab by mouth two (2) times a day. Indications: 1 tablet am, 1/2 tablet pm   simvastatin (ZOCOR) 40 mg tablet 2022 at Unknown time  Yes Yes   Sig: Take 20 mg by mouth nightly. spironolactone (ALDACTONE) 25 mg tablet 2022 at Unknown time  No Yes   Sig: Take 1 Tab by mouth daily. Patient taking differently: Take 25 mg by mouth daily as needed.       Facility-Administered Medications: None       Allergies   Allergen Reactions    Atorvastatin Myalgia    Metformin Other (comments)        Social History     Tobacco Use    Smoking status: Never Smoker    Smokeless tobacco: Never Used   Substance Use Topics    Alcohol use: No        Family History   Problem Relation Age of Onset    Heart Disease Mother     Cancer Father         Lymphoma    Breast Cancer Neg Hx         Objective:       Visit Vitals  BP (!) 158/95   Pulse 97   Temp 97.5 °F (36.4 °C)   Resp 16   Wt 116.5 kg (256 lb 12.8 oz)   LMP 10/29/2018 (Exact Date)   SpO2 93%   BMI 39.05 kg/m²        Temp (24hrs), Av.6 °F (36.4 °C), Min:97 °F (36.1 °C), Max:98.2 °F (36.8 °C)      Oxygen Therapy  O2 Sat (%): 93 % (22 9119)  O2 Device: Nasal cannula (22 4754)  O2 Flow Rate (L/min): 2 l/min (22 1539)      Physical Exam:    General:    Alert, cooperative, no distress, appears stated age. Eyes:   PERRLA EOMI Anicteric  Head:   Normocephalic, without obvious abnormality, atraumatic. ENT:  Nares normal. No drainage. Lungs:   Clear to auscultation bilaterally. No Wheezing or Rhonchi. No rales. Heart:  Regular rate and rhythm,  no murmur, rub or gallop. No JVD. Abdomen:   Soft, non-tender. Not distended. Bowel sounds normal.   MSK:  No edema. No clubbing or cyanosis. No deformities/lesions. Skin:     Texture, turgor normal. No rashes or lesions. No Jaundice. Neurologic: Alert and oriented x 3, no focal deficits   Psychiatry:      No depression/anxiety. Mood congruent for context. Heme/Lymph/Immune: No echymoses or overt signs of bleeding. Lab/Data Review:  No results found for this or any previous visit (from the past 24 hour(s)). Imaging:  No results found. Cultures: All Micro Results     None          Active Problems:     Principal Diagnosis Status post right knee replacement    Hospital Problems as of 1/26/2022 Date Reviewed: 1/19/2022          Codes Class Noted - Resolved POA    Osteoarthritis of right knee ICD-10-CM: M17.11  ICD-9-CM: 715.96  1/26/2022 - Present Yes        * (Principal) Status post right knee replacement ICD-10-CM: Q24.424  ICD-9-CM: V43.65  1/26/2022 - Present Yes        THEE (obstructive sleep apnea) ICD-10-CM: G47.33  ICD-9-CM: 327.23  4/19/2021 - Present Yes        Obesity (BMI 30-39. 9) ICD-10-CM: E66.9  ICD-9-CM: 278.00  12/15/2020 - Present Yes        Systolic CHF, chronic ([facility] ICD-10-CM: I50.22  ICD-9-CM: 428.22, 428.0  12/18/2017 - Present Yes        Coronary artery disease of native artery of native heart with stable angina pectoris [facility]) ICD-10-CM: I25.118  ICD-9-CM: 414.01, 413.9  12/18/2017 - Present Yes              Thank you for allowing us to participate in the care of this patient. We will gladly follow along with you.     Jimmie Horton DO  [facility] Hospitalist Service  1/26/2022

## 2022-01-26 NOTE — PROGRESS NOTES
01/26/22 1837   Oxygen Therapy   O2 Sat (%) 98 %   Pulse via Oximetry 71 beats per minute   O2 Device None (Room air)   Incentive Spirometry Treatment   Actual Volume (ml) 1250 ml   Number of Attempts 3       Patient achieved 1250 Ml/sec on IS. Patient encouraged to do every hour while awake-patient agreed and demonstrated. No shortness of breath or distress noted. [facility] notes reviewed- continuous sat ordered HS and placed on pt at this time.   Supplemental oxygen ordered HS (pt does not have home CPAP)

## 2022-01-26 NOTE — ANESTHESIA POSTPROCEDURE EVALUATION
Procedure(s):  RIGHT KNEE ARTHROPLASTY TOTAL ROBOTIC ASSISTED ARJUN/BOBBI  , ADDUCTOR CANAL BLOCK.    spinal    Anesthesia Post Evaluation      Multimodal analgesia: multimodal analgesia used between 6 hours prior to anesthesia start to PACU discharge  Patient location during evaluation: PACU  Patient participation: complete - patient participated  Level of consciousness: awake and alert  Pain score: 0  Pain management: satisfactory to patient  Airway patency: patent  Anesthetic complications: no  Cardiovascular status: acceptable and hemodynamically stable  Respiratory status: acceptable and spontaneous ventilation  Hydration status: acceptable  Post anesthesia nausea and vomiting:  none  Final Post Anesthesia Temperature Assessment:  Normothermia (36.0-37.5 degrees C)      INITIAL Post-op Vital signs:   Vitals Value Taken Time   /57 01/26/22 1529   Temp 36.8 °C (98.2 °F) 01/26/22 1509   Pulse 51 01/26/22 1529   Resp 16 01/26/22 1529   SpO2 100 % 01/26/22 1529

## 2022-01-26 NOTE — PROGRESS NOTES
Patient arrived to room 339 denies pain. Family present in room. Patient orientated to surrounding, equipment, and introduce to staff. Bed low/locked, call light, and personal items in reach.

## 2022-01-26 NOTE — ANESTHESIA PROCEDURE NOTES
Spinal Block    Start time: 1/26/2022 1:00 PM  End time: 1/26/2022 1:03 PM  Performed by: Fei Rebolledo MD  Authorized by: Fei Rebolledo MD     Pre-procedure:   Indications: at surgeon's request and primary anesthetic  Preanesthetic Checklist: patient identified, risks and benefits discussed, anesthesia consent, site marked, patient being monitored and timeout performed    Timeout Time: 12:59 EST          Spinal Block:   Patient Position:  Seated  Prep Region:  Lumbar  Prep: DuraPrep      Location:  L2-3  Technique:  Single shot    Local Dose (mL):  3    Needle:   Needle Type:  Quincke  Needle Gauge:  22 G  Attempts:  1      Events: CSF confirmed, no blood with aspiration and no paresthesia        Assessment:  Insertion:  Uncomplicated  Patient tolerance:  Patient tolerated the procedure well with no immediate complications

## 2022-01-26 NOTE — ANESTHESIA PROCEDURE NOTES
Peripheral Block    Start time: 1/26/2022 12:08 PM  End time: 1/26/2022 12:10 PM  Performed by: Cleopatra Agrawal MD  Authorized by: Cleopatra Agrawal MD       Pre-procedure: Indications: at surgeon's request, post-op pain management and procedure for pain    Preanesthetic Checklist: patient identified, risks and benefits discussed, site marked, timeout performed, anesthesia consent given and patient being monitored    Timeout Time: 12:07 EST          Block Type:   Block Type: Adductor canal  Laterality:  Right  Monitoring:  Standard ASA monitoring, responsive to questions, continuous pulse ox, oxygen, frequent vital sign checks and heart rate  Injection Technique:  Single shot  Procedures: ultrasound guided    Patient Position: supine  Prep: chlorhexidine    Location:  Mid thigh  Needle Type:  Stimuplex  Needle Gauge:  22 G  Needle Localization:  Ultrasound guidance  Medication Injected:  Dexamethasone (DECADRON) 4 mg/mL injection, 4 mg  ropivacaine 0.2% with epinephrine 1:200,000 injection, 20 mL (Mixture components: ropivacaine 2 mg/mL (0.2 %) Soln, 1 mL; EPINEPHrine HCl (PF) 1 mg/mL (1 mL) Soln, . 005 mL)  Med Admin Time: 1/26/2022 12:09 PM    Assessment:  Number of attempts:  1  Injection Assessment:  Incremental injection every 5 mL, no paresthesia, ultrasound image on chart, local visualized surrounding nerve on ultrasound, negative aspiration for blood and no intravascular symptoms  Patient tolerance:  Patient tolerated the procedure well with no immediate complications

## 2022-01-26 NOTE — PROGRESS NOTES
Care Management Interventions  PCP Verified by CM: Yes  Transition of Care Consult (CM Consult): [address]: Yes  Physical Therapy Consult: Yes  Support Systems: Child(althea)  Confirm Follow Up Transport: Family  The Plan for Transition of Care is Related to the Following Treatment Goals : Pt is being d/c w/ Home Health Services-[facility]  The Patient and/or Patient Representative was Provided with a Choice of Provider and Agrees with the Discharge Plan?: Yes  Name of the Patient Representative Who was Provided with a Choice of Provider and Agrees with the Discharge Plan: Khadar Jay  [place] of Choice List was Provided with Basic Dialogue that Supports the Patient's Individualized Plan of Care/Goals, Treatment Preferences and Shares the Quality Data Associated with the Providers?: Yes  Discharge Location  Patient Expects to be Discharged to[de-identified] Home with home health ([facility])    Patient is a 46y.o. year old female admitted for Right TKA . Patient plans to return home on discharge. Order received to arrange home health. Patient without preference towards agency. Referral sent to [facility]. Patient denies any equipment needs Will follow until discharge.      ANTON Fraga

## 2022-01-26 NOTE — PROGRESS NOTES
TRANSFER - IN REPORT:    Verbal report received from [facility] on Sue Ogsara  being received from PACU for routine post - op      Report consisted of patients Situation, Background, Assessment and   Recommendations(SBAR). Information from the following report(s) SBAR, Procedure Summary and MAR was reviewed with the receiving nurse. Opportunity for questions and clarification was provided. Assessment completed upon patients arrival to unit and care assumed.

## 2022-01-26 NOTE — PROGRESS NOTES
Problem: Mobility Impaired (Adult and Pediatric)  Goal: *Acute Goals and Plan of Care (Insert Text)  Outcome: Progressing Towards Goal  Note: GOALS (1-4 days):  (1.)Ms. Suresh Gonzales will move from supine to sit and sit to supine  in bed with INDEPENDENT. (2.)Ms. Suresh Gonzales will transfer from bed to chair and chair to bed with INDEPENDENT using the least restrictive device. (3.)Ms. Suresh Gonzales will ambulate with INDEPENDENT for 250 feet with the least restrictive device. (4.)Ms. Suresh Gonzales will ambulate up/down 0-3 steps with bilateral  railing with MINIMAL ASSIST with no device. (5.)Ms. Suresh Gonzales will increase right knee ROM to 0°-90°.  ________________________________________________________________________________________________       PHYSICAL THERAPY JOINT CAMP TKA: Initial Assessment, Daily Note, Treatment Day: Day of Assessment and 1st, and PM 1/26/2022  OUTPATIENT: Hospital Day: 1  Payor: [facility] [place] MEDICARE / Plan: [employer] [place] MED[place]/PPO / Product Type: Managed Care Medicare /      NAME/AGE/GENDER: Anthony Medrano is a 46 y.o. female   PRIMARY DIAGNOSIS:  Primary osteoarthritis of right knee [M17.11]   Procedure(s) and Anesthesia Type:     * RIGHT KNEE ARTHROPLASTY TOTAL ROBOTIC ASSISTED ARJUN/BOBBI  , ADDUCTOR CANAL BLOCK - Spinal (Right)  ICD-10: Treatment Diagnosis:    · Pain in Right Knee (M25.561)  · Stiffness of Right Knee, Not elsewhere classified (M25.661)  · Difficulty in walking, Not elsewhere classified (R26.2)  · Other abnormalities of gait and mobility (R26.89)      ASSESSMENT:     Ms. Suresh Gonzales presents with decreased strength and range of motion right lower extremity and with decreased independence with functional mobility s/p right TKA. Pt will benefit from skilled PT interventions to maximize independence with functional mobility and TKA management. Pt did okay with assessment. Pt supine on arrival, requesting to walk to the bathroom.  Pt agreeable to get to bedside commode, as it was determined pt did not have sensation enough to ambulate to bathroom. With assistance, gait belt, and elevated bed, pt was able to perform sit to stand and stand at walker. Pt unable to advance foot to take a step. Pt then assisted with squat pivot transfer to and from bedside commode. Pt returned supine. Pt supine with needs in reach. Pt instructed not to get up without assistance. Hope to progress mobility and exercises in the morning. Pt plans to discharge to home with continued therapy for follow up. This section established at most recent assessment   PROBLEM LIST (Impairments causing functional limitations):  1. Decreased Strength  2. Decreased ADL/Functional Activities  3. Decreased Transfer Abilities  4. Decreased Ambulation Ability/Technique  5. Decreased Flexibility/Joint Mobility  6. Edema/Girth  7. Decreased [place] with Home Exercise Program   INTERVENTIONS PLANNED: (Benefits and precautions of physical therapy have been discussed with the patient.)  1. Bed Mobility  2. Cold  3. Gait Training  4. Home Exercise Program (HEP)  5. Range of Motion (ROM)  6. Therapeutic Activites  7. Therapeutic Exercise/Strengthening  8. Transfer Training     TREATMENT PLAN: Frequency/Duration: Follow patient BID for duration of hospital stay to address above goals. Rehabilitation Potential For Stated Goals: Good     RECOMMENDED REHABILITATION/EQUIPMENT: (at time of discharge pending progress): Continue Skilled Therapy and Home Health: Physical Therapy.               HISTORY:   History of Present Injury/Illness (Reason for Referral):  Pt s/p total knee arthroplasty on 1/26  Past Medical History/Comorbidities:   Ms. Tyler Gómez  has a past medical history of Abnormal Papanicolaou smear of cervix, Adverse effect of anesthesia, Arthritis, CAD (coronary artery disease), CHF (congestive heart failure) ([facility] [address] Fibroid, uterine, GERD (gastroesophageal reflux disease), H/O echocardiogram (10/04/2021), Heart murmur, History of anemia, Hypercholesterolemia, Hypertension, Menorrhagia with regular cycle, Morbid obesity ([address] NICM (nonischemic cardiomyopathy) ([address] THEE on CPAP, Ovarian cyst, PVCs (premature ventricular contractions), and Uterine fibroid. Ms. Maria Esther Cleary  has a past surgical history that includes hx tubal ligation; hx wisdom teeth extraction; hx hysteroscopy; hx afib ablation (02/2018); hx nicole and bso (02/12/2019); hx other surgical; hx heart catheterization; and hx orthopaedic (Right). Social History/Living Environment:   Home Environment: Private residence  One/Two Story Residence: One story  Living Alone: No  Support Systems: Child(althea)  Patient Expects to be Discharged to[de-identified] Home with home health ([facility])  Current DME Used/Available at Home: None  Prior Level of Function/Work/Activity:  Pt independent with ambulation. Number of Personal Factors/Comorbidities that affect the Plan of Care: 0: LOW COMPLEXITY   EXAMINATION:   Most Recent Physical Functioning:   Gross Assessment: Yes  Gross Assessment  AROM: Generally decreased, functional  Strength: Generally decreased, functional  Sensation: Intact                     Bed Mobility  Supine to Sit: Stand-by assistance  Sit to Supine: Minimum assistance  Scooting: Stand-by assistance    Transfers  Sit to Stand: Minimum assistance;Assist x2  Stand to Sit: Minimum assistance;Assist x2  Bed to Chair: Stand-by assistance    Balance  Sitting: Intact  Standing: Pull to stand; With support    Posture  Posture (WDL): Within defined limits         Weight Bearing Status  Right Side Weight Bearing: As tolerated  Distance (ft): 1 Feet (ft)  Ambulation - Level of Assistance: Minimal assistance;Assist x2  Assistive Device: Walker, rolling        Braces/Orthotics: none           Body Structures Involved:  1. Joints  2. Muscles Body Functions Affected:  1. Movement Related Activities and Participation Affected:  1. Mobility  2.  Self Care   Number of elements that affect the Plan of Care: 4+: HIGH COMPLEXITY   CLINICAL PRESENTATION:   Presentation: Stable and uncomplicated: LOW COMPLEXITY   CLINICAL DECISION MAKIN[address] [place] [id number] AM-PAC 6 Clicks   Basic Mobility Inpatient Short Form  How much difficulty does the patient currently have. .. Unable A Lot A Little None   1. Turning over in bed (including adjusting bedclothes, sheets and blankets)? [] 1   [] 2   [x] 3   [] 4   2. Sitting down on and standing up from a chair with arms ( e.g., wheelchair, bedside commode, etc.)   [] 1   [] 2   [x] 3   [] 4   3. Moving from lying on back to sitting on the side of the bed? [] 1   [] 2   [x] 3   [] 4   How much help from another person does the patient currently need. .. Total A Lot A Little None   4. Moving to and from a bed to a chair (including a wheelchair)? [] 1   [] 2   [x] 3   [] 4   5. Need to walk in hospital room? [] 1   [] 2   [x] 3   [] 4   6. Climbing 3-5 steps with a railing? [] 1   [] 2   [x] 3   [] 4   © , Trustees of [address] [place] [address] [address] [address], under license to [employer]. All rights reserved     Score:  Initial: 18 Most Recent: X (Date: -- )    Interpretation of Tool:  Represents activities that are increasingly more difficult (i.e. Bed mobility, Transfers, Gait). Medical Necessity:     · Patient is expected to demonstrate progress in   · strength, range of motion, and functional technique  ·  to   · decrease assistance required with functional mobility and TKA managment  · .  Reason for Services/Other Comments:  · Patient continues to require skilled intervention due to   · Inability to complete functional mobility and TKA management independently  · .    Use of outcome tool(s) and clinical judgement create a POC that gives a: Clear prediction of patient's progress: LOW COMPLEXITY            TREATMENT:   (In addition to Assessment/Re-Assessment sessions the following treatments were rendered)     Pre-treatment Symptoms/Complaints:    Pain Initial:      Post Session:  no complaints     Therapeutic Activity: (    24 minutes): Therapeutic activities including Bed transfers, Chair transfers, and Toilet transfers to improve mobility. Assessment   Date: Date:   Date:     ACTIVITY/EXERCISE AM PM AM PM AM PM     []  []  []  []  []  []   Ankle Pumps         Quad Sets         Gluteal Sets         Hip ABd/ADduction         Straight Leg Raises         Knee Slides         Short Arc Quads         Chair Slides                           B = bilateral; AA = active assistive; A = active; P = passive      Treatment/Session Assessment:     Response to Treatment:  Pt appears happy to have had the opportunity to use bedside commode. Education:  [] Home Exercises  [x] Fall Precautions  [x] Use of Cold Therapy Unit [] D/C Instruction Review  [] Knee Prosthesis Review  [x] Walker Management/Safety [x] Adaptive Equipment as Needed  [] No pillow under knee       Interdisciplinary Collaboration:   o Physical Therapist  o Registered Nurse  o Certified Nursing Assistant/Patient Care Technician    After treatment position/precautions:   o Supine in bed  o Bed/Chair-wheels locked  o Bed in low position  o Caregiver at bedside  o Call light within reach  o RN notified  o Family at bedside    Compliance with Program/Exercises: Compliant most of the time, Will assess as treatment progresses. Recommendations/Intent for next treatment session:  Treatment next visit will focus on increasing Ms. Woodard's independence with bed mobility, transfers, gait training, strength/ROM exercises, modalities for pain, and patient education.       Total Treatment Duration:  PT Patient Time In/Time Out  Time In: 1730  Time Out: 1306 Reginald CALDERON PT

## 2022-01-26 NOTE — PROGRESS NOTES
TRANSFER - OUT REPORT:    Verbal report given to Dian(name) on Nawaf Pimentel  being transferred to [facility](unit) for routine progression of care       Report consisted of patients Situation, Background, Assessment and   Recommendations(SBAR). Information from the following report(s) SBAR was reviewed with the receiving nurse. Lines:   Peripheral IV 01/26/22 Posterior;Right Hand (Active)        Opportunity for questions and clarification was provided.       Patient transported with:   O2 @ 2 liters

## 2022-01-26 NOTE — INTERVAL H&P NOTE
Update History & Physical    The Patient's History and Physical of January 19, 2022 was reviewed with the patient and I examined the patient. There was no change. The surgical site was confirmed by the patient and me. Plan:  The risk, benefits, expected outcome, and alternative to the recommended procedure have been discussed with the patient. Patient understands and wants to proceed with the procedure.     Electronically signed by MANUEL Crawford on 1/26/2022 at 10:49 AM

## 2022-01-26 NOTE — ANESTHESIA PREPROCEDURE EVALUATION
Anesthetic History   No history of anesthetic complications            Review of Systems / Medical History  Patient summary reviewed and pertinent labs reviewed    Pulmonary        Sleep apnea: CPAP           Neuro/Psych   Within defined limits           Cardiovascular    Hypertension: well controlled      CHF  Dysrhythmias (s/p ablation 2/18) : PVC  CAD (moderate non-obstructive dz on cath 2017)    Exercise tolerance: >4 METS  Comments: Echo 12/18 - EF 45%, mild MR, mild TR   GI/Hepatic/Renal     GERD: well controlled           Endo/Other        Obesity and arthritis     Other Findings              Physical Exam    Airway  Mallampati: II  TM Distance: > 6 cm  Neck ROM: normal range of motion   Mouth opening: Normal     Cardiovascular  Regular rate and rhythm,  S1 and S2 normal,  no murmur, click, rub, or gallop  Rhythm: regular  Rate: normal         Dental  No notable dental hx       Pulmonary  Breath sounds clear to auscultation               Abdominal         Other Findings            Anesthetic Plan    ASA: 3  Anesthesia type: spinal      Post-op pain plan if not by surgeon: peripheral nerve block single      Anesthetic plan and risks discussed with: Patient

## 2022-01-27 VITALS
HEART RATE: 57 BPM | OXYGEN SATURATION: 98 % | SYSTOLIC BLOOD PRESSURE: 127 MMHG | DIASTOLIC BLOOD PRESSURE: 73 MMHG | TEMPERATURE: 98.2 F | BODY MASS INDEX: 39.05 KG/M2 | RESPIRATION RATE: 20 BRPM | WEIGHT: 256.8 LBS

## 2022-01-27 LAB — HGB BLD-MCNC: 10.9 G/DL (ref 11.7–15.4)

## 2022-01-27 PROCEDURE — 97535 SELF CARE MNGMENT TRAINING: CPT

## 2022-01-27 PROCEDURE — 74011000250 HC RX REV CODE- 250: Performed by: PHYSICIAN ASSISTANT

## 2022-01-27 PROCEDURE — 74011250636 HC RX REV CODE- 250/636: Performed by: PHYSICIAN ASSISTANT

## 2022-01-27 PROCEDURE — 97116 GAIT TRAINING THERAPY: CPT

## 2022-01-27 PROCEDURE — 99024 POSTOP FOLLOW-UP VISIT: CPT | Performed by: PHYSICIAN ASSISTANT

## 2022-01-27 PROCEDURE — 74011250637 HC RX REV CODE- 250/637: Performed by: PHYSICIAN ASSISTANT

## 2022-01-27 PROCEDURE — 97110 THERAPEUTIC EXERCISES: CPT

## 2022-01-27 PROCEDURE — 36415 COLL VENOUS BLD VENIPUNCTURE: CPT

## 2022-01-27 PROCEDURE — 85018 HEMOGLOBIN: CPT

## 2022-01-27 PROCEDURE — 97165 OT EVAL LOW COMPLEX 30 MIN: CPT

## 2022-01-27 RX ORDER — METHOCARBAMOL 500 MG/1
500 TABLET, FILM COATED ORAL
Qty: 24 TABLET | Refills: 0 | Status: SHIPPED | OUTPATIENT
Start: 2022-01-27 | End: 2022-02-02 | Stop reason: SDUPTHER

## 2022-01-27 RX ORDER — OXYCODONE HYDROCHLORIDE 5 MG/1
5-10 TABLET ORAL
Qty: 60 TABLET | Refills: 0 | Status: SHIPPED | OUTPATIENT
Start: 2022-01-27 | End: 2022-02-02 | Stop reason: SDUPTHER

## 2022-01-27 RX ORDER — PROMETHAZINE HYDROCHLORIDE 25 MG/1
25 TABLET ORAL
Qty: 20 TABLET | Refills: 0 | Status: SHIPPED | OUTPATIENT
Start: 2022-01-27 | End: 2022-04-21

## 2022-01-27 RX ORDER — ASPIRIN 81 MG/1
81 TABLET ORAL EVERY 12 HOURS
Qty: 70 TABLET | Refills: 0 | Status: SHIPPED | OUTPATIENT
Start: 2022-01-27 | End: 2022-03-03

## 2022-01-27 RX ADMIN — Medication 81 MG: at 09:16

## 2022-01-27 RX ADMIN — OXYCODONE HYDROCHLORIDE 10 MG: 5 TABLET ORAL at 09:16

## 2022-01-27 RX ADMIN — CEFAZOLIN SODIUM 2 G: 10 INJECTION, POWDER, FOR SOLUTION INTRAVENOUS at 05:24

## 2022-01-27 RX ADMIN — OXYCODONE HYDROCHLORIDE 10 MG: 5 TABLET ORAL at 05:24

## 2022-01-27 RX ADMIN — ACETAMINOPHEN 1000 MG: 500 TABLET, FILM COATED ORAL at 05:25

## 2022-01-27 RX ADMIN — AMLODIPINE BESYLATE 5 MG: 5 TABLET ORAL at 09:17

## 2022-01-27 RX ADMIN — DOCUSATE SODIUM 50MG AND SENNOSIDES 8.6MG 2 TABLET: 8.6; 5 TABLET, FILM COATED ORAL at 09:16

## 2022-01-27 RX ADMIN — Medication 1 AMPULE: at 09:17

## 2022-01-27 RX ADMIN — OXYCODONE HYDROCHLORIDE 10 MG: 5 TABLET ORAL at 13:03

## 2022-01-27 RX ADMIN — ACETAMINOPHEN 1000 MG: 500 TABLET, FILM COATED ORAL at 13:03

## 2022-01-27 RX ADMIN — ONDANSETRON 4 MG: 4 TABLET, ORALLY DISINTEGRATING ORAL at 14:08

## 2022-01-27 RX ADMIN — SODIUM CHLORIDE, PRESERVATIVE FREE 10 ML: 5 INJECTION INTRAVENOUS at 05:25

## 2022-01-27 NOTE — DISCHARGE INSTRUCTIONS
Shift assessment completed via flow sheet. Pt a/o x 4. Respirations are even and unlabored. Lung sound CTA bilaterally and heart sounds S1 and S2 noted with no murmur. Active bowel sounds noted. NV status WNLs. Pedal pulses palpable bilaterally. No signs of distress or c/o pain at this time. Pt denies any needs at this time. Safety measures in place. Bed low and locked, gripper socks on. Call light within reach. Instructed pt to call for assistance. Pt verbalized understanding. [facility]   Patient Discharge Instructions    Olivier Jose De Jesus / [id number] : 1970    Admitted 2022 Discharged: 2022     IF YOU HAVE ANY PROBLEMS ONCE YOU ARE AT HOME CALL THE FOLLOWING NUMBERS:   Main office number: [phone]      Medications    · The medications you are to continue on are listed on the medication reconciliation sheet. · Narcotic pain medications as well as supplemental iron can cause constipation. If this occurs try stopping the narcotic pain medication and/or the iron. · It is important that you take the medication exactly as they are prescribed. · Medications which increase your risk of blood clots are listed to stop for 5 weeks after surgery as well as medications or supplements which increase your risk of bleeding complications. · Keep your medication in the bottles provided by the pharmacist and keep a list of the medication names, dosages, and times to be taken in your wallet. · Do not take other medications without consulting your doctor. Important Information    Do NOT smoke as this will greatly increase your risk of infection! Resume your prehospital diet. If you have excessive nausea or vomitting call your doctor's office     Leg swelling and warmth is normal for 6 months after surgery. If you experience swelling in your leg elevate you leg while laying down with your toes above your heart.  If you have sudden onset severe swelling with leg pain call our office. The stitches deep inside take approximately 6 months to dissolve. There will be sharp shooting, stinging and burning pain. This is normal and will resolve between 3-6 months after surgery. Difficulty sleeping is normal following total Knee and Hip replacement. You may try melatonin, an over-the-counter sleep aid or benadryl to help with sleep. Most patients will resume sleeping through the night 8 weeks after surgery. Home Physical Therapy is arranged. Home Health will contact you within 48 hrs of discharge that you have chosen. If you have not received a call within this time frame please contact that provider you chose. You should be given this information before you leave the hospital.     You are at a risk for falls. Use the rolling walker when walking. Patients who have had a joint replacement should not drive if they are still taking narcotic pain mediation during the daytime hours. Most patients wean themselves off of pain medication within 2-5 weeks after surgery. When to Call the office    - If you have a temperature greater then 101  - Uncontrolled vomiting   - Loose control of your bladder or bowel function  - Are unable to bear any wieght   - Need a pain medication refill     Patient Education        Total Knee Replacement: What to Expect at [address] [place] [address] Drive had a total knee replacement. The doctor replaced the worn ends of the bones that connect to your knee (thighbone and lower leg bone) with plastic and metal parts. When you leave the hospital, you should be able to move around with a walker or crutches. But you will need someone to help you at home until you have more energy and can move around better. You will go home with a bandage and stitches, staples, skin glue, or tape strips. Change the bandage as your doctor tells you to. If you have stitches or staples, your doctor will remove them about 2 weeks after your surgery.  Glue or tape strips will fall off on their own over time. You may still have some mild pain, and the area may be swollen for a few months after surgery. Your knee will continue to improve for up to a year. You will probably use a walker for some time after surgery. When you are ready, you can use a cane. You may be able to walk without support after a couple weeks, or when you are comfortable. You will need to do months of physical rehabilitation (rehab) after a knee replacement. Rehab will help you strengthen the muscles of the knee and help you regain movement. After you recover, your artificial knee will allow you to do normal daily activities with less pain or no pain at all. You may be able to hike, dance, or ride a bike. Talk to your doctor about whether you can do more strenuous activities. Always tell your caregivers that you have an artificial knee. How long it will take to walk on your own, return to normal activities, and go back to work depends on your health and how well your rehabilitation (rehab) program goes. The better you do with your rehab exercises, the quicker you will get your strength and movement back. This care sheet gives you a general idea about how long it will take for you to recover. But each person recovers at a different pace. Follow the steps below to get better as quickly as possible. How can you care for yourself at home? Activity    · Rest when you feel tired. You may take a nap, but don't stay in bed all day. When you sit, use a chair with arms. You can use the arms to help you stand up.     · Work with your physical therapist to find the best way to exercise. What you can do as your knee heals will depend on whether your new knee is cemented or uncemented. You may not be able to do certain things for a while if your new knee is uncemented.     · After your knee has healed enough, you can do more strenuous activities with caution. ? You can golf, but you may want to use a golf cart for some time.  And don't wear shoes with spikes. ? You can bike on a flat road or on a stationary bike. Talk to your doctor before biking uphill. ? Your doctor may suggest that you stay away from activities that put stress on your knee. These include tennis, badminton, contact sports like football, jumping (such as in basketball), jogging, and running. ? Avoid activities where you might fall.     · Do not sit for more than 1 hour at a time. Get up and walk around for a while before you sit again. If you must sit for a long time, prop up your leg with a chair or footstool. This will help you avoid swelling.     · Ask your doctor when you can drive again. It may take several weeks after knee replacement surgery before it's safe for you to drive.     · When you get into a car, sit on the edge of the seat. Then pull in your legs, and turn to face the front.     · You should be able to do many everyday activities 3 to 6 weeks after your surgery. You will probably need to take 4 to 16 weeks off from work. When you can go back to work depends on the type of work you do and how you feel.     · Ask your doctor when it is okay for you to have sex.     · For 12 weeks, do not lift anything heavier than 10 pounds and do not lift weights. Diet    · By the time you leave the hospital, you should be eating your normal diet. If your stomach is upset, try bland, low-fat foods like plain rice, broiled chicken, toast, and yogurt. Your doctor may suggest that you take iron and vitamin supplements.     · Drink plenty of fluids (unless your doctor tells you not to).   · Eat healthy foods, and watch your portion sizes. Try to stay at your ideal weight. Too much weight puts more stress on your new knee.     · You may notice that your bowel movements are not regular right after your surgery. This is common. Try to avoid constipation and straining with bowel movements.  Drinking enough fluids, taking a stool softener, and eating foods that are good sources of fiber can help you avoid constipation. If you have not had a bowel movement after a couple of days, talk to your doctor. Medicines    · Your doctor will tell you if and when you can restart your medicines. You will also get instructions about taking any new medicines.     · If you take aspirin or some other blood thinner, ask your doctor if and when to start taking it again. Make sure that you understand exactly what your doctor wants you to do.     · Your doctor may give you a blood-thinning medicine to prevent blood clots. If you take a blood thinner, be sure you get instructions about how to take your medicine safely. Blood thinners can cause serious bleeding problems. This medicine could be in pill form or as a shot (injection). If a shot is needed, your doctor will tell you how to do this.     · Be safe with medicines. Take pain medicines exactly as directed. ? If the doctor gave you a prescription medicine for pain, take it as prescribed. ? If you are not taking a prescription pain medicine, ask your doctor if you can take an over-the-counter medicine. ? Plan to take your pain medicine 30 minutes before exercises. It is easier to prevent pain before it starts than to stop it after it has started.     · If you think your pain medicine is making you sick to your stomach:  ? Take your medicine after meals (unless your doctor has told you not to). ? Ask your doctor for a different pain medicine.     · If your doctor prescribed antibiotics, take them as directed. Do not stop taking them just because you feel better. You need to take the full course of antibiotics. Incision care    · If your doctor told you how to care for your cut (incision), follow your doctor's instructions. You will have a dressing over the cut. A dressing helps the incision heal and protects it. Your doctor will tell you how to take care of this.     · If you did not get instructions, follow this general advice:  ?  If you have strips of tape on the cut the doctor made, leave the tape on for a week or until it falls off.  ? If you have stitches or staples, your doctor will tell you when to come back to have them removed. ? If you have skin glue on the cut, leave it on until it falls off. Skin glue is also called skin adhesive or liquid stitches. ? Change the bandage every day. ? Wash the area daily with warm water, and pat it dry. Don't use hydrogen peroxide or alcohol. They can slow healing. ? You may cover the area with a gauze bandage if it oozes fluid or rubs against clothing. ? You may shower 24 to 48 hours after surgery. Pat the incision dry. Don't swim or take a bath for the first 2 weeks, or until your doctor tells you it is okay. Exercise    · Your rehab program will give you a number of exercises to do to help you get back your knee's range of motion and strength. Always do them as your therapist tells you. Ice    · For pain and swelling, put ice or a cold pack on the area for 10 to 20 minutes at a time. Put a thin cloth between the ice and your skin. Other instructions    · Wear compression stockings if your doctor told you to. These may help to prevent blood clots. Your doctor will tell you how long you need to keep wearing the compression stockings.     · Carry a medical alert card that says you have an artificial joint. You have metal pieces in your knee. These may set off some airport metal detectors. Follow-up care is a key part of your treatment and safety. Be sure to make and go to all appointments, and call your doctor if you are having problems. It's also a good idea to know your test results and keep a list of the medicines you take. When should you call for help? Call 911 anytime you think you may need emergency care.  For example, call if:    · You passed out (lost consciousness).     · You have severe trouble breathing.     · You have sudden chest pain and shortness of breath, or you cough up blood. Call your doctor now or seek immediate medical care if:    · You have signs of infection, such as:  ? Increased pain, swelling, warmth, or redness. ? Red streaks leading from the incision. ? Pus draining from the incision. ? A fever.     · You have signs of a blood clot, such as:  ? Pain in your calf, back of the knee, thigh, or groin. ? Redness and swelling in your leg or groin.     · Your incision comes open and begins to bleed, or the bleeding increases.     · You have pain that does not get better after you take pain medicine. Watch closely for changes in your health, and be sure to contact your doctor if:    · You do not have a bowel movement after taking a laxative. Where can you learn more? Go to http://www.gray.com/  Enter T054 in the search box to learn more about \"Total Knee Replacement: What to Expect at Home. \"  Current as of: July 1, 2021               Content Version: 13.0  © 2006-2021 [employer]. Care instructions adapted under license by [employer] (which disclaims liability or warranty for this information). If you have questions about a medical condition or this instruction, always ask your healthcare professional. [place] [address] any warranty or liability for your use of this information. Information obtained by :  I understand that if any problems occur once I am at home I am to contact my physician. I understand and acknowledge receipt of the instructions indicated above.                                                                                                                                            Physician's or R.N.'s Signature                                                                  Date/Time                                                                                                                                              Patient or Representative Signature Date/Time

## 2022-01-27 NOTE — PROGRESS NOTES
Shift assessment complete. Patient is alert and oriented, in bed. Respirations are even and unlabored, patient is on room air. Lung sounds are clear bilaterally. Bowel sounds are present. Pedal pulse and sensation present. Patient is able to Dorsi and Plantar flex with ease. Right lower extremity is appropriate in color and warm to touch. Aquacel dressing is clean, dry and intact. No neurovascular deficit noted. Patient has ambulated to the bedside commode and voided. No needs expressed at this time. Bed low and locked, call light within reach.   _____________________________________________    Problem: Falls - Risk of  Goal: *Absence of Falls  Description: Document Stacy Marking Fall Risk and appropriate interventions in the flowsheet.   Outcome: Progressing Towards Goal  Note: Fall Risk Interventions:  Mobility Interventions: Communicate number of staff needed for ambulation/transfer    Medication Interventions: Patient to call before getting OOB,Teach patient to arise slowly    Elimination Interventions: Call light in reach,Elevated toilet seat,Patient to call for help with toileting needs,Stay With Me (per policy),Toilet paper/wipes in reach,Toileting schedule/hourly rounds      Problem: Pain  Goal: *Control of Pain  Outcome: Progressing Towards Goal     Problem: Knee Replacement: Day of Surgery/Unit  Goal: Activity/Safety  Outcome: Progressing Towards Goal  Goal: Nutrition/Diet  Outcome: Progressing Towards Goal  Goal: Medications  Outcome: Progressing Towards Goal  Goal: Respiratory  Outcome: Progressing Towards Goal  Goal: Treatments/Interventions/Procedures  Outcome: Progressing Towards Goal  Goal: Psychosocial  Outcome: Progressing Towards Goal

## 2022-01-27 NOTE — PROGRESS NOTES
Problem: Self Care Deficits Care Plan (Adult)  Goal: *Acute Goals and Plan of Care (Insert Text)  Outcome: Progressing Towards Goal  Note: GOALS:   DISCHARGE GOALS (in preparation for going home/rehab):  3 days all goals met 1/27/2022   1. Ms. Kaci Galindo will perform one lower body dressing activity with minimal assistance required to demonstrate improved functional mobility and safety. 2.  Ms. Kcai Galindo will perform one lower body bathing activity with minimal assistance required to demonstrate improved functional mobility and safety. 3.  Ms. Kaci Galindo will perform toileting/toilet transfer with contact guard assistance to demonstrate improved functional mobility and safety. 4.  Ms. Kaci Galindo will perform shower transfer with contact guard assistance to demonstrate improved functional mobility and safety. JOINT CAMP OCCUPATIONAL THERAPY TKA: Initial Assessment, Daily Note, Discharge, and AM 1/27/2022  OUTPATIENT: Hospital Day: 2  Payor: [facility] MEDICARE / Plan: [employer] [place]O / Product Type: Managed Care Medicare /      NAME/AGE/GENDER: Oral Howard is a 46 y.o. female   PRIMARY DIAGNOSIS:  Primary osteoarthritis of right knee [M17.11]   Procedure(s) and Anesthesia Type:     * RIGHT KNEE ARTHROPLASTY TOTAL ROBOTIC ASSISTED ARJUN/BOBBI  , ADDUCTOR CANAL BLOCK - Spinal (Right)  ICD-10: Treatment Diagnosis:    · Pain in Right Knee (M25.561)  · Stiffness of Right Knee, Not elsewhere classified (O00.605)      ASSESSMENT:     Ms. Kaci Galindo is s/p R TKA and presents with decreased weight bearing on R LE and decreased independence with functional mobility and activities of daily living. Initial evaluation completed. Patient completed shower and dressing as charted below in ADL grid and is ambulating with rolling walker and contact guard assist.  Patient has met 4/4 goals and plans to return home with good family support.   Family able to provide patient with appropriate level of assistance at this time. OT reviewed safety precautions throughout session and therapy schedule for the remainder of today. Patient instructed to call for assistance when needing to get up from recliner and all needs in reach. Patient verbalized understanding of call light. This section established at most recent assessment   PROBLEM LIST (Impairments causing functional limitations):  1. Decreased Strength  2. Decreased ADL/Functional Activities  3. Decreased Transfer Abilities  4. Increased Pain  5. Increased Fatigue  6. Decreased Flexibility/Joint Mobility  7. Decreased Knowledge of Precautions   INTERVENTIONS PLANNED: (Benefits and precautions of occupational therapy have been discussed with the patient.)  1. Activities of daily living training  2. Adaptive equipment training  3. Balance training  4. Clothing management  5. Donning&doffing training  6. Theraputic activity     TREATMENT PLAN: Frequency/Duration: Follow patient qd to address above goals. Rehabilitation Potential For Stated Goals: Good     RECOMMENDED REHABILITATION/EQUIPMENT: (at time of discharge pending progress): Continue Skilled Therapy and Home Health: Physical Therapy. OCCUPATIONAL PROFILE AND HISTORY:   History of Present Injury/Illness (Reason for Referral): Pt presents this date s/p (R) TKA. Past Medical History/Comorbidities:   Ms. Maida Bellamy  has a past medical history of Abnormal Papanicolaou smear of cervix, Adverse effect of anesthesia, Arthritis, CAD (coronary artery disease), CHF (congestive heart failure) ([address] Fibroid, uterine, GERD (gastroesophageal reflux disease), H/O echocardiogram (10/04/2021), Heart murmur, History of anemia, Hypercholesterolemia, Hypertension, Menorrhagia with regular cycle, Morbid obesity ([address] NICM (nonischemic cardiomyopathy) ([address] THEE on CPAP, Ovarian cyst, PVCs (premature ventricular contractions), and Uterine fibroid.   Ms. Maida Bellamy  has a past surgical history that includes hx tubal ligation; hx wisdom teeth extraction; hx hysteroscopy; hx afib ablation (02/2018); hx nicole and bso (02/12/2019); hx other surgical; hx heart catheterization; and hx orthopaedic (Right). Social History/Living Environment:   Home Environment: Private residence  # Steps to Enter: 0  One/Two Story Residence: One story  Living Alone: No  Support Systems: Child(althea)  Patient Expects to be Discharged to[de-identified] Home with home health ([facility]/PT)  Current DME Used/Available at Home: None  Tub or Shower Type: Tub/Shower combination    Prior Level of Function/Work/Activity:  Independent      Number of Personal Factors/Comorbidities that affect the Plan of Care: Brief history (0):  LOW COMPLEXITY   ASSESSMENT OF OCCUPATIONAL PERFORMANCE[de-identified]   Most Recent Physical Functioning:   Balance  Sitting: Intact  Standing: With support;Pull to stand       Gross Assessment: Yes  Gross Assessment  AROM: Within functional limits (BUE)  Strength:  Within functional limits (BUEs)  Coordination: Within functional limits (BUEs)  Tone: Normal  Sensation: Intact                 Vision  Acuity: Within Defined Limits    Mental Status  Neurologic State: Alert  Orientation Level: Oriented X4  Cognition: Follows commands  Perception: Appears intact  Perseveration: No perseveration noted  Safety/Judgement: Fall prevention          RLE AROM  R Knee Flexion: 75  R Knee Extension: 7     Basic ADLs (From Assessment) Complex ADLs (From Assessment)   Basic ADL  Feeding: Setup  Oral Facial Hygiene/Grooming: Setup  Bathing: Setup  Type of Bath: Chlorhexidine (CHG),Full,Shower  Upper Body Dressing: Setup  Lower Body Dressing: Setup  Toileting: Setup     Grooming/Bathing/Dressing Activities of Daily Living   Grooming  Grooming Assistance: Set-up  Position Performed: Standing  Brushing Teeth: Set-up  Cues: Verbal cues provided Cognitive Retraining  Safety/Judgement: Fall prevention   Upper Body Bathing  Bathing Assistance: Set-up  Position Performed: Seated in chair  Cues: Verbal cues provided  Adaptive Equipment: Grab bar; Shower chair Feeding  Feeding Assistance: Set-up   Lower Body Bathing  Bathing Assistance: Set-up  Perineal  : Set-up  Position Performed: Seated in chair;Standing  Cues: Verbal cues provided  Adaptive Equipment: Grab bar  Lower Body : Set-up  Position Performed: Seated in chair;Standing  Cues: Verbal cues provided  Adaptive Equipment: Grab bar; Shower chair Toileting  Toileting Assistance: Set-up  Bladder Hygiene: Set-up   Upper Body Dressing Assistance  Dressing Assistance: Set-up  Pullover Shirt: Set-up  Cues: Verbal cues provided Functional Transfers  Bathroom Mobility: Contact guard assistance  Toilet Transfer : Contact guard assistance  Shower Transfer: Contact guard assistance  Cues: Verbal cues provided  Adaptive Equipment: Bedside commode;Grab bars; Shower chair with back; Walker (comment)   Lower Body Dressing Assistance  Dressing Assistance: Set-up  Underpants: Set-up  Pants With Elastic Waist: Set-up  Socks: Compensatory technique training (OT assisted)  Slip on Shoes with Back: Set-up  Adaptive Equipment Used: Grab bar;Walker Bed/Mat Mobility  Supine to Sit: Contact guard assistance  Sit to Supine:  (left in the chair)  Sit to Stand: Contact guard assistance  Stand to Sit: Contact guard assistance  Bed to Chair: Contact guard assistance  Scooting: Contact guard assistance         Physical Skills Involved:  1. Balance  2. Strength  3. Activity Tolerance Cognitive Skills Affected (resulting in the inability to perform in a timely and safe manner): 1. none Psychosocial Skills Affected:  1. none   Number of elements that affect the Plan of Care: 1-3:  LOW COMPLEXITY   CLINICAL DECISION MAKIN [facility] [address] [id number] AM-PAC 6 Clicks   Daily Activity Inpatient Short Form  How much help from another person does the patient currently need. .. Total A Lot A Little None   1. Putting on and taking off regular lower body clothing?    [] 1   [] 2   [x] 3 [] 4   2. Bathing (including washing, rinsing, drying)? [] 1   [] 2   [] 3   [x] 4   3. Toileting, which includes using toilet, bedpan or urinal?   [] 1   [] 2   [] 3   [x] 4   4. Putting on and taking off regular upper body clothing? [] 1   [] 2   [] 3   [x] 4   5. Taking care of personal grooming such as brushing teeth? [] 1   [] 2   [] 3   [x] 4   6. Eating meals? [] 1   [] 2   [] 3   [x] 4   © 2007, Trustees of [address] [place] [address] [address] [id number], under license to [employer]. All rights reserved     Score:  Initial: 23 Most Recent: X (Date: -- )    Interpretation of Tool:  Represents activities that are increasingly more difficult (i.e. Bed mobility, Transfers, Gait). Medical Necessity:     · Patient is expected to demonstrate progress in   · strength, balance, coordination, and functional technique  ·  to   · increase independence with self care and functional mobility  · . Reason for Services/Other Comments:  · Patient continues to require skilled intervention due to   · Decrease self care and mobility  · . Use of outcome tool(s) and clinical judgement create a POC that gives a: LOW COMPLEXITY            TREATMENT:   (In addition to Assessment/Re-Assessment sessions the following treatments were rendered)     Pre-treatment Symptoms/Complaints:  tolerated shower  Pain: Initial:   Pain Intensity 1: 5  Pain Location 1: Knee  Pain Orientation 1: Right  Pain Intervention(s) 1: Repositioned,Nurse notified  Post Session:  5     Self Care: (35): Procedure(s) (per grid) utilized to improve and/or restore self-care/home management as related to dressing, bathing, toileting, grooming, and functional transfers . Required minimal verbal and   cueing to facilitate activities of daily living skills and compensatory activities. Assessment    Treatment/Session Assessment:     Response to Treatment:  tolerated well.     Education:  [] Home Exercises  [x] Fall Precautions  [] Hip Precautions [] Going Home Video  [x] Knee/Hip Prosthesis Review  [x] Loyd Spore Management/Safety [x] Adaptive Equipment as Needed       Interdisciplinary Collaboration:   o Physical Therapist  o Occupational Therapist  o Registered Nurse    After treatment position/precautions:   o Up in chair  o Bed/Chair-wheels locked  o Call light within reach  o RN notified  o LEs reclined      Compliance with Program/Exercises: Compliant all of the time. Recommendations/Intent for next treatment session:  Pt doing well all goals met and will do well at home with support from family. Patient will be discharged home with home health PT. No further Occupational Therapy warranted, will discharge Occupational Therapy services.         Total Treatment Duration:  OT Patient Time In/Time Out  Time In: 0800  Time Out: 1200 [facility], OT

## 2022-01-27 NOTE — PROGRESS NOTES
01/26/22 2002   Oxygen Therapy   O2 Sat (%) 98 %   Pulse via Oximetry 67 beats per minute   O2 Device None (Room air)   Incentive Spirometry Treatment   Actual Volume (ml) 1000 ml   Pt on C/S monitor. Alarms on and functioning- set per protocol. Pt working on IS, encourage to keep practicing. No distress noted at this time.

## 2022-01-27 NOTE — PROGRESS NOTES
Orthopedic Joint Progress Note    2022  Admit Date: 2022  Admit Diagnosis: Primary osteoarthritis of right knee [M17.11]  Osteoarthritis of right knee [M17.11]    1 Day Post-Op    Subjective: doing well; pain is well controlled. [facility]     Review of Systems: Pertinent items are noted in HPI. Objective:     PT/OT:     PATIENT MOBILITY    Bed Mobility  Supine to Sit: Stand-by assistance  Sit to Supine: Minimum assistance  Scooting: Stand-by assistance,Minimum assistance  Transfers  Sit to Stand: Minimum assistance,Assist x2  Stand to Sit: Minimum assistance,Assist x2  Bed to Chair: Minimum assistance      Gait  Ambulation - Level of Assistance: Minimal assistance,Assist x2  Distance (ft): 1 Feet (ft)  Assistive Device: Walker, rolling   Weight Bearing Status  Right Side Weight Bearing: As tolerated        Vital Signs:    Blood pressure 122/65, pulse 69, temperature 97.7 °F (36.5 °C), resp. rate 16, weight 256 lb 12.8 oz (116.5 kg), last menstrual period 10/29/2018, SpO2 98 %.   Temp (24hrs), Av.6 °F (36.4 °C), Min:97 °F (36.1 °C), Max:98.2 °F (36.8 °C)      Pain Control:   Pain Assessment  Pain Scale 1: Visual  Pain Intensity 1: 0  Pain Onset 1: post op  Pain Location 1: Knee  Pain Orientation 1: Right  Pain Description 1: Heaviness  Pain Intervention(s) 1: Medication (see MAR),Rest,Repositioned,Ice    Meds:  Current Facility-Administered Medications   Medication Dose Route Frequency    amLODIPine (NORVASC) tablet 5 mg  5 mg Oral DAILY    carvediloL (COREG) tablet 12.5 mg  12.5 mg Oral QHS    sacubitril-valsartan(ENTRESCO)  97 -103 mg  takes 1 tablet am & 0.5 tablet pm (Patient Supplied)  1 Tablet Oral BID    spironolactone (ALDACTONE) tablet 25 mg  25 mg Oral DAILY PRN    alcohol 62% (NOZIN) nasal  1 Ampule  1 Ampule Topical Q12H    0.9% sodium chloride infusion  100 mL/hr IntraVENous CONTINUOUS    sodium chloride (NS) flush 5-40 mL  5-40 mL IntraVENous Q8H    sodium chloride (NS) flush 5-40 mL  5-40 mL IntraVENous PRN    acetaminophen (TYLENOL) tablet 1,000 mg  1,000 mg Oral Q6H    oxyCODONE IR (ROXICODONE) tablet 10 mg  10 mg Oral Q4H PRN    HYDROmorphone (DILAUDID) injection 1 mg  1 mg IntraVENous Q3H PRN    naloxone (NARCAN) injection 0.2-0.4 mg  0.2-0.4 mg IntraVENous Q10MIN PRN    dexamethasone (DECADRON) 10 mg/mL injection 10 mg  10 mg IntraVENous ONCE    ondansetron (ZOFRAN ODT) tablet 4 mg  4 mg Oral Q4H PRN    diphenhydrAMINE (BENADRYL) capsule 25 mg  25 mg Oral Q4H PRN    senna-docusate (PERICOLACE) 8.6-50 mg per tablet 2 Tablet  2 Tablet Oral DAILY    aspirin delayed-release tablet 81 mg  81 mg Oral Q12H        LAB:    Lab Results   Component Value Date/Time    INR 1.0 01/04/2022 09:25 AM    INR 0.9 03/09/2020 08:52 AM    INR 1.1 02/12/2018 09:44 AM     Lab Results   Component Value Date/Time    HGB 10.9 (L) 01/27/2022 04:46 AM    HGB 11.5 (L) 01/26/2022 08:00 PM    HGB 12.3 01/04/2022 09:25 AM       Wound Vagina (Active)   Number of days: 1266       Wound Abdomen Lower (Active)   Number of days: 1080       Wound Perineum (Active)   Number of days: 1080       Wound Vagina (Active)   Number of days: 1080       Incision 01/26/22 Knee Right (Active)   Dressing Status Clean;Dry; Intact 01/26/22 1435   Dressing/Treatment Alginate 01/26/22 1435   Number of days: 1         Physical Exam:  No significant changes    Assessment:      Principal Problem:    Status post right knee replacement (1/26/2022)    Active Problems:    Systolic CHF, chronic ([address] (12/18/2017)      Coronary artery disease of native artery of native heart with stable angina pectoris ([facility] (12/18/2017)      Obesity (BMI 30-39.9) (12/15/2020)      THEE (obstructive sleep apnea) (4/19/2021)      Osteoarthritis of right knee (1/26/2022)         Plan:   Patient looks very good. Continue PT/OT/Rehab  Anticipated discharge home today if stable.   Prescriptions e-prescribed to pharmacy. I have reviewed the patients controlled substance prescription history, as maintained in the [place] prescription monitoring program, so that the prescription(s) for a  controlled substance can be given.            MANUEL Don      Patient Expects to be Discharged to[de-identified] [facility]

## 2022-01-27 NOTE — PROGRESS NOTES
Problem: Falls - Risk of  Goal: *Absence of Falls  Description: Document Cheyenne Martin Fall Risk and appropriate interventions in the flowsheet.   Outcome: Resolved/Met  Note: Fall Risk Interventions:  Mobility Interventions: OT consult for ADLs,Patient to call before getting OOB,Strengthening exercises (ROM-active/passive),PT Consult for mobility concerns,PT Consult for assist device competence,Utilize walker, cane, or other assistive device         Medication Interventions: Patient to call before getting OOB,Teach patient to arise slowly    Elimination Interventions: Call light in reach,Elevated toilet seat,Patient to call for help with toileting needs              Problem: Patient Education: Go to Patient Education Activity  Goal: Patient/Family Education  Outcome: Resolved/Met     Problem: Pain  Goal: *Control of Pain  Outcome: Resolved/Met     Problem: Patient Education: Go to Patient Education Activity  Goal: Patient/Family Education  Outcome: Resolved/Met     Problem: Patient Education: Go to Patient Education Activity  Goal: Patient/Family Education  Outcome: Resolved/Met     Problem: Knee Replacement: Day of Surgery/Unit  Goal: Off Pathway (Use only if patient is Off Pathway)  Outcome: Resolved/Met  Goal: Activity/Safety  Outcome: Resolved/Met  Goal: Consults, if ordered  Outcome: Resolved/Met  Goal: Diagnostic Test/Procedures  Outcome: Resolved/Met  Goal: Nutrition/Diet  Outcome: Resolved/Met  Goal: Medications  Outcome: Resolved/Met  Goal: Respiratory  Outcome: Resolved/Met  Goal: Treatments/Interventions/Procedures  Outcome: Resolved/Met  Goal: Psychosocial  Outcome: Resolved/Met  Goal: *Initiate mobility  Outcome: Resolved/Met  Goal: *Optimal pain control at patient's stated goal  Outcome: Resolved/Met  Goal: *Hemodynamically stable  Outcome: Resolved/Met     Problem: Knee Replacement: Post-Op Day 1  Goal: Off Pathway (Use only if patient is Off Pathway)  Outcome: Resolved/Met  Goal: Activity/Safety  Outcome: Resolved/Met  Goal: Diagnostic Test/Procedures  Outcome: Resolved/Met  Goal: Nutrition/Diet  Outcome: Resolved/Met  Goal: Medications  Outcome: Resolved/Met  Goal: Respiratory  Outcome: Resolved/Met  Goal: Treatments/Interventions/Procedures  Outcome: Resolved/Met  Goal: Psychosocial  Outcome: Resolved/Met  Goal: Discharge Planning  Outcome: Resolved/Met  Goal: *Demonstrates progressive activity  Outcome: Resolved/Met  Goal: *Optimal pain control at patient's stated goal  Outcome: Resolved/Met  Goal: *Hemodynamically stable  Outcome: Resolved/Met  Goal: *Discharge plan identified  Outcome: Resolved/Met     Problem: Knee Replacement: Post-Op Day 2  Goal: Off Pathway (Use only if patient is Off Pathway)  Outcome: Resolved/Met  Goal: Activity/Safety  Outcome: Resolved/Met  Goal: Diagnostic Test/Procedures  Outcome: Resolved/Met  Goal: Medications  Outcome: Resolved/Met  Goal: Respiratory  Outcome: Resolved/Met  Goal: Treatments/Interventions/Procedures  Outcome: Resolved/Met  Goal: Psychosocial  Outcome: Resolved/Met  Goal: *Met physical therapy criteria for discharge to the next level of care  Outcome: Resolved/Met  Goal: *Optimal pain control with oral analgesia  Outcome: Resolved/Met  Goal: *Hemodynamically stable  Outcome: Resolved/Met  Goal: *Tolerating diet  Outcome: Resolved/Met  Goal: *Patient verbalizes understanding of discharge instructions  Outcome: Resolved/Met     Problem: Patient Education: Go to Patient Education Activity  Goal: Patient/Family Education  Outcome: Resolved/Met     Problem: Patient Education: Go to Patient Education Activity  Goal: Patient/Family Education  Outcome: Resolved/Met

## 2022-01-27 NOTE — PROGRESS NOTES
[facility]PITALIST PROGRESS NOTE      Patient ID:  NAME:  Martine Currie   Age/Sex: 46 y.o. female  :   1970   MRN:   [id number]    Admission Date: 2022  Consult Date: 2022  Chief Complaint: Right knee pain  Reason for Admission: Status post right knee replacement  Reason for Consult: Chronic sCHF, THEE    Assessment/Plan (MDM):     Principal Problem:    Status post right knee replacement (2022)  - Per primary team  - PT/OT  - Pain control    Active Problems:    Systolic CHF, chronic ([facility] [address] (2017)  - No acute issues  - Continue Coreg  - Continue Entresto      Coronary artery disease of native artery of native heart with stable angina pectoris ([facility] (2017)  - No acute CP  - Continue Entresto  - Continue ASA      Obesity (BMI 30-39.9) (12/15/2020)      THEE (obstructive sleep apnea) (2021)      Osteoarthritis of right knee (2022)      Diet: ADULT DIET Regular; Low Fat/Low Chol/High Fiber/2 gm Na  VTE ppx: ASA BID per Ortho    PCP: Yulissa Brown MD    Attending MD: Annette Feng MD    Treatment Team: Attending Provider: Annette Feng MD; Utilization Review: Ivania Lopez RN; Consulting Provider: Carmencita Hinton DO; : Jed Hernandez; Primary Nurse: Jacquelyn Jara RN; Physical Therapist: Esther [place] PTA; Occupational Therapist: Toñito Castellanos OT; Primary Nurse: Rachel Desai    HPI:       Martine Currie is a 46 y.o. female with a PMH of chronic sCHF and CAD who is being seen for medical management. She had a right TKA for OA. She complains of  3/10 pain the right knee. There were no marquise-operative complications. EBL 250cc.  - Feels good. Wants to go home. Knee pain 4/10. Denies CP/SOB. Denies F/C. Denies N/V/D.       Complete ROS done and is as stated in HPI or otherwise negative    Past Medical History:   Diagnosis Date    Abnormal Papanicolaou smear of cervix     Adverse effect of anesthesia     \"takes more\" for anesthesia to be effective     Arthritis     CAD (coronary artery disease)     no stents; [facility] 3/2017 mod LAD dz; followed by Dr Christel Gonsalez ([facility] cardio)    CHF (congestive heart failure) ([facility] [address]     ECHO 12/18/18: EF 40-45%; managed with medication and followed by [facility], uterine     GERD (gastroesophageal reflux disease)     diet controlled     H/O echocardiogram 10/04/2021    EF 40-45%    Heart murmur     Echo (10/4/21) EF 40-45% trace regurgitation in mitral and tricuspid valve    History of anemia     Hypercholesterolemia     Controlled with meds     Hypertension     Controlled with meds     Menorrhagia with regular cycle     Morbid obesity ([facility] [address]     NICM (nonischemic cardiomyopathy) ([facility] [address]     Followed by [facility] cardiology     THEE on CPAP     Ovarian cyst     PVCs (premature ventricular contractions)     Uterine fibroid         Past Surgical History:   Procedure Laterality Date    HX AFIB ABLATION  02/2018    Ablation for pvc's     HX HEART CATHETERIZATION      HX HYSTEROSCOPY      HX ORTHOPAEDIC Right     toe removal     HX OTHER SURGICAL      left hip and gluteal surgery    HX LILIANA AND BSO  02/12/2019    Supracervical    HX TUBAL LIGATION      HX WISDOM TEETH EXTRACTION          Prior to Admission Medications   Prescriptions Last Dose Informant Patient Reported? Taking? amLODIPine (NORVASC) 5 mg tablet 1/26/2022 at Unknown time  No Yes   Sig: Take 1 Tab by mouth daily. ascorbic acid, vitamin C, (VITAMIN C) 500 mg tablet 1/19/2022 at Unknown time  Yes Yes   Sig: Take 500 mg by mouth daily. aspirin delayed-release 81 mg tablet 1/26/2022 at Unknown time  Yes Yes   Sig: Take 81 mg by mouth daily. carvediloL (COREG) 12.5 mg tablet 1/25/2022 at Unknown time  No Yes   Sig: Take 1 Tab by mouth two (2) times daily (with meals). Patient taking differently: Take 12.5 mg by mouth nightly.    cholecalciferol (VITAMIN D3) (400 Units /10 mcg) tab tablet 2022 at Unknown time  Yes Yes   Sig: Take 400 Units by mouth daily. diclofenac (VOLTAREN) 1 % gel 2022 at Unknown time  Yes Yes   Sig: Apply  to affected area four (4) times daily. neomycin-bacitracnZn-polymyxnB (NEOSPORIN) 3.5-400-5,000 mg-unit-unit oipk ointment Not Taking at Unknown time  Yes No   Sig: Apply 1 Packet to affected area two (2) times a day. Patient not taking: Reported on 2022   ondansetron hcl (Zofran) 4 mg tablet Not Taking at Unknown time  Yes No   Sig: Take 4 mg by mouth every eight (8) hours as needed for Nausea or Vomiting. Patient not taking: Reported on 2022   sacubitriL-valsartan (Entresto)  mg tablet 2022 at Unknown time  No Yes   Sig: Take 1 Tab by mouth two (2) times a day. Patient taking differently: Take 1 Tab by mouth two (2) times a day. Indications: 1 tablet am, 1/2 tablet pm   simvastatin (ZOCOR) 40 mg tablet 2022 at Unknown time  Yes Yes   Sig: Take 20 mg by mouth nightly. spironolactone (ALDACTONE) 25 mg tablet 2022 at Unknown time  No Yes   Sig: Take 1 Tab by mouth daily. Patient taking differently: Take 25 mg by mouth daily as needed.       Facility-Administered Medications: None       Allergies   Allergen Reactions    Atorvastatin Myalgia    Metformin Other (comments)        Social History     Tobacco Use    Smoking status: Never Smoker    Smokeless tobacco: Never Used   Substance Use Topics    Alcohol use: No        Family History   Problem Relation Age of Onset    Heart Disease Mother     Cancer Father         Lymphoma    Breast Cancer Neg Hx         Objective:       Visit Vitals  /73   Pulse (!) 57   Temp 98.2 °F (36.8 °C)   Resp 20   Wt 116.5 kg (256 lb 12.8 oz)   LMP 10/29/2018 (Exact Date)   SpO2 98%   BMI 39.05 kg/m²        Temp (24hrs), Av.7 °F (36.5 °C), Min:97 °F (36.1 °C), Max:98.2 °F (36.8 °C)      Oxygen Therapy  O2 Sat (%): 98 % (22 0720)  Pulse via Oximetry: 67 beats per minute (01/26/22 2002)  O2 Device: None (Room air) (01/26/22 2002)  O2 Flow Rate (L/min): 0 l/min (01/26/22 1837)      Physical Exam:    General:    Alert, cooperative, no distress, appears stated age. Eyes:   PERRLA EOMI Anicteric  Head:   Normocephalic, without obvious abnormality, atraumatic. ENT:  Nares normal. No drainage. Lungs:   Clear to auscultation bilaterally. No Wheezing or Rhonchi. No rales. Heart:  Regular rate and rhythm,  no murmur, rub or gallop. No JVD. Abdomen:   Soft, non-tender. Not distended. Bowel sounds normal.   MSK:  No edema. No clubbing or cyanosis. No deformities/lesions. Skin:     Texture, turgor normal. No rashes or lesions. No Jaundice. Neurologic: Alert and oriented x 3, no focal deficits   Psychiatry:      No depression/anxiety. Mood congruent for context. Heme/Lymph/Immune: No echymoses or overt signs of bleeding. Lab/Data Review:  Recent Results (from the past 24 hour(s))   HEMOGLOBIN    Collection Time: 01/26/22  8:00 PM   Result Value Ref Range    HGB 11.5 (L) 11.7 - 15.4 g/dL   HEMOGLOBIN    Collection Time: 01/27/22  4:46 AM   Result Value Ref Range    HGB 10.9 (L) 11.7 - 15.4 g/dL       Imaging:  No results found. Cultures: All Micro Results     None          Active Problems:     Principal Diagnosis Status post right knee replacement    Hospital Problems as of 1/27/2022 Date Reviewed: 1/19/2022          Codes Class Noted - Resolved POA    Osteoarthritis of right knee ICD-10-CM: M17.11  ICD-9-CM: 715.96  1/26/2022 - Present Yes        * (Principal) Status post right knee replacement ICD-10-CM: F79.879  ICD-9-CM: V43.65  1/26/2022 - Present Yes        THEE (obstructive sleep apnea) ICD-10-CM: G47.33  ICD-9-CM: 327.23  4/19/2021 - Present Yes        Obesity (BMI 30-39. 9) ICD-10-CM: E66.9  ICD-9-CM: 278.00  12/15/2020 - Present Yes        Systolic CHF, chronic (HCC) ICD-10-CM: I50.22  ICD-9-CM: 428.22, 428.0  12/18/2017 - Present Yes        Coronary artery disease of native artery of native heart with stable angina pectoris [facility]) ICD-10-CM: I25.118  ICD-9-CM: 414.01, 413.9  12/18/2017 - Present Yes              Thank you for allowing us to participate in the care of this patient. We will gladly follow along with you.     Sin Hale, DO  [facility] Hospitalist Service  1/27/2022

## 2022-01-27 NOTE — PROGRESS NOTES
Problem: Mobility Impaired (Adult and Pediatric)  Goal: *Acute Goals and Plan of Care (Insert Text)  Outcome: Progressing Towards Goal  Note: GOALS (1-4 days):  (1.)Ms. Allie Rebollar will move from supine to sit and sit to supine  in bed with INDEPENDENT. (2.)Ms. Allie Rebollar will transfer from bed to chair and chair to bed with INDEPENDENT using the least restrictive device. (3.)Ms. Allie Rebollar will ambulate with INDEPENDENT for 250 feet with the least restrictive device. (4.)Ms. Allie Rebollar will ambulate up/down 0-3 steps with bilateral  railing with MINIMAL ASSIST with no device. (5.)Ms. Allie Rebollar will increase right knee ROM to 0°-90°.  ________________________________________________________________________________________________       PHYSICAL THERAPY JOINT CAMP TKA: Daily Note and AM 1/27/2022  OUTPATIENT: Hospital Day: 2  Payor: [facility] [place] MEDICARE / Plan: [employer] [place] MEDICARE [place]/PPO / Product Type: Managed Care Medicare /      NAME/AGE/GENDER: Clay Kaur is a 46 y.o. female   PRIMARY DIAGNOSIS:  Primary osteoarthritis of right knee [M17.11]   Procedure(s) and Anesthesia Type:     * RIGHT KNEE ARTHROPLASTY TOTAL ROBOTIC ASSISTED ARJUN/BOBBI  , ADDUCTOR CANAL BLOCK - Spinal (Right)  ICD-10: Treatment Diagnosis:    · Pain in Right Knee (M25.561)  · Stiffness of Right Knee, Not elsewhere classified (M25.661)  · Difficulty in walking, Not elsewhere classified (R26.2)  · Other abnormalities of gait and mobility (R26.89)      ASSESSMENT:     Ms. Allie Rebollar presents with decreased strength and range of motion right lower extremity and with decreased independence with functional mobility s/p right TKA. Pt will benefit from skilled PT interventions to maximize independence with functional mobility and TKA management. Pt did okay with assessment. Pt supine on arrival, requesting to walk to the bathroom. Pt agreeable to get to bedside commode, as it was determined pt did not have sensation enough to ambulate to bathroom.  With assistance, gait belt, and elevated bed, pt was able to perform sit to stand and stand at walker. Pt unable to advance foot to take a step. Pt then assisted with squat pivot transfer to and from bedside commode. Pt returned supine. Pt supine with needs in reach. Pt instructed not to get up without assistance. Hope to progress mobility and exercises in the morning. Pt plans to discharge to home with continued therapy for follow up.   1/27 supine upon arrival.  Performs R knee exercises with guidance in the bed. Supine>eob with CGA using rail for support. Sit>stand with CGA and walker for support. Ambulated 50 ft using RW with CGA while working on gait sequence. Rested in the chair then ambulated another 50 ft back to the room using RW with CGA. Return to the chair with needs in reach, ice to R knee and instructed to call for assist.  Will see this afternoon for 2nd tx. This section established at most recent assessment   PROBLEM LIST (Impairments causing functional limitations):  1. Decreased Strength  2. Decreased ADL/Functional Activities  3. Decreased Transfer Abilities  4. Decreased Ambulation Ability/Technique  5. Decreased Flexibility/Joint Mobility  6. Edema/Girth  7. Decreased Wells with Home Exercise Program   INTERVENTIONS PLANNED: (Benefits and precautions of physical therapy have been discussed with the patient.)  1. Bed Mobility  2. Cold  3. Gait Training  4. Home Exercise Program (HEP)  5. Range of Motion (ROM)  6. Therapeutic Activites  7. Therapeutic Exercise/Strengthening  8. Transfer Training     TREATMENT PLAN: Frequency/Duration: Follow patient BID for duration of hospital stay to address above goals. Rehabilitation Potential For Stated Goals: Good     RECOMMENDED REHABILITATION/EQUIPMENT: (at time of discharge pending progress): Continue Skilled Therapy and Home Health: Physical Therapy.               HISTORY:   History of Present Injury/Illness (Reason for Referral):  Pt s/p total knee arthroplasty on 1/26  Past Medical History/Comorbidities:   Ms. Guerda Weaver  has a past medical history of Abnormal Papanicolaou smear of cervix, Adverse effect of anesthesia, Arthritis, CAD (coronary artery disease), CHF (congestive heart failure) ([address] Fibroid, uterine, GERD (gastroesophageal reflux disease), H/O echocardiogram (10/04/2021), Heart murmur, History of anemia, Hypercholesterolemia, Hypertension, Menorrhagia with regular cycle, Morbid obesity ([address] NICM (nonischemic cardiomyopathy) ([facility] [address] THEE on CPAP, Ovarian cyst, PVCs (premature ventricular contractions), and Uterine fibroid. Ms. Guerda Weaver  has a past surgical history that includes hx tubal ligation; hx wisdom teeth extraction; hx hysteroscopy; hx afib ablation (02/2018); hx nicole and bso (02/12/2019); hx other surgical; hx heart catheterization; and hx orthopaedic (Right). Social History/Living Environment:   Home Environment: Private residence  One/Two Story Residence: One story  Living Alone: No  Support Systems: Child(althea)  Patient Expects to be Discharged to[de-identified] Home with home health ([facility]/PT)  Current DME Used/Available at Home: None  Prior Level of Function/Work/Activity:  Pt independent with ambulation. Number of Personal Factors/Comorbidities that affect the Plan of Care: 0: LOW COMPLEXITY   EXAMINATION:   Most Recent Physical Functioning:               RLE AROM  R Knee Flexion: 75  R Knee Extension: 7            Bed Mobility  Supine to Sit: Contact guard assistance  Sit to Supine:  (left in the chair)  Scooting: Contact guard assistance    Transfers  Sit to Stand: Contact guard assistance  Stand to Sit: Contact guard assistance  Bed to Chair: Contact guard assistance    Balance  Sitting: Intact  Standing: Pull to stand; With support              Weight Bearing Status  Right Side Weight Bearing: As tolerated  Distance (ft): 100 Feet (ft)  Ambulation - Level of Assistance: Contact guard assistance  Assistive Device: Walker, rolling  Speed/Janna: Shuffled; Slow  Gait Abnormalities: Decreased step clearance        Braces/Orthotics: none    Right Knee Cold  Type: Cryocuff      Body Structures Involved:  1. Joints  2. Muscles Body Functions Affected:  1. Movement Related Activities and Participation Affected:  1. Mobility  2. Self Care   Number of elements that affect the Plan of Care: 4+: HIGH COMPLEXITY   CLINICAL PRESENTATION:   Presentation: Stable and uncomplicated: LOW COMPLEXITY   CLINICAL DECISION MAKING:   [facility] AM-PAC 6 Clicks   Basic Mobility Inpatient Short Form  How much difficulty does the patient currently have. .. Unable A Lot A Little None   1. Turning over in bed (including adjusting bedclothes, sheets and blankets)? [] 1   [] 2   [x] 3   [] 4   2. Sitting down on and standing up from a chair with arms ( e.g., wheelchair, bedside commode, etc.)   [] 1   [] 2   [x] 3   [] 4   3. Moving from lying on back to sitting on the side of the bed? [] 1   [] 2   [x] 3   [] 4   How much help from another person does the patient currently need. .. Total A Lot A Little None   4. Moving to and from a bed to a chair (including a wheelchair)? [] 1   [] 2   [x] 3   [] 4   5. Need to walk in hospital room? [] 1   [] 2   [x] 3   [] 4   6. Climbing 3-5 steps with a railing? [] 1   [] 2   [x] 3   [] 4   © 2007, Trustees of [facility], under license to [employer]. All rights reserved     Score:  Initial: 18 Most Recent: X (Date: -- )    Interpretation of Tool:  Represents activities that are increasingly more difficult (i.e. Bed mobility, Transfers, Gait).     Medical Necessity:     · Patient is expected to demonstrate progress in   · strength, range of motion, and functional technique  ·  to   · decrease assistance required with functional mobility and TKA managment  · .  Reason for Services/Other Comments:  · Patient continues to require skilled intervention due to   · Inability to complete functional mobility and TKA management independently  · . Use of outcome tool(s) and clinical judgement create a POC that gives a: Clear prediction of patient's progress: LOW COMPLEXITY            TREATMENT:   (In addition to Assessment/Re-Assessment sessions the following treatments were rendered)     Pre-treatment Symptoms/Complaints:  agreeable  Pain Initial:   Pain Intensity 1: 3 (about the same)  Post Session:       Gait Training (25 Minutes):  Gait training to improve and/or restore physical functioning as related to mobility. Ambulated 100 Feet (ft) with Contact guard assistance using a Walker, rolling and minimal   related to their knee position and motion to promote proper body alignment. Therapeutic Exercise: (15 Minutes):  Exercises per grid below to improve strength and balance. Required minimal verbal cues to promote proper body alignment. Progressed range as indicated.      Date:  1/27 Date:   Date:     ACTIVITY/EXERCISE AM PM AM PM AM PM     []  []  []  []  []  []   Ankle Pumps 15        Quad Sets 15        Gluteal Sets 15        Hip ABd/ADduction 15        Straight Leg Raises 15        Knee Slides 15        Short Arc Quads 15        Chair Slides 15                          B = bilateral; AA = active assistive; A = active; P = passive      Treatment/Session Assessment:     Response to Treatment:  Participated well    Education:  [] Home Exercises  [x] Fall Precautions  [x] Use of Cold Therapy Unit [] D/C Instruction Review  [] Knee Prosthesis Review  [x] Walker Management/Safety [x] Adaptive Equipment as Needed  [] No pillow under knee       Interdisciplinary Collaboration:   o Physical Therapy Assistant  o Registered Nurse    After treatment position/precautions:   o Up in chair  o Bed/Chair-wheels locked  o Bed in low position  o Caregiver at bedside  o Call light within reach  o RN notified  o Family at bedside    Compliance with Program/Exercises: Compliant most of the time, Will assess as treatment progresses. Recommendations/Intent for next treatment session:  Treatment next visit will focus on increasing Ms. Woodard's independence with bed mobility, transfers, gait training, strength/ROM exercises, modalities for pain, and patient education.       Total Treatment Duration:  PT Patient Time In/Time Out  Time In: 0700  Time Out: 0740    Lashae Edwards, PTA

## 2022-01-27 NOTE — PROGRESS NOTES
Problem: Mobility Impaired (Adult and Pediatric)  Goal: *Acute Goals and Plan of Care (Insert Text)  Outcome: Progressing Towards Goal  Note: GOALS (1-4 days):  (1.)Ms. Lele Sage will move from supine to sit and sit to supine  in bed with INDEPENDENT. (2.)Ms. Lele Sage will transfer from bed to chair and chair to bed with INDEPENDENT using the least restrictive device. (3.)Ms. Lele Sage will ambulate with INDEPENDENT for 250 feet with the least restrictive device. (4.)Ms. Lele Sgae will ambulate up/down 0-3 steps with bilateral  railing with MINIMAL ASSIST with no device. (5.)Ms. Lele Sage will increase right knee ROM to 0°-90°.  ________________________________________________________________________________________________       PHYSICAL THERAPY JOINT CAMP TKA: Daily Note and PM 1/27/2022  OUTPATIENT: Hospital Day: 2  Payor: [facility] MEDICARE / Plan: [facility] MEDICARE HMO/PPO / Product Type: Managed Care Medicare /      NAME/AGE/GENDER: Bird Joshua is a 46 y.o. female   PRIMARY DIAGNOSIS:  Primary osteoarthritis of right knee [M17.11]   Procedure(s) and Anesthesia Type:     * RIGHT KNEE ARTHROPLASTY TOTAL ROBOTIC ASSISTED ARJUN/BOBBI  , ADDUCTOR CANAL BLOCK - Spinal (Right)  ICD-10: Treatment Diagnosis:    · Pain in Right Knee (M25.561)  · Stiffness of Right Knee, Not elsewhere classified (M25.661)  · Difficulty in walking, Not elsewhere classified (R26.2)  · Other abnormalities of gait and mobility (R26.89)      ASSESSMENT:     Ms. Lele Sage presents with decreased strength and range of motion right lower extremity and with decreased independence with functional mobility s/p right TKA. Pt will benefit from skilled PT interventions to maximize independence with functional mobility and TKA management. Pt did okay with assessment. Pt supine on arrival, requesting to walk to the bathroom. Pt agreeable to get to bedside commode, as it was determined pt did not have sensation enough to ambulate to bathroom.  With assistance, gait belt, and elevated bed, pt was able to perform sit to stand and stand at walker. Pt unable to advance foot to take a step. Pt then assisted with squat pivot transfer to and from bedside commode. Pt returned supine. Pt supine with needs in reach. Pt instructed not to get up without assistance. Hope to progress mobility and exercises in the morning. Pt plans to discharge to home with continued therapy for follow up.   1/27 supine upon arrival.  Performs R knee exercises with guidance in the bed. Supine>eob with CGA using rail for support. Sit>stand with CGA and walker for support. Ambulated 50 ft using RW with CGA while working on gait sequence. Rested in the chair then ambulated another 50 ft back to the room using RW with CGA. Return to the chair with needs in reach, ice to R knee and instructed to call for assist.  Will see this afternoon for 2nd tx.  1/27 pm sitting in the chair upon arrival.  Performs R knee exercises with guidance. Sit>stand with SBA. Ambulated 50 ft to the door using RW with SBA. Got nausea when we got to the door, had to sit down. Rested few min then therapist rolled her back to the other side of the bed. Left in chair with needs in reach, ice to R knee and instructed to call for assist.  All question answer and ready for D/C. This section established at most recent assessment   PROBLEM LIST (Impairments causing functional limitations):  1. Decreased Strength  2. Decreased ADL/Functional Activities  3. Decreased Transfer Abilities  4. Decreased Ambulation Ability/Technique  5. Decreased Flexibility/Joint Mobility  6. Edema/Girth  7. Decreased [place] with Home Exercise Program   INTERVENTIONS PLANNED: (Benefits and precautions of physical therapy have been discussed with the patient.)  1. Bed Mobility  2. Cold  3. Gait Training  4. Home Exercise Program (HEP)  5. Range of Motion (ROM)  6. Therapeutic Activites  7.  Therapeutic Exercise/Strengthening  8. Transfer Training     TREATMENT PLAN: Frequency/Duration: Follow patient BID for duration of hospital stay to address above goals. Rehabilitation Potential For Stated Goals: Good     RECOMMENDED REHABILITATION/EQUIPMENT: (at time of discharge pending progress): Continue Skilled Therapy and Home Health: Physical Therapy. HISTORY:   History of Present Injury/Illness (Reason for Referral):  Pt s/p total knee arthroplasty on 1/26  Past Medical History/Comorbidities:   Ms. Juvenal Yip  has a past medical history of Abnormal Papanicolaou smear of cervix, Adverse effect of anesthesia, Arthritis, CAD (coronary artery disease), CHF (congestive heart failure) ([address] Fibroid, uterine, GERD (gastroesophageal reflux disease), H/O echocardiogram (10/04/2021), Heart murmur, History of anemia, Hypercholesterolemia, Hypertension, Menorrhagia with regular cycle, Morbid obesity ([address] NICM (nonischemic cardiomyopathy) ([facility] [address] THEE on CPAP, Ovarian cyst, PVCs (premature ventricular contractions), and Uterine fibroid. Ms. Juvenal Yip  has a past surgical history that includes hx tubal ligation; hx wisdom teeth extraction; hx hysteroscopy; hx afib ablation (02/2018); hx nicole and bso (02/12/2019); hx other surgical; hx heart catheterization; and hx orthopaedic (Right). Social History/Living Environment:   Home Environment: Private residence  One/Two Story Residence: One story  Living Alone: No  Support Systems: Child(althea)  Patient Expects to be Discharged to[de-identified] Home with home health ([facility]/PT)  Current DME Used/Available at Home: None  Prior Level of Function/Work/Activity:  Pt independent with ambulation. Number of Personal Factors/Comorbidities that affect the Plan of Care: 0: LOW COMPLEXITY   EXAMINATION:   Most Recent Physical Functioning:   Gross Assessment: Yes  Gross Assessment  AROM: Within functional limits (BUE)  Strength:  Within functional limits (BUEs)  Coordination: Within functional limits (BUEs)  Tone: Normal  Sensation: Intact        RLE AROM  R Knee Flexion: 75  R Knee Extension: 7            Bed Mobility  Supine to Sit: Contact guard assistance  Sit to Supine:  (left in the chair)  Scooting: Contact guard assistance    Transfers  Sit to Stand: Stand-by assistance  Stand to Sit: Stand-by assistance  Bed to Chair: Stand-by assistance    Balance  Sitting: Intact  Standing: Pull to stand; With support              Weight Bearing Status  Right Side Weight Bearing: As tolerated  Distance (ft): 50 Feet (ft)  Ambulation - Level of Assistance: Stand-by assistance  Assistive Device: Walker, rolling  Speed/Janna: Pace decreased (<100 feet/min)  Gait Abnormalities: Decreased step clearance        Braces/Orthotics: none    Right Knee Cold  Type: Cryocuff      Body Structures Involved:  1. Joints  2. Muscles Body Functions Affected:  1. Movement Related Activities and Participation Affected:  1. Mobility  2. Self Care   Number of elements that affect the Plan of Care: 4+: HIGH COMPLEXITY   CLINICAL PRESENTATION:   Presentation: Stable and uncomplicated: LOW COMPLEXITY   CLINICAL DECISION MAKIN [facility] [address] [id number] AM-PAC 6 Clicks   Basic Mobility Inpatient Short Form  How much difficulty does the patient currently have. .. Unable A Lot A Little None   1. Turning over in bed (including adjusting bedclothes, sheets and blankets)? [] 1   [] 2   [x] 3   [] 4   2. Sitting down on and standing up from a chair with arms ( e.g., wheelchair, bedside commode, etc.)   [] 1   [] 2   [x] 3   [] 4   3. Moving from lying on back to sitting on the side of the bed? [] 1   [] 2   [x] 3   [] 4   How much help from another person does the patient currently need. .. Total A Lot A Little None   4. Moving to and from a bed to a chair (including a wheelchair)? [] 1   [] 2   [x] 3   [] 4   5. Need to walk in hospital room? [] 1   [] 2   [x] 3   [] 4   6. Climbing 3-5 steps with a railing?    [] 1   [] 2   [x] 3   [] 4 © 2007, Trustees of [facility], under license to "[employer]. All rights reserved     Score:  Initial: 18 Most Recent: X (Date: -- )    Interpretation of Tool:  Represents activities that are increasingly more difficult (i.e. Bed mobility, Transfers, Gait). Medical Necessity:     · Patient is expected to demonstrate progress in   · strength, range of motion, and functional technique  ·  to   · decrease assistance required with functional mobility and TKA managment  · .  Reason for Services/Other Comments:  · Patient continues to require skilled intervention due to   · Inability to complete functional mobility and TKA management independently  · . Use of outcome tool(s) and clinical judgement create a POC that gives a: Clear prediction of patient's progress: LOW COMPLEXITY            TREATMENT:   (In addition to Assessment/Re-Assessment sessions the following treatments were rendered)     Pre-treatment Symptoms/Complaints:  agreeable  Pain Initial:   Pain Intensity 1: 3 (about the same)  Post Session:       Gait Training (25 Minutes):  Gait training to improve and/or restore physical functioning as related to mobility. Ambulated 50 Feet (ft) with Stand-by assistance using a Walker, rolling and minimal   related to their knee position and motion to promote proper body alignment. Therapeutic Exercise: (15 Minutes):  Exercises per grid below to improve strength and balance. Required minimal verbal cues to promote proper body alignment. Progressed range as indicated.      Date:  1/27 Date:   Date:     ACTIVITY/EXERCISE AM PM AM PM AM PM     []  []  []  []  []  []   Ankle Pumps 15 15       Quad Sets 15 15       Gluteal Sets 15 15       Hip ABd/ADduction 15 15       Straight Leg Raises 15 15       Knee Slides 15 15       Short Arc Quads 15 15       Chair Slides 15 15                         B = bilateral; AA = active assistive; A = active; P = passive      Treatment/Session Assessment:     Response to Treatment:  Participated well, ready for D/C    Education:  [] Home Exercises  [x] Fall Precautions  [x] Use of Cold Therapy Unit [] D/C Instruction Review  [] Knee Prosthesis Review  [x] Walker Management/Safety [x] Adaptive Equipment as Needed  [] No pillow under knee       Interdisciplinary Collaboration:   o Physical Therapy Assistant  o Registered Nurse    After treatment position/precautions:   o Up in chair  o Bed/Chair-wheels locked  o Bed in low position  o Caregiver at bedside  o Call light within reach  o RN notified  o Family at bedside    Compliance with Program/Exercises: Compliant most of the time, Will assess as treatment progresses. Recommendations/Intent for next treatment session:  Treatment next visit will focus on increasing Ms. Woodard's independence with bed mobility, transfers, gait training, strength/ROM exercises, modalities for pain, and patient education.       Total Treatment Duration:  PT Patient Time In/Time Out  Time In: 1300  Time Out: Avery [id number], PTA

## 2022-01-28 ENCOUNTER — HOME CARE VISIT (OUTPATIENT)
Dept: SCHEDULING | Facility: HOME HEALTH | Age: 52
End: 2022-01-28
Payer: MEDICARE

## 2022-01-28 VITALS
OXYGEN SATURATION: 97 % | HEART RATE: 66 BPM | TEMPERATURE: 97.7 F | SYSTOLIC BLOOD PRESSURE: 138 MMHG | DIASTOLIC BLOOD PRESSURE: 86 MMHG | RESPIRATION RATE: 18 BRPM

## 2022-01-28 PROCEDURE — 3331090002 HH PPS REVENUE DEBIT

## 2022-01-28 PROCEDURE — 400013 HH SOC

## 2022-01-28 PROCEDURE — 3331090001 HH PPS REVENUE CREDIT

## 2022-01-28 PROCEDURE — 400018 HH-NO PAY CLAIM PROCEDURE

## 2022-01-28 PROCEDURE — G0151 HHCP-SERV OF PT,EA 15 MIN: HCPCS

## 2022-01-29 PROCEDURE — 3331090002 HH PPS REVENUE DEBIT

## 2022-01-29 PROCEDURE — 3331090001 HH PPS REVENUE CREDIT

## 2022-01-30 PROCEDURE — 3331090001 HH PPS REVENUE CREDIT

## 2022-01-30 PROCEDURE — 3331090002 HH PPS REVENUE DEBIT

## 2022-01-31 ENCOUNTER — HOME CARE VISIT (OUTPATIENT)
Dept: SCHEDULING | Facility: HOME HEALTH | Age: 52
End: 2022-01-31
Payer: MEDICARE

## 2022-01-31 VITALS
DIASTOLIC BLOOD PRESSURE: 80 MMHG | SYSTOLIC BLOOD PRESSURE: 140 MMHG | RESPIRATION RATE: 17 BRPM | OXYGEN SATURATION: 100 % | HEART RATE: 80 BPM | TEMPERATURE: 98.7 F

## 2022-01-31 PROCEDURE — 3331090001 HH PPS REVENUE CREDIT

## 2022-01-31 PROCEDURE — 3331090002 HH PPS REVENUE DEBIT

## 2022-01-31 PROCEDURE — G0157 HHC PT ASSISTANT EA 15: HCPCS

## 2022-02-01 PROCEDURE — 3331090002 HH PPS REVENUE DEBIT

## 2022-02-01 PROCEDURE — 3331090001 HH PPS REVENUE CREDIT

## 2022-02-02 ENCOUNTER — HOME CARE VISIT (OUTPATIENT)
Dept: SCHEDULING | Facility: HOME HEALTH | Age: 52
End: 2022-02-02
Payer: MEDICARE

## 2022-02-02 VITALS
SYSTOLIC BLOOD PRESSURE: 130 MMHG | OXYGEN SATURATION: 100 % | TEMPERATURE: 98.2 F | RESPIRATION RATE: 17 BRPM | DIASTOLIC BLOOD PRESSURE: 80 MMHG | HEART RATE: 73 BPM

## 2022-02-02 PROCEDURE — 3331090002 HH PPS REVENUE DEBIT

## 2022-02-02 PROCEDURE — 3331090001 HH PPS REVENUE CREDIT

## 2022-02-02 PROCEDURE — G0157 HHC PT ASSISTANT EA 15: HCPCS

## 2022-02-03 ENCOUNTER — HOME CARE VISIT (OUTPATIENT)
Dept: SCHEDULING | Facility: HOME HEALTH | Age: 52
End: 2022-02-03
Payer: MEDICARE

## 2022-02-03 VITALS
RESPIRATION RATE: 17 BRPM | HEART RATE: 72 BPM | OXYGEN SATURATION: 100 % | DIASTOLIC BLOOD PRESSURE: 80 MMHG | SYSTOLIC BLOOD PRESSURE: 140 MMHG | TEMPERATURE: 97.7 F

## 2022-02-03 PROCEDURE — 3331090001 HH PPS REVENUE CREDIT

## 2022-02-03 PROCEDURE — G0157 HHC PT ASSISTANT EA 15: HCPCS

## 2022-02-03 PROCEDURE — 3331090002 HH PPS REVENUE DEBIT

## 2022-02-04 PROCEDURE — 3331090001 HH PPS REVENUE CREDIT

## 2022-02-04 PROCEDURE — 3331090002 HH PPS REVENUE DEBIT

## 2022-02-05 PROCEDURE — 3331090001 HH PPS REVENUE CREDIT

## 2022-02-05 PROCEDURE — 3331090002 HH PPS REVENUE DEBIT

## 2022-02-06 PROCEDURE — 3331090001 HH PPS REVENUE CREDIT

## 2022-02-06 PROCEDURE — 3331090002 HH PPS REVENUE DEBIT

## 2022-02-07 PROCEDURE — 3331090002 HH PPS REVENUE DEBIT

## 2022-02-07 PROCEDURE — 3331090001 HH PPS REVENUE CREDIT

## 2022-02-08 ENCOUNTER — HOME CARE VISIT (OUTPATIENT)
Dept: SCHEDULING | Facility: HOME HEALTH | Age: 52
End: 2022-02-08
Payer: MEDICARE

## 2022-02-08 VITALS
TEMPERATURE: 98.2 F | HEART RATE: 78 BPM | OXYGEN SATURATION: 97 % | DIASTOLIC BLOOD PRESSURE: 80 MMHG | SYSTOLIC BLOOD PRESSURE: 140 MMHG | RESPIRATION RATE: 17 BRPM

## 2022-02-08 PROCEDURE — 3331090001 HH PPS REVENUE CREDIT

## 2022-02-08 PROCEDURE — G0157 HHC PT ASSISTANT EA 15: HCPCS

## 2022-02-08 PROCEDURE — 3331090002 HH PPS REVENUE DEBIT

## 2022-02-09 PROCEDURE — 3331090001 HH PPS REVENUE CREDIT

## 2022-02-09 PROCEDURE — 3331090002 HH PPS REVENUE DEBIT

## 2022-02-10 PROCEDURE — 3331090002 HH PPS REVENUE DEBIT

## 2022-02-10 PROCEDURE — 3331090001 HH PPS REVENUE CREDIT

## 2022-02-11 ENCOUNTER — HOME CARE VISIT (OUTPATIENT)
Dept: SCHEDULING | Facility: HOME HEALTH | Age: 52
End: 2022-02-11
Payer: MEDICARE

## 2022-02-11 VITALS
OXYGEN SATURATION: 98 % | TEMPERATURE: 98.1 F | SYSTOLIC BLOOD PRESSURE: 130 MMHG | HEART RATE: 78 BPM | DIASTOLIC BLOOD PRESSURE: 80 MMHG | RESPIRATION RATE: 17 BRPM

## 2022-02-11 PROCEDURE — 3331090001 HH PPS REVENUE CREDIT

## 2022-02-11 PROCEDURE — 3331090002 HH PPS REVENUE DEBIT

## 2022-02-11 PROCEDURE — G0157 HHC PT ASSISTANT EA 15: HCPCS

## 2022-02-12 PROCEDURE — 3331090002 HH PPS REVENUE DEBIT

## 2022-02-12 PROCEDURE — 3331090001 HH PPS REVENUE CREDIT

## 2022-02-13 PROCEDURE — 3331090002 HH PPS REVENUE DEBIT

## 2022-02-13 PROCEDURE — 3331090001 HH PPS REVENUE CREDIT

## 2022-02-14 PROCEDURE — 3331090002 HH PPS REVENUE DEBIT

## 2022-02-14 PROCEDURE — 3331090001 HH PPS REVENUE CREDIT

## 2022-02-15 ENCOUNTER — HOME CARE VISIT (OUTPATIENT)
Dept: SCHEDULING | Facility: HOME HEALTH | Age: 52
End: 2022-02-15
Payer: MEDICARE

## 2022-02-15 VITALS
OXYGEN SATURATION: 98 % | DIASTOLIC BLOOD PRESSURE: 84 MMHG | SYSTOLIC BLOOD PRESSURE: 140 MMHG | TEMPERATURE: 97.8 F | HEART RATE: 78 BPM | RESPIRATION RATE: 18 BRPM

## 2022-02-15 PROCEDURE — 3331090001 HH PPS REVENUE CREDIT

## 2022-02-15 PROCEDURE — 3331090002 HH PPS REVENUE DEBIT

## 2022-02-15 PROCEDURE — G0151 HHCP-SERV OF PT,EA 15 MIN: HCPCS

## 2022-03-14 ENCOUNTER — HOSPITAL ENCOUNTER (OUTPATIENT)
Dept: PHYSICAL THERAPY | Age: 52
Discharge: HOME | End: 2022-03-14
Payer: MEDICARE

## 2022-03-14 DIAGNOSIS — Z96.651 STATUS POST RIGHT KNEE REPLACEMENT: ICD-10-CM

## 2022-03-14 PROCEDURE — 97161 PT EVAL LOW COMPLEX 20 MIN: CPT

## 2022-03-14 NOTE — THERAPY EVALUATION
Jonah Arevalo Vance  : 1970  Primary: [place] Hm*  Secondary:  [facility]  [address] [place] [address], [facility] [address]  [phone]   [phone]           OUTPATIENT PHYSICAL THERAPY:Initial Assessment 3/14/2022    ICD-10: Treatment Diagnosis:  Pain in right knee (M25.561)  Effusion, right knee (M25.461)  Stiffness of right knee, not elsewhere classified (M25.661)          Precautions/Allergies:   Atorvastatin and Metformin   Fall Risk Score: 0 (? 5 = High Risk)  MD Orders: Eval and Treat  MEDICAL/REFERRING DIAGNOSIS:  Status post right knee replacement [Z96.651]   DATE OF ONSET: 2022  REFERRING PHYSICIAN: Huyen Kearns MD  RETURN PHYSICIAN APPOINTMENT: 22     INITIAL ASSESSMENT:  Ms. Kaci Galindo presents to physical therapy with decreased strength, ROM, joint mobility, functional mobility. These S/S are consistent with RIGHT TKA . She had significant time getting back to treatment room and required walking plus assistance. She is a transfer for [facility] - Therapy @ [facility]. Current ROM measurements:    Flexion: 90 deg   Extension: 0 DEGREES    SHE WOULD LIKE TO WAIT ON FUTURE SCHEDULING UNTIL SHE HEARS BACK FROM FINANCIAL ASSISTANCE - CURRENTLY HAS $40 COPAY AFFECTING ATTEDNANCE TO THERAPY. 90 deg flexion- Note: has not been completing heel slides at home and was first introduced to this today. Recommending manual techniques to improve ROM and pain. Current comparative sign: lacking TKE with gait, use of AD (cane) currently. Patient will benefit from skilled physical therapy for manual therapeutic techniques (as appropriate), therapeutic exercises and activities, balance and comprehensive home exercises program to address current impairments and functional limitations.     Oral Howard will benefit from skilled PT (medically necessary) in order to address above deficits affecting participation in basic ADLs and overall functional tolerance. PROBLEM LIST (Impacting functional limitations):   1. Decreased Strength  2. Decreased ADL/Functional Activities  3. Decreased Transfer Abilities  4. Decreased Ambulation Ability/Technique  5. Decreased Balance  6. Increased Pain  7. Decreased Joint mobility/Flexibility   8. Decreased Activity Tolerance  9. Decreased [place] with Home Exercise Program INTERVENTIONS PLANNED:   1. Balance Exercise  2. Bed Mobility  3. Cold  4. Cryotherapy  5. Family Education  6. Gait Training  7. Heat  8. Home Exercise Program (HEP)  9. Manual Therapy  10. Neuromuscular Re-education/Strengthening  11. Range of Motion (ROM)  12. Therapeutic Activites  13. Therapeutic Exercise/Strengthening  14. Transfer Training  15. Ultrasound (US)  16. Vasopneumatic Compression  17. Aquatic Therapy          TREATMENT PLAN:  Effective Dates: 3/14/2022 TO 6/12/2022 (90 days). Frequency/Duration: 2-3 times a week for 90 Days    GOALS: (Goals have been discussed and agreed upon with patient.)   Short-Term Goals~8 weeks  Goal Met   1. Courtney Amador will be independent with HEP for strength and ROM 1.  [] Date:   2. Courtney Amador will improve MMT RIGHT LE to >=4+/5 to improve current level of independence and community reintegration. 2.  [] Date:   3. Courtney Amador will demonstrate RIGHT knee flexion >= 110 degrees to improve functional mobility and tolerance of ADLs. 3.  [] Date:   4. Courtney Amador will be able to perform SLR x 10 with no rest break and full appropriate ROM. 4.  [] Date:   5. Courtney Amador will be able to go up and down stairs with <=2/10 pain and reciprocal fashion without difficulty. 5.  [] Date:   6. Isaak Woodard to increase lower extremity functional scale by 10 points to show improvement in areas of difficulty 6. [] Date:   7. Courtney Amador will be able to walk >20 minutes with <=1/10 pain and equal step length 7.  [] Date:   8.  8.  [] Date:   9. 9.  [] Date:               Outcome Measure: Tool Used: Knee Injury and Osteoarthritis Outcome Form (KOOS-JR.)  SCORE: None (0) Mild (1) Moderate (2) Severe (3) Extreme (4)   Stiffness:         1. How severe is your knee stiffness after first waking in the morning? [] [x] [] [] []   Pain:         What amount of knee pain have you experienced in the last week   doing the following activities? 2. Twisting/pivoting on your knee: [] [] [x] [] []   3. Straightening knee fully: [] [] [x] [] []   4. Going up or down stairs [] [] [x] [] []   5. Standing upright [] [] [] [] [x]   Function, daily living        6. Rising from sitting [] [] [x] [] []   7. Bending to floor/ an object [] [] [x] [] []     Score:  Initial: 15 (Interval: 50.012) 3/14/2022/28 Most Recent: TBD/28 (Date: -- )   Interpretation of Score: Questions each scored on a 4 point scale with 4 representing the worst possible score and 0 representing the best possible score. The higher the point total, the worse the knee pain translating to a lower percentage of patient functioning. Medical Necessity:   · Skilled intervention continues to be required due to current impairment. Reason for Services/Other Comments:  · Patient continues to require skilled intervention due to patient continues to present with impairments assessed at initial evaluation and requiring skilled physical therapy to meet goals for PT. Total Treatment Duration:         Rehabilitation Potential For Stated Goals: Good  Regarding Merle Woodard's therapy, I certify that the treatment plan above will be carried out by a therapist or under their direction.   Thank you for this referral,  Kim Diamond DPT     Referring Physician Signature: Johann Holt MD              Date                  HISTORY:   History of Present Injury/Illness (Reason for Referral):  *\"Well for years I have been telling my knees have been getting bad and aching and swelling and red and on fire. I had hip surgery and before I could get released from that my R knee started flaring up again. They said I was bone on bone for my knee. I met this lady, random lady, at [facility] and she highly recommended Dr. Roberto Carlos Martin. I had hip surgery November 13, 2020. I got an appointment Octo 2021 and was advised for surgery. I was on schedule for injections where I was and was told not too because there wasn't room (bone on bone). I decided to get surgery Jan 2022 and I have my L knee scheduled for April 2022.          -Present Symptoms (on day of evaluation): R knee pain      Pain Severity:  · Currently: 3/10  · Best: 3/10  · Worst: 7/10    · Aggravating factors: going up and down stairs, standing, walking   · Relieving factors: Ice  · Irritability: Medium (Onset of pain is equal to alleviation)    Past Medical History/Comorbidities:   Clay Kaur is \"disabled on heart condition\" and is able to drive        Ms. Allie Rebollar  has a past medical history of Abnormal Papanicolaou smear of cervix, Adverse effect of anesthesia, Arthritis, CAD (coronary artery disease), CHF (congestive heart failure) ([address] Fibroid, uterine, GERD (gastroesophageal reflux disease), H/O echocardiogram (10/04/2021), Heart murmur, History of anemia, Hypercholesterolemia, Hypertension, Menorrhagia with regular cycle, Morbid obesity ([address] NICM (nonischemic cardiomyopathy) ([address] THEE on CPAP, Ovarian cyst, PVCs (premature ventricular contractions), and Uterine fibroid. Ms. Allie Rebollar  has a past surgical history that includes hx tubal ligation; hx wisdom teeth extraction; hx hysteroscopy; hx afib ablation (02/2018); hx nicole and bso (02/12/2019); hx other surgical; hx heart catheterization; and hx orthopaedic (Right).     Active Ambulatory Problems     Diagnosis Date Noted    Intramural, submucous, and subserous leiomyoma of uterus 11/01/2017    NICM (nonischemic cardiomyopathy) ([address] 54/09/3080    Systolic CHF, chronic ([address] 12/18/2017    Coronary artery disease of native artery of native heart with stable angina pectoris ([address] 12/18/2017    PVC's (premature ventricular contractions) 12/18/2017    Chronic fatigue 12/18/2017    S/P ablation of ventricular arrhythmia 02/12/2018    Menorrhagia with regular cycle 05/07/2018    Severe obesity with body mass index (BMI) of 35.0 to 39.9 with serious comorbidity ([address] 07/05/2018    Abnormal uterine bleeding (AUB) 02/12/2019    S/P abdominal supracervical subtotal hysterectomy 02/12/2019    Obesity (BMI 30-39.9) 12/15/2020    THEE (obstructive sleep apnea) 04/19/2021    Noncompliance with CPAP treatment 01/05/2022    Osteoarthritis of right knee 01/26/2022    Status post right knee replacement 01/26/2022     Resolved Ambulatory Problems     Diagnosis Date Noted    Atrial fibrillation ([address] 02/12/2018     Past Medical History:   Diagnosis Date    Abnormal Papanicolaou smear of cervix     Adverse effect of anesthesia     Arthritis     CAD (coronary artery disease)     CHF (congestive heart failure) (HCC)     Fibroid, uterine     GERD (gastroesophageal reflux disease)     H/O echocardiogram 10/04/2021    Heart murmur     History of anemia     Hypercholesterolemia     Hypertension     Morbid obesity ([address]     THEE on CPAP     Ovarian cyst     PVCs (premature ventricular contractions)     Uterine fibroid      Social History/Living Environment:        Social History     Socioeconomic History    Marital status: SINGLE     Spouse name: Not on file    Number of children: Not on file    Years of education: Not on file    Highest education level: Not on file   Occupational History    Not on file   Tobacco Use    Smoking status: Never Smoker    Smokeless tobacco: Never Used   Substance and Sexual Activity    Alcohol use: No    Drug use: No    Sexual activity: Not Currently     Birth control/protection: Surgical     Comment: tubal / hysterectomy   Other Topics Concern    Not on file Social History Narrative    Not on file     Social Determinants of Health     Financial Resource Strain:     Difficulty of Paying Living Expenses: Not on file   Food Insecurity:     Worried About Running Out of Food in the Last Year: Not on file    [place]uz of Food in the Last Year: Not on file   Transportation Needs:     Lack of Transportation (Medical): Not on file    Lack of Transportation (Non-Medical): Not on file   Physical Activity:     Days of Exercise per Week: Not on file    Minutes of Exercise per Session: Not on file   Stress:     Feeling of Stress : Not on file   Social Connections:     Frequency of Communication with Friends and Family: Not on file    Frequency of Social Gatherings with Friends and Family: Not on file    Attends Catholic Services: Not on file    Active Member of [address] [place] [address] [employer] or Organizations: Not on file    Attends Club or Organization Meetings: Not on file    Marital Status: Not on file   Intimate Partner Violence:     Fear of Current or Ex-Partner: Not on file    Emotionally Abused: Not on file    Physically Abused: Not on file    Sexually Abused: Not on file   Housing Stability:     Unable to Pay for Housing in the Last Year: Not on file    Number of [place] in the Last Year: Not on file    Unstable Housing in the Last Year: Not on file     Prior Level of Function/Work/Activity:  Normal  Previous Treatment Approach  Previous Physical Therapy   Other Clinical Tests:  X-RAY Negative for fx  Current Medications:    Current Outpatient Medications:     gabapentin (NEURONTIN) 100 mg capsule, Take 1 Capsule by mouth three (3) times daily. Indications: acute pain following an operation, Disp: 90 Capsule, Rfl: 1    vitamin e (E GEMS) 1,000 unit capsule, Take 1,000 Units by mouth daily. , Disp: , Rfl:     oxyCODONE IR (ROXICODONE) 5 mg immediate release tablet, Take 5 mg by mouth every eight (8) hours as needed for Pain., Disp: , Rfl:     meloxicam (MOBIC) 7.5 mg tablet, Take 1 Tablet by mouth two (2) times a day. (Patient taking differently: Take 7.5 mg by mouth as needed.), Disp: 60 Tablet, Rfl: 0    methocarbamoL (ROBAXIN) 500 mg tablet, Take 1 Tablet by mouth three (3) times daily as needed for Muscle Spasm(s). , Disp: 24 Tablet, Rfl: 0    promethazine (PHENERGAN) 25 mg tablet, Take 1 Tablet by mouth every eight (8) hours as needed for Nausea. Indications: nausea and vomiting after surgery, Disp: 20 Tablet, Rfl: 0    cholecalciferol (VITAMIN D3) (400 Units /10 mcg) tab tablet, Take 400 Units by mouth daily. , Disp: , Rfl:     diclofenac (VOLTAREN) 1 % gel, Apply  to affected area as needed. , Disp: , Rfl:     ondansetron hcl (Zofran) 4 mg tablet, Take 4 mg by mouth every eight (8) hours as needed for Nausea or Vomiting., Disp: , Rfl:     simvastatin (ZOCOR) 20 mg tablet, Take 20 mg by mouth nightly., Disp: , Rfl:     carvediloL (COREG) 12.5 mg tablet, Take 1 Tab by mouth two (2) times daily (with meals). (Patient taking differently: Take 12.5 mg by mouth nightly.), Disp: 180 Tab, Rfl: 3    spironolactone (ALDACTONE) 25 mg tablet, Take 1 Tab by mouth daily. , Disp: 30 Tab, Rfl: 6    amLODIPine (NORVASC) 5 mg tablet, Take 1 Tab by mouth daily. , Disp: 90 Tab, Rfl: 3    sacubitriL-valsartan (Entresto)  mg tablet, Take 1 Tab by mouth two (2) times a day. (Patient taking differently: Take 1 Tablet by mouth two (2) times a day. 1 tablet in the am and 1/2 tablet in the pm), Disp: 60 Tab, Rfl: 11      Ambulatory/Rehab Services H2 Model Falls Risk Assessment    Risk Factors:       No Risk Factors Identified Ability to Rise from Chair:       (0)  Ability to rise in a single movement    Falls Prevention Plan:       No modifications necessary   Total: (5 or greater = High Risk): 0    ©2010 [facility]. All Rights Reserved. [facility] Patent #[id number].  Federal Law prohibits the replication, distribution or use without written permission from [employer]         Date Last Reviewed:  3/14/2022   Number of Personal Factors/Comorbidities that affect the Plan of Care: 0: LOW COMPLEXITY   EXAMINATION:   Observation/Orthostatic Postural Assessment:   Gait:  Uses cane in L hand--required walker to get back to therapy room on IE    Palpation:  Assessed @ Initial Visit: Tenderness to Incision sites and R patellar region    AROM/PROM         Joint: Eval Date: 3/14/22  Re-Assess Date:  Re-Assess Date:    Active ROM RIGHT LEFT RIGHT LEFT RIGHT LEFT   Knee Extension 0 deg +2           Knee Flexion 90 deg painful - has not been completing heel slides at home and was first introduced to this today. [address] [place] [address]      Hip Flexion             Ankle mobility                                                 Passive ROM         Knee Extension             Knee Flexion               Strength:     Eval Date: 3/14/22/22  Re-Assess Date:  Re-Assess Date:      RIGHT LEFT RIGHT LEFT RIGHT LEFT   Knee Flexion  3+/5  5/5           Knee Extension  3+/5  5/5           Hip Flexion  4-/5  5/5           Hip Abduction  3+/5  5/5           Hip Extension  4-/5  5/5           Ankle Dorsiflexion   4+/5 5/5           Ankle PF 5/5 5/5               Neurological Screen:  No radiating symptoms down leg    Functional Mobility:   Sit to Stand=  Requires assistance with sit to stand. Squat=Cannot perform  Single Leg Step Down=Cannot perform. Balance and Mobility:  Test Result   Timed up and Go >10 Seconds          Body Structures Involved:  1. Bones  2. Joints  3. Muscles  4. Ligaments Body Functions Affected:  1. Sensory/Pain  2. Neuromusculoskeletal  3. Movement Related Activities and Participation Affected:  1. Mobility  2.  Self Care   Number of elements that affect the Plan of Care: 4+: HIGH COMPLEXITY   CLINICAL PRESENTATION:   Presentation: Stable and uncomplicated: LOW COMPLEXITY   CLINICAL DECISION MAKING:      Use of outcome tool(s) and clinical judgement create a POC that gives a: Clear prediction of patient's progress: LOW COMPLEXITY   See treatment note for associated treatment provided today.       Future Appointments   Date Time Provider Britney Burgos   3[facility]14/2022  9[facility]   4/14/2022  8:30 AM MD UYEN Carballo POAI POA   6/20/2022  8:00 AM BATOOL Lopez PSCD PP   8/17/2022 11:15 AM Nikole Medley DO [address] [facility] [address]

## 2022-03-14 NOTE — PROGRESS NOTES
Silverioikt Woodard  : 1970  Payor: Sharee Armenta [facility] MEDICARE / Plan: [facility] MEDICARE HMO/PPO / Product Type: Managed Care Medicare /  [facility] [place]  at [facility] Therapy  [address], [address]  [phone]   [phone]                                                            Sharran Schilder, MD      OUTPATIENT PHYSICAL THERAPY: Daily Treatment Note 3/14/2022 Visit Count:  Visit count could not be calculated. Make sure you are using a visit which is associated with an episode. Tx Diagnosis:  Pain in right knee (M25.561)  Effusion, right knee (M25.461)  Stiffness of right knee, not elsewhere classified (M25.661)        Pre-treatment Symptoms/Complaints: See Initial Eval Dated 3.14.22 for more details. Pain: Initial:5/10  Medications Last Reviewed:  3/14/2022     Post Session: 3/10   Updated Objective Findings: See Initial Eval for more details. TREATMENT:   THERAPEUTIC EXERCISE: (UNBILLABLE minutes):  Exercises per grid below to improve mobility, strength and balance. Required minimal visual, verbal and manual cues to promote proper body alignment and promote proper body posture. Progressed resistance and complexity of movement as indicated. Date:  3/14/2022 Date:   Date:     Activity/Exercise Parameters Parameters Parameters   Education HEP, POC, PT goals, anatomy/pathology     Heel slides 10     Quad Set 10x10\"     NuStep      Walking                          MANUAL THERAPY: (UNBILLABLE  minutes): Joint mobilization, Soft tissue mobilization was utilized and necessary because of the patient's restricted joint motion and restricted motion of soft tissue mobility.         Date  3/14/2022    Technique Used Grade  Level # Time(s) Effect while being performed   STM Supine L knee to improve ROM, pain                                                              HEP Log Date 1.    3/14/2022   2.  3/14/2022   3. 3/14/2022   4.    5. [employer] [facility]  Treatment/Session Summary:    Response to Treatment: Pt demonstrated understanding of POC and initial HEP. No increase in pain or adverse reactions. Communication/Consultation:  POC, HEP, PT goals, Faxed initial evaluation to MD.   Equipment provided today: HEP Handout   Recommendations/Intent for next treatment session:   Next visit will focus on Manual Therapy Quad strengthening soft tissue mobilization gait training, ROM. Treatment Plan of Care Effective Dates: 3/14/2022 TO 6/12/2022 (90 days).   Frequency/Duration: 2-3 times a week for 90 Days             Total Treatment Billable Duration:   UNBILLABLE  Rx plus Eval      Valeriano Arredondo DPT    Future Appointments   Date Time Provider Britney Loretta   3/14/2022  9:[facility]   4/14/2022  8:30 AM MD UYEN Wilson PONOELLE POA   6/20/2022  8:00 AM BATOOL Vasquez PSCD PP   8/17/2022 11:15 AM DO UYEN TorresD

## 2022-03-18 PROBLEM — D25.2 INTRAMURAL, SUBMUCOUS, AND SUBSEROUS LEIOMYOMA OF UTERUS: Status: ACTIVE | Noted: 2017-11-01

## 2022-03-18 PROBLEM — N93.9 ABNORMAL UTERINE BLEEDING (AUB): Status: ACTIVE | Noted: 2019-02-12

## 2022-03-18 PROBLEM — Z91.14 NONCOMPLIANCE WITH CPAP TREATMENT: Status: ACTIVE | Noted: 2022-01-05

## 2022-03-18 PROBLEM — Z91.199 NONCOMPLIANCE WITH CPAP TREATMENT: Status: ACTIVE | Noted: 2022-01-05

## 2022-03-18 PROBLEM — D25.1 INTRAMURAL, SUBMUCOUS, AND SUBSEROUS LEIOMYOMA OF UTERUS: Status: ACTIVE | Noted: 2017-11-01

## 2022-03-18 PROBLEM — D25.0 INTRAMURAL, SUBMUCOUS, AND SUBSEROUS LEIOMYOMA OF UTERUS: Status: ACTIVE | Noted: 2017-11-01

## 2022-03-19 PROBLEM — Z96.651 STATUS POST RIGHT KNEE REPLACEMENT: Status: ACTIVE | Noted: 2022-01-26

## 2022-03-19 PROBLEM — Z98.890 S/P ABLATION OF VENTRICULAR ARRHYTHMIA: Status: ACTIVE | Noted: 2018-02-12

## 2022-03-19 PROBLEM — M17.11 OSTEOARTHRITIS OF RIGHT KNEE: Status: ACTIVE | Noted: 2022-01-26

## 2022-03-19 PROBLEM — I49.3 PVC'S (PREMATURE VENTRICULAR CONTRACTIONS): Status: ACTIVE | Noted: 2017-12-18

## 2022-03-19 PROBLEM — R53.82 CHRONIC FATIGUE: Status: ACTIVE | Noted: 2017-12-18

## 2022-03-19 PROBLEM — G47.33 OSA (OBSTRUCTIVE SLEEP APNEA): Status: ACTIVE | Noted: 2021-04-19

## 2022-03-19 PROBLEM — Z86.79 S/P ABLATION OF VENTRICULAR ARRHYTHMIA: Status: ACTIVE | Noted: 2018-02-12

## 2022-03-19 PROBLEM — N92.0 MENORRHAGIA WITH REGULAR CYCLE: Status: ACTIVE | Noted: 2018-05-07

## 2022-03-19 PROBLEM — I25.118 CORONARY ARTERY DISEASE OF NATIVE ARTERY OF NATIVE HEART WITH STABLE ANGINA PECTORIS (HCC): Status: ACTIVE | Noted: 2017-12-18

## 2022-03-20 PROBLEM — I50.22 SYSTOLIC CHF, CHRONIC (HCC): Status: ACTIVE | Noted: 2017-12-18

## 2022-03-20 PROBLEM — I42.8 NICM (NONISCHEMIC CARDIOMYOPATHY) (HCC): Status: ACTIVE | Noted: 2017-12-18

## 2022-03-20 PROBLEM — Z90.711 S/P ABDOMINAL SUPRACERVICAL SUBTOTAL HYSTERECTOMY: Status: ACTIVE | Noted: 2019-02-12

## 2022-03-20 PROBLEM — E66.9 OBESITY (BMI 30-39.9): Status: ACTIVE | Noted: 2020-12-15

## 2022-03-20 PROBLEM — E66.01 SEVERE OBESITY WITH BODY MASS INDEX (BMI) OF 35.0 TO 39.9 WITH SERIOUS COMORBIDITY (HCC): Status: ACTIVE | Noted: 2018-07-05

## 2022-03-21 ENCOUNTER — HOSPITAL ENCOUNTER (OUTPATIENT)
Dept: PHYSICAL THERAPY | Age: 52
Discharge: HOME | End: 2022-03-21
Payer: MEDICARE

## 2022-03-21 PROCEDURE — 97110 THERAPEUTIC EXERCISES: CPT

## 2022-03-21 PROCEDURE — 97140 MANUAL THERAPY 1/> REGIONS: CPT

## 2022-03-21 NOTE — PROGRESS NOTES
Ryan Ayalatao Woodard  : 1970  Payor: Marlen Frausto [facility] MEDICARE / Plan: [facility] [place] MED[place]/PPO / Product Type: Managed Care Medicare /  [address] [place] [address]  at [facility] Therapy  [address], [address]  [phone]   [phone]                                                            Lilo Bhatt MD      OUTPATIENT PHYSICAL THERAPY: Daily Treatment Note 3/21/2022 Visit Count:  2    Tx Diagnosis:  Pain in right knee (M25.561)  Effusion, right knee (M25.461)  Stiffness of right knee, not elsewhere classified (M25.661)        Pre-treatment Symptoms/Complaints: See Initial Eval Dated 3.14.22 for more details. Patient reports she has a much better couple of days than she thought she might. She states less pain which allowed her to do more of her home exercises. Pain: Initial:4/10  Medications Last Reviewed:  3/21/2022     Post Session: 3/10   Updated Objective Findings: See Initial Eval for more details. TREATMENT:   THERAPEUTIC EXERCISE: (15 min):  Exercises per grid below to improve mobility, strength and balance. Required minimal visual, verbal and manual cues to promote proper body alignment and promote proper body posture. Progressed resistance and complexity of movement as indicated. Date:  3/21/2022 Date:   Date:     Activity/Exercise Parameters Parameters Parameters   Education HEP, POC, PT goals, anatomy/pathology     Heel slides 10     Quad Set 10x10\"     NuStep X 5 min     Walking With cane 2 x 50 ft     SAQ X 30     LAQ X 20             MANUAL THERAPY: (30 min): Joint mobilization, Soft tissue mobilization was utilized and necessary because of the patient's restricted joint motion and restricted motion of soft tissue mobility.         Date  3/21/2022    Technique Used Grade  Level # Time(s) Effect while being performed   STM Supine L knee to improve ROM, pain in sitting and supine HEP Log Date 1.    3/21/2022   2.  3/21/2022   3. 3/21/2022   4.    5.           [employer] Portal  Treatment/Session Summary:    Response to Treatment: Pt demonstrated understanding of POC and initial HEP. No increase in pain or adverse reactions. Patient tolerated treatment well today. Less discomfort overall. Patient needs to continue to work on ROM and strength. Communication/Consultation:  POC, HEP, PT goals, Faxed initial evaluation to MD.   Equipment provided today: HEP Handout   Recommendations/Intent for next treatment session:   Next visit will focus on Manual Therapy Quad strengthening soft tissue mobilization gait training, ROM. Treatment Plan of Care Effective Dates: 3/21/2022 TO 6/12/2022 (90 days).   Frequency/Duration: 2-3 times a week for 90 Days             Total Treatment Billable Duration:  45   PT Patient Time In/Time Out  Time In: 0800  Time Out: 500 CASS Quiñonez, PTA    Future Appointments   Date Time Provider Britney Burgos   3/25/2022  9:30 AM Joseph Berkowitz DPT [facility]   3/28/2022  8:45 AM Leonette Salter [facility]   3/30/2022  8:00 AM Leonette Salter [facility]   4/1/2022  8:00 AM Leonette Salter [facility]   4/4/2022  8:45 AM Leonette Salter [facility]   4/6/2022  8:00 AM Leonette Salter [facility]   4/13/2022 12:15 PM SFE ASSESSMENT RM 02 [facility]   4/14/2022  8:30 AM Huyen Kearns MD [facility] POAI POA   4/15/2022  8:00 AM Leoncheryl Salter [facility]   6/20/2022  8:00 AM BATOOL Robison PSCD PP   8/17/2022 11:15 AM DO UYEN Ojeda [facility]

## 2022-03-25 ENCOUNTER — HOSPITAL ENCOUNTER (OUTPATIENT)
Dept: PHYSICAL THERAPY | Age: 52
Discharge: HOME | End: 2022-03-25
Payer: MEDICARE

## 2022-03-25 PROCEDURE — 97140 MANUAL THERAPY 1/> REGIONS: CPT

## 2022-03-25 PROCEDURE — 97110 THERAPEUTIC EXERCISES: CPT

## 2022-03-25 NOTE — PROGRESS NOTES
Uriel Zachery Woodard  : 1970  Payor: [place] / Plan[place] [facility] [place] / Product Type: Managed Care Medicare /  [facility] [place]  at [facility] Therapy  [address], [address]  [phone]   [phone]                                                            Renny Gregory MD      OUTPATIENT PHYSICAL THERAPY: Daily Treatment Note 3/25/2022 Visit Count:  3    Tx Diagnosis:  Pain in right knee (M25.561)  Effusion, right knee (M25.461)  Stiffness of right knee, not elsewhere classified (M25.661)        Pre-treatment Symptoms/Complaints: See Initial Eval Dated 3.14.22 for more details. Patient reports she had some discomfort last night, but better today. Pain: Initial:4/10  Medications Last Reviewed:  3/25/2022     Post Session: 3/10   Updated Objective Findings: See Initial Eval for more details. TREATMENT:   THERAPEUTIC EXERCISE: (15 min):  Exercises per grid below to improve mobility, strength and balance. Required minimal visual, verbal and manual cues to promote proper body alignment and promote proper body posture. Progressed resistance and complexity of movement as indicated. Date:  3//2022 Date:  3 /25 Date:     Activity/Exercise Parameters Parameters Parameters   Education HEP, POC, PT goals, anatomy/pathology     Heel slides 10     Quad Set 10x10\" X 30    NuStep X 5 min     Walking With cane 2 x 50 ft Without cane x 75 ft    SAQ X 30 X 30    LAQ X 20     Step ups   X 10    Standing weightshift  X 15                  MANUAL THERAPY: (30 min): Joint mobilization, Soft tissue mobilization was utilized and necessary because of the patient's restricted joint motion and restricted motion of soft tissue mobility.         Date  3/25/2022    Technique Used Grade  Level # Time(s) Effect while being performed   STM Supine L knee to improve ROM, pain in sitting and supine HEP Log Date 1.    3/25/2022   2.  3/25/2022   3. 3/25/2022   4.    5.           [employer] Portal  Treatment/Session Summary:    Response to Treatment: Pt demonstrated understanding of POC and initial HEP. No increase in pain or adverse reactions. Patient tolerated treatment well today. Patient needs to continue to work on ROM and strength. Good ambulation without assistive device. Communication/Consultation:  POC, HEP, PT goals, Faxed initial evaluation to MD.   Equipment provided today: HEP Handout   Recommendations/Intent for next treatment session:   Next visit will focus on Manual Therapy Quad strengthening soft tissue mobilization gait training, ROM. Treatment Plan of Care Effective Dates: 3/25/2022 TO 6/12/2022 (90 days).   Frequency/Duration: 2-3 times a week for 90 Days             Total Treatment Billable Duration:  45   PT Patient Time In/Time Out  Time In: 0930  Time Out: [address] [place], [place]    Future Appointments   Date Time Provider Britney Burgos   3/28/2022  8:45 AM Simone Rasp [facility]   3/30/2022  8:00 AM Simone Rasp [facility]   4/1/2022  8:00 AM Simone Rasp [facility]   4/4/2022  8:45 AM Simone Rasp [facility]   4/6/2022  8:00 AM Simone Rasp [facility]   4/13/2022 12:15 PM SFE ASSESSMENT RM 02 SFEORPA SFE   4/14/2022  8:30 AM Renny Gregory MD SSA POAI POA   4/15/2022  8:00 AM Simone Rasp [facility]   5/26/2022 10:30 AM Renny Gregory MD SSA POAI POA   6/20/2022  8:00 AM BATOOL Alonso PSCD PP   8/17/2022 11:15 AM DO UYEN Franco UCD

## 2022-03-28 ENCOUNTER — HOSPITAL ENCOUNTER (OUTPATIENT)
Dept: PHYSICAL THERAPY | Age: 52
Discharge: HOME | End: 2022-03-28
Payer: MEDICARE

## 2022-03-28 PROCEDURE — 97140 MANUAL THERAPY 1/> REGIONS: CPT

## 2022-03-28 PROCEDURE — 97110 THERAPEUTIC EXERCISES: CPT

## 2022-03-28 NOTE — PROGRESS NOTES
Mary Jo Olivares Vance  : 1970  Payor: Jazz Nava [facility] MEDICARE / Plan: [facility] MEDICARE HMO/PPO / Product Type: Managed Care Medicare /  [facility] [place]  at [facility] Therapy  [address], [address]  [phone]   [phone]                                                            Yefri Jones MD      OUTPATIENT PHYSICAL THERAPY: Daily Treatment Note 3/28/2022 Visit Count:  4    Tx Diagnosis:  Pain in right knee (M25.561)  Effusion, right knee (M25.461)  Stiffness of right knee, not elsewhere classified (M25.661)        Pre-treatment Symptoms/Complaints: See Initial Eval Dated 3.14.22 for more details. Patient reports knee is doing better, but other knee is starting to lock more. Pain: Initial:4/10  Medications Last Reviewed:  3/28/2022     Post Session: 3/10   Updated Objective Findings: See Initial Eval for more details. TREATMENT:   THERAPEUTIC EXERCISE: (15 min):  Exercises per grid below to improve mobility, strength and balance. Required minimal visual, verbal and manual cues to promote proper body alignment and promote proper body posture. Progressed resistance and complexity of movement as indicated. Date:  3//2022 Date:  3 /25 Date:  3/28   Activity/Exercise Parameters Parameters Parameters   Education HEP, POC, PT goals, anatomy/pathology     Heel slides 10     Quad Set 10x10\" X 30    NuStep X 5 min  X 10 min   Walking With cane 2 x 50 ft Without cane x 75 ft X 150 ft without cane   SAQ X 30 X 30 X 30   LAQ X 20     Step ups   X 10 X 15    Standing weightshift  X 15    Hamstring machine    # 20 2 x 10           MANUAL THERAPY: (30 min): Joint mobilization, Soft tissue mobilization was utilized and necessary because of the patient's restricted joint motion and restricted motion of soft tissue mobility.         Date  3/28/2022    Technique Used Grade  Level # Time(s) Effect while being performed   STM Supine L knee to improve ROM, pain in sitting and supine                                                              HEP Log Date 1.    3/28/2022   2.  3/28/2022   3. 3/28/2022   4.    5.           [employer] Portal  Treatment/Session Summary:    Response to Treatment: Pt demonstrated understanding of POC and initial HEP. No increase in pain or adverse reactions. Patient tolerated treatment well today. Patient needs to continue to work on ROM and strength. Patient seems pleased with progress. Scheduled to see doctor April 6th   Communication/Consultation:  POC, HEP, PT goals, Faxed initial evaluation to MD.   Equipment provided today: HEP Handout   Recommendations/Intent for next treatment session:   Next visit will focus on Manual Therapy Quad strengthening soft tissue mobilization gait training, ROM. Treatment Plan of Care Effective Dates: 3/28/2022 TO 6/12/2022 (90 days).   Frequency/Duration: 2-3 times a week for 90 Days             Total Treatment Billable Duration:  45   PT Patient Time In/Time Out  Time In: 0845  Time Out: Avery [address], PTA    Future Appointments   Date Time Provider Britney Burgos   3/30/2022  8:00 AM [facility]   4/1/2022  8:00 AM [facility]   4/4/2022  8:45 AM Ahmed Moors [facility]   4/6/2022  8:00 AM med [facility]   4/13/2022 12:15 PM SFE ASSESSMENT RM 02 SFEORPA SFE   4/14/2022  8:30 AM Trinity Pearl MD SSA POAI POA   4/15/2022  8:00 AM Keri Dillard [facility]   5/26/2022 10:30 AM MD UYEN Leonard POAI POA   6/20/2022  8:00 AM BATOOL Degroot PSCD PP   8/17/2022 11:15 AM DO UYEN Billy UCDG UCD

## 2022-03-30 ENCOUNTER — HOSPITAL ENCOUNTER (OUTPATIENT)
Dept: PHYSICAL THERAPY | Age: 52
Discharge: HOME | End: 2022-03-30
Payer: MEDICARE

## 2022-03-30 PROCEDURE — 97140 MANUAL THERAPY 1/> REGIONS: CPT

## 2022-03-30 PROCEDURE — 97110 THERAPEUTIC EXERCISES: CPT

## 2022-03-30 NOTE — PROGRESS NOTES
Vangie Woodard  : 1970  Payor: Kayli Saucedo [facility] MEDICARE / Plan: [facility] [place] / Product Type: Managed Care Medicare /  [facility] [place]  at [facility] Therapy  [address], [address]  [phone]   [phone]                                                            Mary Reyes MD      OUTPATIENT PHYSICAL THERAPY: Daily Treatment Note 3/30/2022 Visit Count:  5    Tx Diagnosis:  Pain in right knee (M25.561)  Effusion, right knee (M25.461)  Stiffness of right knee, not elsewhere classified (M25.661)        Pre-treatment Symptoms/Complaints: See Initial Eval Dated 3.14.22 for more details. Patient reports knee felt good after last visit and she was able to stand and cook a complete meal.   Pain: Initial:4/10  Medications Last Reviewed:  3/30/2022     Post Session: 3/10   Updated Objective Findings: See Initial Eval for more details. TREATMENT:   THERAPEUTIC EXERCISE: (15 min):  Exercises per grid below to improve mobility, strength and balance. Required minimal visual, verbal and manual cues to promote proper body alignment and promote proper body posture. Progressed resistance and complexity of movement as indicated. Date:  3//2022 Date:  3 /25 Date:  3/28 Date  3/30   Activity/Exercise Parameters Parameters Parameters Parameters   Education HEP, POC, PT goals, anatomy/pathology      Heel slides 10      Quad Set 10x10\" X 30     NuStep X 5 min  X 10 min X 10 min   Walking With cane 2 x 50 ft Without cane x 75 ft X 150 ft without cane    SAQ X 30 X 30 X 30    LAQ X 20      Step ups   X 10 X 15  X 15   Standing weightshift  X 15     Hamstring machine    # 20 2 x 10 # 20 2 x 10   Sit to stand x 10    X 10           MANUAL THERAPY: (30 min): Joint mobilization, Soft tissue mobilization was utilized and necessary because of the patient's restricted joint motion and restricted motion of soft tissue mobility. Date  3/30/2022    Technique Used Grade  Level # Time(s) Effect while being performed   STM Supine L knee to improve ROM, pain in sitting and supine                                                              HEP Log Date 1.    3/30/2022   2.  3/30/2022   3. 3/30/2022   4.    5.           [employer] Portal  Treatment/Session Summary:    Response to Treatment: Pt demonstrated understanding of POC and initial HEP. No increase in pain or adverse reactions. Patient tolerated treatment well today. Patient needs to continue to work on ROM and strength. Patient is scheduled to have her other knee surgery mid April. Communication/Consultation:  POC, HEP, PT goals, Faxed initial evaluation to MD.   Equipment provided today: HEP Handout   Recommendations/Intent for next treatment session:   Next visit will focus on Manual Therapy Quad strengthening soft tissue mobilization gait training, ROM. Treatment Plan of Care Effective Dates: 3/30/2022 TO 6/12/2022 (90 days).   Frequency/Duration: 2-3 times a week for 90 Days             Total Treatment Billable Duration:  45   PT Patient Time In/Time Out  Time In: 0800  Time Out: [address] Quiñonez, PTA    Future Appointments   Date Time Provider Britney Burgos   4/1/2022  8:00 AM [facility]   4/4/2022  8:45 AM Sybil Orris [facility]   4/6/2022  8:00 AM Sybil Orris [facility]   4/13/2022 12:15 PM SFE ASSESSMENT RM 02 SFEORPA SFE   4/14/2022  8:30 AM Jayshree Hart MD SSA POAI POA   4/15/2022  8:00 [facility]   5/26/2022 10:30 AM Jayshree Hart MD SSA POAI POA   6/20/2022  8:00 AM BATOOL Stover PSCD PP   8/17/2022 11:15 AM DO UYEN Murillo UCD

## 2022-04-01 ENCOUNTER — HOSPITAL ENCOUNTER (OUTPATIENT)
Dept: PHYSICAL THERAPY | Age: 52
Discharge: HOME | End: 2022-04-01
Payer: MEDICARE

## 2022-04-01 PROCEDURE — 97140 MANUAL THERAPY 1/> REGIONS: CPT

## 2022-04-01 PROCEDURE — 97110 THERAPEUTIC EXERCISES: CPT

## 2022-04-01 NOTE — PROGRESS NOTES
Lorenzo Lloyd Vance  : 1970  Payor: Guille Torre [facility] MEDICARE / Plan: [facility] [place] / Product Type: Managed Care Medicare /  [facility] [place]  at [facility] Therapy  [address], [address]  [phone]   [phone]                                                            Betty Guevara MD      OUTPATIENT PHYSICAL THERAPY: Daily Treatment Note 2022 Visit Count:  6    Tx Diagnosis:  Pain in right knee (M25.561)  Effusion, right knee (M25.461)  Stiffness of right knee, not elsewhere classified (M25.661)        Pre-treatment Symptoms/Complaints: See Initial Eval Dated 3.14.22 for more details. Patient reports knee is feeling pretty good. She states she slept better last night. Pain: Initial:3/10  Medications Last Reviewed:  2022     Post Session: 2/10   Updated Objective Findings: See Initial Eval for more details. TREATMENT:   THERAPEUTIC EXERCISE: (15 min):  Exercises per grid below to improve mobility, strength and balance. Required minimal visual, verbal and manual cues to promote proper body alignment and promote proper body posture. Progressed resistance and complexity of movement as indicated.      Date:  3//2022 Date:  3 /25 Date:  3/28 Date  3/30 Date     Activity/Exercise Parameters Parameters Parameters Parameters Parameters   Education HEP, POC, PT goals, anatomy/pathology       Heel slides 10       Quad Set 10x10\" X 30      NuStep X 5 min  X 10 min X 10 min X 10 min   Walking With cane 2 x 50 ft Without cane x 75 ft X 150 ft without cane     SAQ X 30 X 30 X 30     LAQ X 20       Step ups   X 10 X 15  X 15 X 15   Standing weightshift  X 15      Hamstring machine    # 20 2 x 10 # 20 2 x 10 # 25 3 x 10   Sit to stand x 10    X 10 X 10           MANUAL THERAPY: (30 min): Joint mobilization, Soft tissue mobilization was utilized and necessary because of the patient's restricted joint motion and restricted motion of soft tissue mobility. Date  4/1/2022    Technique Used Grade  Level # Time(s) Effect while being performed   STM Supine L knee to improve ROM, pain in sitting and supine                                                              HEP Log Date 1.    4/1/2022   2.  4/1/2022   3. 4/1/2022   4.    5.           [employer] Portal  Treatment/Session Summary:    Response to Treatment: Pt demonstrated understanding of POC and initial HEP. No increase in pain or adverse reactions. Patient tolerated treatment well today. Patient needs to continue to work on ROM and strength. Patient seems pleased with progress. Communication/Consultation:  POC, HEP, PT goals, Faxed initial evaluation to MD.   Equipment provided today: HEP Handout   Recommendations/Intent for next treatment session:   Next visit will focus on Manual Therapy Quad strengthening soft tissue mobilization gait training, ROM. Treatment Plan of Care Effective Dates: 4/1/2022 TO 6/12/2022 (90 days).   Frequency/Duration: 2-3 times a week for 90 Days             Total Treatment Billable Duration:  45   PT Patient Time In/Time Out  Time In: 0800  Time Out: 500 CASS Quiñonez, PTA    Future Appointments   Date Time Provider Britney Burgos   4/4/2022  8:45 AM [facility]   4/6/2022  8:00 AM [facility]   4/13/2022 12:15 PM SFE ASSESSMENT RM 02 SFEORPA SFE   4/14/2022  8:30 AM Elda Hernandez MD SSA POAI POA   4/15/2022  8[facility]   5/26/2022 10:30 AM Elda Hernandez MD SSA POAI POA   6/20/2022  8:00 AM BATOOL Nina PSCD PP   8/17/2022 11:15 AM DO UYEN SanotroD

## 2022-04-04 ENCOUNTER — HOSPITAL ENCOUNTER (OUTPATIENT)
Dept: PHYSICAL THERAPY | Age: 52
Discharge: HOME | End: 2022-04-04
Payer: MEDICARE

## 2022-04-04 PROCEDURE — 97110 THERAPEUTIC EXERCISES: CPT

## 2022-04-04 PROCEDURE — 97140 MANUAL THERAPY 1/> REGIONS: CPT

## 2022-04-06 ENCOUNTER — HOSPITAL ENCOUNTER (OUTPATIENT)
Dept: PHYSICAL THERAPY | Age: 52
Discharge: HOME | End: 2022-04-06
Payer: MEDICARE

## 2022-04-06 NOTE — PROGRESS NOTES
Raji Woodard  : 1970  Primary[place] [employer] [place] Hm*  Secondary:  [facility] at [facility] Therapy  [address] [place] [address], [facility] [address]  [phone]   [phone]      OUTPATIENT DAILY NOTE    NAME/AGE/GENDER: Ceci Iyer is a 46 y.o. female. DATE: 2022    Ms. Claudia Mitchell for today's appointment due to having an appointment at [facility] on [address] at 9:00am this morning. Patient rescheduled appoitment for tomorrow. Andrea Schmitz, PTA

## 2022-04-07 ENCOUNTER — APPOINTMENT (OUTPATIENT)
Dept: PHYSICAL THERAPY | Age: 52
End: 2022-04-07
Payer: MEDICARE

## 2022-04-07 ENCOUNTER — HOSPITAL ENCOUNTER (OUTPATIENT)
Dept: PHYSICAL THERAPY | Age: 52
Discharge: HOME | End: 2022-04-07
Payer: MEDICARE

## 2022-04-07 PROCEDURE — 97110 THERAPEUTIC EXERCISES: CPT

## 2022-04-07 PROCEDURE — 97140 MANUAL THERAPY 1/> REGIONS: CPT

## 2022-04-07 NOTE — PROGRESS NOTES
Carlitos Becerril Vance  : 1970  Payor: Milo Wolff [facility] MEDICARE / Plan: [facility] MEDICARE HMO/PPO / Product Type: Managed Care Medicare /  [facility] [place]  at [facility] Therapy  [address], [address]  [phone]   [phone]                                                            Nasim Corley MD      OUTPATIENT PHYSICAL THERAPY: Daily Treatment Note 2022 Visit Count:  8    Tx Diagnosis:  Pain in right knee (M25.561)  Effusion, right knee (M25.461)  Stiffness of right knee, not elsewhere classified (M25.661)        Pre-treatment Symptoms/Complaints: See Initial Eval Dated 3.14.22 for more details. Patient reports right knee is doing ok having more discomfort in left knee today. Pain: Initial:3/10  Medications Last Reviewed:  2022     Post Session: 2/10   Updated Objective Findings: See Initial Eval for more details. TREATMENT:   THERAPEUTIC EXERCISE: (20 min):  Exercises per grid below to improve mobility, strength and balance. Required minimal visual, verbal and manual cues to promote proper body alignment and promote proper body posture. Progressed resistance and complexity of movement as indicated. Date:  3/28 Date  3/30 Date   Date   Date     Activity/Exercise Parameters Parameters Parameters Parameters Parameters   Education        Heel slides        Quad Set        NuStep X 10 min X 10 min X 10 min X 10 min X 10 min   Walking X 150 ft without cane       SAQ X 30       LAQ     3 x 10   Step ups  X 15  X 15 X 15 X 20 X 20    Standing weightshift        Hamstring machine  # 20 2 x 10 # 20 2 x 10 # 25 3 x 10 # 25 3 x 10 # 25 3 x 10   Sit to stand   X 10 X 10 X 15 X 20           MANUAL THERAPY: (25 min): Joint mobilization, Soft tissue mobilization was utilized and necessary because of the patient's restricted joint motion and restricted motion of soft tissue mobility.         Date  2022    Technique Used Grade Level # Time(s) Effect while being performed   STM Supine L knee to improve ROM, pain in sitting and supine                                                              HEP Log Date 1.    4/7/2022   2.  4/7/2022   3. 4/7/2022   4.    5.           [employer] Portal  Treatment/Session Summary:    Response to Treatment: Pt demonstrated understanding of POC and initial HEP. No increase in pain or adverse reactions. Patient tolerated treatment well today. Patient needs to continue to work on ROM and strength. Patient is scheduled to see the doctor today concerning her other knee. Communication/Consultation:  POC, HEP, PT goals, Faxed initial evaluation to MD.   Equipment provided today: HEP Handout   Recommendations/Intent for next treatment session:   Next visit will focus on Manual Therapy Quad strengthening soft tissue mobilization gait training, ROM. Treatment Plan of Care Effective Dates: 4/7/2022 TO 6/12/2022 (90 days).   Frequency/Duration: 2-3 times a week for 90 Days             Total Treatment Billable Duration:  45   PT Patient Time In/Time Out  Time In: 0145  Time Out: [facility]    Future Appointments   Date Time Provider Britney Burgos   4/13/2022 12:15 PM SFE ASSESSMENT RM 02 SFEORPA SFE   4/14/2022  8:30 AM MD UYEN Koch POAI POA   4/15/2022  8:00 AM [facility]   5/26/2022 10:30 AM MD UYEN Koch POAI POA   6/20/2022  8:00 AM BATOOL Brown PSCD PP   8/17/2022 11:15 AM DO UYEN Zavala UCD

## 2022-04-08 ENCOUNTER — APPOINTMENT (OUTPATIENT)
Dept: PHYSICAL THERAPY | Age: 52
End: 2022-04-08
Payer: MEDICARE

## 2022-04-11 RX ORDER — CEFAZOLIN SODIUM/WATER 2 G/20 ML
2 SYRINGE (ML) INTRAVENOUS ONCE
Status: CANCELLED | OUTPATIENT
Start: 2022-04-11 | End: 2022-04-11

## 2022-04-13 ENCOUNTER — HOSPITAL ENCOUNTER (OUTPATIENT)
Dept: SURGERY | Age: 52
Discharge: HOME | End: 2022-04-13
Attending: ORTHOPAEDIC SURGERY
Payer: MEDICARE

## 2022-04-13 VITALS
SYSTOLIC BLOOD PRESSURE: 140 MMHG | TEMPERATURE: 98.2 F | OXYGEN SATURATION: 100 % | HEART RATE: 61 BPM | WEIGHT: 249.9 LBS | HEIGHT: 68 IN | DIASTOLIC BLOOD PRESSURE: 71 MMHG | BODY MASS INDEX: 37.87 KG/M2

## 2022-04-13 LAB
ANION GAP SERPL CALC-SCNC: 5 MMOL/L (ref 7–16)
APTT PPP: 29.9 SEC (ref 24.1–35.1)
BACTERIA SPEC CULT: NORMAL
BASOPHILS # BLD: 0 K/UL (ref 0–0.2)
BASOPHILS NFR BLD: 1 % (ref 0–2)
BUN SERPL-MCNC: 15 MG/DL (ref 6–23)
CALCIUM SERPL-MCNC: 9.7 MG/DL (ref 8.3–10.4)
CHLORIDE SERPL-SCNC: 107 MMOL/L (ref 98–107)
CO2 SERPL-SCNC: 29 MMOL/L (ref 21–32)
CREAT SERPL-MCNC: 0.78 MG/DL (ref 0.6–1)
DIFFERENTIAL METHOD BLD: ABNORMAL
EOSINOPHIL # BLD: 0.1 K/UL (ref 0–0.8)
EOSINOPHIL NFR BLD: 2 % (ref 0.5–7.8)
ERYTHROCYTE [DISTWIDTH] IN BLOOD BY AUTOMATED COUNT: 14.6 % (ref 11.9–14.6)
EST. AVERAGE GLUCOSE BLD GHB EST-MCNC: 120 MG/DL
GLUCOSE SERPL-MCNC: 94 MG/DL (ref 65–100)
HBA1C MFR BLD: 5.8 % (ref 4.2–6.3)
HCT VFR BLD AUTO: 35.8 % (ref 35.8–46.3)
HGB BLD-MCNC: 11.4 G/DL (ref 11.7–15.4)
IMM GRANULOCYTES # BLD AUTO: 0 K/UL (ref 0–0.5)
IMM GRANULOCYTES NFR BLD AUTO: 0 % (ref 0–5)
INR PPP: 1
LYMPHOCYTES # BLD: 1.3 K/UL (ref 0.5–4.6)
LYMPHOCYTES NFR BLD: 20 % (ref 13–44)
MCH RBC QN AUTO: 26.5 PG (ref 26.1–32.9)
MCHC RBC AUTO-ENTMCNC: 31.8 G/DL (ref 31.4–35)
MCV RBC AUTO: 83.1 FL (ref 79.6–97.8)
MONOCYTES # BLD: 0.5 K/UL (ref 0.1–1.3)
MONOCYTES NFR BLD: 7 % (ref 4–12)
NEUTS SEG # BLD: 4.3 K/UL (ref 1.7–8.2)
NEUTS SEG NFR BLD: 70 % (ref 43–78)
NRBC # BLD: 0 K/UL (ref 0–0.2)
PLATELET # BLD AUTO: 359 K/UL (ref 150–450)
PMV BLD AUTO: 10.9 FL (ref 9.4–12.3)
POTASSIUM SERPL-SCNC: 3.7 MMOL/L (ref 3.5–5.1)
PROTHROMBIN TIME: 13.7 SEC (ref 12.6–14.5)
RBC # BLD AUTO: 4.31 M/UL (ref 4.05–5.2)
SERVICE CMNT-IMP: NORMAL
SODIUM SERPL-SCNC: 141 MMOL/L (ref 136–145)
WBC # BLD AUTO: 6.2 K/UL (ref 4.3–11.1)

## 2022-04-13 PROCEDURE — 94760 N-INVAS EAR/PLS OXIMETRY 1: CPT

## 2022-04-13 PROCEDURE — 85025 COMPLETE CBC W/AUTO DIFF WBC: CPT

## 2022-04-13 PROCEDURE — 83036 HEMOGLOBIN GLYCOSYLATED A1C: CPT

## 2022-04-13 PROCEDURE — 87641 MR-STAPH DNA AMP PROBE: CPT

## 2022-04-13 PROCEDURE — 85730 THROMBOPLASTIN TIME PARTIAL: CPT

## 2022-04-13 PROCEDURE — 80048 BASIC METABOLIC PNL TOTAL CA: CPT

## 2022-04-13 PROCEDURE — 85610 PROTHROMBIN TIME: CPT

## 2022-04-13 NOTE — PERIOP NOTES
PLEASE CONTINUE TAKING ALL PRESCRIPTION MEDICATIONS UP TO THE DAY OF SURGERY UNLESS OTHERWISE DIRECTED BELOW. DISCONTINUE all vitamins and supplements 21 days prior to surgery. DISCONTINUE Non-Steriodal Anti-Inflammatory (NSAIDS) such as Advil and Aleve 5 days prior to surgery. Home Medications to take  the day of surgery    Entresto, Amlodipine, Aspirin 81 mg, Promethazine if needed, Gabapentin,   Methocarbamol        Home Medications   to Hold   Hold all NSAIDS for 5 days before surgery: Diclofenac gel, Meloxicam, Ibuprofen, Aleve   Stop all vitamins/supplements after today     Comments   Bring to hospital: incentive spirometer, Peyton hex soap, CPAP machine   On the day before surgery (4/19/22) please take Acetaminophen 1000mg in the morning and then again before bed. You may substitute for Tylenol 650 mg. Please do not bring home medications with you on the day of surgery unless otherwise directed by your nurse. If you are instructed to bring home medications, please give them to your nurse as they will be administered by the nursing staff. If you have any questions, please call Ysabel Alonso [phone]. A copy of this note was provided to the patient for reference.

## 2022-04-13 NOTE — PERIOP NOTES
Labs within anesthesia guidelines, no follow-up required. MRSA swab still processing. Recent Results (from the past 12 hour(s))   CBC WITH AUTOMATED DIFF    Collection Time: 04/13/22 12:49 PM   Result Value Ref Range    WBC 6.2 4.3 - 11.1 K/uL    RBC 4.31 4.05 - 5.2 M/uL    HGB 11.4 (L) 11.7 - 15.4 g/dL    HCT 35.8 35.8 - 46.3 %    MCV 83.1 79.6 - 97.8 FL    MCH 26.5 26.1 - 32.9 PG    MCHC 31.8 31.4 - 35.0 g/dL    RDW 14.6 11.9 - 14.6 %    PLATELET 090 381 - 664 K/uL    MPV 10.9 9.4 - 12.3 FL    ABSOLUTE NRBC 0.00 0.0 - 0.2 K/uL    NEUTROPHILS 70 43 - 78 %    LYMPHOCYTES 20 13 - 44 %    MONOCYTES 7 4.0 - 12.0 %    EOSINOPHILS 2 0.5 - 7.8 %    BASOPHILS 1 0.0 - 2.0 %    IMMATURE GRANULOCYTES 0 0.0 - 5.0 %    ABS. NEUTROPHILS 4.3 1.7 - 8.2 K/UL    ABS. LYMPHOCYTES 1.3 0.5 - 4.6 K/UL    ABS. MONOCYTES 0.5 0.1 - 1.3 K/UL    ABS. EOSINOPHILS 0.1 0.0 - 0.8 K/UL    ABS. BASOPHILS 0.0 0.0 - 0.2 K/UL    ABS. IMM.  GRANS. 0.0 0.0 - 0.5 K/UL    DF AUTOMATED     PROTHROMBIN TIME + INR    Collection Time: 04/13/22 12:49 PM   Result Value Ref Range    Prothrombin time 13.7 12.6 - 14.5 sec    INR 1.0     PTT    Collection Time: 04/13/22 12:49 PM   Result Value Ref Range    aPTT 29.9 24.1 - 87.4 SEC   METABOLIC PANEL, BASIC    Collection Time: 04/13/22 12:49 PM   Result Value Ref Range    Sodium 141 136 - 145 mmol/L    Potassium 3.7 3.5 - 5.1 mmol/L    Chloride 107 98 - 107 mmol/L    CO2 29 21 - 32 mmol/L    Anion gap 5 (L) 7 - 16 mmol/L    Glucose 94 65 - 100 mg/dL    BUN 15 6 - 23 MG/DL    Creatinine 0.78 0.6 - 1.0 MG/DL    GFR est AA >60 >60 ml/min/1.73m2    GFR est non-AA >60 >60 ml/min/1.73m2    Calcium 9.7 8.3 - 10.4 MG/DL   HEMOGLOBIN A1C WITH EAG    Collection Time: 04/13/22 12:49 PM   Result Value Ref Range    Hemoglobin A1c 5.8 4.20 - 6.30 %    Est. average glucose 120 mg/dL

## 2022-04-13 NOTE — PERIOP NOTES
Your patient recently had labs drawn during a hospital appointment due to an upcoming surgery. The results are attached. If you have any questions or concerns please reach out to your patient for a follow-up in your office. Please do not respond to this message as my mailbox is not monitored. You may call [phone] with questions or concerns. Recent Results (from the past 12 hour(s))   CBC WITH AUTOMATED DIFF    Collection Time: 04/13/22 12:49 PM   Result Value Ref Range    WBC 6.2 4.3 - 11.1 K/uL    RBC 4.31 4.05 - 5.2 M/uL    HGB 11.4 (L) 11.7 - 15.4 g/dL    HCT 35.8 35.8 - 46.3 %    MCV 83.1 79.6 - 97.8 FL    MCH 26.5 26.1 - 32.9 PG    MCHC 31.8 31.4 - 35.0 g/dL    RDW 14.6 11.9 - 14.6 %    PLATELET 136 340 - 159 K/uL    MPV 10.9 9.4 - 12.3 FL    ABSOLUTE NRBC 0.00 0.0 - 0.2 K/uL    NEUTROPHILS 70 43 - 78 %    LYMPHOCYTES 20 13 - 44 %    MONOCYTES 7 4.0 - 12.0 %    EOSINOPHILS 2 0.5 - 7.8 %    BASOPHILS 1 0.0 - 2.0 %    IMMATURE GRANULOCYTES 0 0.0 - 5.0 %    ABS. NEUTROPHILS 4.3 1.7 - 8.2 K/UL    ABS. LYMPHOCYTES 1.3 0.5 - 4.6 K/UL    ABS. MONOCYTES 0.5 0.1 - 1.3 K/UL    ABS. EOSINOPHILS 0.1 0.0 - 0.8 K/UL    ABS. BASOPHILS 0.0 0.0 - 0.2 K/UL    ABS. IMM.  GRANS. 0.0 0.0 - 0.5 K/UL    DF AUTOMATED     PROTHROMBIN TIME + INR    Collection Time: 04/13/22 12:49 PM   Result Value Ref Range    Prothrombin time 13.7 12.6 - 14.5 sec    INR 1.0     PTT    Collection Time: 04/13/22 12:49 PM   Result Value Ref Range    aPTT 29.9 24.1 - 21.5 SEC   METABOLIC PANEL, BASIC    Collection Time: 04/13/22 12:49 PM   Result Value Ref Range    Sodium 141 136 - 145 mmol/L    Potassium 3.7 3.5 - 5.1 mmol/L    Chloride 107 98 - 107 mmol/L    CO2 29 21 - 32 mmol/L    Anion gap 5 (L) 7 - 16 mmol/L    Glucose 94 65 - 100 mg/dL    BUN 15 6 - 23 MG/DL    Creatinine 0.78 0.6 - 1.0 MG/DL    GFR est AA >60 >60 ml/min/1.73m2    GFR est non-AA >60 >60 ml/min/1.73m2    Calcium 9.7 8.3 - 10.4 MG/DL   HEMOGLOBIN A1C WITH EAG    Collection Time: 04/13/22 12:49 PM   Result Value Ref Range    Hemoglobin A1c 5.8 4.20 - 6.30 %    Est. average glucose 120 mg/dL

## 2022-04-13 NOTE — PERIOP NOTES
Patient verified name and . Order for consent for Robotic assisted Left total knee arthroplasty IS found in EHR and matches case posting; patient verified. Type 3 surgery, walk in joint assessment complete. Labs per surgeon: CBC,BMP, PT/PTT, A1c, MRSA swab ; results within anesthesia protocols. MRSA swab still processing. Labs per anesthesia protocol: no additional  EKG (21) and stress test (21) within anesthesia protocols. ECHO (10/4/21) reviewed by Dr. Maryan Mcclain on 22 and ok'd to proceed with surgery. MRSA/MSSA swab collected; pharmacy to review and dose antibiotic as appropriate. Hospital approved surgical skin cleanser and instructions to return bottle on DOS given per hospital policy. Patient provided with handouts including Guide to Surgery, Pain Management, Hand Hygiene, Blood Transfusion Education, and [facility] Brochure. Patient answered medical/surgical history questions at their best of ability. All prior to admission medications documented in [facility]. Original medication prescription bottles were not visualized during patient appointment. Patient instructed to hold all vitamins 3 weeks prior to surgery and NSAIDS 5 days prior to surgery. Patient teach back successful and patient demonstrates knowledge of instruction.

## 2022-04-13 NOTE — PROGRESS NOTES
04/13/22 1343   Oxygen Therapy   O2 Sat (%) 100 %   Pulse via Oximetry 62 beats per minute   O2 Device None (Room air)   Pre-Treatment   Breath Sounds Bilateral Clear   Pre FEV1 (liters) 2.2 liters   % Predicted 83     Initial respiratory Assessment completed with pt. Pt was interviewed and evaluated in Joint camp prior to surgery. Patient ID:  Bernie Robles  [id number]  72 y.o.  1970  Surgeon: Dr. Miguel Lockwood  Date of Surgery: 4/20/2022  Procedure:  Total knee Arthroplasty  Primary Care Physician: Campos Trinidad [phone]  Specialists:     Pt taught proper COUGH technique  DIAPHRAGMATIC BREATHING EXERCISE INSTRUCTIONS GIVEN    History of smoking:   DENIES                 Quit date:         Secondhand smoke:DENIES    Past procedures with Oxygen desaturation or delayed awakening:DENIES    Past Medical History:   Diagnosis Date    Abnormal Papanicolaou smear of cervix     Adverse effect of anesthesia     \"takes more\" for anesthesia to be effective     Arthritis     CAD (coronary artery disease)     no stents; [facility] 3/2017 mod LAD dz; [facility] Leola [facility] ([facility])    CHF (congestive heart failure) ([facility] [address]     ECHO 12/18/18: EF 40-45%; managed with medication and followed by Linda 6, uterine     GERD (gastroesophageal reflux disease)     diet controlled     H/O echocardiogram 10/04/2021    EF 40-45%    Heart murmur     Echo (10/4/21) EF 40-45% trace regurgitation in mitral and tricuspid valve    History of anemia     Hypercholesterolemia     Controlled with meds     Hypertension     Controlled with meds     Menorrhagia with regular cycle     Morbid obesity ([facility] [address]     NICM (nonischemic cardiomyopathy) ([facility] [address]     Followed by [facility] cardiology     THEE on CPAP     Ovarian cyst     PVCs (premature ventricular contractions)     Uterine fibroid         Respiratory history:DENIES SOB                                                                   Respiratory meds:  DENIES    FAMILY PRESENT:             NO     PAST SLEEP STUDY:        YES                  REVIEWED HST WITH PT. Columba Banks PT HAD SEEN SLEEP DENTIST FOR ORAL DEVICE BUT DID NOT KNOW INSURANCE WOULD NOT PAY FOR IT. PT WILL PURSUE OBTAINING ANOTHER CPAP IN FUTURE. HX OF THEE:                        YES                      THEE assessment:                                               SLEEPS ON SIDE       &      BACK         &       STOMACH  PHYSICAL EXAM   There is no height or weight on file to calculate BMI. Visit Vitals  SpO2 (P) 100%     Neck circumference:  38    cm    Loud snoring:                                                 YES            Witnessed apnea or wakening gasping or choking:          APNEA  Awakens with headaches:                                               DENIES  Morning or daytime tiredness/ sleepiness:                         TIRED  Dry mouth or sore throat in morning:            YES                                              Pritchard stage:  4                                   SACS score:16  Stop Bang  4      PT NON-COMPLIANT with CPAP. Dangers of non-compliance with treatment of THEE explained to pt. THEE handouts given to pt. ALBUTEROL Q 6 PRN  CONT. SAT MONITOR HS after surgery. O2 @ 3 lpm NC HS   RESPIRATORY ASSESSMENT Q SHIFT.                                                                      Referrals:    Pt. Phone Number:

## 2022-04-14 NOTE — H&P (VIEW-ONLY)
[id number] [facility]  Pre Operative History and Physical Exam    Patient ID:  Bernie Robles  [id number]  75 y.o.  1970    Today: April 14, 2022           CC: Left knee pain    HPI:   The patient has end stage arthritis of the left knee. The patient was evaluated and examined during a consultation prior to this office visit. There have been no changes to the patient's orthopedic condition since the initial consultation. The patient has failed previous conservative treatment for this condition including antiinflammatories , and lifestyle modifications. The necessity for joint replacement is present.  The patient will be admitted the day of surgery for left knee replacement    Past Medical/Surgical History:  Past Medical History:   Diagnosis Date    Abnormal Papanicolaou smear of cervix     Adverse effect of anesthesia     \"takes more\" for anesthesia to be effective     Arthritis     CAD (coronary artery disease)     no stents; [facility] 3/2017 mod LAD dz; followed by Dr Leola Ivan ([facility])    CHF (congestive heart failure) ([facility] [address]     ECHO 12/18/18: EF 40-45%; managed with medication and followed by Linda 6, uterine     GERD (gastroesophageal reflux disease)     diet controlled     H/O echocardiogram 10/04/2021    EF 40-45%    Heart murmur     Echo (10/4/21) EF 40-45% trace regurgitation in mitral and tricuspid valve    History of anemia     Hypercholesterolemia     Controlled with meds     Hypertension     Controlled with meds     Menorrhagia with regular cycle     Morbid obesity ([address]     NICM (nonischemic cardiomyopathy) ([address]     Followed by [facility] cardiology     THEE on CPAP     Ovarian cyst     PVCs (premature ventricular contractions)     Uterine fibroid      Past Surgical History:   Procedure Laterality Date    HX AFIB ABLATION  02/2018    Ablation for pvc's     HX HEART CATHETERIZATION      HX HYSTEROSCOPY      HX KNEE REPLACEMENT Right 01/26/2022    HX ORTHOPAEDIC Right     toe removal     HX OTHER SURGICAL      left hip and gluteal surgery    HX LILIANA AND BSO  02/12/2019    Supracervical    HX TUBAL LIGATION      HX WISDOM TEETH EXTRACTION          Allergies: Allergies   Allergen Reactions    Atorvastatin Myalgia    Metformin Other (comments)        Physical Exam:   General: NAD, Alert, Oriented, Appears their stated age     [de-identified]: NC/AT    Skin: No rashes, lesions or wounds seen      Psych: normal affect      Heart: Regular Rate, Rhythm     Lungs: unlabored respirations, no wheezing    Abdomen: Soft and non-distended     Ortho: Pain with limited ROM of the left knee    Neuro: no focal defects, moving extremities equally    Lymph: no lymphadenopathy     Meds:   Current Outpatient Medications   Medication Sig    chlorhexidine (PERIDEX) 0.12 % solution     neomycin-polymyxin-hydrocortisone, buffered, (PEDIOTIC) 3.5-10,000-1 mg/mL-unit/mL-% otic suspension APPLY 2 DROPS TO AFFECTED AREA TWO TIMES A DAY FOR 10 DAYS (Patient not taking: APPLY 2 DROPS TO AFFECTED AREA TWO TIMES A DAY FOR 10 DAYS)    triamcinolone acetonide (KENALOG) 0.1 % topical cream     amLODIPine (NORVASC) 10 mg tablet     gabapentin (NEURONTIN) 100 mg capsule Take 1 Capsule by mouth three (3) times daily. Indications: acute pain following an operation    vitamin e (E GEMS) 1,000 unit capsule Take 1,000 Units by mouth daily.  oxyCODONE IR (ROXICODONE) 5 mg immediate release tablet Take 5 mg by mouth every eight (8) hours as needed for Pain. (Patient not taking: Reported on 4/13/2022)    meloxicam (MOBIC) 7.5 mg tablet Take 1 Tablet by mouth two (2) times a day. (Patient not taking: Reported on 4/13/2022)    methocarbamoL (ROBAXIN) 500 mg tablet Take 1 Tablet by mouth three (3) times daily as needed for Muscle Spasm(s).  promethazine (PHENERGAN) 25 mg tablet Take 1 Tablet by mouth every eight (8) hours as needed for Nausea.  Indications: nausea and vomiting after surgery    cholecalciferol (VITAMIN D3) (400 Units /10 mcg) tab tablet Take 400 Units by mouth daily.  diclofenac (VOLTAREN) 1 % gel Apply  to affected area as needed.  ondansetron hcl (Zofran) 4 mg tablet Take 4 mg by mouth every eight (8) hours as needed for Nausea or Vomiting. (Patient not taking: Reported on 4/13/2022)    simvastatin (ZOCOR) 20 mg tablet Take 20 mg by mouth nightly.  carvediloL (COREG) 12.5 mg tablet Take 1 Tab by mouth two (2) times daily (with meals). (Patient taking differently: Take 12.5 mg by mouth nightly.)    spironolactone (ALDACTONE) 25 mg tablet Take 1 Tab by mouth daily.  amLODIPine (NORVASC) 5 mg tablet Take 1 Tab by mouth daily.  sacubitriL-valsartan (Entresto)  mg tablet Take 1 Tab by mouth two (2) times a day. (Patient taking differently: Take 1 Tablet by mouth two (2) times a day. 1 tablet in the am and 1/2 tablet in the pm)     No current facility-administered medications for this visit. Labs:  Hospital Outpatient Visit on 04/13/2022   Component Date Value Ref Range Status    WBC 04/13/2022 6.2  4.3 - 11.1 K/uL Final    RBC 04/13/2022 4.31  4.05 - 5.2 M/uL Final    HGB 04/13/2022 11.4* 11.7 - 15.4 g/dL Final    HCT 04/13/2022 35.8  35.8 - 46.3 % Final    MCV 04/13/2022 83.1  79.6 - 97.8 FL Final    MCH 04/13/2022 26.5  26.1 - 32.9 PG Final    MCHC 04/13/2022 31.8  31.4 - 35.0 g/dL Final    RDW 04/13/2022 14.6  11.9 - 14.6 % Final    PLATELET 24/88/1673 684  150 - 450 K/uL Final    MPV 04/13/2022 10.9  9.4 - 12.3 FL Final    ABSOLUTE NRBC 04/13/2022 0.00  0.0 - 0.2 K/uL Final    **Note: Absolute NRBC parameter is now reported with Hemogram**    NEUTROPHILS 04/13/2022 70  43 - 78 % Final    LYMPHOCYTES 04/13/2022 20  13 - 44 % Final    MONOCYTES 04/13/2022 7  4.0 - 12.0 % Final    EOSINOPHILS 04/13/2022 2  0.5 - 7.8 % Final    BASOPHILS 04/13/2022 1  0.0 - 2.0 % Final    IMMATURE GRANULOCYTES 04/13/2022 0  0.0 - 5.0 % Final    ABS.  NEUTROPHILS 04/13/2022 4.3  1.7 - 8.2 K/UL Final    ABS. LYMPHOCYTES 04/13/2022 1.3  0.5 - 4.6 K/UL Final    ABS. MONOCYTES 04/13/2022 0.5  0.1 - 1.3 K/UL Final    ABS. EOSINOPHILS 04/13/2022 0.1  0.0 - 0.8 K/UL Final    ABS. BASOPHILS 04/13/2022 0.0  0.0 - 0.2 K/UL Final    ABS. IMM. GRANS. 04/13/2022 0.0  0.0 - 0.5 K/UL Final    DF 04/13/2022 AUTOMATED    Final    Prothrombin time 04/13/2022 13.7  12.6 - 14.5 sec Final    INR 04/13/2022 1.0    Final    Comment: Suggested therapeutic INR range:  Venous thrombosis and embolus  2.0-3.0  Prosthetic heart valve         2.5-3.5  ** Note new reference range and method **      aPTT 04/13/2022 29.9  24.1 - 35.1 SEC Final    Comment: Heparin Therapeutic Range = 74 - 123 seconds  In addition to factor deficiency, monitoring heparin therapy, etc., evaluation of a prolonged aPTT result should include consideration of preanalytic variables such as heparin flush contamination, specimen integrity issues, etc.      Sodium 04/13/2022 141  136 - 145 mmol/L Final    Potassium 04/13/2022 3.7  3.5 - 5.1 mmol/L Final    Chloride 04/13/2022 107  98 - 107 mmol/L Final    CO2 04/13/2022 29  21 - 32 mmol/L Final    Anion gap 04/13/2022 5* 7 - 16 mmol/L Final    Glucose 04/13/2022 94  65 - 100 mg/dL Final    Comment: 47 - 60 mg/dl Consistent with, but not fully diagnostic of hypoglycemia. 101 - 125 mg/dl Impaired fasting glucose/consistent with pre-diabetes mellitus  > 126 mg/dl Fasting glucose consistent with overt diabetes mellitus      BUN 04/13/2022 15  6 - 23 MG/DL Final    Creatinine 04/13/2022 0.78  0.6 - 1.0 MG/DL Final    GFR est AA 04/13/2022 >60  >60 ml/min/1.73m2 Final    GFR est non-AA 04/13/2022 >60  >60 ml/min/1.73m2 Final    Comment: (NOTE)  Estimated GFR is calculated using the Modification of Diet in Renal   Disease (MDRD) Study equation, reported for both  Americans   (GFRAA) and non- Americans (GFRNA), and normalized to 1.73m2   body surface area. The physician must decide which value applies to   the patient. The MDRD study equation should only be used in   individuals age 25 or older. It has not been validated for the   following: pregnant women, patients with serious comorbid conditions,   or on certain medications, or persons with extremes of body size,   muscle mass, or nutritional status.  Calcium 04/13/2022 9.7  8.3 - 10.4 MG/DL Final    Hemoglobin A1c 04/13/2022 5.8  4.20 - 6.30 % Final    Est. average glucose 04/13/2022 120  mg/dL Final    Comment: (NOTE)  The eAG should be interpreted with patient characteristics in mind   since ethnicity, interindividual differences, red cell lifespan,   variation in rates of glycation, etc. may affect the validity of the   calculation.  Special Requests: 04/13/2022 NO SPECIAL REQUESTS    Final    Culture result: 04/13/2022 SA target not detected. A MRSA NEGATIVE, SA NEGATIVE test result does not preclude MRSA or SA nasal colonization. Final   Admission on 01/26/2022, Discharged on 01/27/2022   Component Date Value Ref Range Status    HGB 01/26/2022 11.5* 11.7 - 15.4 g/dL Final    HGB 01/27/2022 10.9* 11.7 - 15.4 g/dL Final   Orders Only on 01/17/2022   Component Date Value Ref Range Status    SARS-CoV-2, MUNA 01/24/2022 Not Detected  Not Detected Final    Comment: This nucleic acid amplification test was developed and its performance  characteristics determined by [employer]. Nucleic acid  amplification tests include RT-PCR and TMA. This test has not been  FDA cleared or approved. This test has been authorized by FDA under  an Emergency Use Authorization (EUA).  This test is only authorized  for the duration of time the declaration that circumstances exist  justifying the authorization of the emergency use of in vitro  diagnostic tests for detection of SARS-CoV-2 virus and/or diagnosis  of COVID-19 infection under section 564(b)(1) of the Act, 21 U.S.C.  489LBA-4(H) (1), unless the authorization is terminated or revoked  sooner. When diagnostic testing is negative, the possibility of a false  negative result should be considered in the context of a patient's  recent exposures and the presence of clinical signs and symptoms  consistent with COVID-19. An individual without symptoms of COVID-19  and who is not shedding SARS-CoV-2 virus wo                           uld expect to have a  negative (not detected) result in this assay.  Inpatient 01/24/2022 Comment   Final    Received    SARS-CoV-2, MUNA 2 DAY TAT 01/24/2022 Performed   Final                 Patient Active Problem List   Diagnosis Code    Intramural, submucous, and subserous leiomyoma of uterus D25.1, D25.0, D25.2    NICM (nonischemic cardiomyopathy) ([facility] [address] B24.6    Systolic CHF, chronic (HCC) I50.22    Coronary artery disease of native artery of native heart with stable angina pectoris ([facility] [address] I25.118    PVC's (premature ventricular contractions) I49.3    Chronic fatigue R53.82    S/P ablation of ventricular arrhythmia Z98.890, Z86.79    Menorrhagia with regular cycle N92.0    Severe obesity with body mass index (BMI) of 35.0 to 39.9 with serious comorbidity (HCC) E66.01    Abnormal uterine bleeding (AUB) N93.9    S/P abdominal supracervical subtotal hysterectomy Z90.711    Obesity (BMI 30-39. 9) E66.9    THEE (obstructive sleep apnea) G47.33    Noncompliance with CPAP treatment Z91.14    Osteoarthritis of right knee M17.11    Status post right knee replacement Z96.651         Assessment:   1. Arthritis of the left knee      Plan:    1. Proceed with scheduled left knee replacement      The patient was counseled at length about the risks of flo Covid-19 during their perioperative period and any recovery window from their procedure.   The patient was made aware that flo Covid-19  may worsen their prognosis for recovering from their procedure and lend to a higher morbidity and/or mortality risk. All material risks, benefits, and reasonable alternatives including postponing the procedure were discussed. The patient does wish to proceed with the procedure at this time.          Signed By: MANUEL An  April 14, 2022

## 2022-04-15 ENCOUNTER — HOSPITAL ENCOUNTER (OUTPATIENT)
Dept: PHYSICAL THERAPY | Age: 52
Discharge: HOME | End: 2022-04-15
Payer: MEDICARE

## 2022-04-15 PROCEDURE — 97140 MANUAL THERAPY 1/> REGIONS: CPT

## 2022-04-15 PROCEDURE — 97110 THERAPEUTIC EXERCISES: CPT

## 2022-04-15 NOTE — PROGRESS NOTES
Olivia Nabillalo Woodard  : 1970  Payor: [facility] MEDICARE / Plan: [facility] MEDICARE HMO/PPO / Product Type: Managed Care Medicare /  [address] [place] [address]  at [place] [address] [facility]  [address], [address]  [phone]   [phone]                                                            Nadege Ji MD      OUTPATIENT PHYSICAL THERAPY: Daily Treatment Note 4/15/2022 Visit Count:  9    Tx Diagnosis:  Pain in right knee (M25.561)  Effusion, right knee (M25.461)  Stiffness of right knee, not elsewhere classified (M25.661)        Pre-treatment Symptoms/Complaints: See Initial Eval Dated 3.14.22 for more details. Patient reports right knee is really stiff today. She states she can tell she hasn't been to therapy since last week. Pain: Initial:3/10  Medications Last Reviewed:  4/15/2022     Post Session: 2/10   Updated Objective Findings: See Initial Eval for more details. TREATMENT:   THERAPEUTIC EXERCISE: (25 min):  Exercises per grid below to improve mobility, strength and balance. Required minimal visual, verbal and manual cues to promote proper body alignment and promote proper body posture. Progressed resistance and complexity of movement as indicated. Date  3/30 Date   Date   Date   Date  4/15   Activity/Exercise Parameters Parameters Parameters Parameters Parameters   Education        Heel slides        Quad Set     X 20   NuStep X 10 min X 10 min X 10 min X 10 min X 10 min   Walking        SAQ     3 x 10   LAQ    3 x 10 3 x 10   Step ups  X 15 X 15 X 20 X 20  X 20   Standing weightshift        Hamstring machine  # 20 2 x 10 # 25 3 x 10 # 25 3 x 10 # 25 3 x 10 # 25 3 x 10   Sit to stand  X 10 X 10 X 15 X 20 X 20           MANUAL THERAPY: (30 min): Joint mobilization, Soft tissue mobilization was utilized and necessary because of the patient's restricted joint motion and restricted motion of soft tissue mobility. Date  4/15/2022    Technique Used Grade  Level # Time(s) Effect while being performed   STM Supine L knee to improve ROM, pain in sitting and supine                                                              HEP Log Date 1.    4/15/2022   2.  4/15/2022   3. 4/15/2022   4.    5.           [facility]  Treatment/Session Summary:    Response to Treatment: Pt demonstrated understanding of POC and initial HEP. No increase in pain or adverse reactions. Patient tolerated treatment well today. Patient needs to continue to work on ROM and strength. Patient is scheduled to have TKA on left knee next week. Communication/Consultation:  POC, HEP, PT goals, Faxed initial evaluation to MD.   Equipment provided today: HEP Handout   Recommendations/Intent for next treatment session:   Next visit will focus on Manual Therapy Quad strengthening soft tissue mobilization gait training, ROM. Treatment Plan of Care Effective Dates: 4/15/2022 TO 6/12/2022 (90 days).   Frequency/Duration: 2-3 times a week for 90 Days             Total Treatment Billable Duration:  55   PT Patient Time In/Time Out  Time In: 0800  Time Out: 0900  Sera Gurrola PTA    Future Appointments   Date Time Provider Britney Burgos   5/26/2022 10:30 AM Jd Villagomez MD S[facility] POAI POA   6/20/2022  8:00 AM BATOOL Stewart PSCD PP   8/17/2022 11:15 AM DO UYEN Holland UCMARY UCD

## 2022-04-19 ENCOUNTER — ANESTHESIA EVENT (OUTPATIENT)
Dept: SURGERY | Age: 52
End: 2022-04-19
Payer: MEDICARE

## 2022-04-19 RX ORDER — HYDROMORPHONE HYDROCHLORIDE 2 MG/ML
0.5 INJECTION, SOLUTION INTRAMUSCULAR; INTRAVENOUS; SUBCUTANEOUS
Status: CANCELLED | OUTPATIENT
Start: 2022-04-19

## 2022-04-20 ENCOUNTER — HOSPITAL ENCOUNTER (OUTPATIENT)
Age: 52
Discharge: HOME HEALTH | End: 2022-04-21
Attending: ORTHOPAEDIC SURGERY | Admitting: ORTHOPAEDIC SURGERY
Payer: MEDICARE

## 2022-04-20 ENCOUNTER — ANESTHESIA (OUTPATIENT)
Dept: SURGERY | Age: 52
End: 2022-04-20
Payer: MEDICARE

## 2022-04-20 ENCOUNTER — HOME HEALTH ADMISSION (OUTPATIENT)
Dept: HOME HEALTH SERVICES | Facility: HOME HEALTH | Age: 52
End: 2022-04-20
Payer: MEDICARE

## 2022-04-20 DIAGNOSIS — Z96.652 STATUS POST LEFT KNEE REPLACEMENT: Primary | ICD-10-CM

## 2022-04-20 PROBLEM — M17.12 OSTEOARTHRITIS OF LEFT KNEE: Status: ACTIVE | Noted: 2022-04-20

## 2022-04-20 LAB — HGB BLD-MCNC: 11.1 G/DL (ref 11.7–15.4)

## 2022-04-20 PROCEDURE — 77030007880 HC KT SPN EPDRL BBMI -B: Performed by: ANESTHESIOLOGY

## 2022-04-20 PROCEDURE — 77030039760: Performed by: ORTHOPAEDIC SURGERY

## 2022-04-20 PROCEDURE — 74011250636 HC RX REV CODE- 250/636: Performed by: ORTHOPAEDIC SURGERY

## 2022-04-20 PROCEDURE — 74011250636 HC RX REV CODE- 250/636: Performed by: PHYSICIAN ASSISTANT

## 2022-04-20 PROCEDURE — 77030031139 HC SUT VCRL2 J&J -A: Performed by: ORTHOPAEDIC SURGERY

## 2022-04-20 PROCEDURE — 76010010054 HC POST OP PAIN BLOCK: Performed by: ORTHOPAEDIC SURGERY

## 2022-04-20 PROCEDURE — 77030039425 HC BLD LARYNG TRULITE DISP TELE -A: Performed by: ANESTHESIOLOGY

## 2022-04-20 PROCEDURE — 74011250637 HC RX REV CODE- 250/637: Performed by: ANESTHESIOLOGY

## 2022-04-20 PROCEDURE — 77030002912 HC SUT ETHBND J&J -A: Performed by: ORTHOPAEDIC SURGERY

## 2022-04-20 PROCEDURE — 74011250637 HC RX REV CODE- 250/637: Performed by: PHYSICIAN ASSISTANT

## 2022-04-20 PROCEDURE — 97161 PT EVAL LOW COMPLEX 20 MIN: CPT

## 2022-04-20 PROCEDURE — 77030038692 HC WND DEB SYS IRMX -B: Performed by: ORTHOPAEDIC SURGERY

## 2022-04-20 PROCEDURE — C1776 JOINT DEVICE (IMPLANTABLE): HCPCS | Performed by: ORTHOPAEDIC SURGERY

## 2022-04-20 PROCEDURE — 76210000006 HC OR PH I REC 0.5 TO 1 HR: Performed by: ORTHOPAEDIC SURGERY

## 2022-04-20 PROCEDURE — 77030038149 HC BLD SAW SAG STRY -D: Performed by: ORTHOPAEDIC SURGERY

## 2022-04-20 PROCEDURE — 77030012935 HC DRSG AQUACEL BMS -B: Performed by: ORTHOPAEDIC SURGERY

## 2022-04-20 PROCEDURE — 74011000250 HC RX REV CODE- 250: Performed by: PHYSICIAN ASSISTANT

## 2022-04-20 PROCEDURE — 74011250636 HC RX REV CODE- 250/636: Performed by: ANESTHESIOLOGY

## 2022-04-20 PROCEDURE — 76010000877 HC OR TIME 2.5 TO 3HR INTENSV - TIER 2: Performed by: ORTHOPAEDIC SURGERY

## 2022-04-20 PROCEDURE — 36415 COLL VENOUS BLD VENIPUNCTURE: CPT

## 2022-04-20 PROCEDURE — 77030006720 HC BLD PAT RMR ZIMM -B: Performed by: ORTHOPAEDIC SURGERY

## 2022-04-20 PROCEDURE — 97165 OT EVAL LOW COMPLEX 30 MIN: CPT

## 2022-04-20 PROCEDURE — 77030019557 HC ELECTRD VES SEAL MEDT -F: Performed by: ORTHOPAEDIC SURGERY

## 2022-04-20 PROCEDURE — 76060000036 HC ANESTHESIA 2.5 TO 3 HR: Performed by: ORTHOPAEDIC SURGERY

## 2022-04-20 PROCEDURE — 74011000250 HC RX REV CODE- 250: Performed by: ORTHOPAEDIC SURGERY

## 2022-04-20 PROCEDURE — 77030003665 HC NDL SPN BBMI -A: Performed by: ANESTHESIOLOGY

## 2022-04-20 PROCEDURE — 27447 TOTAL KNEE ARTHROPLASTY: CPT | Performed by: ORTHOPAEDIC SURGERY

## 2022-04-20 PROCEDURE — 97530 THERAPEUTIC ACTIVITIES: CPT

## 2022-04-20 PROCEDURE — 97535 SELF CARE MNGMENT TRAINING: CPT

## 2022-04-20 PROCEDURE — 76942 ECHO GUIDE FOR BIOPSY: CPT | Performed by: ORTHOPAEDIC SURGERY

## 2022-04-20 PROCEDURE — 2709999900 HC NON-CHARGEABLE SUPPLY

## 2022-04-20 PROCEDURE — 77030003602 HC NDL NRV BLK BBMI -B: Performed by: ANESTHESIOLOGY

## 2022-04-20 PROCEDURE — 77030003028 HC SUT VCRL J&J -A: Performed by: ORTHOPAEDIC SURGERY

## 2022-04-20 PROCEDURE — 74011000250 HC RX REV CODE- 250: Performed by: NURSE ANESTHETIST, CERTIFIED REGISTERED

## 2022-04-20 PROCEDURE — 74011000258 HC RX REV CODE- 258: Performed by: ORTHOPAEDIC SURGERY

## 2022-04-20 PROCEDURE — 2709999900 HC NON-CHARGEABLE SUPPLY: Performed by: ORTHOPAEDIC SURGERY

## 2022-04-20 PROCEDURE — 94760 N-INVAS EAR/PLS OXIMETRY 1: CPT

## 2022-04-20 PROCEDURE — 77030018836 HC SOL IRR NACL ICUM -A: Performed by: ORTHOPAEDIC SURGERY

## 2022-04-20 PROCEDURE — 77030020044 HC CLD THERAPY UNIT -B

## 2022-04-20 PROCEDURE — 77030037088 HC TUBE ENDOTRACH ORAL NSL COVD-A: Performed by: ANESTHESIOLOGY

## 2022-04-20 PROCEDURE — 77030040922 HC BLNKT HYPOTHRM STRY -A: Performed by: ANESTHESIOLOGY

## 2022-04-20 PROCEDURE — 77030029820: Performed by: ORTHOPAEDIC SURGERY

## 2022-04-20 PROCEDURE — 77030029828 HC FEM TIB CKPNT KT DISP STRY -B: Performed by: ORTHOPAEDIC SURGERY

## 2022-04-20 PROCEDURE — C1713 ANCHOR/SCREW BN/BN,TIS/BN: HCPCS | Performed by: ORTHOPAEDIC SURGERY

## 2022-04-20 PROCEDURE — 74011250636 HC RX REV CODE- 250/636: Performed by: NURSE ANESTHETIST, CERTIFIED REGISTERED

## 2022-04-20 PROCEDURE — 85018 HEMOGLOBIN: CPT

## 2022-04-20 PROCEDURE — 74011250637 HC RX REV CODE- 250/637: Performed by: ORTHOPAEDIC SURGERY

## 2022-04-20 PROCEDURE — 20985 CPTR-ASST DIR MS PX: CPT | Performed by: ORTHOPAEDIC SURGERY

## 2022-04-20 DEVICE — CEMENT BNE 20GM HALF DOSE PMMA VISC RADPQ FAST: Type: IMPLANTABLE DEVICE | Site: KNEE | Status: FUNCTIONAL

## 2022-04-20 DEVICE — KNEE K2 TOT HEMI ADV CMTLS -- IMPL CAPPED K2: Type: IMPLANTABLE DEVICE | Status: FUNCTIONAL

## 2022-04-20 DEVICE — INSERT TIB CS 5 10 MM ARTC KNEE BEAR TECHNOLOGY TRIATHLON: Type: IMPLANTABLE DEVICE | Site: KNEE | Status: FUNCTIONAL

## 2022-04-20 DEVICE — COMPNT PAT ASYM TRIATHLN 38X11 --: Type: IMPLANTABLE DEVICE | Site: KNEE | Status: FUNCTIONAL

## 2022-04-20 DEVICE — COMPNT FEM CR TRIATHLN 4 L PA --: Type: IMPLANTABLE DEVICE | Site: KNEE | Status: FUNCTIONAL

## 2022-04-20 DEVICE — BASEPLT TIB PC TRITNM SZ 5 -- TRIATHLON: Type: IMPLANTABLE DEVICE | Site: KNEE | Status: FUNCTIONAL

## 2022-04-20 RX ORDER — MIDAZOLAM HYDROCHLORIDE 1 MG/ML
2 INJECTION, SOLUTION INTRAMUSCULAR; INTRAVENOUS
Status: DISCONTINUED | OUTPATIENT
Start: 2022-04-20 | End: 2022-04-20 | Stop reason: HOSPADM

## 2022-04-20 RX ORDER — DEXAMETHASONE SODIUM PHOSPHATE 4 MG/ML
INJECTION, SOLUTION INTRA-ARTICULAR; INTRALESIONAL; INTRAMUSCULAR; INTRAVENOUS; SOFT TISSUE
Status: COMPLETED | OUTPATIENT
Start: 2022-04-20 | End: 2022-04-20

## 2022-04-20 RX ORDER — METHOCARBAMOL 500 MG/1
500 TABLET, FILM COATED ORAL
Status: DISCONTINUED | OUTPATIENT
Start: 2022-04-20 | End: 2022-04-21 | Stop reason: HOSPADM

## 2022-04-20 RX ORDER — SODIUM CHLORIDE 0.9 % (FLUSH) 0.9 %
5-40 SYRINGE (ML) INJECTION EVERY 8 HOURS
Status: DISCONTINUED | OUTPATIENT
Start: 2022-04-20 | End: 2022-04-21 | Stop reason: HOSPADM

## 2022-04-20 RX ORDER — DIPHENHYDRAMINE HCL 25 MG
25 CAPSULE ORAL
Status: DISCONTINUED | OUTPATIENT
Start: 2022-04-20 | End: 2022-04-21 | Stop reason: HOSPADM

## 2022-04-20 RX ORDER — KETAMINE HYDROCHLORIDE 50 MG/ML
INJECTION, SOLUTION INTRAMUSCULAR; INTRAVENOUS AS NEEDED
Status: DISCONTINUED | OUTPATIENT
Start: 2022-04-20 | End: 2022-04-20 | Stop reason: HOSPADM

## 2022-04-20 RX ORDER — ACETAMINOPHEN 650 MG/1
650 SUPPOSITORY RECTAL ONCE
Status: DISCONTINUED | OUTPATIENT
Start: 2022-04-20 | End: 2022-04-20 | Stop reason: SDUPTHER

## 2022-04-20 RX ORDER — ACETAMINOPHEN 500 MG
1000 TABLET ORAL ONCE
Status: DISCONTINUED | OUTPATIENT
Start: 2022-04-20 | End: 2022-04-20 | Stop reason: HOSPADM

## 2022-04-20 RX ORDER — NALOXONE HYDROCHLORIDE 0.4 MG/ML
.2-.4 INJECTION, SOLUTION INTRAMUSCULAR; INTRAVENOUS; SUBCUTANEOUS
Status: DISCONTINUED | OUTPATIENT
Start: 2022-04-20 | End: 2022-04-21 | Stop reason: HOSPADM

## 2022-04-20 RX ORDER — MIDAZOLAM HYDROCHLORIDE 1 MG/ML
INJECTION, SOLUTION INTRAMUSCULAR; INTRAVENOUS AS NEEDED
Status: DISCONTINUED | OUTPATIENT
Start: 2022-04-20 | End: 2022-04-20 | Stop reason: HOSPADM

## 2022-04-20 RX ORDER — DEXAMETHASONE SODIUM PHOSPHATE 100 MG/10ML
INJECTION INTRAMUSCULAR; INTRAVENOUS AS NEEDED
Status: DISCONTINUED | OUTPATIENT
Start: 2022-04-20 | End: 2022-04-20 | Stop reason: HOSPADM

## 2022-04-20 RX ORDER — SODIUM CHLORIDE, SODIUM LACTATE, POTASSIUM CHLORIDE, CALCIUM CHLORIDE 600; 310; 30; 20 MG/100ML; MG/100ML; MG/100ML; MG/100ML
100 INJECTION, SOLUTION INTRAVENOUS CONTINUOUS
Status: DISCONTINUED | OUTPATIENT
Start: 2022-04-20 | End: 2022-04-20 | Stop reason: HOSPADM

## 2022-04-20 RX ORDER — OXYCODONE HYDROCHLORIDE 5 MG/1
5 TABLET ORAL
Status: DISCONTINUED | OUTPATIENT
Start: 2022-04-20 | End: 2022-04-20 | Stop reason: HOSPADM

## 2022-04-20 RX ORDER — ONDANSETRON 2 MG/ML
INJECTION INTRAMUSCULAR; INTRAVENOUS AS NEEDED
Status: DISCONTINUED | OUTPATIENT
Start: 2022-04-20 | End: 2022-04-20 | Stop reason: HOSPADM

## 2022-04-20 RX ORDER — CEFAZOLIN SODIUM/WATER 2 G/20 ML
2 SYRINGE (ML) INTRAVENOUS ONCE
Status: COMPLETED | OUTPATIENT
Start: 2022-04-20 | End: 2022-04-20

## 2022-04-20 RX ORDER — HALOPERIDOL 5 MG/ML
1 INJECTION INTRAMUSCULAR
Status: DISCONTINUED | OUTPATIENT
Start: 2022-04-20 | End: 2022-04-20 | Stop reason: HOSPADM

## 2022-04-20 RX ORDER — ROPIVACAINE HYDROCHLORIDE 2 MG/ML
INJECTION, SOLUTION EPIDURAL; INFILTRATION; PERINEURAL AS NEEDED
Status: DISCONTINUED | OUTPATIENT
Start: 2022-04-20 | End: 2022-04-20 | Stop reason: HOSPADM

## 2022-04-20 RX ORDER — GABAPENTIN 100 MG/1
100 CAPSULE ORAL 3 TIMES DAILY
Status: DISCONTINUED | OUTPATIENT
Start: 2022-04-20 | End: 2022-04-21 | Stop reason: HOSPADM

## 2022-04-20 RX ORDER — ACETAMINOPHEN 325 MG/1
975 TABLET ORAL ONCE
Status: DISCONTINUED | OUTPATIENT
Start: 2022-04-20 | End: 2022-04-20 | Stop reason: SDUPTHER

## 2022-04-20 RX ORDER — HYDROMORPHONE HYDROCHLORIDE 2 MG/ML
0.2 INJECTION, SOLUTION INTRAMUSCULAR; INTRAVENOUS; SUBCUTANEOUS
Status: DISCONTINUED | OUTPATIENT
Start: 2022-04-20 | End: 2022-04-20 | Stop reason: HOSPADM

## 2022-04-20 RX ORDER — HYDROMORPHONE HYDROCHLORIDE 2 MG/ML
INJECTION, SOLUTION INTRAMUSCULAR; INTRAVENOUS; SUBCUTANEOUS AS NEEDED
Status: DISCONTINUED | OUTPATIENT
Start: 2022-04-20 | End: 2022-04-20 | Stop reason: HOSPADM

## 2022-04-20 RX ORDER — KETOROLAC TROMETHAMINE 30 MG/ML
INJECTION, SOLUTION INTRAMUSCULAR; INTRAVENOUS AS NEEDED
Status: DISCONTINUED | OUTPATIENT
Start: 2022-04-20 | End: 2022-04-20 | Stop reason: HOSPADM

## 2022-04-20 RX ORDER — LIDOCAINE HYDROCHLORIDE 20 MG/ML
INJECTION, SOLUTION EPIDURAL; INFILTRATION; INTRACAUDAL; PERINEURAL AS NEEDED
Status: DISCONTINUED | OUTPATIENT
Start: 2022-04-20 | End: 2022-04-20 | Stop reason: HOSPADM

## 2022-04-20 RX ORDER — LIDOCAINE HYDROCHLORIDE 10 MG/ML
0.1 INJECTION INFILTRATION; PERINEURAL AS NEEDED
Status: DISCONTINUED | OUTPATIENT
Start: 2022-04-20 | End: 2022-04-20 | Stop reason: HOSPADM

## 2022-04-20 RX ORDER — OXYCODONE HYDROCHLORIDE 5 MG/1
10 TABLET ORAL
Status: DISCONTINUED | OUTPATIENT
Start: 2022-04-20 | End: 2022-04-21 | Stop reason: HOSPADM

## 2022-04-20 RX ORDER — DEXAMETHASONE SODIUM PHOSPHATE 100 MG/10ML
10 INJECTION INTRAMUSCULAR; INTRAVENOUS ONCE
Status: DISCONTINUED | OUTPATIENT
Start: 2022-04-21 | End: 2022-04-21 | Stop reason: HOSPADM

## 2022-04-20 RX ORDER — AMOXICILLIN 250 MG
2 CAPSULE ORAL DAILY
Status: DISCONTINUED | OUTPATIENT
Start: 2022-04-21 | End: 2022-04-21 | Stop reason: HOSPADM

## 2022-04-20 RX ORDER — CARVEDILOL 6.25 MG/1
12.5 TABLET ORAL
Status: DISCONTINUED | OUTPATIENT
Start: 2022-04-20 | End: 2022-04-21 | Stop reason: HOSPADM

## 2022-04-20 RX ORDER — HYDROMORPHONE HYDROCHLORIDE 1 MG/ML
1 INJECTION, SOLUTION INTRAMUSCULAR; INTRAVENOUS; SUBCUTANEOUS
Status: DISCONTINUED | OUTPATIENT
Start: 2022-04-20 | End: 2022-04-21 | Stop reason: HOSPADM

## 2022-04-20 RX ORDER — CEFAZOLIN SODIUM/WATER 2 G/20 ML
2 SYRINGE (ML) INTRAVENOUS EVERY 8 HOURS
Status: COMPLETED | OUTPATIENT
Start: 2022-04-20 | End: 2022-04-21

## 2022-04-20 RX ORDER — NALOXONE HYDROCHLORIDE 0.4 MG/ML
0.04 INJECTION, SOLUTION INTRAMUSCULAR; INTRAVENOUS; SUBCUTANEOUS
Status: DISCONTINUED | OUTPATIENT
Start: 2022-04-20 | End: 2022-04-20 | Stop reason: HOSPADM

## 2022-04-20 RX ORDER — MIDAZOLAM HYDROCHLORIDE 1 MG/ML
2 INJECTION, SOLUTION INTRAMUSCULAR; INTRAVENOUS ONCE
Status: COMPLETED | OUTPATIENT
Start: 2022-04-20 | End: 2022-04-20

## 2022-04-20 RX ORDER — FENTANYL CITRATE 50 UG/ML
100 INJECTION, SOLUTION INTRAMUSCULAR; INTRAVENOUS ONCE
Status: COMPLETED | OUTPATIENT
Start: 2022-04-20 | End: 2022-04-20

## 2022-04-20 RX ORDER — ACETAMINOPHEN 500 MG
1000 TABLET ORAL EVERY 6 HOURS
Status: DISCONTINUED | OUTPATIENT
Start: 2022-04-20 | End: 2022-04-21 | Stop reason: HOSPADM

## 2022-04-20 RX ORDER — FENTANYL CITRATE 50 UG/ML
INJECTION, SOLUTION INTRAMUSCULAR; INTRAVENOUS AS NEEDED
Status: DISCONTINUED | OUTPATIENT
Start: 2022-04-20 | End: 2022-04-20 | Stop reason: HOSPADM

## 2022-04-20 RX ORDER — ATORVASTATIN CALCIUM 10 MG/1
10 TABLET, FILM COATED ORAL
Status: DISCONTINUED | OUTPATIENT
Start: 2022-04-20 | End: 2022-04-21 | Stop reason: HOSPADM

## 2022-04-20 RX ORDER — GLYCOPYRROLATE 0.2 MG/ML
INJECTION INTRAMUSCULAR; INTRAVENOUS AS NEEDED
Status: DISCONTINUED | OUTPATIENT
Start: 2022-04-20 | End: 2022-04-20 | Stop reason: HOSPADM

## 2022-04-20 RX ORDER — TRANEXAMIC ACID 100 MG/ML
INJECTION, SOLUTION INTRAVENOUS AS NEEDED
Status: DISCONTINUED | OUTPATIENT
Start: 2022-04-20 | End: 2022-04-20 | Stop reason: HOSPADM

## 2022-04-20 RX ORDER — SODIUM CHLORIDE 0.9 % (FLUSH) 0.9 %
5-40 SYRINGE (ML) INJECTION AS NEEDED
Status: DISCONTINUED | OUTPATIENT
Start: 2022-04-20 | End: 2022-04-21 | Stop reason: HOSPADM

## 2022-04-20 RX ORDER — EPHEDRINE SULFATE/0.9% NACL/PF 50 MG/5 ML
SYRINGE (ML) INTRAVENOUS AS NEEDED
Status: DISCONTINUED | OUTPATIENT
Start: 2022-04-20 | End: 2022-04-20 | Stop reason: HOSPADM

## 2022-04-20 RX ORDER — OXYCODONE HYDROCHLORIDE 5 MG/1
10 TABLET ORAL ONCE
Status: COMPLETED | OUTPATIENT
Start: 2022-04-20 | End: 2022-04-20

## 2022-04-20 RX ORDER — PROPOFOL 10 MG/ML
INJECTION, EMULSION INTRAVENOUS AS NEEDED
Status: DISCONTINUED | OUTPATIENT
Start: 2022-04-20 | End: 2022-04-20 | Stop reason: HOSPADM

## 2022-04-20 RX ORDER — NEOSTIGMINE METHYLSULFATE 1 MG/ML
INJECTION, SOLUTION INTRAVENOUS AS NEEDED
Status: DISCONTINUED | OUTPATIENT
Start: 2022-04-20 | End: 2022-04-20 | Stop reason: HOSPADM

## 2022-04-20 RX ORDER — SODIUM CHLORIDE 9 MG/ML
100 INJECTION, SOLUTION INTRAVENOUS CONTINUOUS
Status: DISCONTINUED | OUTPATIENT
Start: 2022-04-20 | End: 2022-04-21 | Stop reason: HOSPADM

## 2022-04-20 RX ORDER — AMLODIPINE BESYLATE 10 MG/1
10 TABLET ORAL DAILY
Status: DISCONTINUED | OUTPATIENT
Start: 2022-04-21 | End: 2022-04-21 | Stop reason: HOSPADM

## 2022-04-20 RX ORDER — SPIRONOLACTONE 25 MG/1
25 TABLET ORAL DAILY
Status: DISCONTINUED | OUTPATIENT
Start: 2022-04-21 | End: 2022-04-21 | Stop reason: HOSPADM

## 2022-04-20 RX ORDER — ASPIRIN 81 MG/1
81 TABLET ORAL EVERY 12 HOURS
Status: DISCONTINUED | OUTPATIENT
Start: 2022-04-20 | End: 2022-04-21 | Stop reason: HOSPADM

## 2022-04-20 RX ORDER — ONDANSETRON 4 MG/1
4 TABLET, ORALLY DISINTEGRATING ORAL
Status: DISCONTINUED | OUTPATIENT
Start: 2022-04-20 | End: 2022-04-21 | Stop reason: HOSPADM

## 2022-04-20 RX ADMIN — Medication 81 MG: at 22:37

## 2022-04-20 RX ADMIN — TRANEXAMIC ACID 1 G: 100 INJECTION, SOLUTION INTRAVENOUS at 11:00

## 2022-04-20 RX ADMIN — LIDOCAINE HYDROCHLORIDE 60 MG: 20 INJECTION, SOLUTION EPIDURAL; INFILTRATION; INTRACAUDAL; PERINEURAL at 10:01

## 2022-04-20 RX ADMIN — GLYCOPYRROLATE 0.1 MG: 0.2 INJECTION, SOLUTION INTRAMUSCULAR; INTRAVENOUS at 10:17

## 2022-04-20 RX ADMIN — ROPIVACAINE HYDROCHLORIDE 20 ML: 2 INJECTION, SOLUTION EPIDURAL; INFILTRATION at 09:38

## 2022-04-20 RX ADMIN — ONDANSETRON 4 MG: 2 INJECTION INTRAMUSCULAR; INTRAVENOUS at 09:49

## 2022-04-20 RX ADMIN — CEFAZOLIN SODIUM 2 G: 100 INJECTION, POWDER, LYOPHILIZED, FOR SOLUTION INTRAVENOUS at 17:01

## 2022-04-20 RX ADMIN — PROPOFOL 50 MG: 10 INJECTION, EMULSION INTRAVENOUS at 10:46

## 2022-04-20 RX ADMIN — Medication 10 MG: at 11:42

## 2022-04-20 RX ADMIN — GABAPENTIN 100 MG: 100 CAPSULE ORAL at 16:58

## 2022-04-20 RX ADMIN — HYDROMORPHONE HYDROCHLORIDE 1 MG: 2 INJECTION INTRAMUSCULAR; INTRAVENOUS; SUBCUTANEOUS at 10:50

## 2022-04-20 RX ADMIN — SODIUM CHLORIDE, SODIUM LACTATE, POTASSIUM CHLORIDE, AND CALCIUM CHLORIDE 100 ML/HR: 600; 310; 30; 20 INJECTION, SOLUTION INTRAVENOUS at 08:40

## 2022-04-20 RX ADMIN — CARVEDILOL 12.5 MG: 6.25 TABLET, FILM COATED ORAL at 22:36

## 2022-04-20 RX ADMIN — DEXAMETHASONE SODIUM PHOSPHATE 4 MG: 4 INJECTION, SOLUTION INTRAMUSCULAR; INTRAVENOUS at 09:38

## 2022-04-20 RX ADMIN — Medication 1 AMPULE: at 22:37

## 2022-04-20 RX ADMIN — SACUBITRIL AND VALSARTAN 1 TABLET: 49; 51 TABLET, FILM COATED ORAL at 17:00

## 2022-04-20 RX ADMIN — FENTANYL CITRATE 100 MCG: 50 INJECTION INTRAMUSCULAR; INTRAVENOUS at 10:01

## 2022-04-20 RX ADMIN — HYDROMORPHONE HYDROCHLORIDE 0.6 MG: 2 INJECTION INTRAMUSCULAR; INTRAVENOUS; SUBCUTANEOUS at 11:12

## 2022-04-20 RX ADMIN — ACETAMINOPHEN 1000 MG: 500 TABLET, FILM COATED ORAL at 17:01

## 2022-04-20 RX ADMIN — Medication 5 MG: at 11:59

## 2022-04-20 RX ADMIN — OXYCODONE 10 MG: 5 TABLET ORAL at 22:36

## 2022-04-20 RX ADMIN — FENTANYL CITRATE 100 MCG: 50 INJECTION, SOLUTION INTRAMUSCULAR; INTRAVENOUS at 09:36

## 2022-04-20 RX ADMIN — SODIUM CHLORIDE, SODIUM LACTATE, POTASSIUM CHLORIDE, AND CALCIUM CHLORIDE: 600; 310; 30; 20 INJECTION, SOLUTION INTRAVENOUS at 10:23

## 2022-04-20 RX ADMIN — Medication 3 AMPULE: at 08:28

## 2022-04-20 RX ADMIN — ATORVASTATIN CALCIUM 10 MG: 10 TABLET, FILM COATED ORAL at 22:36

## 2022-04-20 RX ADMIN — Medication 2 G: at 10:20

## 2022-04-20 RX ADMIN — OXYCODONE 10 MG: 5 TABLET ORAL at 12:44

## 2022-04-20 RX ADMIN — PROPOFOL 150 MG: 10 INJECTION, EMULSION INTRAVENOUS at 10:01

## 2022-04-20 RX ADMIN — SODIUM CHLORIDE, PRESERVATIVE FREE 10 ML: 5 INJECTION INTRAVENOUS at 22:37

## 2022-04-20 RX ADMIN — KETAMINE HYDROCHLORIDE 50 MG: 50 INJECTION, SOLUTION INTRAMUSCULAR; INTRAVENOUS at 10:20

## 2022-04-20 RX ADMIN — ONDANSETRON 4 MG: 4 TABLET, ORALLY DISINTEGRATING ORAL at 13:56

## 2022-04-20 RX ADMIN — MIDAZOLAM 2 MG: 1 INJECTION INTRAMUSCULAR; INTRAVENOUS at 09:48

## 2022-04-20 RX ADMIN — DEXAMETHASONE SODIUM PHOSPHATE 10 MG: 10 INJECTION INTRAMUSCULAR; INTRAVENOUS at 10:07

## 2022-04-20 RX ADMIN — TRANEXAMIC ACID 1 G: 100 INJECTION, SOLUTION INTRAVENOUS at 10:20

## 2022-04-20 RX ADMIN — Medication 10 MG: at 10:14

## 2022-04-20 RX ADMIN — MIDAZOLAM 2 MG: 1 INJECTION INTRAMUSCULAR; INTRAVENOUS at 09:35

## 2022-04-20 RX ADMIN — GABAPENTIN 100 MG: 100 CAPSULE ORAL at 22:37

## 2022-04-20 RX ADMIN — GLYCOPYRROLATE 0.7 MG: 0.2 INJECTION, SOLUTION INTRAMUSCULAR; INTRAVENOUS at 11:59

## 2022-04-20 NOTE — ANESTHESIA POSTPROCEDURE EVALUATION
Procedure(s):  LEFT  KNEE ARTHROPLASTY TOTAL ROBOTIC ASSISTED ARJUN/BOBBI  , ADDUCTOR CANAL BLOCK.    spinal    Anesthesia Post Evaluation        Patient location during evaluation: PACU  Patient participation: complete - patient participated  Level of consciousness: awake  Pain management: satisfactory to patient  Airway patency: patent  Anesthetic complications: no  Cardiovascular status: hemodynamically stable  Respiratory status: spontaneous ventilation  Hydration status: euvolemic  Post anesthesia nausea and vomiting:  none      INITIAL Post-op Vital signs:   Vitals Value Taken Time   /63 04/20/22 1245   Temp 36.7 °C (98 °F) 04/20/22 1220   Pulse 51 04/20/22 1249   Resp 16 04/20/22 1245   SpO2 99 % 04/20/22 1249   Vitals shown include unvalidated device data.

## 2022-04-20 NOTE — PROGRESS NOTES
Care Management Interventions  PCP Verified by CM: Yes  Mode of Transport at Discharge: Self  Transition of Care Consult (CM Consult): [address]: Yes  Physical Therapy Consult: Yes  Occupational Therapy Consult: Yes  Support Systems: Child(althea)  Confirm Follow Up Transport: Self  The Plan for Transition of Care is Related to the Following Treatment Goals : improve mobility  The Patient and/or Patient Representative was Provided with a Choice of Provider and Agrees with the Discharge Plan?: Yes  Name of the Patient Representative Who was Provided with a Choice of Provider and Agrees with the Discharge Plan: pt  Freedom of Choice List was Provided with Basic Dialogue that Supports the Patient's Individualized Plan of Care/Goals, Treatment Preferences and Shares the Quality Data Associated with the Providers?: Yes  Discharge Location  Patient Expects to be Discharged to[de-identified] Home with home health  Patient is a 46y.o. year old female admitted for Left TKA . Patient plans to return home on discharge. Order received to arrange home health. Patient without preference towards agency. Referral sent to [facility]. Patient denies any equipment needs as patient has a walker and bedside commode. Will follow until discharge.

## 2022-04-20 NOTE — PERIOP NOTES
RN ask Dr. Tiffanie Jeronimo if patient can have 10 mg of oxycodone as patient does not want IV pain medication at this time. New order received.

## 2022-04-20 NOTE — INTERVAL H&P NOTE
Cardiology prefers that PCP manage. Patient was called and voice mail left with this information.      Kathleen M Doege RN     Update History & Physical    The Patient's History and Physical of April 14,   H&P was reviewed with the patient and I examined the patient. There was no change. The surgical site was confirmed by the patient and me. Plan:  The risk, benefits, expected outcome, and alternative to the recommended procedure have been discussed with the patient. Patient understands and wants to proceed with the procedure.     Electronically signed by Jeanne Suazo MD on 4/20/2022 at 8:50 AM

## 2022-04-20 NOTE — PROGRESS NOTES
TRANSFER - IN REPORT:    Verbal report received from Margot RN(name) on [facility]  being received from pacu(unit) for routine post - op      Report consisted of patients Situation, Background, Assessment and   Recommendations(SBAR). Information from the following report(s) SBAR was reviewed with the receiving nurse. Opportunity for questions and clarification was provided. Assessment completed upon patients arrival to unit and care assumed.

## 2022-04-20 NOTE — PROGRESS NOTES
Problem: Self Care Deficits Care Plan (Adult)  Goal: *Acute Goals and Plan of Care (Insert Text)  Outcome: Progressing Towards Goal  Note: GOALS:   DISCHARGE GOALS (in preparation for going home/rehab):  3 days  1. Ms. Mohsen Palencia will perform one lower body dressing activity with minimal assistance required to demonstrate improved functional mobility and safety. 2.  Ms. Mohsen Palencia will perform one lower body bathing activity with minimal assistance required to demonstrate improved functional mobility and safety. 3.  Ms. Mohsen Palencia will perform toileting/toilet transfer with contact guard assistance to demonstrate improved functional mobility and safety. 4.  Ms. Mohsen Palencia will perform shower transfer with contact guard assistance to demonstrate improved functional mobility and safety. JOINT CAMP OCCUPATIONAL THERAPY TKA: Initial Assessment and PM 4/20/2022  OUTPATIENT: Hospital Day: 1  Payor: Kayli [place] OF [place] / Plan: [place]/PPO / Product Type: Managed Care Medicare /      NAME/AGE/GENDER: Bhavesh Coombs is a 46 y.o. female   PRIMARY DIAGNOSIS:  Primary osteoarthritis of left knee [M17.12]   Procedure(s) and Anesthesia Type:     * LEFT  KNEE ARTHROPLASTY TOTAL ROBOTIC ASSISTED ARJUN/BOBBI  , ADDUCTOR CANAL BLOCK - General (Left)  ICD-10: Treatment Diagnosis:    Pain in Left Knee (M25.562)  Stiffness of Left Knee, Not elsewhere classified (O23.812)  Other lack of cordination (R27.8)  Difficulty in walking, Not elsewhere classified (R26.2)  Other abnormalities of gait and mobility (R26.89)      ASSESSMENT:     Ms. Mohsen Palencia is s/p left TKA and presents with decreased weight bearing on left LE and decreased independence with functional mobility and activities of daily living as compared to baseline level of function and safety. Patient would benefit from skilled Occupational Therapy to maximize independence and safety with self-care task and functional mobility.   Pt would also benefit from education on adaptive equipment and safety precautions in preparation for going home. She had general anesthesia. She is very drowsy. She transferred to EOB and was nauseous. SHe dry heaved. She donned a gown like a bathrobe. She stood and took steps to the recliner with min assist. She had her other knee done not too long ago. She plans to return home with assist from her daughter. She is sitting up in recliner working with PT. OT educated on Amee. Will see in am.  This section established at most recent assessment   PROBLEM LIST (Impairments causing functional limitations):  Decreased Strength  Decreased ADL/Functional Activities  Decreased Transfer Abilities  Increased Pain  Increased Fatigue  Decreased Flexibility/Joint Mobility  Decreased Knowledge of Precautions   INTERVENTIONS PLANNED: (Benefits and precautions of occupational therapy have been discussed with the patient.)  Activities of daily living training  Adaptive equipment training  Balance training  Clothing management  Donning&doffing training  Theraputic activity     TREATMENT PLAN: Frequency/Duration: Follow patient 1-2 times to address above goals. Rehabilitation Potential For Stated Goals: Good     RECOMMENDED REHABILITATION/EQUIPMENT: (at time of discharge pending progress): Continue Skilled Therapy. OCCUPATIONAL PROFILE AND HISTORY:   History of Present Injury/Illness (Reason for Referral): Pt presents this date s/p (left) TKA.     Past Medical History/Comorbidities:   Ms. Sarah Chaidez  has a past medical history of Abnormal Papanicolaou smear of cervix, Adverse effect of anesthesia, Arthritis, CAD (coronary artery disease), CHF (congestive heart failure) ([address] Fibroid, uterine, GERD (gastroesophageal reflux disease), H/O echocardiogram (10/04/2021), Heart murmur, History of anemia, Hypercholesterolemia, Hypertension, Menorrhagia with regular cycle, Morbid obesity ([address] NICM (nonischemic cardiomyopathy) ([address] THEE on CPAP, Ovarian cyst, PVCs (premature ventricular contractions), and Uterine fibroid. Ms. Justino Gutierrez  has a past surgical history that includes hx tubal ligation; hx wisdom teeth extraction; hx hysteroscopy; hx afib ablation (02/2018); hx nicole and bso (02/12/2019); hx other surgical; hx heart catheterization; hx orthopaedic (Right); and hx knee replacement (Right, 01/26/2022). Social History/Living Environment:   Home Environment: Private residence  # Steps to Enter: 0  One/Two Story Residence: One story  Living Alone: No  Support Systems: Other Family Member(s)  Patient Expects to be Discharged to[de-identified] Home with home health  Current DME Used/Available at Home: Cane, straight; Commode, bedside; Walker, rolling  Tub or Shower Type: Tub/Shower combination    Prior Level of Function/Work/Activity:  Mod I      Number of Personal Factors/Comorbidities that affect the Plan of Care: Brief history (0):  LOW COMPLEXITY   ASSESSMENT OF OCCUPATIONAL PERFORMANCE[de-identified]   Most Recent Physical Functioning:   Balance  Sitting: Intact  Standing: Pull to stand; With support                    Coordination  Fine Motor Skills-Upper: Left Intact; Right Intact  Gross Motor Skills-Upper: Left Intact; Right Intact         Mental Status  Neurologic State: Alert  Orientation Level: Appropriate for age  Cognition: Follows commands  Perception: Appears intact  Perseveration: No perseveration noted  Safety/Judgement: Awareness of environment; Fall prevention                Basic ADLs (From Assessment) Complex ADLs (From Assessment)   Basic ADL  Feeding: Supervision  Oral Facial Hygiene/Grooming: Moderate assistance  Bathing: Maximum assistance  Upper Body Dressing: Moderate assistance  Lower Body Dressing: Total assistance  Toileting: Moderate assistance     Grooming/Bathing/Dressing Activities of Daily Living     Cognitive Retraining  Safety/Judgement: Awareness of environment; Fall prevention                 Functional Transfers  Toilet Transfer : Minimum assistance  Shower Transfer: Minimum assistance     Bed/Mat Mobility  Supine to Sit: Minimum assistance  Sit to Stand: Minimum assistance  Stand to Sit: Minimum assistance  Bed to Chair: Minimum assistance  Scooting: Minimum assistance         Physical Skills Involved:  Balance  Strength  Activity Tolerance Cognitive Skills Affected (resulting in the inability to perform in a timely and safe manner):  Very drowsy from anesthisia  Psychosocial Skills Affected:  Habits/Routines  Environmental Adaptation  Self-Awareness   Number of elements that affect the Plan of Care: 5+:  HIGH COMPLEXITY   CLINICAL DECISION MAKIN[address] [place] [address] AM-PAC 6 Clicks   Daily Activity Inpatient Short Form  How much help from another person does the patient currently need. .. Total A Lot A Little None   1. Putting on and taking off regular lower body clothing? [x] 1   [] 2   [] 3   [] 4   2. Bathing (including washing, rinsing, drying)? [] 1   [x] 2   [] 3   [] 4   3. Toileting, which includes using toilet, bedpan or urinal?   [] 1   [x] 2   [] 3   [] 4   4. Putting on and taking off regular upper body clothing? [] 1   [x] 2   [] 3   [] 4   5. Taking care of personal grooming such as brushing teeth? [] 1   [] 2   [x] 3   [] 4   6. Eating meals? [] 1   [] 2   [] 3   [x] 4   © , Trustees of [address] [place] [address], under license to [employer]. All rights reserved     Score:  Initial: 14 Most Recent: X (Date: -- )    Interpretation of Tool:  Represents activities that are increasingly more difficult (i.e. Bed mobility, Transfers, Gait).     Medical Necessity:     · Patient is expected to demonstrate progress in   · Self care skills and functional mobility  ·     Reason for Services/Other Comments:  · Patient continues to require skilled intervention due to   · Above listed deficits     Use of outcome tool(s) and clinical judgement create a POC that gives a: LOW COMPLEXITY            TREATMENT:   (In addition to Assessment/Re-Assessment sessions the following treatments were rendered)     Pre-treatment Symptoms/Complaints:  \  Pain: Initial:   Pain Intensity 1: 0  Post Session:  0/10      Self Care: (10): Procedure(s) (per grid) utilized to improve and/or restore self-care/home management as related to dressing and functional transfers . Required moderate visual, verbal, manual, and tactile cueing to facilitate activities of daily living skills and compensatory activities. Assessment complete    Treatment/Session Assessment:     Response to Treatment:  tolerated fair, very lethargic. Education:  [] Home Exercises  [x] Fall Precautions  [] Hip Precautions [] Going Home Video  [x] Knee/Hip Prosthesis Review  [x] Walker Management/Safety [x] Adaptive Equipment as Needed       Interdisciplinary Collaboration:   Physical Therapist  Occupational Therapist  Registered Nurse    After treatment position/precautions:   Up in chair  Bed/Chair-wheels locked  Bed in low position  Caregiver at bedside  Call light within reach  RN notified     Compliance with Program/Exercises: Will assess as treatment progresses. Recommendations/Intent for next treatment session:  Treatment next visit will focus on increasing Ms. Woodard's independence with bed mobility, transfers, self care, functional mobility, modalities for pain, and patient education.       Total Treatment Duration:10  OT Patient Time In/Time Out  Time In: 1400  Time Out: [address], OT

## 2022-04-20 NOTE — PERIOP NOTES
TRANSFER - OUT REPORT:    Verbal report given to SANDY Bynum  on [facility]  being transferred to [address] [place] [address] for routine progression of care       Report consisted of patients Situation, Background, Assessment and   Recommendations(SBAR). Information from the following report(s) SBAR and Cardiac Rhythm SR was reviewed with the receiving nurse. Lines:   Peripheral IV 04/20/22 Left;Posterior Wrist (Active)   Site Assessment Clean, dry, & intact 04/20/22 1245   Phlebitis Assessment 0 04/20/22 1245   Infiltration Assessment 0 04/20/22 1245   Dressing Status Clean, dry, & intact 04/20/22 1245   Dressing Type Tape;Transparent 04/20/22 1245   Hub Color/Line Status Green; Infusing 04/20/22 1245        Opportunity for questions and clarification was provided.       Patient transported with:   O2 @ RA liters

## 2022-04-20 NOTE — ANESTHESIA PROCEDURE NOTES
Peripheral Block    Start time: 4/20/2022 9:33 AM  End time: 4/20/2022 9:38 AM  Performed by: Gonzalo Fatima MD  Authorized by: Gonzalo Fatima MD       Pre-procedure: Indications: at surgeon's request and post-op pain management    Preanesthetic Checklist: patient identified, risks and benefits discussed, site marked, timeout performed, anesthesia consent given and patient being monitored    Timeout Time: 09:33 EDT  Preanesthetic Checklist comment:  Risk of nerve damage discussed. Block Type:   Block Type: Adductor canal  Laterality:  Left  Monitoring:  Standard ASA monitoring, continuous pulse ox, frequent vital sign checks, heart rate, oxygen and responsive to questions  Injection Technique:  Single shot  Procedures: ultrasound guided    Prep: chlorhexidine    Location:  Mid thigh  Needle Type:  Stimuplex (4 inch)  Needle Gauge:  20 G  Needle Localization:  Ultrasound guidance  Medication Injected:  Ropivacaine 0.2% with epinephrine 1:200,000 injection, 20 mL (Mixture components: ropivacaine 2 mg/mL (0.2 %) Soln, 1 mL; EPINEPHrine HCl (PF) 1 mg/mL (1 mL) Soln, . 005 mL)  dexamethasone (DECADRON) 4 mg/mL injection, 4 mg  Med Admin Time: 4/20/2022 9:38 AM    Assessment:  Number of attempts:  1  Injection Assessment:  Incremental injection every 5 mL, local visualized surrounding nerve on ultrasound, negative aspiration for blood, no intravascular symptoms, no paresthesia and ultrasound image on chart  Patient tolerance:  Patient tolerated the procedure well with no immediate complications  3 cc 1% Lidocaine injected at needle insertion site. Needle inserted and placed in close proximity to the nerve under real time ultrasound guidance. Ultrasound was used to visualize the spread of local anesthetic in close proximity to the nerve being blocked. The nerve appeared anatomically normal and there were no abnormal findings.

## 2022-04-20 NOTE — OP NOTES
[address] [place] [address] Robotic Assisted Total Knee Arthroplasty: Posterior Cruciate Retaining       Patient:Nadine Galicia   : 1970  Medical Record Number:[id number]  Pre-operative Diagnosis:  Primary osteoarthritis of left knee [M17.12]  Post-operative Diagnosis: Primary osteoarthritis of left knee [M17.12]  Location: [address] [place] [address]  Surgeon: Paulina Louis MD   Assistant: Delores houser    Anesthesia: General and ACB    Indications: Patient has end stage arthritis. They have tried and failed conservative management. Procedure:Procedure(s) (LRB):  LEFT  KNEE ARTHROPLASTY TOTAL ROBOTIC ASSISTED ARJUN/BOBBI  , ADDUCTOR CANAL BLOCK (Left)            CPT- 31840- Total knee arthroplasty           91760- Other procedures on musculoskeletal system            0055T- Computer assisted surgical navigation   The complexity of the total joint surgery requires the use of a first assistant for positioning, retraction and expertise in closure. Tourniquet Time: 0 minutes  EBL: 250 cc  Findings: severe degenerative arthritis with loss of cartilage in weight bearing compartments of the knee,  patellar osteophytes with loss of patella cartilage, posterior femoral osteophytes   BMI: There is no height or weight on file to calculate BMI. Beto Mullins was brought to the operating room and positioned on the operating table. She was anesthestized with anesthesia. IV antibiotics were administered. Prior to the incision being made a timeout was called identifying the patient, procedure ,operative side and surgeon The operative leg was prepped and draped in the usual sterile manner. An anterior longitudinal incision was accomplished just medial to the tibial tubercle and extending approximal 6 centimeters proximal to the superior pole of the patella. A medial parapatellar capsular incision was performed.  The medial capsular flap was elevated around to the insertion of the semimembranous tendon. The patella was everted and the knee flexed and externally rotated. The medial and external menisci were excised. The lateral half of the fat pad excised and the patella femoral ligament was released. The anterior cruciate ligament was resect and the posterior cruciate ligament was retained. The femoral and tibial arrays were pinned in place and registered with the Spyra 92. The patient landmarks were collected and the tibial and femoral checkpoints were registered and verified. The preresection balancing was performed. The distal femur was addressed first. Utilizing the [facility] robotic arm the distal femoral cut was made. The anterior and posterior cuts were then made. The osteophytes were removed from the tibial and femoral surfaces. The tibia was then addressed. The griddig robotic arm was then used to make the measured resection of the tibia. The tibia was sized. The tibial base plate was pinned into place with the appropriate external rotation and stem site prepared. A trial femoral component and poly was placed. A preliminary range of motion was accomplished with the trial components. The patient was found to obtain full extension as well as appropriate flexion. The patient's ligaments were stable in flexion and extension to medial and lateral stressing and the alignment was through the appropriate mechanical axis. Additional surgical procedures included: none. The patella was then everted. The bone was resect to accommodate the three peg patellar button. A trial reduction of the patella revealed appropriate tracking through the patellofemoral groove with no lateral retinacular release being accomplished. All trial components were removed. The real implants were opened: Sizes listed below. The knee was irrigated. There were no femoral deficiencies. There were no tibial deficiencies. No augmentation was utilized.  The tibial and femoral components were impacted into place. The patella component was cemented into place. Arlene Macdonald knee was placed through range of motion and noted to be stable as mentioned above with the trail components. The wound was dry, therefore no drain was used. The operative knee was injected with 60 cc of Naropin, 10 cc's of morphine and 1 cc of 30 mg of Toradol. The knee was then soaked with a diluted betadine solution for approximately 3 min. This was then thoroughly irrigated. The capsular layer was closed using a #1 Stratafix suture. Then, 1 gram (100 mg/ml) of Transexamic Acid was injected into the joint space. The subcutaneous layers were closed using 2-0 Stratafix. Finally the skin was closed using 3-0 Vicryl and staples which were applied in occlusive fashion and sterile bandage applied. An Iceman cryo pad was applied on the operative leg. Sponge count and needle counts were correct. Arlene Macdonald left the operating room     Implants:   Implant Name Type Inv.  Item Serial No.  Lot No. LRB No. Used Action   CEMENT BNE 20GM HALF DOSE PMMA W/ GENT M VISC RADPQ FAST - [id number]  CEMENT BNE 20GM HALF DOSE PMMA W/ GENT M VISC RADPQ FAST  [employer]_ [id number] Left 1 Implanted   COMPNT FEM CR TRIATHLN 4 L PA --  - [id number]  COMPNT FEM CR TRIATHLN 4 L PA --   [employer] HOW_ [id number] Left 1 Implanted   BASEPLT TIB PC TRITNM SZ 5 -- TRIATHLON - [id number]  BASEPLT TIB PC TRITNM SZ 5 -- TRIATHLON  [facility] HOW_ [id number] Left 1 Implanted   COMPNT PAT ASYM TRIATHLN 38X11 --  - [id number]  COMPNT PAT ASYM TRIATHLN 38X11 --   [employer] HOW_ [id number] Left 1 Implanted   INSERT TIB CS 5 10 MM [employer] TRIATHLON - [id number]  INSERT TIB CS 5 10 MM [employer]C KNEE [employer] TRIATHLON [id number] [employer]AUKESHA CTY MENTAL [facility] [employer] ORTHOPEDICS HOW_ [id number] Left 1 Implanted         Signed By: Aaron Howe MD   4/20/2022,  11:41 AM

## 2022-04-20 NOTE — PROGRESS NOTES
04/20/22 1443   Oxygen Therapy   O2 Sat (%) 96 %   Pulse via Oximetry 61 beats per minute   O2 Device None (Room air)   O2 Flow Rate (L/min) 0 l/min   Patient encouraged to do 10 breaths every hour while awake-patient agreed. No shortness of breath or distress noted. BS are clear b/l. Joint Camp notes reviewed- continuous sat monitor order noted HS.

## 2022-04-20 NOTE — ANESTHESIA PREPROCEDURE EVALUATION
Anesthetic History   No history of anesthetic complications            Review of Systems / Medical History  Patient summary reviewed and pertinent labs reviewed    Pulmonary        Sleep apnea: No treatment           Neuro/Psych   Within defined limits           Cardiovascular    Hypertension: well controlled      CHF (NICM)  Dysrhythmias (s/p successful ablation 2/18) : PVC  CAD (moderate non-obstructive dz on cath 2017)    Exercise tolerance: <4 METS: Uses cane  Comments: Echo 10/21:  ·LV: Estimated LVEF is 40 - 45%. Moderately dilated left ventricle. Mildly increased wall thickness. Globally reduced systolic function. Mild (grade 1) left ventricular diastolic dysfunction. ·The tubular ascending aorta is mildly dilated at 3.65 cm.         GI/Hepatic/Renal     GERD: well controlled           Endo/Other        Morbid obesity and arthritis     Other Findings            Physical Exam    Airway  Mallampati: II  TM Distance: > 6 cm  Neck ROM: normal range of motion   Mouth opening: Normal     Cardiovascular  Regular rate and rhythm,  S1 and S2 normal,  no murmur, click, rub, or gallop  Rhythm: regular  Rate: normal         Dental  No notable dental hx       Pulmonary  Breath sounds clear to auscultation               Abdominal         Other Findings            Anesthetic Plan    ASA: 3  Anesthesia type: spinal      Post-op pain plan if not by surgeon: peripheral nerve block single    Induction: Intravenous  Anesthetic plan and risks discussed with: Patient and Son / Daughter

## 2022-04-20 NOTE — CONSULTS
[facility] Hospitalist Consult   Admit Date:  2022  7:57 AM   Name:  Sergio Garcia   Age:  46 y.o. Sex:  female  :  1970   MRN:  [id number]   Room:  Critical access hospital/    Presenting Complaint: left knee pain    Reason(s) for Admission: Primary osteoarthritis of left knee [M17.12]  Osteoarthritis of left knee [M17.12]     Hospitalists consulted by Lissette Gates MD for: medical management    History of Presenting Illness:   Sergio Garcia is a 46 y.o. female with history of systolic heart failure, CAD, THEE who was admitted for left knee arthroplasty. Hospitalist asked to consult for medical management. A&O x3, PT in room. Some nausea. Medication given. Review of Systems:  10 systems reviewed and negative except as noted in HPI.   Assessment & Plan:     Principal Problem:    Status post left knee replacement (2022)    Per ortho    Active Problems:    Systolic CHF, chronic ([facility] [address] (2017)    Not in exacerbation  Home meds resumed      Coronary artery disease of native artery of native heart with stable angina pectoris ([facility] [address] (2017)    Home medsd      Severe obesity with body mass index (BMI) of 35.0 to 39.9 with serious comorbidity ([address] (2018)    Noted  education      THEE (obstructive sleep apnea) (2021)    Noted, does not wear CPAP      Noncompliance with CPAP treatment (2022)    noted      Discharge Plannin-2 days    Diet:  ADULT DIET Regular  DVT PPx: ASA, SCDs  Code status: Prior    Hospital Problems as of 2022 Date Reviewed: 2022          Codes Class Noted - Resolved POA    * (Principal) Status post left knee replacement ICD-10-CM: I08.632  ICD-9-CM: V43.65  2022 - Present Unknown        Osteoarthritis of left knee ICD-10-CM: M17.12  ICD-9-CM: 715.96  2022 - Present Unknown        Noncompliance with CPAP treatment ICD-10-CM: Z91.14  ICD-9-CM: V15.81  2022 - Present Yes        THEE (obstructive sleep apnea) ICD-10-CM: G47.33  ICD-9-CM: 327.23  4/19/2021 - Present Yes        Severe obesity with body mass index (BMI) of 35.0 to 39.9 with serious comorbidity ([facility] ICD-10-CM: E66.01  ICD-9-CM: 278.01  7/5/2018 - Present Yes        Systolic CHF, chronic (HCC) ICD-10-CM: I50.22  ICD-9-CM: 428.22, 428.0  12/18/2017 - Present Yes        Coronary artery disease of native artery of native heart with stable angina pectoris [facility]) ICD-10-CM: I25.118  ICD-9-CM: 414.01, 413.9  12/18/2017 - Present Yes              Past History:  Past Medical History:   Diagnosis Date    Abnormal Papanicolaou smear of cervix     Adverse effect of anesthesia     \"takes more\" for anesthesia to be effective     Arthritis     CAD (coronary artery disease)     no stents; [facility] 3/2017 mod LAD dz; [place] Audery [place]    CHF (congestive heart failure) ([facility]     ECHO 12/18/18: EF 40-45%; managed with medication and followed by [facility], uterine     GERD (gastroesophageal reflux disease)     diet controlled     H/O echocardiogram 10/04/2021    EF 40-45%    Heart murmur     Echo (10/4/21) EF 40-45% trace regurgitation in mitral and tricuspid valve    History of anemia     Hypercholesterolemia     Controlled with meds     Hypertension     Controlled with meds     Menorrhagia with regular cycle     Morbid obesity ([facility]     NICM (nonischemic cardiomyopathy) ([facility] [facility]     Followed by [facility] cardiology     HTEE on CPAP     Ovarian cyst     PVCs (premature ventricular contractions)     Uterine fibroid       Past Surgical History:   Procedure Laterality Date    HX AFIB ABLATION  02/2018    Ablation for pvc's     HX HEART CATHETERIZATION      HX HYSTEROSCOPY      HX KNEE REPLACEMENT Right 01/26/2022    HX ORTHOPAEDIC Right     toe removal     HX OTHER SURGICAL      left hip and gluteal surgery    HX LILIANA AND BSO  02/12/2019    Supracervical    HX TUBAL LIGATION      HX WISDOM TEETH EXTRACTION        Allergies   Allergen Reactions    Atorvastatin Myalgia    Metformin Other (comments)      Social History     Tobacco Use    Smoking status: Never Smoker    Smokeless tobacco: Never Used   Substance Use Topics    Alcohol use: No      Family History   Problem Relation Age of Onset    Heart Disease Mother     Cancer Father         Lymphoma    Breast Cancer Neg Hx       Family history reviewed and negative except as otherwise noted.     Immunization History   Administered Date(s) Administered    COVID-19, Pfizer Purple top, DILUTE for use, 12+ yrs, 30mcg/0.3mL dose 03/16/2021, 04/13/2021     Current Facility-Administered Medications   Medication Dose Route Frequency    [START ON 4/21/2022] amLODIPine (NORVASC) tablet 10 mg  10 mg Oral DAILY    carvediloL (COREG) tablet 12.5 mg  12.5 mg Oral QHS    gabapentin (NEURONTIN) capsule 100 mg  100 mg Oral TID    methocarbamoL (ROBAXIN) tablet 500 mg  500 mg Oral TID PRN    sacubitriL-valsartan (ENTRESTO)  mg tablet 1 Tablet  1 Tablet Oral BID    atorvastatin (LIPITOR) tablet 10 mg  10 mg Oral QHS    [START ON 4/21/2022] spironolactone (ALDACTONE) tablet 25 mg  25 mg Oral DAILY    alcohol 62% (NOZIN) nasal  1 Ampule  1 Ampule Topical Q12H    0.9% sodium chloride infusion  100 mL/hr IntraVENous CONTINUOUS    sodium chloride (NS) flush 5-40 mL  5-40 mL IntraVENous Q8H    sodium chloride (NS) flush 5-40 mL  5-40 mL IntraVENous PRN    ceFAZolin (ANCEF) 2 g/20 mL in sterile water IV syringe  2 g IntraVENous Q8H    acetaminophen (TYLENOL) tablet 1,000 mg  1,000 mg Oral Q6H    oxyCODONE IR (ROXICODONE) tablet 10 mg  10 mg Oral Q4H PRN    HYDROmorphone (DILAUDID) injection 1 mg  1 mg IntraVENous Q3H PRN    naloxone (NARCAN) injection 0.2-0.4 mg  0.2-0.4 mg IntraVENous Q10MIN PRN    [START ON 4/21/2022] dexamethasone (DECADRON) 10 mg/mL injection 10 mg  10 mg IntraVENous ONCE    ondansetron (ZOFRAN ODT) tablet 4 mg  4 mg Oral Q4H PRN    diphenhydrAMINE (BENADRYL) capsule 25 mg  25 mg Oral Q4H PRN    [START ON 4/21/2022] senna-docusate (PERICOLACE) 8.6-50 mg per tablet 2 Tablet  2 Tablet Oral DAILY    aspirin delayed-release tablet 81 mg  81 mg Oral Q12H       Objective:     Patient Vitals for the past 24 hrs:   Temp Pulse Resp BP SpO2   04/20/22 1322 98 °F (36.7 °C) (!) 50 16 (!) 130/112 99 %   04/20/22 1245  (!) 51 16 (!) 140/63 100 %   04/20/22 1240   15     04/20/22 1235   15     04/20/22 1230   16     04/20/22 1225   16     04/20/22 1220 98 °F (36.7 °C) 66 17 132/65 98 %   04/20/22 1217  63 16 132/65 98 %   04/20/22 0935  (!) 59 18 (!) 135/110 100 %   04/20/22 0930  61 22 (!) 133/91 100 %   04/20/22 0815 97.7 °F (36.5 °C) 60 18 (!) 147/84 100 %     Oxygen Therapy  O2 Sat (%): 99 % (04/20/22 1322)  Pulse via Oximetry: 52 beats per minute (04/20/22 1245)  O2 Device: None (Room air) (04/20/22 1235)  O2 Flow Rate (L/min): 2 l/min (04/20/22 1220)    Estimated body mass index is 38 kg/m² as calculated from the following:    Height as of 4/13/22: 5' 8\" (1.727 m). Weight as of 4/13/22: 113.4 kg (249 lb 14.4 oz). Intake/Output Summary (Last 24 hours) at 4/20/2022 1401  Last data filed at 4/20/2022 1140  Gross per 24 hour   Intake 1000 ml   Output 250 ml   Net 750 ml         Physical Exam:  Blood pressure (!) 130/112, pulse (!) 50, temperature 98 °F (36.7 °C), resp. rate 16, last menstrual period 10/29/2018, SpO2 99 %. General:    Well nourished. No overt distress  Head:  Normocephalic, atraumatic, BMI 38  Eyes:  Sclerae appear normal.  Pupils equally round. ENT:  Nares appear normal, no drainage. Moist oral mucosa  Neck:  No restricted ROM. Trachea midline   CV:   RRR. No m/r/g. No jugular venous distension. Lungs:   CTAB. No wheezing, rhonchi, or rales. Respirations even, unlabored  Abdomen: Bowel sounds present. Soft, nontender, nondistended. Extremities: No cyanosis or clubbing. No edema, left knee immobilizer on.   Skin:     No rashes and normal coloration. Warm and dry. Neuro:  CN II-XII grossly intact. Sensation intact. A&Ox3  Psych:  Normal mood and affect. I have reviewed ordered lab tests and independently visualized imaging below:    Recent Labs:  No results found for this or any previous visit (from the past 48 hour(s)). All Micro Results     None          Other Studies:  No results found.     Signed:  Niels Parker NP

## 2022-04-20 NOTE — PROGRESS NOTES
Problem: Mobility Impaired (Adult and Pediatric)  Goal: *Acute Goals and Plan of Care (Insert Text)  Outcome: Progressing Towards Goal  Note: GOALS (1-4 days):  (1.)Ms. Judy White will move from supine to sit and sit to supine  in bed with STAND BY ASSIST.  (2.)Ms. Judy White will transfer from bed to chair and chair to bed with STAND BY ASSIST using the least restrictive device. (3.)Ms. Woodard will ambulate with STAND BY ASSIST for 100 feet with the least restrictive device. (4.)Ms. Judy White will ambulate up/down 2 steps with bilateral  railing with CONTACT GUARD ASSIST with no device. (5.)Ms. Judy White will increase left knee ROM to 0°-90°.  ________________________________________________________________________________________________       PHYSICAL THERAPY JOINT CAMP TKA: Initial Assessment and PM 4/20/2022  OUTPATIENT: Hospital Day: 1  Payor: [facility] MEDICARE / Plan: [facility] MEDICARE HMO/PPO / Product Type: Managed Care Medicare /      NAME/AGE/GENDER: Stanton Tatum is a 46 y.o. female   PRIMARY DIAGNOSIS:  Primary osteoarthritis of left knee [M17.12]   Procedure(s) and Anesthesia Type:     * LEFT  KNEE ARTHROPLASTY TOTAL ROBOTIC ASSISTED ARJUN/BOBBI  , ADDUCTOR CANAL BLOCK - General (Left)  ICD-10: Treatment Diagnosis:    Pain in Left Knee (M25.562)  Stiffness of Left Knee, Not elsewhere classified (M25.662)  Difficulty in walking, Not elsewhere classified (R26.2)      ASSESSMENT:     Ms. Judy White presents with decreased strength and range of motion left lower extremity and with decreased independence with functional mobility s/p left TKA. Pt will benefit from skilled PT interventions to maximize independence with functional mobility and TKA management. Pt did fine with assessment and mobility and exercises. Functional mobility and gait limited by nausea and vomiting. Pt instructed not to get up without assistance. Hope to progress mobility and exercises in the morning.  Pt plans to discharge to home tomorrow with continued therapy for follow up. This section established at most recent assessment   PROBLEM LIST (Impairments causing functional limitations):  Decreased Strength  Decreased ADL/Functional Activities  Decreased Transfer Abilities  Decreased Ambulation Ability/Technique  Decreased Flexibility/Joint Mobility  Edema/Girth  Decreased [place] with Home Exercise Program   INTERVENTIONS PLANNED: (Benefits and precautions of physical therapy have been discussed with the patient.)  Bed Mobility  Cold  Gait Training  Home Exercise Program (HEP)  Range of Motion (ROM)  Therapeutic Activites  Therapeutic Exercise/Strengthening  Transfer Training     TREATMENT PLAN: Frequency/Duration: Follow patient BID for duration of hospital stay to address above goals. Rehabilitation Potential For Stated Goals: Good     RECOMMENDED REHABILITATION/EQUIPMENT: (at time of discharge pending progress): Continue Skilled Therapy and Home Health: Physical Therapy. HISTORY:   History of Present Injury/Illness (Reason for Referral):  Pt s/p total knee arthroplasty on 4/20  Past Medical History/Comorbidities:   Ms. Marlyn Parker  has a past medical history of Abnormal Papanicolaou smear of cervix, Adverse effect of anesthesia, Arthritis, CAD (coronary artery disease), CHF (congestive heart failure) ([address] Fibroid, uterine, GERD (gastroesophageal reflux disease), H/O echocardiogram (10/04/2021), Heart murmur, History of anemia, Hypercholesterolemia, Hypertension, Menorrhagia with regular cycle, Morbid obesity ([address] NICM (nonischemic cardiomyopathy) ([address] THEE on CPAP, Ovarian cyst, PVCs (premature ventricular contractions), and Uterine fibroid.   Ms. Marlyn Parker  has a past surgical history that includes hx tubal ligation; hx wisdom teeth extraction; hx hysteroscopy; hx afib ablation (02/2018); hx nicole and bso (02/12/2019); hx other surgical; hx heart catheterization; hx orthopaedic (Right); and hx knee replacement (Right, 01/26/2022). Social History/Living Environment:   Home Environment: Private residence  # Steps to Enter: 0  One/Two Story Residence: One story  Living Alone: No  Support Systems: Child(althea)  Patient Expects to be Discharged to[de-identified] Home with home health  Current DME Used/Available at Home: Cane, straight,Commode, bedside,Walker, rolling  Tub or Shower Type: Tub/Shower combination  Prior Level of Function/Work/Activity:  independent   Number of Personal Factors/Comorbidities that affect the Plan of Care: 1-2: MODERATE COMPLEXITY   EXAMINATION:   Most Recent Physical Functioning:   Gross Assessment: Yes  Gross Assessment  AROM: Generally decreased, functional (right LE)  Strength: Generally decreased, functional (right LE)          LLE AROM  L Knee Flexion: 60  L Knee Extension: 10          Bed Mobility  Supine to Sit: Minimum assistance  Scooting: Minimum assistance    Transfers  Sit to Stand: Minimum assistance  Stand to Sit: Minimum assistance  Bed to Chair: Minimum assistance    Balance  Sitting: Intact  Standing: With support;Pull to stand              Weight Bearing Status  Left Side Weight Bearing: As tolerated  Distance (ft): 3 Feet (ft)  Ambulation - Level of Assistance: Minimal assistance  Assistive Device: Walker, rolling  Speed/Janna: Delayed  Step Length: Left shortened;Right shortened  Stance: Left decreased  Gait Abnormalities: Antalgic;Decreased step clearance        Braces/Orthotics:     Left Knee Cold  Type: Cryocuff      Body Structures Involved:  Joints  Muscles Body Functions Affected: Movement Related Activities and Participation Affected: Mobility  Self Care   Number of elements that affect the Plan of Care: 4+: HIGH COMPLEXITY   CLINICAL PRESENTATION:   Presentation: Stable and uncomplicated: LOW COMPLEXITY   CLINICAL DECISION MAKING:   [facility] AM-PAC 6 Clicks   Basic Mobility Inpatient Short Form  How much difficulty does the patient currently have. ..  Unable A Lot A Little None 1.  Turning over in bed (including adjusting bedclothes, sheets and blankets)? [] 1   [] 2   [x] 3   [] 4   2. Sitting down on and standing up from a chair with arms ( e.g., wheelchair, bedside commode, etc.)   [] 1   [] 2   [x] 3   [] 4   3. Moving from lying on back to sitting on the side of the bed? [] 1   [] 2   [x] 3   [] 4   How much help from another person does the patient currently need. .. Total A Lot A Little None   4. Moving to and from a bed to a chair (including a wheelchair)? [] 1   [] 2   [x] 3   [] 4   5. Need to walk in hospital room? [] 1   [] 2   [x] 3   [] 4   6. Climbing 3-5 steps with a railing? [] 1   [] 2   [x] 3   [] 4   © 2007, Trustees of [address] [place] [address], under license to [employer]. All rights reserved     Score:  Initial: 18 Most Recent: X (Date: -- )    Interpretation of Tool:  Represents activities that are increasingly more difficult (i.e. Bed mobility, Transfers, Gait). Medical Necessity:     Patient is expected to demonstrate progress in   strength, range of motion, and functional technique   to   decrease assistance required with functional mobility and TKA managment  . Reason for Services/Other Comments:  Patient continues to require skilled intervention due to   Inability to complete functional mobility and TKA management independently  . Use of outcome tool(s) and clinical judgement create a POC that gives a: Clear prediction of patient's progress: LOW COMPLEXITY            TREATMENT:   (In addition to Assessment/Re-Assessment sessions the following treatments were rendered)     Pre-treatment Symptoms/Complaints:  nausea, groggy  Pain Initial:      Post Session:  no complaints   assessment  Therapeutic Activity: (  15 Minutes ):  Therapeutic activities including Bed transfers, Chair transfers, Ambulation on level ground, and standing with rW and exercises to improve mobility, strength, and balance.   Required minimal   to promote static and dynamic balance in standing. Date:  4/20 Date:   Date:     ACTIVITY/EXERCISE AM PM AM PM AM PM     []  []  []  []  []  []   Ankle Pumps  10       Quad Sets  10       Gluteal Sets  10       Hip ABd/ADduction  10       Straight Leg Raises  10aa       Knee Slides  10aa       Short Arc Quads         Chair Slides                           B = bilateral; AA = active assistive; A = active; P = passive      Treatment/Session Assessment:     Response to Treatment:  nausea limiting mobility. Education:  [x] Home Exercises  [x] Fall Precautions  [x] Use of Cold Therapy Unit [] D/C Instruction Review  [] Knee Prosthesis Review  [x] Walker Management/Safety [x] Adaptive Equipment as Needed  [x] No pillow under knee       Interdisciplinary Collaboration:   Occupational Therapist  Registered Nurse    After treatment position/precautions:   Up in chair  Bed/Chair-wheels locked  Bed in low position  Call light within reach    Compliance with Program/Exercises: Compliant most of the time, Will assess as treatment progresses. Recommendations/Intent for next treatment session:  Treatment next visit will focus on increasing Ms. Woodard's independence with bed mobility, transfers, gait training, strength/ROM exercises, modalities for pain, and patient education.       Total Treatment Duration:  PT Patient Time In/Time Out  Time In: 8380  Time Out: 7322 [facility]

## 2022-04-21 VITALS
DIASTOLIC BLOOD PRESSURE: 50 MMHG | RESPIRATION RATE: 18 BRPM | OXYGEN SATURATION: 99 % | SYSTOLIC BLOOD PRESSURE: 106 MMHG | HEART RATE: 62 BPM | TEMPERATURE: 98.3 F

## 2022-04-21 LAB — HGB BLD-MCNC: 10.3 G/DL (ref 11.7–15.4)

## 2022-04-21 PROCEDURE — 74011000250 HC RX REV CODE- 250: Performed by: PHYSICIAN ASSISTANT

## 2022-04-21 PROCEDURE — 74011250637 HC RX REV CODE- 250/637: Performed by: PHYSICIAN ASSISTANT

## 2022-04-21 PROCEDURE — 85018 HEMOGLOBIN: CPT

## 2022-04-21 PROCEDURE — 97530 THERAPEUTIC ACTIVITIES: CPT

## 2022-04-21 PROCEDURE — 74011250636 HC RX REV CODE- 250/636: Performed by: PHYSICIAN ASSISTANT

## 2022-04-21 PROCEDURE — 36415 COLL VENOUS BLD VENIPUNCTURE: CPT

## 2022-04-21 PROCEDURE — 99024 POSTOP FOLLOW-UP VISIT: CPT | Performed by: ORTHOPAEDIC SURGERY

## 2022-04-21 PROCEDURE — 97535 SELF CARE MNGMENT TRAINING: CPT

## 2022-04-21 RX ORDER — METHOCARBAMOL 500 MG/1
500 TABLET, FILM COATED ORAL
Qty: 24 TABLET | Refills: 0 | Status: SHIPPED | OUTPATIENT
Start: 2022-04-21

## 2022-04-21 RX ORDER — OXYCODONE HYDROCHLORIDE 5 MG/1
5-10 TABLET ORAL
Qty: 60 TABLET | Refills: 0 | Status: SHIPPED | OUTPATIENT
Start: 2022-04-21 | End: 2022-04-26

## 2022-04-21 RX ORDER — MELOXICAM 7.5 MG/1
7.5-15 TABLET ORAL
Qty: 60 TABLET | Refills: 0 | Status: SHIPPED | OUTPATIENT
Start: 2022-04-21 | End: 2022-05-21

## 2022-04-21 RX ORDER — PROMETHAZINE HYDROCHLORIDE 25 MG/1
25 TABLET ORAL
Qty: 20 TABLET | Refills: 0 | Status: SHIPPED | OUTPATIENT
Start: 2022-04-21

## 2022-04-21 RX ORDER — ASPIRIN 81 MG/1
81 TABLET ORAL EVERY 12 HOURS
Qty: 70 TABLET | Refills: 0 | Status: SHIPPED | OUTPATIENT
Start: 2022-04-21 | End: 2022-05-26

## 2022-04-21 RX ADMIN — Medication 81 MG: at 08:26

## 2022-04-21 RX ADMIN — ACETAMINOPHEN 1000 MG: 500 TABLET, FILM COATED ORAL at 05:11

## 2022-04-21 RX ADMIN — AMLODIPINE BESYLATE 10 MG: 10 TABLET ORAL at 08:25

## 2022-04-21 RX ADMIN — GABAPENTIN 100 MG: 100 CAPSULE ORAL at 08:26

## 2022-04-21 RX ADMIN — SODIUM CHLORIDE, PRESERVATIVE FREE 10 ML: 5 INJECTION INTRAVENOUS at 05:11

## 2022-04-21 RX ADMIN — OXYCODONE 10 MG: 5 TABLET ORAL at 05:11

## 2022-04-21 RX ADMIN — CEFAZOLIN SODIUM 2 G: 100 INJECTION, POWDER, LYOPHILIZED, FOR SOLUTION INTRAVENOUS at 02:08

## 2022-04-21 RX ADMIN — DOCUSATE SODIUM 50MG AND SENNOSIDES 8.6MG 2 TABLET: 8.6; 5 TABLET, FILM COATED ORAL at 08:25

## 2022-04-21 RX ADMIN — SPIRONOLACTONE 25 MG: 25 TABLET ORAL at 08:26

## 2022-04-21 RX ADMIN — Medication 1 AMPULE: at 08:26

## 2022-04-21 RX ADMIN — SACUBITRIL AND VALSARTAN 2 TABLET: 49; 51 TABLET, FILM COATED ORAL at 08:25

## 2022-04-21 NOTE — PROGRESS NOTES
[facility] Hospitalist Progress Note   Admit Date:  2022  7:57 AM   Name:  Sara Kramer   Age:  46 y.o. Sex:  female  :  1970   MRN:  [id number]   Room:  [facility]/    Presenting Complaint: left knee pain    Reason(s) for Admission: Primary osteoarthritis of left knee [M17.12]  Osteoarthritis of left knee [M17.12]     Hospitalists consulted by Trinity Pearl MD for: medical management    History of Presenting Illness:   Sara Kramer is a 46 y.o. female with history of systolic heart failure, CAD, THEE who was admitted for left knee arthroplasty. Hospitalist asked to consult for medical management. A&O x3, taking a shower. Will discharge today. Review of Systems:  10 systems reviewed and negative except as noted in HPI.   Assessment & Plan:     Principal Problem:    Status post left knee replacement (2022)    Per ortho    Active Problems:    Systolic CHF, chronic ([facility] [address] (2017)    Not in exacerbation  Home meds resumed      Coronary artery disease of native artery of native heart with stable angina pectoris ([facility] [address] (2017)    Home medsd      Severe obesity with body mass index (BMI) of 35.0 to 39.9 with serious comorbidity ([facility] [address] (2018)    Noted  education      THEE (obstructive sleep apnea) (2021)    Noted, does not wear CPAP      Noncompliance with CPAP treatment (2022)    noted      Discharge Plannin-2 days    Diet:  ADULT DIET Regular  DVT PPx: ASA, SCDs  Code status: Prior    Hospital Problems as of 2022 Date Reviewed: 2022          Codes Class Noted - Resolved POA    * (Principal) Status post left knee replacement ICD-10-CM: F65.173  ICD-9-CM: V43.65  2022 - Present Unknown        Osteoarthritis of left knee ICD-10-CM: M17.12  ICD-9-CM: 715.96  2022 - Present Unknown        Noncompliance with CPAP treatment ICD-10-CM: Z91.14  ICD-9-CM: V15.81  2022 - Present Yes        THEE (obstructive sleep apnea) ICD-10-CM: G47.33  ICD-9-CM: 327.23  4/19/2021 - Present Yes        Severe obesity with body mass index (BMI) of 35.0 to 39.9 with serious comorbidity ([facility] [address] ICD-10-CM: E66.01  ICD-9-CM: 278.01  7/5/2018 - Present Yes        Systolic CHF, chronic (HCC) ICD-10-CM: I50.22  ICD-9-CM: 428.22, 428.0  12/18/2017 - Present Yes        Coronary artery disease of native artery of native heart with stable angina pectoris [facility]) ICD-10-CM: I25.118  ICD-9-CM: 414.01, 413.9  12/18/2017 - Present Yes              Past History:  Past Medical History:   Diagnosis Date    Abnormal Papanicolaou smear of cervix     Adverse effect of anesthesia     \"takes more\" for anesthesia to be effective     Arthritis     CAD (coronary artery disease)     no stents; [facility] 3/2017 mod LAD dz; [place] [facility]    CHF (congestive heart failure) ([facility] [address]     ECHO 12/18/18: EF 40-45%; managed with medication and followed by [facility], uterine     GERD (gastroesophageal reflux disease)     diet controlled     H/O echocardiogram 10/04/2021    EF 40-45%    Heart murmur     Echo (10/4/21) EF 40-45% trace regurgitation in mitral and tricuspid valve    History of anemia     Hypercholesterolemia     Controlled with meds     Hypertension     Controlled with meds     Menorrhagia with regular cycle     Morbid obesity ([facility] [address]     NICM (nonischemic cardiomyopathy) ([facility] [address]     Follow[facility] cardiology     THEE on CPAP     Ovarian cyst     PVCs (premature ventricular contractions)     Uterine fibroid       Past Surgical History:   Procedure Laterality Date    HX AFIB ABLATION  02/2018    Ablation for pvc's     HX HEART CATHETERIZATION      HX HYSTEROSCOPY      HX KNEE REPLACEMENT Right 01/26/2022    HX ORTHOPAEDIC Right     toe removal     HX OTHER SURGICAL      left hip and gluteal surgery    HX LILIANA AND BSO  02/12/2019    Supracervical    HX TUBAL LIGATION      HX WISDOM TEETH EXTRACTION        Allergies   Allergen Reactions    Atorvastatin Myalgia    Metformin Other (comments)      Social History     Tobacco Use    Smoking status: Never Smoker    Smokeless tobacco: Never Used   Substance Use Topics    Alcohol use: No      Family History   Problem Relation Age of Onset    Heart Disease Mother     Cancer Father         Lymphoma    Breast Cancer Neg Hx       Family history reviewed and negative except as otherwise noted.     Immunization History   Administered Date(s) Administered    COVID-19, Pfizer Purple top, DILUTE for use, 12+ yrs, 30mcg/0.3mL dose 03/16/2021, 04/13/2021     Current Facility-Administered Medications   Medication Dose Route Frequency    amLODIPine (NORVASC) tablet 10 mg  10 mg Oral DAILY    carvediloL (COREG) tablet 12.5 mg  12.5 mg Oral QHS    gabapentin (NEURONTIN) capsule 100 mg  100 mg Oral TID    methocarbamoL (ROBAXIN) tablet 500 mg  500 mg Oral TID PRN    sacubitriL-valsartan (ENTRESTO) 49-51 mg tablet 2 Tablet  2 Tablet Oral DAILY    atorvastatin (LIPITOR) tablet 10 mg  10 mg Oral QHS    spironolactone (ALDACTONE) tablet 25 mg  25 mg Oral DAILY    alcohol 62% (NOZIN) nasal  1 Ampule  1 Ampule Topical Q12H    0.9% sodium chloride infusion  100 mL/hr IntraVENous CONTINUOUS    sodium chloride (NS) flush 5-40 mL  5-40 mL IntraVENous Q8H    sodium chloride (NS) flush 5-40 mL  5-40 mL IntraVENous PRN    acetaminophen (TYLENOL) tablet 1,000 mg  1,000 mg Oral Q6H    oxyCODONE IR (ROXICODONE) tablet 10 mg  10 mg Oral Q4H PRN    HYDROmorphone (DILAUDID) injection 1 mg  1 mg IntraVENous Q3H PRN    naloxone (NARCAN) injection 0.2-0.4 mg  0.2-0.4 mg IntraVENous Q10MIN PRN    dexamethasone (DECADRON) 10 mg/mL injection 10 mg  10 mg IntraVENous ONCE    ondansetron (ZOFRAN ODT) tablet 4 mg  4 mg Oral Q4H PRN    diphenhydrAMINE (BENADRYL) capsule 25 mg  25 mg Oral Q4H PRN    senna-docusate (PERICOLACE) 8.6-50 mg per tablet 2 Tablet  2 Tablet Oral DAILY    aspirin delayed-release tablet 81 mg  81 mg Oral Q12H    sacubitriL-valsartan (ENTRESTO) 49-51 mg tablet 1 Tablet  1 Tablet Oral QPM    lip protectant (BLISTEX) ointment 1 Each  1 Each Topical PRN       Objective:     Patient Vitals for the past 24 hrs:   Temp Pulse Resp BP SpO2   04/21/22 0715 98.3 °F (36.8 °C) 62 18 (!) 106/50 99 %   04/21/22 0316 98.5 °F (36.9 °C) (!) 55 18 120/72 97 %   04/20/22 2321 98 °F (36.7 °C) 65 18 110/67 95 %   04/20/22 1929 98.4 °F (36.9 °C) 73 18 126/70 94 %   04/20/22 1609 97.6 °F (36.4 °C) (!) 51 18 125/75    04/20/22 1443     96 %   04/20/22 1322 98 °F (36.7 °C) (!) 50 16 (!) 130/112 99 %   04/20/22 1245  (!) 51 16 (!) 140/63 100 %   04/20/22 1240   15     04/20/22 1235   15     04/20/22 1230   16     04/20/22 1225   16     04/20/22 1220 98 °F (36.7 °C) 66 17 132/65 98 %   04/20/22 1217  63 16 132/65 98 %     Oxygen Therapy  O2 Sat (%): 99 % (04/21/22 0715)  Pulse via Oximetry: 61 beats per minute (04/20/22 1443)  O2 Device: None (Room air) (04/20/22 1443)  O2 Flow Rate (L/min): 0 l/min (04/20/22 1443)    Estimated body mass index is 38 kg/m² as calculated from the following:    Height as of 4/13/22: 5' 8\" (1.727 m). Weight as of 4/13/22: 113.4 kg (249 lb 14.4 oz). Intake/Output Summary (Last 24 hours) at 4/21/2022 1035  Last data filed at 4/20/2022 1140  Gross per 24 hour   Intake    Output 250 ml   Net -250 ml         Physical Exam:  Blood pressure (!) 106/50, pulse 62, temperature 98.3 °F (36.8 °C), resp. rate 18, last menstrual period 10/29/2018, SpO2 99 %. General:    Well nourished. No overt distress  Head:  Normocephalic, atraumatic, BMI 38  Eyes:  Sclerae appear normal.  Pupils equally round. ENT:  Nares appear normal, no drainage. Moist oral mucosa  Neck:  No restricted ROM. Trachea midline   CV:   RRR. No m/r/g. No jugular venous distension. Lungs:   CTAB. No wheezing, rhonchi, or rales. Respirations even, unlabored  Abdomen: Bowel sounds present. Soft, nontender, nondistended. Extremities: No cyanosis or clubbing. No edema, left knee immobilizer on. Skin:     No rashes and normal coloration. Warm and dry. Neuro:  CN II-XII grossly intact. Sensation intact. A&Ox3  Psych:  Normal mood and affect. I have reviewed ordered lab tests and independently visualized imaging below:    Recent Labs:  Recent Results (from the past 48 hour(s))   HEMOGLOBIN    Collection Time: 04/20/22  7:07 PM   Result Value Ref Range    HGB 11.1 (L) 11.7 - 15.4 g/dL   HEMOGLOBIN    Collection Time: 04/21/22  4:56 AM   Result Value Ref Range    HGB 10.3 (L) 11.7 - 15.4 g/dL       All Micro Results     None          Other Studies:  No results found.     Signed:  Darlene Allison NP

## 2022-04-21 NOTE — PROGRESS NOTES
2022         Post Op day: 1 Day Post-OpProcedure(s) (LRB):  LEFT  KNEE ARTHROPLASTY TOTAL ROBOTIC ASSISTED ARJUN/BOBBI  , ADDUCTOR CANAL BLOCK (Left)      Admit Date: 2022  Admit Diagnosis: Primary osteoarthritis of left knee [M17.12]; Osteoarthritis of left knee [M17.12]    LAB:    Recent Results (from the past 24 hour(s))   HEMOGLOBIN    Collection Time: 22  7:07 PM   Result Value Ref Range    HGB 11.1 (L) 11.7 - 15.4 g/dL   HEMOGLOBIN    Collection Time: 22  4:56 AM   Result Value Ref Range    HGB 10.3 (L) 11.7 - 15.4 g/dL     Vital Signs:    Patient Vitals for the past 8 hrs:   BP Temp Pulse Resp SpO2   22 0316 120/72 98.5 °F (36.9 °C) (!) 55 18 97 %     Temp (24hrs), Av °F (36.7 °C), Min:97.6 °F (36.4 °C), Max:98.5 °F (36.9 °C)    There is no height or weight on file to calculate BMI.   Pain Control:   Pain Assessment  Pain Scale 1: Numeric (0 - 10)  Pain Intensity 1: 0  Pain Onset 1: postop  Pain Location 1: Knee  Pain Orientation 1: Left  Pain Description 1: Aching  Pain Intervention(s) 1: Medication (see MAR)    Subjective: Doing well, No complaints, No SOB, No Chest Pain, No nausea or vomiting     Objective: Vital Signs are Stable, No Acute Distress, Alert and Oriented, Dressing is dry,  Neurovascular exam is normal.       PT/OT:            Assistive Device: Walker (comment)        LLE AROM  L Knee Flexion: 60  L Knee Extension: 10       Wieght Bearing Status: WBAT    Meds:    Current Facility-Administered Medications:     amLODIPine (NORVASC) tablet 10 mg, 10 mg, Oral, DAILY, Tien Moore PA    carvediloL (COREG) tablet 12.5 mg, 12.5 mg, Oral, QHS, MANUEL Avina, 12.5 mg at 226    gabapentin (NEURONTIN) capsule 100 mg, 100 mg, Oral, TID, MANUEL Avina, 100 mg at 22 2237    methocarbamoL (ROBAXIN) tablet 500 mg, 500 mg, Oral, TID PRN, MANUEL Avina    sacubitriL-valsartan (ENTRESTO) 49-51 mg tablet 2 Tablet, 2 Tablet, Oral, DAILY, Oscar, Emma Barbosa    atorvastatin (LIPITOR) tablet 10 mg, 10 mg, Oral, QHS, Emma Gao, 10 mg at 04/20/22 2236    spironolactone (ALDACTONE) tablet 25 mg, 25 mg, Oral, DAILY, Wiggins, MANUEL Barbosa    alcohol 62% (NOZIN) nasal  1 Ampule, 1 Ampule, Topical, Q12H, Emma Gao, 1 Ampule at 04/20/22 2237    0.9% sodium chloride infusion, 100 mL/hr, IntraVENous, CONTINUOUS, Emma Gao    sodium chloride (NS) flush 5-40 mL, 5-40 mL, IntraVENous, Q8H, MANUEL Gao, 10 mL at 04/21/22 0511    sodium chloride (NS) flush 5-40 mL, 5-40 mL, IntraVENous, PRN, MANUEL Gao    acetaminophen (TYLENOL) tablet 1,000 mg, 1,000 mg, Oral, Q6H, MANUEL Gao, 1,000 mg at 04/21/22 0511    oxyCODONE IR (ROXICODONE) tablet 10 mg, 10 mg, Oral, Q4H PRN, MANUEL Gao, 10 mg at 04/21/22 0511    HYDROmorphone (DILAUDID) injection 1 mg, 1 mg, IntraVENous, Q3H PRN, MANUEL Gao    naloxone [facility]) injection 0.2-0.4 mg, 0.2-0.4 mg, IntraVENous, Q10MIN PRN, MANUEL Gao    dexamethasone (DECADRON) 10 mg/mL injection 10 mg, 10 mg, IntraVENous, ONCE, MANUEL Gao    ondansetron (ZOFRAN ODT) tablet 4 mg, 4 mg, Oral, Q4H PRN, MANUEL Gao, 4 mg at 04/20/22 1356    diphenhydrAMINE (BENADRYL) capsule 25 mg, 25 mg, Oral, Q4H PRN, MANUEL Gao    senna-docusate (PERICOLACE) 8.6-50 mg per tablet 2 Tablet, 2 Tablet, Oral, DAILY, MANUEL Gao    aspirin delayed-release tablet 81 mg, 81 mg, Oral, Q12H, MANUEL Gao, 81 mg at 04/20/22 2207    sacubitriL-valsartan (ENTRESTO) 49-51 mg tablet 1 Tablet, 1 Tablet, Oral, QPM, Nasim Corley MD, 1 Tablet at 04/20/22 1700    lip protectant (BLISTEX) ointment 1 Each, 1 Each, Topical, PRN, Nasim Corley MD     Assessment:   Patient Active Problem List   Diagnosis Code    Intramural, submucous, and subserous leiomyoma of uterus D25.1, D25.0, D25.2    NICM (nonischemic cardiomyopathy) (HCC) T32.7    Systolic CHF, chronic ([facility] [address] I50.22    Coronary artery disease of native artery of native heart with stable angina pectoris (HCC) I25.118    PVC's (premature ventricular contractions) I49.3    Chronic fatigue R53.82    S/P ablation of ventricular arrhythmia Z98.890, Z86.79    Menorrhagia with regular cycle N92.0    Severe obesity with body mass index (BMI) of 35.0 to 39.9 with serious comorbidity (HCC) E66.01    Abnormal uterine bleeding (AUB) N93.9    S/P abdominal supracervical subtotal hysterectomy Z90.711    Obesity (BMI 30-39. 9) E66.9    THEE (obstructive sleep apnea) G47.33    Noncompliance with CPAP treatment Z91.14    Osteoarthritis of right knee M17.11    Status post right knee replacement Z96.651    Status post left knee replacement Z96.652    Osteoarthritis of left knee M17.12             Plan:  Continue Physical Therapy, Monitor labs, home today        Signed By: Ancel Duane, MD

## 2022-04-21 NOTE — DISCHARGE INSTRUCTIONS
[facility]   Patient Discharge Instructions    Ceci Iyer / [id number] : 1970    Admitted 2022 Discharged: 2022     IF YOU HAVE ANY PROBLEMS ONCE YOU ARE AT HOME CALL THE FOLLOWING NUMBERS:   Main office number: [phone]      Medications    · The medications you are to continue on are listed on the medication reconciliation sheet. · Narcotic pain medications as well as supplemental iron can cause constipation. If this occurs try stopping the narcotic pain medication and/or the iron. · It is important that you take the medication exactly as they are prescribed. · Medications which increase your risk of blood clots are listed to stop for 5 weeks after surgery as well as medications or supplements which increase your risk of bleeding complications. · Keep your medication in the bottles provided by the pharmacist and keep a list of the medication names, dosages, and times to be taken in your wallet. · Do not take other medications without consulting your doctor. Important Information    Do NOT smoke as this will greatly increase your risk of infection! Resume your prehospital diet. If you have excessive nausea or vomitting call your doctor's office     Leg swelling and warmth is normal for 6 months after surgery. If you experience swelling in your leg elevate you leg while laying down with your toes above your heart. If you have sudden onset severe swelling with leg pain call our office. The stitches deep inside take approximately 6 months to dissolve. There will be sharp shooting, stinging and burning pain. This is normal and will resolve between 3-6 months after surgery. Difficulty sleeping is normal following total Knee and Hip replacement. You may try melatonin, an over-the-counter sleep aid or benadryl to help with sleep. Most patients will resume sleeping through the night 8 weeks after surgery. Home Physical Therapy is arranged.  Home [facility] will contact you within 48 hrs of discharge that you have chosen. If you have not received a call within this time frame please contact that provider you chose. You should be given this information before you leave the hospital.     You are at a risk for falls. Use the rolling walker when walking. Patients who have had a joint replacement should not drive if they are still taking narcotic pain mediation during the daytime hours. Most patients wean themselves off of pain medication within 2-5 weeks after surgery. When to Call the office    - If you have a temperature greater then 101  - Uncontrolled vomiting   - Loose control of your bladder or bowel function  - Are unable to bear any wieght   - Need a pain medication refill     Patient Education        Total Knee Replacement: What to Expect at  [address] had a total knee replacement. The doctor replaced the worn ends of the bones that connect to your knee (thighbone and lower leg bone) with plastic and metal parts. When you leave the hospital, you should be able to move around with a walker or crutches. But you will need someone to help you at home until you have more energy and can move around better. You will go home with a bandage and stitches, staples, skin glue, or tape strips. Change the bandage as your doctor tells you to. If you have stitches or staples, your doctor will remove them about 2 weeks after your surgery. Glue or tape strips will fall off on their own over time. You may still have some mild pain, and the area may be swollen for a few months after surgery. Your knee will continue to improve for up to a year. You will probably use a walker for some time after surgery. When you are ready, you can use a cane. You may be able to walk without support after a couple weeks, or when you are comfortable. You will need to do months of physical rehabilitation (rehab) after a knee replacement.  Rehab will help you strengthen the muscles of the knee and help you regain movement. After you recover, your artificial knee will allow you to do normal daily activities with less pain or no pain at all. You may be able to hike, dance, or ride a bike. Talk to your doctor about whether you can do more strenuous activities. Always tell your caregivers that you have an artificial knee. How long it will take to walk on your own, return to normal activities, and go back to work depends on your health and how well your rehabilitation (rehab) program goes. The better you do with your rehab exercises, the quicker you will get your strength and movement back. This care sheet gives you a general idea about how long it will take for you to recover. But each person recovers at a different pace. Follow the steps below to get better as quickly as possible. How can you care for yourself at home? Activity    · Rest when you feel tired. You may take a nap, but don't stay in bed all day. When you sit, use a chair with arms. You can use the arms to help you stand up.     · Work with your physical therapist to find the best way to exercise. What you can do as your knee heals will depend on whether your new knee is cemented or uncemented. You may not be able to do certain things for a while if your new knee is uncemented.     · After your knee has healed enough, you can do more strenuous activities with caution. ? You can golf, but you may want to use a golf cart for some time. And don't wear shoes with spikes. ? You can bike on a flat road or on a stationary bike. Talk to your doctor before biking uphill. ? Your doctor may suggest that you stay away from activities that put stress on your knee. These include tennis, badminton, contact sports like football, jumping (such as in basketball), jogging, and running. ? Avoid activities where you might fall.     · Do not sit for more than 1 hour at a time.  Get up and walk around for a while before you sit again. If you must sit for a long time, prop up your leg with a chair or footstool. This will help you avoid swelling.     · Ask your doctor when you can drive again. It may take several weeks after knee replacement surgery before it's safe for you to drive.     · When you get into a car, sit on the edge of the seat. Then pull in your legs, and turn to face the front.     · You should be able to do many everyday activities 3 to 6 weeks after your surgery. You will probably need to take 4 to 16 weeks off from work. When you can go back to work depends on the type of work you do and how you feel.     · Ask your doctor when it is okay for you to have sex.     · For 12 weeks, do not lift anything heavier than 10 pounds and do not lift weights. Diet    · By the time you leave the hospital, you should be eating your normal diet. If your stomach is upset, try bland, low-fat foods like plain rice, broiled chicken, toast, and yogurt. Your doctor may suggest that you take iron and vitamin supplements.     · Drink plenty of fluids (unless your doctor tells you not to).   · Eat healthy foods, and watch your portion sizes. Try to stay at your ideal weight. Too much weight puts more stress on your new knee.     · You may notice that your bowel movements are not regular right after your surgery. This is common. Try to avoid constipation and straining with bowel movements. Drinking enough fluids, taking a stool softener, and eating foods that are good sources of fiber can help you avoid constipation. If you have not had a bowel movement after a couple of days, talk to your doctor. Medicines    · Your doctor will tell you if and when you can restart your medicines. You will also get instructions about taking any new medicines.     · If you take aspirin or some other blood thinner, ask your doctor if and when to start taking it again.  Make sure that you understand exactly what your doctor wants you to do.     · Your doctor may give you a blood-thinning medicine to prevent blood clots. If you take a blood thinner, be sure you get instructions about how to take your medicine safely. Blood thinners can cause serious bleeding problems. This medicine could be in pill form or as a shot (injection). If a shot is needed, your doctor will tell you how to do this.     · Be safe with medicines. Take pain medicines exactly as directed. ? If the doctor gave you a prescription medicine for pain, take it as prescribed. ? If you are not taking a prescription pain medicine, ask your doctor if you can take an over-the-counter medicine. ? Plan to take your pain medicine 30 minutes before exercises. It is easier to prevent pain before it starts than to stop it after it has started.     · If you think your pain medicine is making you sick to your stomach:  ? Take your medicine after meals (unless your doctor has told you not to). ? Ask your doctor for a different pain medicine.     · If your doctor prescribed antibiotics, take them as directed. Do not stop taking them just because you feel better. You need to take the full course of antibiotics. Incision care    · If your doctor told you how to care for your cut (incision), follow your doctor's instructions. You will have a dressing over the cut. A dressing helps the incision heal and protects it. Your doctor will tell you how to take care of this.     · If you did not get instructions, follow this general advice:  ? If you have strips of tape on the cut the doctor made, leave the tape on for a week or until it falls off.  ? If you have stitches or staples, your doctor will tell you when to come back to have them removed. ? If you have skin glue on the cut, leave it on until it falls off. Skin glue is also called skin adhesive or liquid stitches. ? Change the bandage every day. ? Wash the area daily with warm water, and pat it dry. Don't use hydrogen peroxide or alcohol. They can slow healing. ?  You may cover the area with a gauze bandage if it oozes fluid or rubs against clothing. ? You may shower 24 to 48 hours after surgery. Pat the incision dry. Don't swim or take a bath for the first 2 weeks, or until your doctor tells you it is okay. Exercise    · Your rehab program will give you a number of exercises to do to help you get back your knee's range of motion and strength. Always do them as your therapist tells you. Ice    · For pain and swelling, put ice or a cold pack on the area for 10 to 20 minutes at a time. Put a thin cloth between the ice and your skin. If your doctor recommended cold therapy using a portable machine, follow the instructions that came with the machine. Other instructions    · Wear compression stockings if your doctor told you to. These may help to prevent blood clots. Your doctor will tell you how long you need to keep wearing the compression stockings.     · Carry a medical alert card that says you have an artificial joint. You have metal pieces in your knee. These may set off some airport metal detectors. Follow-up care is a key part of your treatment and safety. Be sure to make and go to all appointments, and call your doctor if you are having problems. It's also a good idea to know your test results and keep a list of the medicines you take. When should you call for help? Call 911 anytime you think you may need emergency care. For example, call if:    · You passed out (lost consciousness).     · You have severe trouble breathing.     · You have sudden chest pain and shortness of breath, or you cough up blood. Call your doctor now or seek immediate medical care if:    · You have signs of infection, such as:  ? Increased pain, swelling, warmth, or redness. ? Red streaks leading from the incision. ? Pus draining from the incision. ? A fever.     · You have signs of a blood clot, such as:  ? Pain in your calf, back of the knee, thigh, or groin. ?  Redness and swelling in your leg or groin.     · Your incision comes open and begins to bleed, or the bleeding increases.     · You have pain that does not get better after you take pain medicine. Watch closely for changes in your health, and be sure to contact your doctor if:    · You do not have a bowel movement after taking a laxative. Where can you learn more? Go to http://www.gray.com/  Enter T054 in the search box to learn more about \"Total Knee Replacement: What to Expect at Home. \"  Current as of: July 1, 2021               Content Version: 13.2  © 3522-9953 [employer]. Care instructions adapted under license by [employer] (which disclaims liability or warranty for this information). If you have questions about a medical condition or this instruction, always ask your healthcare professional. [place] [address] any warranty or liability for your use of this information. Information obtained by :  I understand that if any problems occur once I am at home I am to contact my physician. I understand and acknowledge receipt of the instructions indicated above.                                                                                                                                            Physician's or R.N.'s Signature                                                                  Date/Time                                                                                                                                              Patient or Representative Signature                                                          Date/Time

## 2022-04-21 NOTE — PROGRESS NOTES
Problem: Mobility Impaired (Adult and Pediatric)  Goal: *Acute Goals and Plan of Care (Insert Text)  Outcome: Progressing Towards Goal  Note: GOALS (1-4 days):  (1.)Ms. Mikayla Lozoya will move from supine to sit and sit to supine  in bed with STAND BY ASSIST.  (2.)Ms. Mikayla Lozoya will transfer from bed to chair and chair to bed with STAND BY ASSIST using the least restrictive device. Goal met  (3.)Ms. Woodard will ambulate with STAND BY ASSIST for 100 feet with the least restrictive device. GOAL MET  (4.)Ms. Mikayla Lozoya will ambulate up/down 2 steps with bilateral  railing with CONTACT GUARD ASSIST with no device. (5.)Ms. Mikayla Lozoya will increase left knee ROM to 0°-90°.  ________________________________________________________________________________________________       PHYSICAL THERAPY JOINT CAMP TKA: Daily Note and AM 4/21/2022  OUTPATIENT: Hospital Day: 2  Payor: [facility] [place] MEDICARE / Plan: [facility] [place] MED[place]/PPO / Product Type: Managed Care Medicare /      NAME/AGE/GENDER: Sara Kramer is a 46 y.o. female   PRIMARY DIAGNOSIS:  Primary osteoarthritis of left knee [M17.12]   Procedure(s) and Anesthesia Type:     * LEFT  KNEE ARTHROPLASTY TOTAL ROBOTIC ASSISTED ARJUN/BOBBI  , ADDUCTOR CANAL BLOCK - General (Left)  ICD-10: Treatment Diagnosis:    · Pain in Left Knee (M25.562)  · Stiffness of Left Knee, Not elsewhere classified (M25.662)  · Difficulty in walking, Not elsewhere classified (R26.2)      ASSESSMENT:     Ms. Mikayla Lozoya presents with decreased strength and range of motion left lower extremity and with decreased independence with functional mobility s/p left TKA. Pt will benefit from skilled PT interventions to maximize independence with functional mobility and TKA management. Pt did fine with assessment and mobility and exercises. Functional mobility and gait limited by nausea and vomiting. Pt instructed not to get up without assistance. Hope to progress mobility and exercises in the morning.  Pt plans to discharge to home tomorrow with continued therapy for follow up.   4/21- able to progress very well this morning and increased gait distance with good technique. Transferred out of the chair and ambulated in the halls with PT. Returned to the room and performed LE therex. Time spent educating on PT discharge info and ice machine. She plans on discharging home today. This section established at most recent assessment   PROBLEM LIST (Impairments causing functional limitations):  1. Decreased Strength  2. Decreased ADL/Functional Activities  3. Decreased Transfer Abilities  4. Decreased Ambulation Ability/Technique  5. Decreased Flexibility/Joint Mobility  6. Edema/Girth  7. Decreased [place] with Home Exercise Program   INTERVENTIONS PLANNED: (Benefits and precautions of physical therapy have been discussed with the patient.)  1. Bed Mobility  2. Cold  3. Gait Training  4. Home Exercise Program (HEP)  5. Range of Motion (ROM)  6. Therapeutic Activites  7. Therapeutic Exercise/Strengthening  8. Transfer Training     TREATMENT PLAN: Frequency/Duration: Follow patient BID for duration of hospital stay to address above goals. Rehabilitation Potential For Stated Goals: Good     RECOMMENDED REHABILITATION/EQUIPMENT: (at time of discharge pending progress): Continue Skilled Therapy and Home Health: Physical Therapy.               HISTORY:   History of Present Injury/Illness (Reason for Referral):  Pt s/p total knee arthroplasty on 4/20  Past Medical History/Comorbidities:   Ms. Cherrie Hilliard  has a past medical history of Abnormal Papanicolaou smear of cervix, Adverse effect of anesthesia, Arthritis, CAD (coronary artery disease), CHF (congestive heart failure) ([address] Fibroid, uterine, GERD (gastroesophageal reflux disease), H/O echocardiogram (10/04/2021), Heart murmur, History of anemia, Hypercholesterolemia, Hypertension, Menorrhagia with regular cycle, Morbid obesity ([address] NICM (nonischemic cardiomyopathy) ([address] THEE on CPAP, Ovarian cyst, PVCs (premature ventricular contractions), and Uterine fibroid. Ms. Ursula Duncan  has a past surgical history that includes hx tubal ligation; hx wisdom teeth extraction; hx hysteroscopy; hx afib ablation (02/2018); hx nicole and bso (02/12/2019); hx other surgical; hx heart catheterization; hx orthopaedic (Right); and hx knee replacement (Right, 01/26/2022). Social History/Living Environment:   Home Environment: Private residence  # Steps to Enter: 0  One/Two Story Residence: One story  Living Alone: No  Support Systems: Child(althea)  Patient Expects to be Discharged to[de-identified] Home with home health  Current DME Used/Available at Home: Cane, straight,Commode, bedside,Walker, rolling  Tub or Shower Type: Tub/Shower combination  Prior Level of Function/Work/Activity:  independent   Number of Personal Factors/Comorbidities that affect the Plan of Care: 1-2: MODERATE COMPLEXITY   EXAMINATION:   Most Recent Physical Functioning:                            Bed Mobility  Supine to Sit: Supervision    Transfers  Sit to Stand: Stand-by assistance  Stand to Sit: Supervision  Bed to Chair: Stand-by assistance    Balance  Sitting: Intact  Standing: Intact; Without support              Weight Bearing Status  Left Side Weight Bearing: As tolerated  Distance (ft): 230 Feet (ft)  Ambulation - Level of Assistance: Stand-by assistance  Assistive Device: Walker, rolling  Speed/Janna: Delayed  Step Length: Right shortened  Stance: Left decreased  Gait Abnormalities: Antalgic;Decreased step clearance  Interventions: Safety awareness training;Verbal cues     Braces/Orthotics:     Left Knee Cold  Type: Cryocuff      Body Structures Involved:  1. Joints  2. Muscles Body Functions Affected:  1. Movement Related Activities and Participation Affected:  1. Mobility  2.  Self Care   Number of elements that affect the Plan of Care: 4+: HIGH COMPLEXITY   CLINICAL PRESENTATION:   Presentation: Stable and uncomplicated: LOW COMPLEXITY CLINICAL DECISION [facility] [place] [address] AM-PAC 6 Clicks   Basic Mobility Inpatient Short Form  How much difficulty does the patient currently have. .. Unable A Lot A Little None   1. Turning over in bed (including adjusting bedclothes, sheets and blankets)? [] 1   [] 2   [x] 3   [] 4   2. Sitting down on and standing up from a chair with arms ( e.g., wheelchair, bedside commode, etc.)   [] 1   [] 2   [x] 3   [] 4   3. Moving from lying on back to sitting on the side of the bed? [] 1   [] 2   [x] 3   [] 4   How much help from another person does the patient currently need. .. Total A Lot A Little None   4. Moving to and from a bed to a chair (including a wheelchair)? [] 1   [] 2   [x] 3   [] 4   5. Need to walk in hospital room? [] 1   [] 2   [x] 3   [] 4   6. Climbing 3-5 steps with a railing? [] 1   [] 2   [x] 3   [] 4   © 2007, Trustees of [address] [place] [address], under license to [employer]. All rights reserved     Score:  Initial: 18 Most Recent: X (Date: -- )    Interpretation of Tool:  Represents activities that are increasingly more difficult (i.e. Bed mobility, Transfers, Gait). Medical Necessity:     · Patient is expected to demonstrate progress in   · strength, range of motion, and functional technique  ·  to   · decrease assistance required with functional mobility and TKA managment  · .  Reason for Services/Other Comments:  · Patient continues to require skilled intervention due to   · Inability to complete functional mobility and TKA management independently  · .    Use of outcome tool(s) and clinical judgement create a POC that gives a: Clear prediction of patient's progress: LOW COMPLEXITY            TREATMENT:   (In addition to Assessment/Re-Assessment sessions the following treatments were rendered)     Pre-treatment Symptoms/Complaints:  nausea, groggy  Pain Initial:      Post Session:  No complaints     Therapeutic Activity: (  45 min   ):  Therapeutic activities including Chair transfers, Ambulation on level ground, education on PT discharge instructions and LE exercises to improve mobility, strength, and balance. Required minimal Safety awareness training;Verbal cues to promote static and dynamic balance in standing. Date:  4/20 Date:  4/21 Date:     ACTIVITY/EXERCISE AM PM AM PM AM PM     []  []  []  []  []  []   Ankle Pumps  10 15      Quad Sets  10 15      Gluteal Sets  10 15      Hip ABd/ADduction  10 15      Straight Leg Raises  10aa 15aa      Knee Slides  10aa 15aa      Short Arc Quads   15aa      Chair Slides                           B = bilateral; AA = active assistive; A = active; P = passive      Treatment/Session Assessment:     Response to Treatment:  Feeling much better today and says she is doing much better with this surgery than with her last one. Education:  [x] Home Exercises  [x] Fall Precautions  [x] Use of Cold Therapy Unit [x] D/C Instruction Review  [] Knee Prosthesis Review  [x] Walker Management/Safety [x] Adaptive Equipment as Needed  [x] No pillow under knee       Interdisciplinary Collaboration:   o Physical Therapist  o Registered Nurse    After treatment position/precautions:   o Up in chair  o Bed/Chair-wheels locked  o Bed in low position  o Call light within reach    Compliance with Program/Exercises: Compliant most of the time, Will assess as treatment progresses. Recommendations/Intent for next treatment session:  Treatment next visit will focus on increasing Ms. Woodard's independence with bed mobility, transfers, gait training, strength/ROM exercises, modalities for pain, and patient education.       Total Treatment Duration:  PT Patient Time In/Time Out  Time In: 1000  Time Out: [address],

## 2022-04-21 NOTE — PROGRESS NOTES
Problem: Self Care Deficits Care Plan (Adult)  Goal: *Acute Goals and Plan of Care (Insert Text)  Outcome: Progressing Towards Goal  Note: GOALS:   DISCHARGE GOALS (in preparation for going home/rehab):  3 days  1. Ms. Ursula Duncan will perform one lower body dressing activity with minimal assistance required to demonstrate improved functional mobility and safety. GOAL MET 4/21/2022    2. Ms. Ursula Duncan will perform one lower body bathing activity with minimal assistance required to demonstrate improved functional mobility and safety. GOAL MET 4/21/2022    3. Ms. Ursula Duncan will perform toileting/toilet transfer with contact guard assistance to demonstrate improved functional mobility and safety. GOAL MET 4/21/2022    4. Ms. Ursula Duncan will perform shower transfer with contact guard assistance to demonstrate improved functional mobility and safety. GOAL MET 4/21/2022         JOINT CAMP OCCUPATIONAL THERAPY TKA: Daily Note, Discharge and AM 4/21/2022  OUTPATIENT: Hospital Day: 2  Payor: [facility] MEDICARE / Plan: [facility] / Product Type: [employer] Care Medicare /      NAME/AGE/GENDER: Ever Jones is a 46 y.o. female   PRIMARY DIAGNOSIS:  Primary osteoarthritis of left knee [M17.12]   Procedure(s) and Anesthesia Type:     * LEFT  KNEE ARTHROPLASTY TOTAL ROBOTIC ASSISTED ARJUN/BOBBI  , ADDUCTOR CANAL BLOCK - General (Left)  ICD-10: Treatment Diagnosis:    · Pain in Left Knee (M25.562)  · Stiffness of Left Knee, Not elsewhere classified (M25.662)  · Other lack of cordination (R27.8)  · Difficulty in walking, Not elsewhere classified (R26.2)  · Other abnormalities of gait and mobility (R26.89)      ASSESSMENT:       Ms. Ursula Duncan is s/p left TKA and presents with decreased weight bearing on left LE and decreased independence with functional mobility and activities of daily living.   Patient completed shower and dressing as charted below in ADL grid and is ambulating with rolling walker and stand by assist. Patient has met 4/4 goals and plans to return home with good family support. Family able to provide patient with appropriate level of assistance at this time. OT reviewed safety precautions throughout session and therapy schedule for the remainder of today. Patient instructed to call for assistance when needing to get up from recliner and all needs in reach. Patient verbalized understanding of call light. She transferred oob with SBA. She ambulated to the bathroom with SBA, toileted and transferred to the transfer tub bench. She undressed showered and donned clean gown. She stood at the sink to brush her teeth. The returned to recliner. She was fatigued. She rested and dressed. She met all her goals and plans to discharge home today. She has iceman in place. No further OT indicated. She has a supportive family to assist her. Discharge OT. This section established at most recent assessment   PROBLEM LIST (Impairments causing functional limitations):  1. Decreased Strength  2. Decreased ADL/Functional Activities  3. Decreased Transfer Abilities  4. Increased Pain  5. Increased Fatigue  6. Decreased Flexibility/Joint Mobility  7. Decreased Knowledge of Precautions   INTERVENTIONS PLANNED: (Benefits and precautions of occupational therapy have been discussed with the patient.)  1. Activities of daily living training  2. Adaptive equipment training  3. Balance training  4. Clothing management  5. Donning&doffing training  6. Theraputic activity     TREATMENT PLAN: Frequency/Duration: Follow patient 1-2 times to address above goals. Rehabilitation Potential For Stated Goals: Good     RECOMMENDED REHABILITATION/EQUIPMENT: (at time of discharge pending progress): Continue Skilled Therapy. OCCUPATIONAL PROFILE AND HISTORY:   History of Present Injury/Illness (Reason for Referral): Pt presents this date s/p (left) TKA.     Past Medical History/Comorbidities:   Ms. Larsen Po  has a past medical history of Abnormal Papanicolaou smear of cervix, Adverse effect of anesthesia, Arthritis, CAD (coronary artery disease), CHF (congestive heart failure) ([facility] [address] Fibroid, uterine, GERD (gastroesophageal reflux disease), H/O echocardiogram (10/04/2021), Heart murmur, History of anemia, Hypercholesterolemia, Hypertension, Menorrhagia with regular cycle, Morbid obesity ([facility] [address] NICM (nonischemic cardiomyopathy) ([facility] [address] THEE on CPAP, Ovarian cyst, PVCs (premature ventricular contractions), and Uterine fibroid. Ms. Linda Sanchez  has a past surgical history that includes hx tubal ligation; hx wisdom teeth extraction; hx hysteroscopy; hx afib ablation (02/2018); hx nicole and bso (02/12/2019); hx other surgical; hx heart catheterization; hx orthopaedic (Right); and hx knee replacement (Right, 01/26/2022). Social History/Living Environment:   Home Environment: Private residence  # Steps to Enter: 0  One/Two Story Residence: One story  Living Alone: No  Support Systems: Child(althea)  Patient Expects to be Discharged to[de-identified] Home with home health  Current DME Used/Available at Home: [address] [facility] [place], straight; Commode, bedside; Walker, rolling  Tub or Shower Type: Tub/Shower combination    Prior Level of Function/Work/Activity:  Mod I      Number of Personal Factors/Comorbidities that affect the Plan of Care: Brief history (0):  LOW COMPLEXITY   ASSESSMENT OF OCCUPATIONAL PERFORMANCE[de-identified]   Most Recent Physical Functioning:   Balance  Sitting: Intact  Standing: With support                              Mental Status  Neurologic State: Alert; Appropriate for age  Orientation Level: Appropriate for age  Cognition: Appropriate decision making; Appropriate for age attention/concentration; Appropriate safety awareness; Follows commands  Perception: Appears intact  Perseveration: No perseveration noted  Safety/Judgement: Awareness of environment; Fall prevention                Basic ADLs (From Assessment) Complex ADLs (From Assessment)   Basic ADL  Feeding: Supervision  Oral Facial Hygiene/Grooming: Moderate assistance  Bathing: Maximum assistance  Type of Bath: Chlorhexidine (CHG),Shower  Upper Body Dressing: Moderate assistance  Lower Body Dressing: Total assistance  Toileting: Moderate assistance     Grooming/Bathing/Dressing Activities of Daily Living   Grooming  Grooming Assistance: Supervision  Position Performed: Standing  Washing Face: Supervision  Washing Hands: Supervision  Brushing Teeth: Supervision  Brushing/Combing Hair: Supervision Cognitive Retraining  Safety/Judgement: Awareness of environment; Fall prevention   Upper Body Bathing  Bathing Assistance: Supervision  Position Performed: Seated in chair  Adaptive Equipment: Grab bar;Tub bench     Lower Body Bathing  Bathing Assistance: Stand-by assistance  Perineal  : Stand-by assistance  Position Performed: Standing  Adaptive Equipment: Grab bar  Lower Body : Stand-by assistance  Position Performed: Seated in chair;Standing  Adaptive Equipment: Grab bar;Tub bench Toileting  Toileting Assistance: Supervision  Bladder Hygiene: Supervision  Clothing Management: Supervision  Adaptive Equipment: Elevated seat;Walker   Upper Body Dressing Assistance  Dressing Assistance: Supervision  Bra: Supervision  Hospital Gown: Supervision  Pullover Shirt: Supervision Functional Transfers  Bathroom Mobility: Stand-by assistance  Toilet Transfer : Stand-by assistance  Shower Transfer: Stand-by assistance  Adaptive Equipment: Bedside commode;Grab bars; Tub transfer bench   Lower Body Dressing Assistance  Dressing Assistance: Minimum assistance  Underpants: Supervision  Pants With Elastic Waist: Supervision  Socks: Supervision;Minimum assistance  Slip on Shoes with Back: Supervision Bed/Mat Mobility  Supine to Sit: Supervision  Sit to Stand: Stand-by assistance  Stand to Sit: Stand-by assistance  Bed to Chair: Stand-by assistance         Physical Skills Involved:  1. Balance  2. Strength  3.  Activity Tolerance Cognitive Skills Affected (resulting in the inability to perform in a timely and safe manner):  1. Very drowsy from anesthisia  Psychosocial Skills Affected:  1. Habits/Routines  2. Environmental Adaptation  3. Self-Awareness   Number of elements that affect the Plan of Care: 5+:  HIGH COMPLEXITY   CLINICAL DECISION MAKING:   [facility] AM-PAC 6 Clicks   Daily Activity Inpatient Short Form  How much help from another person does the patient currently need. .. Total A Lot A Little None   1. Putting on and taking off regular lower body clothing? [] 1   [] 2   [x] 3   [] 4   2. Bathing (including washing, rinsing, drying)? [] 1   [] 2   [x] 3   [] 4   3. Toileting, which includes using toilet, bedpan or urinal?   [] 1   [] 2   [x] 3   [] 4   4. Putting on and taking off regular upper body clothing? [] 1   [] 2   [] 3   [x] 4   5. Taking care of personal grooming such as brushing teeth? [] 1   [] 2   [] 3   [x] 4   6. Eating meals? [] 1   [] 2   [] 3   [x] 4   © 2007, Trustees of [facility], under license to [employer]. All rights reserved     Score:  Initial: 14 Most Recent: 21 discharge 4/21/22    Interpretation of Tool:  Represents activities that are increasingly more difficult (i.e. Bed mobility, Transfers, Gait). Use of outcome tool(s) and clinical judgement create a POC that gives a: LOW COMPLEXITY            TREATMENT:   (In addition to Assessment/Re-Assessment sessions the following treatments were rendered)     Pre-treatment Symptoms/Complaints:  \  Pain: Initial:   Pain Intensity 1: 0  Post Session:  2/10 iceman in place     Self Care: (40): Procedure(s) (per grid) utilized to improve and/or restore self-care/home management as related to dressing, bathing, toileting, grooming and functional transfers. Required min visual, verbal, manual, and tactile cueing to facilitate activities of daily living skills and compensatory activities.        Treatment/Session Assessment:     Response to Treatment: Tolerated very well home today    Education:  [] Home Exercises  [x] Fall Precautions  [] Hip Precautions [] Going Home Video  [x] Knee/Hip Prosthesis Review  [x] Walker Management/Safety [x] Adaptive Equipment as Needed       Interdisciplinary Collaboration:   o Physical Therapist  o Occupational Therapist  o Registered Nurse    After treatment position/precautions:   o Up in chair  o Bed/Chair-wheels locked  o Bed in low position  o Call light within reach  o RN notified     Compliance with Program/Exercises: Compliant all of the time. Recommendations/Intent for next treatment session:   Pt doing well all goals met and will do well at home with support from daughter. Patient will be discharged home with home health PT. No further Occupational Therapy warranted, will discharge Occupational Therapy services.         Total Treatment Duration:40  OT Patient Time In/Time Out  Time In: 5069  Time Out: [address] [place],

## 2022-04-22 ENCOUNTER — HOME CARE VISIT (OUTPATIENT)
Dept: SCHEDULING | Facility: HOME HEALTH | Age: 52
End: 2022-04-22
Payer: MEDICARE

## 2022-04-22 VITALS
SYSTOLIC BLOOD PRESSURE: 128 MMHG | OXYGEN SATURATION: 98 % | HEART RATE: 78 BPM | DIASTOLIC BLOOD PRESSURE: 78 MMHG | RESPIRATION RATE: 18 BRPM | TEMPERATURE: 97.8 F

## 2022-04-22 PROCEDURE — 400018 HH-NO PAY CLAIM PROCEDURE

## 2022-04-22 PROCEDURE — 3331090001 HH PPS REVENUE CREDIT

## 2022-04-22 PROCEDURE — 3331090002 HH PPS REVENUE DEBIT

## 2022-04-22 PROCEDURE — G0151 HHCP-SERV OF PT,EA 15 MIN: HCPCS

## 2022-04-22 PROCEDURE — 400013 HH SOC

## 2022-04-23 PROCEDURE — 3331090001 HH PPS REVENUE CREDIT

## 2022-04-23 PROCEDURE — 3331090002 HH PPS REVENUE DEBIT

## 2022-04-24 PROCEDURE — 3331090001 HH PPS REVENUE CREDIT

## 2022-04-24 PROCEDURE — 3331090002 HH PPS REVENUE DEBIT

## 2022-04-25 ENCOUNTER — HOME CARE VISIT (OUTPATIENT)
Dept: SCHEDULING | Facility: HOME HEALTH | Age: 52
End: 2022-04-25
Payer: MEDICARE

## 2022-04-25 VITALS
TEMPERATURE: 97.8 F | DIASTOLIC BLOOD PRESSURE: 88 MMHG | OXYGEN SATURATION: 98 % | RESPIRATION RATE: 18 BRPM | SYSTOLIC BLOOD PRESSURE: 128 MMHG | HEART RATE: 78 BPM

## 2022-04-25 PROCEDURE — 3331090001 HH PPS REVENUE CREDIT

## 2022-04-25 PROCEDURE — G0151 HHCP-SERV OF PT,EA 15 MIN: HCPCS

## 2022-04-25 PROCEDURE — 3331090002 HH PPS REVENUE DEBIT

## 2022-04-26 PROCEDURE — 3331090002 HH PPS REVENUE DEBIT

## 2022-04-26 PROCEDURE — 3331090001 HH PPS REVENUE CREDIT

## 2022-04-27 ENCOUNTER — HOME CARE VISIT (OUTPATIENT)
Dept: SCHEDULING | Facility: HOME HEALTH | Age: 52
End: 2022-04-27
Payer: MEDICARE

## 2022-04-27 ENCOUNTER — APPOINTMENT (OUTPATIENT)
Dept: GENERAL RADIOLOGY | Age: 52
End: 2022-04-27
Attending: PHYSICIAN ASSISTANT
Payer: MEDICARE

## 2022-04-27 ENCOUNTER — HOME CARE VISIT (OUTPATIENT)
Dept: HOME HEALTH SERVICES | Facility: HOME HEALTH | Age: 52
End: 2022-04-27
Payer: MEDICARE

## 2022-04-27 ENCOUNTER — APPOINTMENT (OUTPATIENT)
Dept: CT IMAGING | Age: 52
End: 2022-04-27
Attending: PHYSICIAN ASSISTANT
Payer: MEDICARE

## 2022-04-27 ENCOUNTER — HOSPITAL ENCOUNTER (EMERGENCY)
Age: 52
Discharge: HOME | End: 2022-04-27
Attending: EMERGENCY MEDICINE
Payer: MEDICARE

## 2022-04-27 VITALS
OXYGEN SATURATION: 96 % | RESPIRATION RATE: 19 BRPM | DIASTOLIC BLOOD PRESSURE: 92 MMHG | SYSTOLIC BLOOD PRESSURE: 150 MMHG | TEMPERATURE: 98.2 F | HEART RATE: 100 BPM

## 2022-04-27 VITALS
DIASTOLIC BLOOD PRESSURE: 70 MMHG | RESPIRATION RATE: 18 BRPM | HEIGHT: 67 IN | WEIGHT: 250 LBS | HEART RATE: 66 BPM | BODY MASS INDEX: 39.24 KG/M2 | SYSTOLIC BLOOD PRESSURE: 148 MMHG | OXYGEN SATURATION: 99 % | TEMPERATURE: 98.3 F

## 2022-04-27 DIAGNOSIS — S16.1XXA STRAIN OF NECK MUSCLE, INITIAL ENCOUNTER: ICD-10-CM

## 2022-04-27 DIAGNOSIS — S70.01XA CONTUSION OF RIGHT HIP, INITIAL ENCOUNTER: ICD-10-CM

## 2022-04-27 DIAGNOSIS — M50.30 DEGENERATIVE CERVICAL DISC: ICD-10-CM
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
## 2022-04-27 DIAGNOSIS — S09.90XA MINOR HEAD INJURY, INITIAL ENCOUNTER: ICD-10-CM

## 2022-04-27 DIAGNOSIS — S80.01XA CONTUSION OF RIGHT KNEE, INITIAL ENCOUNTER: ICD-10-CM

## 2022-04-27 PROCEDURE — 96372 THER/PROPH/DIAG INJ SC/IM: CPT

## 2022-04-27 PROCEDURE — 70450 CT HEAD/BRAIN W/O DYE: CPT

## 2022-04-27 PROCEDURE — 72125 CT NECK SPINE W/O DYE: CPT

## 2022-04-27 PROCEDURE — 73562 X-RAY EXAM OF KNEE 3: CPT

## 2022-04-27 PROCEDURE — 3331090002 HH PPS REVENUE DEBIT

## 2022-04-27 PROCEDURE — 99284 EMERGENCY DEPT VISIT MOD MDM: CPT

## 2022-04-27 PROCEDURE — 3331090001 HH PPS REVENUE CREDIT

## 2022-04-27 PROCEDURE — 74011250636 HC RX REV CODE- 250/636: Performed by: PHYSICIAN ASSISTANT

## 2022-04-27 PROCEDURE — 73502 X-RAY EXAM HIP UNI 2-3 VIEWS: CPT

## 2022-04-27 PROCEDURE — G0157 HHC PT ASSISTANT EA 15: HCPCS

## 2022-04-27 RX ORDER — DICLOFENAC SODIUM 75 MG/1
75 TABLET, DELAYED RELEASE ORAL 2 TIMES DAILY
Qty: 14 TABLET | Refills: 0 | Status: SHIPPED | OUTPATIENT
Start: 2022-04-27 | End: 2022-05-04

## 2022-04-27 RX ORDER — KETOROLAC TROMETHAMINE 30 MG/ML
30 INJECTION, SOLUTION INTRAMUSCULAR; INTRAVENOUS
Status: COMPLETED | OUTPATIENT
Start: 2022-04-27 | End: 2022-04-27

## 2022-04-27 RX ADMIN — KETOROLAC TROMETHAMINE 30 MG: 30 INJECTION, SOLUTION INTRAMUSCULAR at 16:26

## 2022-04-27 NOTE — DISCHARGE INSTRUCTIONS
I would try to avoid taking your oxycodone at the same time as the muscle relaxant as I am concerned this may have contributed to your fall. Do not take any other anti-inflammatories with your prescription diclofenac for pain. Please see your orthopedic physician in the next few days for any concerns about your knee  Your neck CT shows some degenerative arthritis in your neck but no fracture.

## 2022-04-27 NOTE — ED TRIAGE NOTES
Pt to ER with EMS after she fell today walking. States she fell on right side and hit knee, hip, and right side of head. States left knee surgery one week ago and wants it evaluated also. Masked for triage.

## 2022-04-27 NOTE — ED NOTES
I have reviewed discharge instructions with the patient. The patient verbalized understanding. Patient left ED via Discharge Method: w/c to Home with family. No acute distress present    Opportunity for questions and clarification provided. Patient given 1 scripts. To continue your aftercare when you leave the hospital, you may receive an automated call from our care team to check in on how you are doing. This is a free service and part of our promise to provide the best care and service to meet your aftercare needs.  If you have questions, or wish to unsubscribe from this service please call [phone]. Thank you for Choosing our [employer] Emergency Department.

## 2022-04-27 NOTE — ED PROVIDER NOTES
Patient presents to the emergency department due to a fall injury that occurred earlier today. She is status post left knee arthroplasty 1 week ago by orthopedic physician Dr. Kassie Aguila. She states that around 11:30 AM today she took her oxycodone, 2 Tylenol and Robaxin for pain. Shortly after this she got up to get a snack to eat in the kitchen and was using her walker and when she turned to the right she thought there was going to be furniture there for her to brace herself but there was not and she fell over on her right side. Patient states she struck the right side of her head but no loss of consciousness. She does not take prescription anticoagulants. She complains of a mild right-sided headache. She complains of mild pain in her neck. She denies any back pain. She states she also hit her right knee and hip and is having pain in both of these joints as well as mild pain in the left knee. The left knee has persistently been swollen since surgery. Her follow-up appointment is next month with her orthopedic surgeon.            Past Medical History:   Diagnosis Date    Abnormal Papanicolaou smear of cervix     Adverse effect of anesthesia     \"takes more\" for anesthesia to be effective     Arthritis     CAD (coronary artery disease)     no stents; [facility] 3/2017 mod LAD dz; followed [facility] Dr Aliya Pratt ([facility])    CHF (congestive heart failure) ([facility] [address]     ECHO 12/18/18: EF 40-45%; managed with medication and followed by Linda 6, uterine     GERD (gastroesophageal reflux disease)     diet controlled     H/O echocardiogram 10/04/2021    EF 40-45%    Heart murmur     Echo (10/4/21) EF 40-45% trace regurgitation in mitral and tricuspid valve    History of anemia     Hypercholesterolemia     Controlled with meds     Hypertension     Controlled with meds     Menorrhagia with regular cycle     Morbid obesity ([address]     NICM (nonischemic cardiomyopathy) ([facility] [address]     Followed by [facility] cardiology     THEE on CPAP     Ovarian cyst     PVCs (premature ventricular contractions)     Uterine fibroid        Past Surgical History:   Procedure Laterality Date    HX AFIB ABLATION  02/2018    Ablation for pvc's     HX HEART CATHETERIZATION      HX HYSTEROSCOPY      HX KNEE REPLACEMENT Right 01/26/2022    HX ORTHOPAEDIC Right     toe removal     HX OTHER SURGICAL      left hip and gluteal surgery    HX LILIANA AND BSO  02/12/2019    Supracervical    HX TUBAL LIGATION      HX WISDOM TEETH EXTRACTION           Family History:   Problem Relation Age of Onset    Heart Disease Mother     Cancer Father         Lymphoma    Breast Cancer Neg Hx        Social History     Socioeconomic History    Marital status: SINGLE     Spouse name: Not on file    Number of children: Not on file    Years of education: Not on file    Highest education level: Not on file   Occupational History    Not on file   Tobacco Use    Smoking status: Never Smoker    Smokeless tobacco: Never Used   Substance and Sexual Activity    Alcohol use: No    Drug use: No    Sexual activity: Not Currently     Birth control/protection: Surgical     Comment: tubal / hysterectomy   Other Topics Concern    Not on file   Social History Narrative    Not on file     Social Determinants of Health     Financial Resource Strain:     Difficulty of Paying Living Expenses: Not on file   Food Insecurity:     Worried About Running Out of Food in the Last Year: Not on file    [place] of Food in the Last Year: Not on file   Transportation Needs:     Lack of Transportation (Medical): Not on file    Lack of Transportation (Non-Medical):  Not on file   Physical Activity:     Days of Exercise per Week: Not on file    Minutes of Exercise per Session: Not on file   Stress:     Feeling of Stress : Not on file   Social Connections:     Frequency of Communication with Friends and Family: Not on file    Frequency of Social Gatherings with Friends and Family: Not on file    Attends Roman Catholic Services: Not on file    Active Member of Clubs or Organizations: Not on file    Attends Club or Organization Meetings: Not on file    Marital Status: Not on file   Intimate Partner Violence:     Fear of Current or Ex-Partner: Not on file    Emotionally Abused: Not on file    Physically Abused: Not on file    Sexually Abused: Not on file   Housing Stability:     Unable to Pay for Housing in the Last Year: Not on file    Number of [place] in the Last Year: Not on file    Unstable Housing in the Last Year: Not on file         ALLERGIES: Atorvastatin and Metformin    Review of Systems   Musculoskeletal:        Pain in the knees bilaterally right worse than left. Swelling in the left knee since recent surgery 1 week ago. Pain in the right hip   Neurological: Positive for headaches. Negative for dizziness and light-headedness. All other systems reviewed and are negative. Vitals:    04/27/22 1337 04/27/22 1405   BP: 134/61 131/64   Pulse: 69 64   Resp: 16    Temp: 98.3 °F (36.8 °C)    SpO2: 99% 100%   Weight: 113.4 kg (250 lb)    Height: 5' 7\" (1.702 m)             Physical Exam  Vitals and nursing note reviewed. Constitutional:       Appearance: Normal appearance. She is obese. HENT:      Head:      Comments: No obvious hematoma or laceration noted but mild tenderness to the right parietal scalp region. Nose: Nose normal.      Mouth/Throat:      Mouth: Mucous membranes are moist.   Eyes:      Extraocular Movements: Extraocular movements intact. Pupils: Pupils are equal, round, and reactive to light. Neck:      Comments: Mild midline cervical spinous process tenderness to palpation. Cardiovascular:      Rate and Rhythm: Normal rate and regular rhythm. Pulses: Normal pulses. Heart sounds: Normal heart sounds. Pulmonary:      Effort: Pulmonary effort is normal.      Breath sounds: Normal breath sounds.    Abdominal: General: Abdomen is flat. There is no distension. Palpations: Abdomen is soft. Tenderness: There is no abdominal tenderness. There is no guarding. Musculoskeletal:      Comments: Effusion noted to left knee with bandage in place from recent surgery a week ago. Patient states it is not more swollen today than it has been over the last week. Mild tenderness to palpation of the left knee. Patient has no large effusion noted to the right knee but tenderness to palpation of the suprapatellar region and joint line. Pain with flexion extension of the right knee. Patient has tenderness to palpation of the right hip and mild tenderness with internal and external rotation of the right leg. Skin:     General: Skin is warm and dry. Neurological:      General: No focal deficit present. Mental Status: She is alert. Mental status is at baseline. Psychiatric:         Mood and Affect: Mood normal.         Behavior: Behavior normal.         Thought Content: Thought content normal.          MDM  Number of Diagnoses or Management Options  Contusion of right hip, initial encounter  Contusion of right knee, initial encounter  Fall, initial encounter  Minor head injury, initial encounter  Strain of neck muscle, initial encounter  Diagnosis management comments: Differential diagnoses: Head contusion, concussion, intracranial hemorrhage, C-spine injury, hip fracture, hip contusion, internal derangement knee, knee contusion, periprosthetic fracture    Since head CT came back negative and I will give her Toradol IM for additional pain relief as she has a normal creatinine in our system.   I also gave her a prescription for diclofenac for pain she is not taking any NSAIDs and this will be less sedating than the narcotics which I think the combination of the narcotic and muscle relaxant caused her to fall       Amount and/or Complexity of Data Reviewed  Tests in the radiology section of CPT®: reviewed    Risk of Complications, Morbidity, and/or Mortality  Presenting problems: moderate  Diagnostic procedures: moderate  Management options: low    Patient Progress  Patient progress: improved         Procedures

## 2022-04-28 PROCEDURE — 3331090002 HH PPS REVENUE DEBIT

## 2022-04-28 PROCEDURE — 3331090001 HH PPS REVENUE CREDIT

## 2022-04-29 ENCOUNTER — HOME CARE VISIT (OUTPATIENT)
Dept: SCHEDULING | Facility: HOME HEALTH | Age: 52
End: 2022-04-29
Payer: MEDICARE

## 2022-04-29 VITALS
SYSTOLIC BLOOD PRESSURE: 138 MMHG | RESPIRATION RATE: 17 BRPM | HEART RATE: 92 BPM | OXYGEN SATURATION: 98 % | TEMPERATURE: 98 F | DIASTOLIC BLOOD PRESSURE: 84 MMHG

## 2022-04-29 PROCEDURE — G0157 HHC PT ASSISTANT EA 15: HCPCS

## 2022-04-29 PROCEDURE — 3331090002 HH PPS REVENUE DEBIT

## 2022-04-29 PROCEDURE — 3331090001 HH PPS REVENUE CREDIT

## 2022-04-30 PROCEDURE — 3331090001 HH PPS REVENUE CREDIT

## 2022-04-30 PROCEDURE — 3331090002 HH PPS REVENUE DEBIT

## 2022-05-01 PROCEDURE — 3331090001 HH PPS REVENUE CREDIT

## 2022-05-01 PROCEDURE — 3331090002 HH PPS REVENUE DEBIT

## 2022-05-02 ENCOUNTER — HOME CARE VISIT (OUTPATIENT)
Dept: SCHEDULING | Facility: HOME HEALTH | Age: 52
End: 2022-05-02
Payer: MEDICARE

## 2022-05-02 VITALS
TEMPERATURE: 98.6 F | RESPIRATION RATE: 17 BRPM | DIASTOLIC BLOOD PRESSURE: 82 MMHG | HEART RATE: 76 BPM | OXYGEN SATURATION: 99 % | SYSTOLIC BLOOD PRESSURE: 138 MMHG

## 2022-05-02 PROCEDURE — 3331090001 HH PPS REVENUE CREDIT

## 2022-05-02 PROCEDURE — G0157 HHC PT ASSISTANT EA 15: HCPCS

## 2022-05-02 PROCEDURE — 3331090002 HH PPS REVENUE DEBIT

## 2022-05-03 PROCEDURE — 3331090001 HH PPS REVENUE CREDIT

## 2022-05-03 PROCEDURE — 3331090002 HH PPS REVENUE DEBIT

## 2022-05-04 ENCOUNTER — HOME CARE VISIT (OUTPATIENT)
Dept: SCHEDULING | Facility: HOME HEALTH | Age: 52
End: 2022-05-04
Payer: MEDICARE

## 2022-05-04 VITALS
TEMPERATURE: 98.5 F | RESPIRATION RATE: 17 BRPM | DIASTOLIC BLOOD PRESSURE: 80 MMHG | HEART RATE: 84 BPM | SYSTOLIC BLOOD PRESSURE: 124 MMHG | OXYGEN SATURATION: 99 %

## 2022-05-04 PROCEDURE — 3331090002 HH PPS REVENUE DEBIT

## 2022-05-04 PROCEDURE — 3331090001 HH PPS REVENUE CREDIT

## 2022-05-04 PROCEDURE — G0157 HHC PT ASSISTANT EA 15: HCPCS

## 2022-05-05 PROCEDURE — 3331090002 HH PPS REVENUE DEBIT

## 2022-05-05 PROCEDURE — 3331090001 HH PPS REVENUE CREDIT

## 2022-05-06 ENCOUNTER — HOME CARE VISIT (OUTPATIENT)
Dept: SCHEDULING | Facility: HOME HEALTH | Age: 52
End: 2022-05-06
Payer: MEDICARE

## 2022-05-06 VITALS
OXYGEN SATURATION: 98 % | DIASTOLIC BLOOD PRESSURE: 68 MMHG | RESPIRATION RATE: 17 BRPM | TEMPERATURE: 98.4 F | SYSTOLIC BLOOD PRESSURE: 130 MMHG | HEART RATE: 64 BPM

## 2022-05-06 PROCEDURE — 3331090002 HH PPS REVENUE DEBIT

## 2022-05-06 PROCEDURE — G0157 HHC PT ASSISTANT EA 15: HCPCS

## 2022-05-06 PROCEDURE — 3331090001 HH PPS REVENUE CREDIT

## 2022-05-07 PROCEDURE — 3331090002 HH PPS REVENUE DEBIT

## 2022-05-07 PROCEDURE — 3331090001 HH PPS REVENUE CREDIT

## 2022-05-08 PROCEDURE — 3331090001 HH PPS REVENUE CREDIT

## 2022-05-08 PROCEDURE — 3331090002 HH PPS REVENUE DEBIT

## 2022-05-09 ENCOUNTER — HOME CARE VISIT (OUTPATIENT)
Dept: SCHEDULING | Facility: HOME HEALTH | Age: 52
End: 2022-05-09
Payer: MEDICARE

## 2022-05-09 ENCOUNTER — HOME CARE VISIT (OUTPATIENT)
Dept: HOME HEALTH SERVICES | Facility: HOME HEALTH | Age: 52
End: 2022-05-09
Payer: MEDICARE

## 2022-05-09 VITALS
TEMPERATURE: 98.3 F | HEART RATE: 76 BPM | SYSTOLIC BLOOD PRESSURE: 124 MMHG | RESPIRATION RATE: 17 BRPM | OXYGEN SATURATION: 98 % | DIASTOLIC BLOOD PRESSURE: 72 MMHG

## 2022-05-09 PROCEDURE — G0157 HHC PT ASSISTANT EA 15: HCPCS

## 2022-05-09 PROCEDURE — 3331090001 HH PPS REVENUE CREDIT

## 2022-05-09 PROCEDURE — 3331090002 HH PPS REVENUE DEBIT

## 2022-05-10 ENCOUNTER — HOME CARE VISIT (OUTPATIENT)
Dept: SCHEDULING | Facility: HOME HEALTH | Age: 52
End: 2022-05-10
Payer: MEDICARE

## 2022-05-10 VITALS
TEMPERATURE: 97.8 F | DIASTOLIC BLOOD PRESSURE: 84 MMHG | OXYGEN SATURATION: 98 % | RESPIRATION RATE: 18 BRPM | SYSTOLIC BLOOD PRESSURE: 138 MMHG | HEART RATE: 78 BPM

## 2022-05-10 PROCEDURE — G0151 HHCP-SERV OF PT,EA 15 MIN: HCPCS

## 2022-05-10 PROCEDURE — 3331090002 HH PPS REVENUE DEBIT

## 2022-05-10 PROCEDURE — 3331090001 HH PPS REVENUE CREDIT

## 2022-05-16 ENCOUNTER — HOSPITAL ENCOUNTER (OUTPATIENT)
Dept: PHYSICAL THERAPY | Age: 52
Discharge: HOME | End: 2022-05-16
Payer: MEDICARE

## 2022-05-16 DIAGNOSIS — Z96.652 S/P TOTAL KNEE ARTHROPLASTY, LEFT: ICD-10-CM

## 2022-05-16 PROCEDURE — 97161 PT EVAL LOW COMPLEX 20 MIN: CPT

## 2022-05-16 PROCEDURE — 97140 MANUAL THERAPY 1/> REGIONS: CPT

## 2022-05-16 NOTE — PROGRESS NOTES
Anthonyyl Luiz Woodard  : 1970  Payor: [facility]arcie [facility] MEDICARE / Plan: [facility] [place] MEDICARE HMO/PPO / Product Type: Managed Care Medicare /  [address] [place] [address]  at [facility]  [address], [address]  [phone]   [phone]                                                            Damaris Carrion MD      OUTPATIENT PHYSICAL THERAPY: Daily Treatment Note 2022 Visit Count:  1    Tx Diagnosis:  Pain in left knee (M25.562)  Effusion, left knee (M25.462)  Stiffness of left knee, not elsewhere classified (M25.662)      Pre-treatment Symptoms/Complaints: Doing okay 88 deg flexion, +4-5 extension   Pain: Initial:4/10  Medications Last Reviewed:  2022     Post Session: 4/10   Updated Objective Findings: See Initial Eval for more details. TREATMENT:   THERAPEUTIC EXERCISE: (- minutes):  Exercises per grid below to improve mobility, strength and balance. Required minimal visual, verbal and manual cues to promote proper body alignment and promote proper body posture. Progressed resistance and complexity of movement as indicated. Date:  2022 Date:   Date:     Activity/Exercise Parameters Parameters Parameters   Education HEP, POC, PT goals, anatomy/pathology                                               MANUAL THERAPY: (25 minutes): Joint mobilization, Soft tissue mobilization was utilized and necessary because of the patient's restricted joint motion and restricted motion of soft tissue mobility. Date  2022    Technique Used Grade  Level # Time(s) Effect while being performed   Retrograde massage and STM       PAs                                                       HEP Log Date 1.    2022   2.  2022   3. 2022   4.    5.           [facility] Portal  Treatment/Session Summary:    Response to Treatment: Pt demonstrated understanding of POC and initial HEP. No increase in pain or adverse reactions. Communication/Consultation:  POC, HEP, PT goals, Faxed initial evaluation to MD.   Equipment provided today: HEP Handout   Recommendations/Intent for next treatment session:   Next visit will focus on Manual Therapy Core Stability Pain Science Education Quad strengthening Hip strengthening soft tissue mobilization. Treatment Plan of Care Effective Dates: 5/16/2022 TO 8/14/2022 (90 days). Frequency/Duration: 2-3 times a week for 90 Days             Total Treatment Billable Duration:   25  Rx plus Eval   PT Patient Time In/Time Out  Time In: 0930  Time Out: [address] Paty Eddy DPT    No future appointments.

## 2022-05-16 NOTE — THERAPY EVALUATION
Kacie Penn Vance  : 1970  Primary: [place]m*  Secondary:  [facility]  [address], [address]  [phone]   [phone]           OUTPATIENT PHYSICAL THERAPY:Initial Assessment 2022    ICD-10: Treatment Diagnosis:  Pain in left knee (M25.562)  Effusion, left knee (M25.462)  Stiffness of left knee, not elsewhere classified (X88.526)          Precautions/Allergies:   Atorvastatin and Metformin   Fall Risk Score: 5 (? 5 = High Risk)  MD Orders: Eval and Treat  MEDICAL/REFERRING DIAGNOSIS:  S/P total knee arthroplasty, left [E08.522]   DATE OF ONSET: 2022 L TKA  REFERRING PHYSICIAN: Jd Villagomez MD  RETURN PHYSICIAN APPOINTMENT: 26 May 2022     INITIAL ASSESSMENT:  Ms. Gilles Peng presents to physical therapy with decreased strength, ROM, joint mobility, functional mobility. These S/S are consistent with L TKA. Patient will benefit from skilled physical therapy for manual therapeutic techniques (as appropriate), therapeutic exercises and activities, balance and comprehensive home exercises program to address current impairments and functional limitations. LEFT Knee ROM:    +5   88 deg     Kash Cerda will benefit from skilled PT (medically necessary) in order to address above deficits affecting participation in basic ADLs and overall functional tolerance. PROBLEM LIST (Impacting functional limitations):   1. Decreased Strength  2. Decreased ADL/Functional Activities  3. Decreased Transfer Abilities  4. Decreased Ambulation Ability/Technique  5. Decreased Balance  6. Increased Pain  7. Decreased Joint mobility/Flexibility   8. Decreased Activity Tolerance  9. Decreased Graham with Home Exercise Program INTERVENTIONS PLANNED:   1. Balance Exercise  2. Bed Mobility  3. Cold  4. Cryotherapy  5. Family Education  6. Gait Training  7. Heat  8. Home Exercise Program (HEP)  9.  Manual Therapy  10. Neuromuscular Re-education/Strengthening  11. Range of Motion (ROM)  12. Therapeutic Activites  13. Therapeutic Exercise/Strengthening  14. Transfer Training  15. Ultrasound (US)  16. Vasopneumatic Compression  17. Aquatic Therapy          TREATMENT PLAN:  Effective Dates: 5/16/2022 TO 8/14/2022 (90 days). Frequency/Duration: 2 times a week for 90 Days    GOALS: (Goals have been discussed and agreed upon with patient.)   Short-Term Goals~ 12 weeks  Goal Met   1. Sanjuana Schwarz will be independent with HEP for strength and ROM 1.  [] Date:   2. Sanjuana Schwarz will improve MMT LEFT LE to >=4+/5 to improve current level of independence and community reintegration. 2.  [] Date:   3. Sanjuana Schwarz will demonstrate LEFT knee flexion >= 105 degrees to improve functional mobility and tolerance of ADLs. 3.  [] Date:   4. Sanjuana Schwarz will demonstrate LEFT knee Extension >= 1 degrees to improve functional mobility and tolerance of ADLs. 4.  [] Date:   5. Sanjuana Schwarz will be able to go up and down stairs with <=2/10 with minimal to no difficulty 5. [] Date:   6. Kaushal Woodard toimprove KOOS by 8 points to show improvement in areas of difficulty 6. [] Date:             Tool Used: Knee Injury and Osteoarthritis Outcome Form (KOOS-JR.)  SCORE: None (0) Mild (1) Moderate (2) Severe (3) Extreme (4)   Stiffness:         1. How severe is your knee stiffness after first waking in the morning? [] [] [] [x] []   Pain:         What amount of knee pain have you experienced in the last week   doing the following activities? 2. Twisting/pivoting on your knee: [] [] [] [x] []   3. Straightening knee fully: [] [] [] [x] []   4. Going up or down stairs [] [] [] [x] []   5. Standing upright [] [] [] [x] []   Function, daily living        6. Rising from sitting [] [] [] [x] []   7.  Bending to floor/ an object [] [] [] [x] []     Score:  Initial: 21 (Interval: 34.174) 5/16/2022/28 Most Recent: TBD/28 (Date: -- )   Interpretation of Score: Questions each scored on a 4 point scale with 4 representing the worst possible score and 0 representing the best possible score. The higher the point total, the worse the knee pain translating to a lower percentage of patient functioning. Medical Necessity:   · Skilled intervention continues to be required due to current impairment. Reason for Services/Other Comments:  · Patient continues to require skilled intervention due to patient continues to present with impairments assessed at initial evaluation and requiring skilled physical therapy to meet goals for PT. Total Treatment Duration:         Rehabilitation Potential For Stated Goals: Good  Regarding Yanira Woodard's therapy, I certify that the treatment plan above will be carried out by a therapist or under their direction. Thank you for this referral,  Jhonathan Nelson DPT     Referring Physician Signature: Dori Garcia MD              Date                  HISTORY:   History of Present Injury/Illness (Reason for Referral):  *4/20 TKA. Previous TKA January 26th. Doing well uses cane to ambulate.   Miniaml TKE    -Present Symptoms (on day of evaluation):       Pain Severity:  · Currently: 7/10  · Best: 5/10  · Worst: 8/10    · Aggravating factors: going up and down stairs, standing, walking   · Relieving factors: Rest and Ice  · Irritability: Medium (Onset of pain is equal to alleviation)    Past Medical History/Comorbidities:   Ms. Vidhya Dutton  has a past medical history of Abnormal Papanicolaou smear of cervix, Adverse effect of anesthesia, Arthritis, CAD (coronary artery disease), CHF (congestive heart failure) ([address] Fibroid, uterine, GERD (gastroesophageal reflux disease), H/O echocardiogram (10/04/2021), Heart murmur, History of anemia, Hypercholesterolemia, Hypertension, Menorrhagia with regular cycle, Morbid obesity ([address] NICM (nonischemic cardiomyopathy) ([address] THEE on CPAP, Ovarian cyst, PVCs (premature ventricular contractions), and Uterine fibroid. Ms. Nat Celaya  has a past surgical history that includes hx tubal ligation; hx wisdom teeth extraction; hx hysteroscopy; hx afib ablation (02/2018); hx nicole and bso (02/12/2019); hx other surgical; hx heart catheterization; hx orthopaedic (Right); and hx knee replacement (Right, 01/26/2022).     Active Ambulatory Problems     Diagnosis Date Noted    Intramural, submucous, and subserous leiomyoma of uterus 11/01/2017    NICM (nonischemic cardiomyopathy) ([address] 85/91/2276    Systolic CHF, chronic ([address] 12/18/2017    Coronary artery disease of native artery of native heart with stable angina pectoris ([address] 12/18/2017    PVC's (premature ventricular contractions) 12/18/2017    Chronic fatigue 12/18/2017    S/P ablation of ventricular arrhythmia 02/12/2018    Menorrhagia with regular cycle 05/07/2018    Severe obesity with body mass index (BMI) of 35.0 to 39.9 with serious comorbidity ([address] 07/05/2018    Abnormal uterine bleeding (AUB) 02/12/2019    S/P abdominal supracervical subtotal hysterectomy 02/12/2019    Obesity (BMI 30-39.9) 12/15/2020    THEE (obstructive sleep apnea) 04/19/2021    Noncompliance with CPAP treatment 01/05/2022    Osteoarthritis of right knee 01/26/2022    Status post right knee replacement 01/26/2022    Status post left knee replacement 04/20/2022    Osteoarthritis of left knee 04/20/2022     Resolved Ambulatory Problems     Diagnosis Date Noted    Atrial fibrillation ([address] 02/12/2018     Past Medical History:   Diagnosis Date    Abnormal Papanicolaou smear of cervix     Adverse effect of anesthesia     Arthritis     CAD (coronary artery disease)     CHF (congestive heart failure) (HCC)     Fibroid, uterine     GERD (gastroesophageal reflux disease)     H/O echocardiogram 10/04/2021    Heart murmur     History of anemia     Hypercholesterolemia     Hypertension     Morbid obesity ([address]     THEE on CPAP     Ovarian cyst     PVCs (premature ventricular contractions)     Uterine fibroid      Social History/Living Environment:        Social History     Socioeconomic History    Marital status: SINGLE     Spouse name: Not on file    Number of children: Not on file    Years of education: Not on file    Highest education level: Not on file   Occupational History    Not on file   Tobacco Use    Smoking status: Never Smoker    Smokeless tobacco: Never Used   Substance and Sexual Activity    Alcohol use: No    Drug use: No    Sexual activity: Not Currently     Birth control/protection: Surgical     Comment: tubal / hysterectomy   Other Topics Concern    Not on file   Social History Narrative    Not on file     Social Determinants of Health     Financial Resource Strain:     Difficulty of Paying Living Expenses: Not on file   Food Insecurity:     Worried About Running Out of Food in the Last Year: Not on file    [place]uz of Food in the Last Year: Not on file   Transportation Needs:     Lack of Transportation (Medical): Not on file    Lack of Transportation (Non-Medical):  Not on file   Physical Activity:     Days of Exercise per Week: Not on file    Minutes of Exercise per Session: Not on file   Stress:     Feeling of Stress : Not on file   Social Connections:     Frequency of Communication with Friends and Family: Not on file    Frequency of Social Gatherings with Friends and Family: Not on file    Attends Religion Services: Not on file    Active Member of Clubs or Organizations: Not on file    Attends Club or Organization Meetings: Not on file    Marital Status: Not on file   Intimate Partner Violence:     Fear of Current or Ex-Partner: Not on file    Emotionally Abused: Not on file    Physically Abused: Not on file    Sexually Abused: Not on file   Housing Stability:     Unable to Pay for Housing in the Last Year: Not on file    Number of [place] in the Last Year: Not on file  Unstable Housing in the Last Year: Not on file     Prior Level of Function/Work/Activity:  Previous TKA  Previous Treatment Approach  Previous Physical Therapy   Other Clinical Tests:  none  Current Medications:    Current Outpatient Medications:     polyethylene glycol (MIRALAX) 17 gram packet, Take 17 g by mouth daily. , Disp: , Rfl:     promethazine (PHENERGAN) 25 mg tablet, Take 1 Tablet by mouth every eight (8) hours as needed for Nausea. Indications: nausea and vomiting after surgery, Disp: 20 Tablet, Rfl: 0    methocarbamoL (ROBAXIN) 500 mg tablet, Take 1 Tablet by mouth three (3) times daily as needed for Muscle Spasm(s). , Disp: 24 Tablet, Rfl: 0    meloxicam (MOBIC) 7.5 mg tablet, Take 1-2 Tablets by mouth daily as needed for Pain for up to 30 days. (Patient taking differently: Take 1-2 Tablets by mouth daily as needed (breakthrough pain).), Disp: 60 Tablet, Rfl: 0    aspirin delayed-release 81 mg tablet, Take 1 Tablet by mouth every twelve (12) hours for 35 days. Indications: DVT prophylaxis, Disp: 70 Tablet, Rfl: 0    amLODIPine (NORVASC) 10 mg tablet, Take 10 mg by mouth daily. , Disp: , Rfl:     gabapentin (NEURONTIN) 100 mg capsule, Take 1 Capsule by mouth three (3) times daily. Indications: acute pain following an operation, Disp: 90 Capsule, Rfl: 1    cholecalciferol (VITAMIN D3) (400 Units /10 mcg) tab tablet, Take 400 Units by mouth daily. , Disp: , Rfl:     diclofenac (VOLTAREN) 1 % gel, Apply  to affected area as needed. , Disp: , Rfl:     simvastatin (ZOCOR) 20 mg tablet, Take 20 mg by mouth nightly., Disp: , Rfl:     carvediloL (COREG) 12.5 mg tablet, Take 1 Tab by mouth two (2) times daily (with meals). (Patient taking differently: Take 12.5 mg by mouth nightly.), Disp: 180 Tab, Rfl: 3    spironolactone (ALDACTONE) 25 mg tablet, Take 1 Tab by mouth daily. , Disp: 30 Tab, Rfl: 6    sacubitriL-valsartan (Entresto)  mg tablet, Take 1 Tab by mouth two (2) times a day.  (Patient taking differently: Take 1 Tablet by mouth two (2) times a day. 1 tablet in the am and 1/2 tablet in the pm), Disp: 60 Tab, Rfl: 11      Ambulatory/Rehab Services H2 Model Falls Risk Assessment    Risk Factors:       (5)  History of Recent Falls [w/in 3 months] Ability to Rise from Chair:       (0)  Ability to rise in a single movement    Falls Prevention Plan:       Physical Limitations to Exercise (specify):  uses cane and supervision for balance. Total: (5 or greater = High Risk): 5    ©2010 [facility] of [place] [employer] Patent #[id number]. Federal Law prohibits the replication, distribution or use without written permission from [facility] [employer]         Date Last Reviewed:  5/16/2022   Number of Personal Factors/Comorbidities that affect the Plan of Care: 0: LOW COMPLEXITY   EXAMINATION:   Observation/Orthostatic Postural Assessment:   Uses cane to ambulate.  Limited TKE  Palpation:  Assessed @ Initial Visit: Tenderness to *icnsion sites    AROM/PROM         Joint: Eval Date: 5/16/22  Re-Assess Date:  Re-Assess Date:    Active ROM RIGHT LEFT RIGHT LEFT RIGHT LEFT   Knee Extension 0 +5           Knee Flexion 105 88 deg           Hip Flexion             Ankle mobility                                                 Passive ROM         Knee Extension             Knee Flexion               Strength:     Eval Date: 5/16/22  Re-Assess Date:  Re-Assess Date:      RIGHT LEFT RIGHT LEFT RIGHT LEFT   Knee Flexion  5/5  4/5           Knee Extension  5/5  4-/5           Hip Flexion  5/5  4-/5           Hip Abduction  5/5  4-/5           Hip Extension  5/5  4-/5           Ankle Dorsiflexion   5/5 4-/5            5/5 4-/5                 Neurological Screen:  No radiating symptoms down leg    Functional Mobility:   Sit to Stand=  Limited  Squat= Cannot perform today  Single Leg Step Down= Limited eccentric strength      Balance and Mobility:  Test Result   Timed up and Go >10 Seconds          Body Structures Involved:  1. Bones  2. Joints  3. Muscles  4. Ligaments Body Functions Affected:  1. Sensory/Pain  2. Neuromusculoskeletal  3. Movement Related Activities and Participation Affected:  1. Mobility  2. Self Care   Number of elements that affect the Plan of Care: 4+: HIGH COMPLEXITY   CLINICAL PRESENTATION:   Presentation: Stable and uncomplicated: LOW COMPLEXITY   CLINICAL DECISION MAKING:      Use of outcome tool(s) and clinical judgement create a POC that gives a: Clear prediction of patient's progress: LOW COMPLEXITY   See treatment note for associated treatment provided today.       Future Appointments   Date Time Provider Britney Burgos   5/16/2022  9:30 AM Carlos Bernard DPT [facility]         Yessenia Florez DPT

## 2022-05-19 ENCOUNTER — HOSPITAL ENCOUNTER (OUTPATIENT)
Dept: PHYSICAL THERAPY | Age: 52
Discharge: HOME | End: 2022-05-19
Payer: MEDICARE

## 2022-05-19 PROCEDURE — 97110 THERAPEUTIC EXERCISES: CPT

## 2022-05-19 PROCEDURE — 97140 MANUAL THERAPY 1/> REGIONS: CPT

## 2022-05-19 NOTE — PROGRESS NOTES
Agnieszka Woodard  : 1970  [place]or: [place] / Plan: [facility] [place]O / Product Type: Managed Care Medicare /  [address] [place] [address]  at [facility]  [address], [address]  [phone]   [phone]                                                            Delfin Kayser, MD      OUTPATIENT PHYSICAL THERAPY: Daily Treatment Note 2022 Visit Count:  2    Tx Diagnosis:  Pain in left knee (M25.562)  Effusion, left knee (M25.462)  Stiffness of left knee, not elsewhere classified (M25.662)      Pre-treatment Symptoms/Complaints: Patient reports knee is doing ok just stiff and sore. Pain: Initial:4/10  Medications Last Reviewed:  2022     Post Session: 2/10   Updated Objective Findings: See Initial Eval for more details. TREATMENT:   THERAPEUTIC EXERCISE: 20 minutes):  Exercises per grid below to improve mobility, strength and balance. Required minimal visual, verbal and manual cues to promote proper body alignment and promote proper body posture. Progressed resistance and complexity of movement as indicated. Date:  2022 Date:  22 Date:     Activity/Exercise Parameters Parameters Parameters   Education HEP, POC, PT goals, anatomy/pathology     SLR      QS  X 25    SAQ  X 20    LAQ  X 10    Step ups      bridging            MANUAL THERAPY: (25 minutes): Joint mobilization, Soft tissue mobilization was utilized and necessary because of the patient's restricted joint motion and restricted motion of soft tissue mobility.         Date  2022    Technique Used Grade  Level # Time(s) Effect while being performed   Retrograde massage and STM    To decrease tightness and stiffness in B knees   PAs (B)                                                       HEP Log Date 1.    2022   2.  2022   3. 2022   4.    5.           [email or web address] Portal  Treatment/Session Summary:    Response to Treatment: Pt demonstrated understanding of POC and initial HEP. No increase in pain or adverse reactions. Patient tolerated treatment well today. Good improvement in ROM needs to continue to work on strength. Communication/Consultation:  POC, HEP, PT goals, Faxed initial evaluation to MD.   Equipment provided today: HEP Handout   Recommendations/Intent for next treatment session:   Next visit will focus on Manual Therapy Core Stability Pain Science Education Quad strengthening Hip strengthening soft tissue mobilization. Treatment Plan of Care Effective Dates: 5/19/2022 TO 8/14/2022 (90 days). Frequency/Duration: 2-3 times a week for 90 Days             Total Treatment Billable Duration:   45  Rx  PT Patient Time In/Time Out  Time In: 0230  Time Out: 1306 Reginald Quiñonez E, PTA    No future appointments.

## 2022-05-23 ENCOUNTER — HOSPITAL ENCOUNTER (OUTPATIENT)
Dept: PHYSICAL THERAPY | Age: 52
Setting detail: RECURRING SERIES
Discharge: HOME | End: 2022-05-26
Payer: MEDICARE

## 2022-05-23 PROCEDURE — 97110 THERAPEUTIC EXERCISES: CPT

## 2022-05-23 PROCEDURE — 97140 MANUAL THERAPY 1/> REGIONS: CPT

## 2022-05-23 ASSESSMENT — PAIN SCALES - GENERAL: PAINLEVEL_OUTOF10: 3

## 2022-05-23 NOTE — PROGRESS NOTES
Angela Handy  : 1970  [place]  Secondary:  [address] @ [facility]  [address] [place], [place] [address] [address]  Phone: [phone]  Fax: [phone] No data recorded  No data recorded    PT Visit Info:    No data recorded    OUTPATIENT PHYSICAL THERAPY:OP NOTE TYPE: Treatment Note 2022     Appt Desk   Episode      Treatment Diagnosis: Tx Diagnosis:  Pain in left knee (M25.562)  Effusion, left knee (M25.462)  Stiffness of left knee, not elsewhere classified (A61.782)  Medical/Referring Diagnosis:  No admission diagnoses are documented for this encounter. Referring Physician:  Melodie Clark., *  MD Orders:  PT Eval and Treat   Date of Onset:  No data recorded   Allergies:  Atorvastatin and Metformin  Restrictions/Precautions:    No data recordedNo data recorded   Interventions Planned (Treatment may consist of any combination of the following):    No data recorded   Subjective Comments:  Patient reports knee is stiff and sore today. Initial:     3/10 Post Session:     1/10  Medications Last Reviewed:  2022  Updated Objective Findings:  Limited TKE with ambulation  Treatment   THERAPEUTIC EXERCISE: 25:min  Exercises per grid below to improve mobility, strength and balance. Required minimal visual, verbal and manual cues to promote proper body alignment and promote proper body posture.   Progressed resistance and complexity of movement as indicated.       Date:  2022 Date:  22 Date:      Activity/Exercise Parameters Parameters Parameters   Education HEP, POC, PT goals, anatomy/pathology       SLR      x 25   QS   X 25  x 25   SAQ   X 20  x 25   LAQ   X 10  x 25   Step ups         bridging         Step forward and backward   X 1-    Ambulation without assistive device   X 100 ft            MANUAL THERAPY: (20 -minutes): Joint mobilization, Soft tissue mobilization was utilized and necessary because of the patient's restricted joint motion and restricted motion of soft tissue mobility.            Date  5/19/2022     Technique Used Grade  Level # Time(s) Effect while being performed   Retrograde massage and STM       To decrease tightness and stiffness in B knees   PAs (B)                                                                              Treatment/Session Summary:    · Treatment Assessment:   Good improvement in ROM, but needs to continue to work on strength and gait. · Communication/Consultation:  None today  · Equipment provided today:  None  · Recommendations/Intent for next treatment session: Next visit will focus on manual techniques and strengthening.     Total Treatment Billable Duration:    Time In: 3827  Time Out: 58 [place], [place]       Post Session Pain  Charge Capture  [facility] Portal  MD Guidelines  Scanned Media  Benefits  MyChart

## 2022-05-25 ENCOUNTER — HOSPITAL ENCOUNTER (OUTPATIENT)
Dept: PHYSICAL THERAPY | Age: 52
Setting detail: RECURRING SERIES
Discharge: HOME | End: 2022-05-28
Payer: MEDICARE

## 2022-05-25 PROCEDURE — 97140 MANUAL THERAPY 1/> REGIONS: CPT

## 2022-05-25 PROCEDURE — 97110 THERAPEUTIC EXERCISES: CPT

## 2022-05-25 ASSESSMENT — PAIN SCALES - GENERAL: PAINLEVEL_OUTOF10: 5

## 2022-05-25 NOTE — PROGRESS NOTES
Hector Handy  : 1970  [place]*  Secondary:  [address] @ [facility]  [address] [facility] [address] [facility] [address] [place] [address]  Phone: [phone]  Fax: [phone] No data recorded  No data recorded    PT Visit Info:    No data recorded    OUTPATIENT PHYSICAL THERAPY:OP NOTE TYPE: Treatment Note 2022     Appt Desk   Episode      Treatment Diagnosis: Tx Diagnosis:  Pain in left knee (M25.562)  Effusion, left knee (M25.462)  Stiffness of left knee, not elsewhere classified (E62.499)  Medical/Referring Diagnosis:  No admission diagnoses are documented for this encounter. Referring Physician:  Juanjose Billy., *  MD Orders:  PT Eval and Treat   Date of Onset:  No data recorded   Allergies:  Atorvastatin and Metformin  Restrictions/Precautions:    No data recordedNo data recorded   Interventions Planned (Treatment may consist of any combination of the following):    No data recorded   Subjective Comments:  Patient reports both knee has been hurting more today. Initial:     5/10 Post Session:     2/10  Medications Last Reviewed:  2022  Updated Objective Findings:  None Today  Treatment   THERAPEUTIC EXERCISE: 20:min  Exercises per grid below to improve mobility, strength and balance. Required minimal visual, verbal and manual cues to promote proper body alignment and promote proper body posture.   Progressed resistance and complexity of movement as indicated.       Date:  2022 Date:    Date     Activity/Exercise Parameters Parameters    Education HEP, POC, PT goals, anatomy/pathology      SLR    x 25 X 25    QS    x 25 X 25   SAQ    x 25 X 25   LAQ    x 25 X 25   Step ups        bridging        Step forward and backward  X 1-     Ambulation without assistive device  X 100 ft             MANUAL THERAPY: (25 -minutes): Joint mobilization, Soft tissue mobilization was utilized and necessary because of the patient's restricted joint motion and restricted motion of soft tissue mobility.            Date  5/19/2022     Technique Used Grade  Level # Time(s) Effect while being performed   Retrograde massage and STM       To decrease tightness and stiffness in B knees   PAs (B)                                                                              Treatment/Session Summary:    · Treatment Assessment:   Patient tolerated treatment with well. Less discomfort following treatment. · Communication/Consultation:  None today  · Equipment provided today:  None  · Recommendations/Intent for next treatment session: Next visit will focus on manual techniques and strengthening.     Total Treatment Billable Duration:    Time In: 5507  Time Out: 58 [place], [place]       Post Session Pain  Charge Capture  [employer] Portal  MD Guidelines  Scanned Media  Benefits  MyChart

## 2022-05-26 ENCOUNTER — OFFICE VISIT (OUTPATIENT)
Dept: ORTHOPEDIC SURGERY | Age: 52
End: 2022-05-26

## 2022-05-26 DIAGNOSIS — Z96.652 STATUS POST LEFT KNEE REPLACEMENT: Primary | ICD-10-CM

## 2022-05-26 PROCEDURE — 99024 POSTOP FOLLOW-UP VISIT: CPT | Performed by: PHYSICIAN ASSISTANT

## 2022-05-26 NOTE — PROGRESS NOTES
Post-op TKA Visit      05/26/22     Allergies   Allergen Reactions    Atorvastatin Other (See Comments)    Metformin Other (See Comments)     Current Outpatient Medications on File Prior to Visit   Medication Sig Dispense Refill    aspirin 81 MG EC tablet Take 81 mg by mouth every 12 hours      carvedilol (COREG) 12.5 MG tablet Take 12.5 mg by mouth 2 times daily (with meals)      vitamin D3 (CHOLECALCIFEROL) 10 MCG (400 UNIT) TABS tablet Take 400 Units by mouth daily      diclofenac sodium (VOLTAREN) 1 % GEL Apply topically as needed      gabapentin (NEURONTIN) 100 MG capsule Take 100 mg by mouth 3 times daily.  meloxicam (MOBIC) 7.5 MG tablet Take 7.5-15 mg by mouth daily as needed      methocarbamol (ROBAXIN) 500 MG tablet Take 500 mg by mouth 3 times daily as needed      polyethylene glycol (GLYCOLAX) 17 GM/SCOOP powder Take 17 g by mouth daily      promethazine (PHENERGAN) 25 MG tablet Take 25 mg by mouth every 8 hours as needed      sacubitril-valsartan (ENTRESTO)  MG per tablet Take 1 tablet by mouth 2 times daily      simvastatin (ZOCOR) 20 MG tablet Take 20 mg by mouth      spironolactone (ALDACTONE) 25 MG tablet Take 25 mg by mouth daily       No current facility-administered medications on file prior to visit. 5 weeks Status Post left TKA     History: The patient returns today for post-op visit following left TKA. Their pain is improving, but she is still having some stiffness. They are ambulating with a cane as a  walking aid. They have completed home physical therapy and started outpatient therapy which is going well. They are pleased with their results thus far. Denies any new complaints today. Physical Exam:  The patient's incision is well healed. There is moderate swelling and minimal increased warmth. ROM is 0 to 110 degrees. There is no M/L or A/P instability. The calf is soft and non-tender. Neurologic and vascular exams are intact.     X-Rays: AP and Lateral x-rays of left reveals a cementless TKA, Atlantic prosthesis. There is stable patella arthroplasty. There is good alignment and good position of the components. X-Ray Diagnosis: Satisfactory appearance left TKA    Disposition: The patient is doing well following left TKA. They will continue physical therapy exercises. The patient was advised about fall precautions and dental prophylaxis. The patient will follow up as scheduled in 5 months or sooner if needed.

## 2022-06-01 ENCOUNTER — HOSPITAL ENCOUNTER (OUTPATIENT)
Dept: PHYSICAL THERAPY | Age: 52
Setting detail: RECURRING SERIES
End: 2022-06-01
Payer: MEDICARE

## 2022-06-01 NOTE — PROGRESS NOTES
Reji Yvonne Handy  : 1970  Primary: [employer] [place] [employer] [place]  Secondary:  [address] @ [facility]  [facility] [address] [address]  Phone: [phone]  Fax: [phone] No data recorded  No data recorded    PT Visit Info:  No data recorded       OUTPATIENT PHYSICAL THERAPY 2022     Appt Desk   Episode   MyChart      Ms. Handy cancelled appointment today due to not feeling well.     Cristhian Jimenez PTA    Future Appointments   Date Time Provider Delroy Lopez   6/3/2022 10:15 AM Cristhian Jimenez, [facility]   2022 10:15 AM Cristhian Jimenez, [facility]   6/10/2022 10:15 AM Cristhian Jimenez, PTA [facility]   6/15/2022 10:15 AM Cristhian Barriosman, PTA [facility]   2022 10:15 AM Crishtian Boatman, PTA [facility]   2022  8:00 AM Regina Winchester, APRN - CNP [facility]   2022 10:15 AM Cristhian Boatman, PTA [facility]   2022 10:15 AM Cristhian Boatman, PTA [facility]   2022 10:15 AM Cristhian Boatman, PTA [facility]   2022 10:15 AM Cristhian Boatman, PTA [facility]   2022 11:15 AM Victoria Limon DO [facility]   10/27/2022  9:15 AM MD LAILA Jernigan [facility]

## 2022-06-03 ENCOUNTER — HOSPITAL ENCOUNTER (OUTPATIENT)
Dept: PHYSICAL THERAPY | Age: 52
Setting detail: RECURRING SERIES
End: 2022-06-03
Payer: MEDICARE

## 2022-06-03 ENCOUNTER — HOSPITAL ENCOUNTER (OUTPATIENT)
Dept: PHYSICAL THERAPY | Age: 52
Setting detail: RECURRING SERIES
Discharge: HOME | End: 2022-06-06
Payer: MEDICARE

## 2022-06-03 PROCEDURE — 97140 MANUAL THERAPY 1/> REGIONS: CPT

## 2022-06-03 PROCEDURE — 97110 THERAPEUTIC EXERCISES: CPT

## 2022-06-03 ASSESSMENT — PAIN SCALES - GENERAL: PAINLEVEL_OUTOF10: 2

## 2022-06-03 NOTE — PROGRESS NOTES
Brandy Cardenas Ole  : 1970  [place] [employer] [place]  Secondary:  [address] @ [facility]  [address] [facility] [address] [facility] [address]  Phone: [phone]  Fax: [phone] No data recorded  No data recorded    PT Visit Info:    No data recorded    OUTPATIENT PHYSICAL THERAPY:OP NOTE TYPE: Treatment Note 6/3/2022     Appt Desk   Episode      Treatment Diagnosis: Tx Diagnosis:  Pain in left knee (M25.562)  Effusion, left knee (M25.462)  Stiffness of left knee, not elsewhere classified (T00.459)  Medical/Referring Diagnosis:  Presence of left artificial knee joint [N41.249]  Referring Physician:  Linda Winslow., *  MD Orders:  PT Eval and Treat   Date of Onset:  No data recorded   Allergies:  Atorvastatin and Metformin  Restrictions/Precautions:    No data recordedNo data recorded   Interventions Planned (Treatment may consist of any combination of the following):    No data recorded   Subjective Comments:  Patient reports feeling better today. Patient states she hasn't felt like doing exercises. Initial:     2/10 Post Session:     2/10  Medications Last Reviewed:  6/3/2022  Updated Objective Findings:  None Today  Treatment   THERAPEUTIC EXERCISE: 30:min  Exercises per grid below to improve mobility, strength and balance. Required minimal visual, verbal and manual cues to promote proper body alignment and promote proper body posture.   Progressed resistance and complexity of movement as indicated.       Date:  2022 Date:    Date   Date  6/3   Activity/Exercise Parameters Parameters     Education HEP, POC, PT goals, anatomy/pathology       SLR    x 25 X 25  X 25   QS    x 25 X 25 X 25   SAQ    x 25 X 25 X 25   LAQ    x 25 X 25 X 25   Step ups         bridging         Step forward and backward  X 1-      Ambulation without assistive device  X 100 ft     Hamstring machine     #25 3 x 10   3 way hip machine    17.5 3 x 10 flex/abd          MANUAL THERAPY: (15 -minutes): Joint mobilization, Soft tissue mobilization was utilized and necessary because of the patient's restricted joint motion and restricted motion of soft tissue mobility.            Date  5/19/2022     Technique Used Grade  Level # Time(s) Effect while being performed   Retrograde massage and STM       To decrease tightness and stiffness in B knees   PAs (B)                                                                              Treatment/Session Summary:    · Treatment Assessment:      · Communication/Consultation:  None today  · Equipment provided today:  None  · Recommendations/Intent for next treatment session: Next visit will focus on manual techniques and strengthening.     Total Treatment Billable Duration:         Percy Mar PTA       Post Session Pain  Charge Capture  [employer] Portal  MD Guidelines  Scanned Media  Benefits  MyChart

## 2022-06-08 ENCOUNTER — HOSPITAL ENCOUNTER (OUTPATIENT)
Dept: PHYSICAL THERAPY | Age: 52
Setting detail: RECURRING SERIES
Discharge: HOME | End: 2022-06-11
Payer: MEDICARE

## 2022-06-08 PROCEDURE — 97140 MANUAL THERAPY 1/> REGIONS: CPT

## 2022-06-08 PROCEDURE — 97110 THERAPEUTIC EXERCISES: CPT

## 2022-06-08 ASSESSMENT — PAIN SCALES - GENERAL: PAINLEVEL_OUTOF10: 2

## 2022-06-08 NOTE — PROGRESS NOTES
Shilpi Gee Ole  : 1970  [place]: [employer] [place]*  Secondary:  [address] @ [facility]  [address] [place] [address]  Phone: [phone]  Fax: [phone] No data recorded  No data recorded    PT Visit Info:    No data recorded    OUTPATIENT PHYSICAL THERAPY:OP NOTE TYPE: Treatment Note 2022     Appt Desk   Episode      Treatment Diagnosis: Tx Diagnosis:  Pain in left knee (M25.562)  Effusion, left knee (M25.462)  Stiffness of left knee, not elsewhere classified (X76.875)  Medical/Referring Diagnosis:  No admission diagnoses are documented for this encounter. Referring Physician:  Shell Reynolds, *  MD Orders:  PT Eval and Treat   Date of Onset:  No data recorded   Allergies:  Atorvastatin and Metformin  Restrictions/Precautions:    No data recordedNo data recorded   Interventions Planned (Treatment may consist of any combination of the following):    No data recorded   Subjective Comments:  Patient reports both knees feels stiff. Initial:     2/10 Post Session:     2/10  Medications Last Reviewed:  2022  Updated Objective Findings:  None Today  Treatment   THERAPEUTIC EXERCISE: 30:min  Exercises per grid below to improve mobility, strength and balance. Required minimal visual, verbal and manual cues to promote proper body alignment and promote proper body posture.   Progressed resistance and complexity of movement as indicated.     Date:    Date   Date  6/3 Date  22   Parameters              x 25 X 25  X 25 X 30    x 25 X 25 X 25 X 30    x 25 X 25 X 25 X 30    x 25 X 25 X 25 X 30                 X 1-       X 100 ft        #25 3 x 10 #25 3 x 10     17.5 3 x 10 flex/abd 17.5 3 x 10 flex/abd                  MANUAL THERAPY: (15 -minutes): Joint mobilization, Soft tissue mobilization was utilized and necessary because of the patient's restricted joint motion and restricted motion of soft tissue mobility.            Date  5/19/2022     Technique Used Grade  Level # Time(s) Effect while being performed   Retrograde massage and STM       To decrease tightness and stiffness in B knees   PAs (B)                                                                              Treatment/Session Summary:    · Treatment Assessment:   Patient continue to demonstrate good improvement in ROM, but needs to continue to work on strength. · Communication/Consultation:  None today  · Equipment provided today:  None  · Recommendations/Intent for next treatment session: Next visit will focus on manual techniques and strengthening.     Total Treatment Billable Duration:    Time In: 8085  Time Out: 58 [place], [place]       Post Session Pain  Charge Capture  [employer] Portal  MD Guidelines  Scanned Media  Benefits  MyChart

## 2022-06-09 ENCOUNTER — TELEPHONE (OUTPATIENT)
Dept: CARDIOLOGY CLINIC | Age: 52
End: 2022-06-09

## 2022-06-09 NOTE — TELEPHONE ENCOUNTER
Patient called stating she is experiencing PVC's and has tiredness and SOB. She has not checked her BP. No active chest pain. Please call and advise.

## 2022-06-09 NOTE — TELEPHONE ENCOUNTER
Pt.is calling reporting that she has not been feeling right and feels like she is having PVC's and feels tired/weak at times. Not constant but off/on for past few days. Appt.made tomorrow w/ in 's absence.

## 2022-06-10 ENCOUNTER — OFFICE VISIT (OUTPATIENT)
Dept: CARDIOLOGY CLINIC | Age: 52
End: 2022-06-10
Payer: MEDICARE

## 2022-06-10 ENCOUNTER — HOSPITAL ENCOUNTER (OUTPATIENT)
Dept: PHYSICAL THERAPY | Age: 52
Setting detail: RECURRING SERIES
Discharge: HOME | End: 2022-06-13
Payer: MEDICARE

## 2022-06-10 VITALS
BODY MASS INDEX: 39.43 KG/M2 | SYSTOLIC BLOOD PRESSURE: 162 MMHG | WEIGHT: 251.2 LBS | HEART RATE: 67 BPM | HEIGHT: 67 IN | DIASTOLIC BLOOD PRESSURE: 98 MMHG

## 2022-06-10 DIAGNOSIS — I49.3 PVC'S (PREMATURE VENTRICULAR CONTRACTIONS): ICD-10-CM

## 2022-06-10 DIAGNOSIS — I50.22 SYSTOLIC CHF, CHRONIC (HCC): ICD-10-CM

## 2022-06-10 DIAGNOSIS — R00.2 PALPITATIONS: ICD-10-CM

## 2022-06-10 DIAGNOSIS — D64.9 ANEMIA, UNSPECIFIED TYPE: ICD-10-CM

## 2022-06-10 DIAGNOSIS — R00.2 PALPITATIONS: Primary | ICD-10-CM

## 2022-06-10 LAB
ANION GAP SERPL CALC-SCNC: 9 MMOL/L (ref 7–16)
BUN SERPL-MCNC: 16 MG/DL (ref 6–23)
CALCIUM SERPL-MCNC: 9.9 MG/DL (ref 8.3–10.4)
CHLORIDE SERPL-SCNC: 109 MMOL/L (ref 98–107)
CO2 SERPL-SCNC: 25 MMOL/L (ref 21–32)
CREAT SERPL-MCNC: 0.8 MG/DL (ref 0.6–1)
ERYTHROCYTE [DISTWIDTH] IN BLOOD BY AUTOMATED COUNT: 15.5 % (ref 11.9–14.6)
GLUCOSE SERPL-MCNC: 77 MG/DL (ref 65–100)
HCT VFR BLD AUTO: 35.5 % (ref 35.8–46.3)
HGB BLD-MCNC: 11.1 G/DL (ref 11.7–15.4)
MCH RBC QN AUTO: 25.8 PG (ref 26.1–32.9)
MCHC RBC AUTO-ENTMCNC: 31.3 G/DL (ref 31.4–35)
MCV RBC AUTO: 82.6 FL (ref 79.6–97.8)
NRBC # BLD: 0 K/UL (ref 0–0.2)
PLATELET # BLD AUTO: 396 K/UL (ref 150–450)
PMV BLD AUTO: 11.1 FL (ref 9.4–12.3)
POTASSIUM SERPL-SCNC: 4 MMOL/L (ref 3.5–5.1)
RBC # BLD AUTO: 4.3 M/UL (ref 4.05–5.2)
SODIUM SERPL-SCNC: 143 MMOL/L (ref 136–145)
WBC # BLD AUTO: 6.2 K/UL (ref 4.3–11.1)

## 2022-06-10 PROCEDURE — 97140 MANUAL THERAPY 1/> REGIONS: CPT

## 2022-06-10 PROCEDURE — 99214 OFFICE O/P EST MOD 30 MIN: CPT | Performed by: INTERNAL MEDICINE

## 2022-06-10 PROCEDURE — 93000 ELECTROCARDIOGRAM COMPLETE: CPT | Performed by: INTERNAL MEDICINE

## 2022-06-10 PROCEDURE — 97110 THERAPEUTIC EXERCISES: CPT

## 2022-06-10 RX ORDER — ASCORBIC ACID 500 MG
500 TABLET ORAL DAILY
COMMUNITY

## 2022-06-10 ASSESSMENT — ENCOUNTER SYMPTOMS: SHORTNESS OF BREATH: 0

## 2022-06-10 ASSESSMENT — PAIN SCALES - GENERAL: PAINLEVEL_OUTOF10: 2

## 2022-06-10 NOTE — PROGRESS NOTES
Alisson Handy  : 1970  Primary: [facility] Medicare Hmo/p*  Secondary:  [address] @ [facility]  [address] [place] [address]  Phone: [phone]  Fax: [phone] No data recorded  No data recorded    PT Visit Info:    No data recorded    OUTPATIENT PHYSICAL THERAPY:OP NOTE TYPE: Treatment Note 6/10/2022     Appt Desk   Episode      Treatment Diagnosis: Tx Diagnosis:  Pain in left knee (M25.562)  Effusion, left knee (M25.462)  Stiffness of left knee, not elsewhere classified (C03.306)  Medical/Referring Diagnosis:  Presence of left artificial knee joint [Y25.474]  Referring Physician:  Dandre Price., *  MD Orders:  PT Eval and Treat   Date of Onset:  No data recorded   Allergies:  Atorvastatin and Metformin  Restrictions/Precautions:    No data recordedNo data recorded   Interventions Planned (Treatment may consist of any combination of the following):    Current Treatment Recommendations: Strengthening; ROM; Balance training     Subjective Comments:  Patient reports knees are stiff today. Initial:     2/10 Post Session:     2/10  Medications Last Reviewed:  6/10/2022  Updated Objective Findings:  None Today  Treatment   THERAPEUTIC EXERCISE: 30:min  Exercises per grid below to improve mobility, strength and balance. Required minimal visual, verbal and manual cues to promote proper body alignment and promote proper body posture.   Progressed resistance and complexity of movement as indicated.     Date   Date  6/3 Date  22 Date  6/10/22               X 25  X 25 X 30 X 30    X 25 X 25 X 30 X 30   X 25 X 25 X 30 X 30   X 25 X 25 X 30 X 30                            #25 3 x 10 #25 3 x 10 #25 3 x 10    17.5 3 x 10 flex/abd 17.5 3 x 10 flex/abd 17.5 3 x 10 flex/abd                  MANUAL THERAPY: (15 -minutes): Joint mobilization, Soft tissue mobilization was utilized and necessary because of the patient's restricted joint motion and restricted motion of soft tissue mobility.            Date  5/19/2022     Technique Used Grade  Level # Time(s) Effect while being performed   Retrograde massage and STM       To decrease tightness and stiffness in B knees   PAs (B)                                                                              Treatment/Session Summary:    · Treatment Assessment:   Patient tolerated treatment well. Good improvement in ROM, but needs to continue to work on strength. · Communication/Consultation:  None today  · Equipment provided today:  None  · Recommendations/Intent for next treatment session: Next visit will focus on manual techniques and strengthening.     Total Treatment Billable Duration:    Time In: 4711  Time Out: 58 [place], [place]       Post Session Pain  Charge Capture  [employer] Portal  MD Guidelines  Scanned Media  Benefits  MyChart

## 2022-06-10 NOTE — PROGRESS NOTES
[address]  PHONE: [phone]    Hector Waldron Ole  1970      SUBJECTIVE:   Letha Arriola is a 46 y.o. female seen for a follow up visit regarding the following:     Chief Complaint   Patient presents with    Palpitations       HPI:    Sent presents for acute care visit. She typically follows with Dr. Genevieve Whitaker. Last seen in February 2022. Has a history of a nonischemic cardiomyopathy and frequent PVCs. Last echo in October 2021 shows EF 40 to 45%. It appears she underwent a PVC ablation in 2018. Prior cardiac testing as outlined below:   Echo 2/2017: EF 30%     2/2018:  PVC ablation by Dr. Odell Vargas. Echo 3/2018: EF 45%   LHC 3/9/2020: EF 20%, LAD 40%   Echo 10/2021: EF 40-45%   NST 6/2021: Left ventricular perfusion is normal. Myocardial perfusion imaging supports a low risk stress test.8i    Presents for acute care visit. She states she has been feeling similar to when she had abnormalities in 2018. She is very vague other than she feels tired and weak. She states these are symptoms that she had with her PVCs. When questioned specifically she does note that she has occasional palpitations and tachycardia but is hard to relate her symptoms of tiredness and fatigue to these events. She has no PVCs or ectopy on today's exam.  She was recently told she was anemic with a hemoglobin of 10.3. This was checked in April. Recheck has not been performed. Past Medical History, Past Surgical History, Family history, Social History, and Medications were all reviewed with the patient today and updated as necessary.          Allergies   Allergen Reactions    Atorvastatin Other (See Comments)    Metformin Other (See Comments)        Patient Active Problem List    Diagnosis Date Noted    S/P ablation of ventricular arrhythmia 02/12/2018     Priority: High    Status post left knee replacement 04/20/2022     Priority: Low    Osteoarthritis of left knee 04/20/2022     Priority: Low    Status post right knee replacement 01/26/2022     Priority: Low    Osteoarthritis of right knee 01/26/2022     Priority: Low    Noncompliance with CPAP treatment 01/05/2022     Priority: Low    SULEIMAN (obstructive sleep apnea) 04/19/2021     Priority: Low    Obesity (BMI 30-39.9) 12/15/2020     Priority: Low    Abnormal uterine bleeding (AUB) 02/12/2019     Priority: Low    S/P abdominal supracervical subtotal hysterectomy 02/12/2019     Priority: Low    Severe obesity with body mass index (BMI) of 35.0 to 39.9 with serious comorbidity ([facility] 07/05/2018     Priority: Low    Menorrhagia with regular cycle 05/07/2018     Priority: Low    Coronary artery disease of native artery of native heart with stable angina pectoris ([facility] 12/18/2017     Priority: Low    Chronic fatigue 12/18/2017     Priority: Low    PVC's (premature ventricular contractions) 12/18/2017     Priority: Low    Systolic CHF, chronic ([facility] 12/18/2017     Priority: Low    [facility]ischemic cardiomyopathy) [facility] 12/18/2017     Priority: Low    Intramural, submucous, and subserous leiomyoma of uterus 11/01/2017     Priority: Low        Social History     Tobacco Use    Smoking status: Never Smoker    Smokeless tobacco: Never Used   Substance Use Topics    Alcohol use: No       ROS:    Review of Systems   Constitutional: Positive for malaise/fatigue. Cardiovascular: Positive for irregular heartbeat. Negative for chest pain. Respiratory: Negative for shortness of breath. Musculoskeletal: Negative for falls. Neurological: Negative for focal weakness. Psychiatric/Behavioral: Negative for depression.            PHYSICAL EXAM:  Wt Readings from Last 3 Encounters:   06/10/22 251 lb 3.2 oz (113.9 kg)   02/16/22 254 lb (115.2 kg)   10/19/21 246 lb (111.6 kg)     BP Readings from Last 3 Encounters:   06/10/22 (!) 162/98   05/10/22 138/84   05/09/22 124/72     Pulse Readings from Last 3 Encounters:   06/10/22 67   05/10/22 78   05/09/22 76       Physical Exam  Constitutional:       General: She is not in acute distress. Appearance: Normal appearance. Neck:      Vascular: No carotid bruit. Cardiovascular:      Rate and Rhythm: Normal rate and regular rhythm. Pulmonary:      Breath sounds: Normal breath sounds. No wheezing. Abdominal:      General: There is no distension. Palpations: Abdomen is soft. Musculoskeletal:         General: No swelling. Skin:     General: Skin is warm and dry. Neurological:      General: No focal deficit present. Psychiatric:         Mood and Affect: Mood normal.         Medical problems and test results were reviewed with the patient today. Lab Results   Component Value Date    WBC 6.2 04/13/2022    HGB 10.3 (L) 04/21/2022    HCT 35.8 04/13/2022    MCV 83.1 04/13/2022     04/13/2022       Lab Results   Component Value Date     04/13/2022    K 3.7 04/13/2022     04/13/2022    CO2 29 04/13/2022    BUN 15 04/13/2022    CREATININE 0.78 04/13/2022    GLUCOSE 94 04/13/2022    CALCIUM 9.7 04/13/2022        No results found for: CHOL  No results found for: TRIG  No results found for: HDL  No results found for: LDLCHOLESTEROL, LDLCALC  No results found for: LABVLDL, VLDL  No results found for: CHOLHDLRATIO     Data from outside records/labs from outside providers have been reviewed and summarized as noted in the HPI, past history and data review sections of this note       ASSESSMENT and PLAN      1. Palpitations  No ectopy on exam.  Check 3-day Holter. Follow-up with Dr. Mervat Forrester after this is performed. - EKG 12 Lead  - CBC; Future  - Basic Metabolic Panel; Future  - Extended cardiac holter monitor (48 hrs - 15 days); Future    2. Systolic CHF, chronic (HCC)  Mild LV dysfunction on most recent echo. On appropriate medical therapy with carvedilol and Entresto.   No evidence of volume overload or decompensation.  - CBC; Future  - Basic Metabolic Panel; Future    3. PVC's (premature ventricular contractions)  Holter. Continue carvedilol.  - CBC; Future  - Basic Metabolic Panel; Future  - Extended cardiac holter monitor (48 hrs - 15 days); Future    4. Anemia, unspecified type  Check CBC. Khadar Ramos MD  6/10/2022  3:45 PM    This note may have been dictated using speech recognition software.   As a result, error of speech recognition may have occurred

## 2022-06-10 NOTE — PROGRESS NOTES
Cee Handy  : 1970  Primary: [place] Hmo/p*  Secondary:  [address] @ [facility]  [address] [place] [address]  Phone: [phone]  Fax: [phone] No data recorded  No data recorded    PT Visit Info:    No data recorded    OUTPATIENT PHYSICAL THERAPY:OP NOTE TYPE: Progress Report 6/10/2022               Episode  Appt Desk         Treatment Diagnosis:  Pain in left knee (M25.562)  Effusion, left knee (M25.462)  Stiffness of left knee, not elsewhere classified (M25.662)     * No diagnoses found *  Medical/Referring Diagnosis:  No admission diagnoses are documented for this encounter. Referring Physician:  Hazel Laughlin., *  MD Orders:  PT Eval and Treat   Return MD Appt:  *10/27/22  Date of Onset:  No data recorded   Allergies:  Atorvastatin and Metformin  Restrictions/Precautions:    No data recordedNo data recorded   Medications Last Reviewed:  6/10/2022        PROGRESS ASSESSMENT:    Overall great improvement with ROM and functional strength. She has rehabbed even better with this surgery than previous TKA. Gait is becoming more fluid and TKE is improving. Current ROM as seen below. She continues to use cane a little bit, but is working on [employer] this when safe. Swelling has been managed appropriately     LEFT Knee ROM:    +1   115 deg      Caro Millan will benefit from skilled PT (medically necessary) in order to address above deficits affecting participation in basic ADLs and overall functional tolerance. 4+/5 Strength L LE        ASSESSMENT     Problem List: (Impacting functional limitations): Body Structures, Functions, Activity Limitations Requiring Skilled Therapeutic Intervention: Decreased functional mobility ; Decreased ADL status;  Decreased ROM     Therapy Prognosis: Con  Therapy Prognosis: Good     Assessment Complexity:   Low Complexity  PLAN   Frequency/Duration: No data recorded   Interventions Planned (Treatment may consist of any combination of the following):    Current Treatment Recommendations: Strengthening; ROM; Balance training        GOALS: (Goals have been discussed and agreed upon with patient.)    Short-Term Goals~ 12 weeks  Goal Met   1. Kamar Tao will be independent with HEP for strength and ROM 1.  [x]? Date: 6/10/2022    2. Kamar Tao will improve MMT LEFT LE to >=4+/5 to improve current level of independence and community reintegration. 2.  [x]? 6/10/2022 Date:   3. Kamar Tao will demonstrate LEFT knee flexion >= 105 degrees to improve functional mobility and tolerance of ADLs. 3.  [x]? Date: 6/10/2022    4. Kamar Tao will demonstrate LEFT knee Extension >= 1 degrees to improve functional mobility and tolerance of ADLs. 4.  [x]? Date:6/10/2022    5. Kamar Tao will be able to go up and down stairs with <=2/10 with minimal to no difficulty 5. [x]? Date 6/10/2022 :   6. Kamar Tao toimprove KOOS by 8 points to show improvement in areas of difficulty 6. []? Date:                                     Tool Used: Knee Injury and Osteoarthritis Outcome Form (KOOS-JR.)  SCORE: None (0) Mild (1) Moderate (2) Severe (3) Extreme (4)   Stiffness:              1. How severe is your knee stiffness after first waking in the morning? []?  []?  [x]?  []?  []?    Pain:              What amount of knee pain have you experienced in the last week   doing the following activities?          2. Twisting/pivoting on your knee: []?  []?  [x]?  []?  []?    3. Straightening knee fully: []?  []?  [x]?  []?  []?    4. Going up or down stairs []?  []?  [x]?  []?  []?    5. Standing upright []?  []?  [x]?  []?  []?    Function, daily living             6.  Rising from sitting []?  []?  [x]?  []?  []?    7. Bending to floor/ an object []?  []?  [x]?  []?  []?       Score:  Initial: 21 (Interval: 34.174) 5/16/2022/28 Most Recent: 14/28 (Date: -6.10.22- ) Interpretation of Score: Questions each scored on a 4 point scale with 4 representing the worst possible score and 0 representing the best possible score. The higher the point total, the worse the knee pain translating to a lower percentage of patient functioning. Medical Necessity:   Skilled intervention continues to be required due to current impairments. .  Reason For Services/Other Comments:  Patient continues to require skilled intervention due to eccentric strength. Progress Assessmet performed today. See additional note for treatment rendered by Renetta Parra PTA       Regarding Junette Denver Scott's therapy, I certify that the treatment plan above will be carried out by a therapist or under their direction. Thank you for this referral,  Lorena Sánchez, PT     Referring Physician Signature: Cher Fox, * No Signature is Required for this note.          Future Appointments   Date Time Provider Delroy Lopez   6/10/2022 10:15 AM [address], PTA [facility]   6/10/2022  3:30 PM Trupti Long MD [facility] AMB   6/13/2022  1:00 PM [address], PTA [facility]   6/16/2022  8:45 AM [address], PTA [facility]   6/20/2022  8:00 AM MILY Mcfarlane - [facility] AMB   6/22/2022 10:15 AM [address], PTA [facility]   6/24/2022 10:15 AM [address], PTA [facility]   6/29/2022 10:15 AM [address], PTA [facility]   7/1/2022 10:15 AM [address], PTA [facility]   8/17/2022 11:15 AM Yosvany Cervantes DO [facility] AMB   10/27/2022  9:15 AM Wade Martinez MD [facility]         Post Session Pain  Charge Capture  PT Visit Info  POC Link  Treatment Note Link  MD Anyi Harris

## 2022-06-13 ENCOUNTER — HOSPITAL ENCOUNTER (OUTPATIENT)
Dept: PHYSICAL THERAPY | Age: 52
Setting detail: RECURRING SERIES
Discharge: HOME | End: 2022-06-16
Payer: MEDICARE

## 2022-06-13 PROCEDURE — 97140 MANUAL THERAPY 1/> REGIONS: CPT

## 2022-06-13 PROCEDURE — 97110 THERAPEUTIC EXERCISES: CPT

## 2022-06-13 ASSESSMENT — PAIN SCALES - GENERAL: PAINLEVEL_OUTOF10: 2

## 2022-06-13 NOTE — PROGRESS NOTES
Tray aHndy  : 1970  [place]p*  Secondary:  Ibis Leblanc @ [facility]  [address] [place] [address]  Phone: [phone]  Fax: [phone] No data recorded  No data recorded    PT Visit Info:    No data recorded    OUTPATIENT PHYSICAL THERAPY:OP NOTE TYPE: Treatment Note 2022     Appt Desk   Episode      Treatment Diagnosis: Tx Diagnosis:  Pain in left knee (M25.562)  Effusion, left knee (M25.462)  Stiffness of left knee, not elsewhere classified (N68.125)  Medical/Referring Diagnosis:  No admission diagnoses are documented for this encounter. Referring Physician:  Adia Cervantes, *  MD Orders:  PT Eval and Treat   Date of Onset:  No data recorded   Allergies:  Atorvastatin and Metformin  Restrictions/Precautions:    No data recordedNo data recorded   Interventions Planned (Treatment may consist of any combination of the following):    Current Treatment Recommendations: Strengthening; ROM; Balance training     Subjective Comments:  Patient reports that the left knee feels good still some discomfort with the right. She states it feels like it going to give out. Initial:     2/10 Post Session:     2/10  Medications Last Reviewed:  2022  Updated Objective Findings:  None Today  Treatment   THERAPEUTIC EXERCISE: 30:min  Exercises per grid below to improve mobility, strength and balance. Required minimal visual, verbal and manual cues to promote proper body alignment and promote proper body posture.   Progressed resistance and complexity of movement as indicated.     Date  22 Date  6/10/22 Date  6/13/22             X 30 X 30  X 30   X 30 X 30 X 30   X 30 X 30 X 30   X 30 X 30 X 30                       #25 3 x 10 #25 3 x 10 #25 3 x 10   17.5 3 x 10 flex/abd 17.5 3 x 10 flex/abd 17.5 3 x 10 flex/abd                 MANUAL THERAPY: (15 -minutes): Joint mobilization, Soft tissue mobilization was utilized and necessary because of the patient's restricted joint motion and restricted motion of soft tissue mobility.            Date  5/19/2022     Technique Used Grade  Level # Time(s) Effect while being performed   Retrograde massage and STM       To decrease tightness and stiffness in B knees   PAs (B)                                                                              Treatment/Session Summary:    · Treatment Assessment:   Patient tolreated treatment well. Better ambulation without any type of assistive device. Patient continues to be compliant with all home exercises. · Communication/Consultation:  None today  · Equipment provided today:  None  · Recommendations/Intent for next treatment session: Next visit will focus on manual techniques and strengthening.     Total Treatment Billable Duration:    Time In: 0100  Time Out: Julissa 77, PTA       Post Session Pain  Charge Capture  [employer] Portal  MD Guidelines  Scanned Media  Benefits  MyChart

## 2022-06-16 ENCOUNTER — HOSPITAL ENCOUNTER (OUTPATIENT)
Dept: PHYSICAL THERAPY | Age: 52
Setting detail: RECURRING SERIES
Discharge: HOME | End: 2022-06-19
Payer: MEDICARE

## 2022-06-16 PROCEDURE — 97110 THERAPEUTIC EXERCISES: CPT

## 2022-06-16 PROCEDURE — 97140 MANUAL THERAPY 1/> REGIONS: CPT

## 2022-06-16 ASSESSMENT — PAIN SCALES - GENERAL: PAINLEVEL_OUTOF10: 2

## 2022-06-16 NOTE — PROGRESS NOTES
Gordon Handy  : 1970  [place]p*  Secondary:  [address] @ [facility]  9 [address] [facility] [address] [place] [address]  Phone: [phone]  Fax: [phone] No data recorded  No data recorded    PT Visit Info:    No data recorded    OUTPATIENT PHYSICAL THERAPY:OP NOTE TYPE: Treatment Note 2022     Appt Desk   Episode      Treatment Diagnosis: Tx Diagnosis:  Pain in left knee (M25.562)  Effusion, left knee (M25.462)  Stiffness of left knee, not elsewhere classified (I96.617)  Medical/Referring Diagnosis:  No admission diagnoses are documented for this encounter. Referring Physician:  Jimmy Fair, *  MD Orders:  PT Eval and Treat   Date of Onset:  No data recorded   Allergies:  Atorvastatin and Metformin  Restrictions/Precautions:    No data recordedNo data recorded   Interventions Planned (Treatment may consist of any combination of the following):    Current Treatment Recommendations: Strengthening; ROM; Balance training     Subjective Comments:  Patient reports knees are feeling better stiff, but not painful. She states she has been using the cane later. Initial:     2/10 Post Session:     1/10  Medications Last Reviewed:  2022  Updated Objective Findings:  None Today  Treatment   THERAPEUTIC EXERCISE: 30:min  Exercises per grid below to improve mobility, strength and balance. Required minimal visual, verbal and manual cues to promote proper body alignment and promote proper body posture.   Progressed resistance and complexity of movement as indicated.     Date  6/10/22 Date  22 Date  6/16/22             X 30  X 30 X 30   X 30 X 30 X 30   X 30 X 30 X 30    X 30 X 30 X 30                       #25 3 x 10 #25 3 x 10 #25 3 x 10   17.5 3 x 10 flex/abd 17.5 3 x 10 flex/abd 17.5 3 x 10 flex/abd                 MANUAL THERAPY: (30 -minutes): Joint mobilization, Soft tissue mobilization was utilized and necessary because of the patient's restricted joint motion and restricted motion of soft tissue mobility.            Date  5/19/2022     Technique Used Grade  Level # Time(s) Effect while being performed   Retrograde massage and STM       To decrease tightness and stiffness in B knees   PAs (B)                                                                              Treatment/Session Summary:    · Treatment Assessment:   Good effort with all exercises. Patient is ambulating without any type of assistive device. Patient plans to start walking more over the weekend. · Communication/Consultation:  None today  · Equipment provided today:  None  · Recommendations/Intent for next treatment session: Next visit will focus on manual techniques and strengthening.     Total Treatment Billable Duration:    Time In: 0845  Time Out: [address] [facility], hospitals       Post Session Pain  Charge Capture  [address] Portal  MD Guidelines  Scanned Media  Benefits  MyChart

## 2022-06-17 NOTE — PROGRESS NOTES
[address] [place] [address] Court, [address] [facility]  [phone]    Patient Name:  Alex Lyons  YOB: 1970    Pursuant to the emergency declaration under the [address] [place] [address] waiver authority and the [employer] Act, this Virtual  Visit was conducted, with patient's consent, to reduce the patient's risk of exposure to COVID-19 and provide continuity of care for an established patient. Telehealth encounter is a billable service, with coverage as determined by the insurance carrier. Services were provided through a video synchronous discussion virtually to substitute for in-person clinic visit. Alex Lyons is a 45 y.o. female who was seen by synchronous (real-time) audio-video technology on 6/20/2022. Consent:  She and/or her healthcare decision maker is aware that this patient-initiated Telehealth encounter is a billable service, with coverage as determined by her insurance carrier. She is aware that she may receive a bill and has provided verbal consent to proceed: Yes        Office Visit 6/20/2022    CHIEF COMPLAINT:    Chief Complaint   Patient presents with    Sleep Apnea       HISTORY OF PRESENT ILLNESS:  Patient is being seen today via virtual visit. Patient was diagnosed with mild sleep apnea with AHI 7.6/hr with desaturations to 89%. She has tried pap therapy in the past but did not tolerate. She was referred to Dr. Jinny Troncoso for evaluation of oral appliance. Due to cost and insurance not covering the device, she was unable to proceed with appliance. Will try pap therapy again APAP 5-10 cm with nasal mask. She understands the importance of treating pap therapy.              Past Medical History:   Diagnosis Date    Abnormal Papanicolaou smear of cervix     Adverse effect of anesthesia     \"takes more\" for anesthesia to be effective     Arthritis     CAD (coronary artery disease)     no stents; [facility] 3/2017 mod LAD dz; followed by Dr Sharon Traylor ([facility] cardio)    CHF (congestive heart failure) ([address]     ECHO 12/18/18: EF 40-45%; managed with medication and followed by Nicole 6, uterine     GERD (gastroesophageal reflux disease)     diet controlled     H/O echocardiogram 10/04/2021    EF 40-45%    Heart murmur     Echo (10/4/21) EF 40-45% trace regurgitation in mitral and tricuspid valve    History of anemia     Hypercholesterolemia     Controlled with meds     Hypertension     Controlled with meds     Menorrhagia with regular cycle     Morbid obesity ([address]     NICM (nonischemic cardiomyopathy) ([address]     Followed by [facility] cardiology     SULEIMAN on CPAP     Ovarian cyst     PVCs (premature ventricular contractions)     Uterine fibroid        Patient Active Problem List   Diagnosis    Intramural, submucous, and subserous leiomyoma of uterus    Noncompliance with CPAP treatment    Abnormal uterine bleeding (AUB)    Status post right knee replacement    Coronary artery disease of native artery of native heart with stable angina pectoris (HCC)    Chronic fatigue    PVC's (premature ventricular contractions)    Osteoarthritis of right knee    Menorrhagia with regular cycle    S/P ablation of ventricular arrhythmia    SULEIMAN (obstructive sleep apnea)    S/P abdominal supracervical subtotal hysterectomy    Obesity (BMI 30-39. 9)    Systolic CHF, chronic ([address]    NICM (nonischemic cardiomyopathy) ([address]    Severe obesity with body mass index (BMI) of 35.0 to 39.9 with serious comorbidity ([address]    Status post left knee replacement    Osteoarthritis of left knee           Past Surgical History:   Procedure Laterality Date    ABLATION OF DYSRHYTHMIC FOCUS  02/2018    Ablation for pvc's     CARDIAC CATHETERIZATION      HYSTEROSCOPY      ORTHOPEDIC SURGERY Right     toe removal     OTHER SURGICAL HISTORY      left hip and gluteal surgery  FRANSISCA AND BSO (CERVIX REMOVED)  02/12/2019    Supracervical    TOTAL KNEE ARTHROPLASTY Right 01/26/2022    TUBAL LIGATION      WISDOM TOOTH EXTRACTION             Social History     Socioeconomic History    Marital status: Single     Spouse name: Not on file    Number of children: Not on file    Years of education: Not on file    Highest education level: Not on file   Occupational History    Not on file   Tobacco Use    Smoking status: Never Smoker    Smokeless tobacco: Never Used   Substance and Sexual Activity    Alcohol use: No    Drug use: No    Sexual activity: Not on file     Comment: tubal / hysterectomy   Other Topics Concern    Not on file   Social History Narrative    Not on file     Social Determinants of Health     Financial Resource Strain:     Difficulty of Paying Living Expenses: Not on file   Food Insecurity:     Worried About Running Out of Food in the Last Year: Not on file    Jorgito of Food in the Last Year: Not on file   Transportation Needs:     Lack of Transportation (Medical): Not on file    Lack of Transportation (Non-Medical):  Not on file   Physical Activity:     Days of Exercise per Week: Not on file    Minutes of Exercise per Session: Not on file   Stress:     Feeling of Stress : Not on file   Social Connections:     Frequency of Communication with Friends and Family: Not on file    Frequency of Social Gatherings with Friends and Family: Not on file    Attends Sikh Services: Not on file    Active Member of Clubs or Organizations: Not on file    Attends Club or Organization Meetings: Not on file    Marital Status: Not on file   Intimate Partner Violence:     Fear of Current or Ex-Partner: Not on file    Emotionally Abused: Not on file    Physically Abused: Not on file    Sexually Abused: Not on file   Housing Stability:     Unable to Pay for Housing in the Last Year: Not on file    Number of Places Lived in the Last Year: Not on file    Unstable Housing in the Last Year: Not on file         Family History   Problem Relation Age of Onset    Breast Cancer Neg Hx     Cancer Father         Lymphoma    Heart Disease Mother          Allergies   Allergen Reactions    Atorvastatin Other (See Comments)    Metformin Other (See Comments)         Current Outpatient Medications   Medication Sig    amLODIPine (NORVASC) 10 MG tablet Take 10 mg by mouth daily    omeprazole (PRILOSEC) 40 MG delayed release capsule Take 40 mg by mouth daily    simvastatin (ZOCOR) 40 MG tablet     vitamin C (ASCORBIC ACID) 500 MG tablet Take 500 mg by mouth daily    carvedilol (COREG) 12.5 MG tablet Take 12.5 mg by mouth 2 times daily (with meals)    vitamin D3 (CHOLECALCIFEROL) 10 MCG (400 UNIT) TABS tablet Take 400 Units by mouth daily     diclofenac sodium (VOLTAREN) 1 % GEL Apply topically as needed    methocarbamol (ROBAXIN) 500 MG tablet Take 500 mg by mouth 3 times daily as needed    polyethylene glycol (GLYCOLAX) 17 GM/SCOOP powder Take 17 g by mouth daily    promethazine (PHENERGAN) 25 MG tablet Take 25 mg by mouth every 8 hours as needed    sacubitril-valsartan (ENTRESTO)  MG per tablet Take 1 tablet by mouth 2 times daily    simvastatin (ZOCOR) 20 MG tablet Take 40 mg by mouth     spironolactone (ALDACTONE) 25 MG tablet Take 25 mg by mouth daily     aspirin 81 MG EC tablet Take 81 mg by mouth every 12 hours     No current facility-administered medications for this visit. REVIEW OF SYSTEMS:   CONSTITUTIONAL:   There is no history of fever, chills, night sweats. CARDIAC:   No chest pain, pressure, discomfort, palpitations, orthopnea, murmurs, or edema. GI:   No dysphagia, heartburn reflux, nausea/vomiting, diarrhea, abdominal pain, or bleeding. NEURO:   There is no history of AMS, persistent headache, decreased level of consciousness, seizures, or motor or sensory deficits.     VIRTUAL EXAM  PHYSICAL EXAMINATION:  [ INSTRUCTIONS:  \"[x]\" Indicates a positive item  \"[]\" Indicates a negative item   Vital Signs: (As obtained by patient/caregiver at home)  There were no vitals taken for this visit. Constitutional: [x] Appears well-developed and well-nourished [x] No apparent distress      [] Abnormal     Mental status: [x] Alert and awake  [x] Oriented to person/place/time [x] Able to follow commands    [] Abnormal -     Eyes:   EOM    [x]  Normal    [] Abnormal -   Sclera  [x]  Normal    [] Abnormal -          Discharge [x]  None visible   [] Abnormal -    HENT: [x] Normocephalic, atraumatic  [] Abnormal -  [x] Mouth/Throat: Mucous membranes are moist    External Ears [x] Normal  [] Abnormal -    Neck: [x] No visualized mass [] Abnormal -     Pulmonary/Chest: [x] Respiratory effort normal   [x] No visualized signs of difficulty breathing or respiratory distress        [] Abnormal -      Musculoskeletal:   [x] Normal gait with no signs of ataxia         [x] Normal range of motion of neck        [] Abnormal -     Neurological:        [x] No Facial Asymmetry (Cranial nerve 7 motor function) (limited exam due to video visit)          [x] No gaze palsy        [] Abnormal -         Skin:        [x] No significant exanthematous lesions or discoloration noted on facial skin         [] Abnormal -            Psychiatric:       [x] Normal Affect [] Abnormal -       [x] No Hallucinations    Other pertinent observable physical exam findings:-      ASSESSMENT:  (Medical Decision Making)      Diagnosis Orders   1. SULEIMAN (obstructive sleep apnea) restart pap therapy. Will try nasal mask sample as well. She may be able to tolerate it easier than full face  DME - [address]   2. NICM (nonischemic cardiomyopathy) ([facility]  DME - DURABLE MEDICAL EQUIPMENT        PLAN:  Restart APAP 5-10 cm  She will come by tomorrow for sample nasal mask.  She is a mouthbreather and unsure if she can use a nasal  Follow up in 4 months or sooner if needed    Orders Placed This Encounter   Procedures    DME - [address] [facility]  Phone: [phone] [facility] [address] [facility] [address]  Dept: [phone]      Patient Name: Kelly Burgess  : 1970  Gender: female  Address: [place] [address]  Patient phone number: [phone] (home)       Primary Insurance: Payor: Darío Palacio / Plan: Silvia Sharma / Product Type: *No Product type* /   Subscriber ID: [id number] - (Medicare Managed)      AMB Supply Order  Order Details     DME Location:    Order Date: 2022   The primary encounter diagnosis was SULEIMAN (obstructive sleep apnea). A diagnosis of NICM (nonischemic cardiomyopathy) ([facility] was also pertinent to this visit.           (  X   )New Set-Up     APAP machine   (     )[id number] CPAP Unit  (  x   ) Auto CPAP Unit  (     ) BiLevel Unit  (     ) Auto BiLevel Unit  (     ) ASV   (     ) Bilevel ST    (     ) Oxygen Concentrator         Length of need: 12 months    Pressure: 5-10  cmH20  EPR:      Starting Ramp Pressure:   cm H20  Ramp Time: min      Patient had a diagnostic Apnea Hypopnea Index (AHI) of :      *SUPPLIES* Replace all as needed, or per coverage guidelines     Masks Type:    (  x   ) -Full Face Mask (1 per 3 mon)  ( x    ) -Full Mask (1 per month) Interface/Cushion      (     ) -Nasal Mask (1 per 3 mon)  (     ) - Nasal Mask (1 per month) Interface/Cushion  (     ) -Pillow (2 per mon)  (     ) -Bhpygfnei (1 per 6 mon)      _________________________________________________________________          Other Supplies:    (  X   )-Ghvjudqf (1 per 6 mon)  ( X    )-Irgrdg Tubing (1 per 3 mon)  (  X   )- Disposable Filter (2 per mon)  (   X  )-Ifoemy Humidifier (1 per year)     (  x   )-Nbwukxcid (sometimes used with Full Face Mask) (1 per 6 mos)  (     )-Tubing-without heat (1 per 3 mos)  ( X )-Non-Disposable Filter (1 per 6 mos)  (   x  )-Water Chamber (1 per 6 mos)  (     )-Humidifier non-heated (1 per 5 yrs)      Signed Date: 6/20/2022  Electronically Signed By: MILY Pritchett CNP     No orders of the defined types were placed in this encounter. Collaborating Physician: Dr. Sandrita Mathur      I spent at least 25 minutes with this established patient, and >50% of the time was spent counseling and/or coordinating care regarding norbert.     MILY Pritchett CNP  Electronically signed

## 2022-06-20 ENCOUNTER — TELEPHONE (OUTPATIENT)
Dept: CARDIOLOGY CLINIC | Age: 52
End: 2022-06-20

## 2022-06-20 ENCOUNTER — TELEMEDICINE (OUTPATIENT)
Dept: SLEEP MEDICINE | Age: 52
End: 2022-06-20
Payer: MEDICARE

## 2022-06-20 DIAGNOSIS — I42.8 NICM (NONISCHEMIC CARDIOMYOPATHY) (HCC): ICD-10-CM

## 2022-06-20 DIAGNOSIS — G47.33 OSA (OBSTRUCTIVE SLEEP APNEA): Primary | ICD-10-CM

## 2022-06-20 PROCEDURE — 99213 OFFICE O/P EST LOW 20 MIN: CPT | Performed by: NURSE PRACTITIONER

## 2022-06-20 RX ORDER — OMEPRAZOLE 40 MG/1
40 CAPSULE, DELAYED RELEASE ORAL DAILY
COMMUNITY
Start: 2022-05-31

## 2022-06-20 RX ORDER — CARVEDILOL 25 MG/1
25 TABLET ORAL 2 TIMES DAILY WITH MEALS
Qty: 60 TABLET | Refills: 11 | Status: SHIPPED | OUTPATIENT
Start: 2022-06-20 | End: 2022-07-06 | Stop reason: SINTOL

## 2022-06-20 RX ORDER — SIMVASTATIN 40 MG
TABLET ORAL
COMMUNITY
Start: 2022-05-31

## 2022-06-20 RX ORDER — AMLODIPINE BESYLATE 10 MG/1
10 TABLET ORAL DAILY
COMMUNITY
Start: 2022-01-22 | End: 2022-07-06 | Stop reason: SINTOL

## 2022-06-20 ASSESSMENT — SLEEP AND FATIGUE QUESTIONNAIRES
HOW LIKELY ARE YOU TO NOD OFF OR FALL ASLEEP IN A CAR, WHILE STOPPED FOR A FEW MINUTES IN TRAFFIC: 0
HOW LIKELY ARE YOU TO NOD OFF OR FALL ASLEEP WHEN YOU ARE A PASSENGER IN A CAR FOR AN HOUR WITHOUT A BREAK: 1
HOW LIKELY ARE YOU TO NOD OFF OR FALL ASLEEP WHILE LYING DOWN TO REST IN THE AFTERNOON WHEN CIRCUMSTANCES PERMIT: 0
HOW LIKELY ARE YOU TO NOD OFF OR FALL ASLEEP WHILE SITTING AND TALKING TO SOMEONE: 0
ESS TOTAL SCORE: 8
HOW LIKELY ARE YOU TO NOD OFF OR FALL ASLEEP WHILE SITTING QUIETLY AFTER LUNCH WITHOUT ALCOHOL: 2
HOW LIKELY ARE YOU TO NOD OFF OR FALL ASLEEP WHILE SITTING AND READING: 2
HOW LIKELY ARE YOU TO NOD OFF OR FALL ASLEEP WHILE WATCHING TV: 3
HOW LIKELY ARE YOU TO NOD OFF OR FALL ASLEEP WHILE SITTING INACTIVE IN A PUBLIC PLACE: 0

## 2022-06-20 NOTE — TELEPHONE ENCOUNTER
Spoke with pt and made her aware per Dr Hailee Palomo   let her know that her Holter showed only showed some PVCs but no concerning arrhythmias. If she is willing to try we can increase her Coreg to 25 mg twice daily if her blood pressure tolerates. I will copy Dr. Cipriano Marinelli on this to make sure he agrees. He has a follow-up with her in early July.                 Pt verbalized understanding stated she does not want to increase meds but she is very symptomatic with PVCs. I took note. Will have triage follow up.

## 2022-06-20 NOTE — TELEPHONE ENCOUNTER
----- Message from Rolando Wharton MD sent at 6/19/2022 11:46 AM EDT -----  Please call patient. Labs are reviewed and are acceptable. Continue current medications. Call with any questions, concerns or new/worsening cardiac symptoms.

## 2022-06-20 NOTE — TELEPHONE ENCOUNTER
----- Message from Bridget Fairchild MD sent at 6/19/2022 12:02 PM EDT -----  Sarita Soto  I saw this lady for Negin when he was out. Her Holter shows occasional PVCs which appear to correspond to her symptoms of palpitations. Can you let her know that her Holter showed only showed some PVCs but no concerning arrhythmias. If she is willing to try we can increase her Coreg to 25 mg twice daily if her blood pressure tolerates. I will copy Dr. Kam on this to make sure he agrees.   He has a follow-up with her in early July

## 2022-06-20 NOTE — TELEPHONE ENCOUNTER
I called and informed pt. of MD response. I escribed med as below  Requested Prescriptions     Signed Prescriptions Disp Refills    carvedilol (COREG) 25 MG tablet 60 tablet 11     Sig: Take 1 tablet by mouth 2 times daily (with meals)     Authorizing Provider: Saw Bean     Ordering User: NAVARRO CAMP

## 2022-06-20 NOTE — TELEPHONE ENCOUNTER
Spoke with pt and made her aware per Dr Zhong Lords are reviewed and are acceptable. Continue current medications. Call with any questions, concerns or new/worsening cardiac symptoms.

## 2022-06-20 NOTE — TELEPHONE ENCOUNTER
----- Message from Stephani Castañeda DO sent at 6/19/2022 12:24 PM EDT -----  Sounds good to me, thank you Hermann for calling her.   ----- Message -----  From: Rolando Wharton MD  Sent: 6/19/2022  12:03 PM EDT  To: Stephani Castañeda DO, [place] Luis, LPN    [place]  I saw this lady for Allison Ramos when he was out. Her Holter shows occasional PVCs which appear to correspond to her symptoms of palpitations. Can you let her know that her Holter showed only showed some PVCs but no concerning arrhythmias. If she is willing to try we can increase her Coreg to 25 mg twice daily if her blood pressure tolerates. I will copy Dr. Allison Ramos on this to make sure he agrees.   He has a follow-up with her in early July

## 2022-06-22 ENCOUNTER — HOSPITAL ENCOUNTER (OUTPATIENT)
Dept: PHYSICAL THERAPY | Age: 52
Setting detail: RECURRING SERIES
Discharge: HOME | End: 2022-06-25
Payer: MEDICARE

## 2022-06-22 PROCEDURE — 97110 THERAPEUTIC EXERCISES: CPT

## 2022-06-22 PROCEDURE — 97140 MANUAL THERAPY 1/> REGIONS: CPT

## 2022-06-22 ASSESSMENT — PAIN SCALES - GENERAL: PAINLEVEL_OUTOF10: 1

## 2022-06-22 NOTE — PROGRESS NOTES
Jone Handy  : 1970  Primary: [place] Hmo/p*  Secondary:  [address] @ [facility]  [address] [place] [address]  Phone: [phone]  Fax: [phone] No data recorded  No data recorded    PT Visit Info:    No data recorded    OUTPATIENT PHYSICAL THERAPY:OP NOTE TYPE: Treatment Note 2022     Appt Desk   Episode      Treatment Diagnosis: Tx Diagnosis:  Pain in left knee (M25.562)  Effusion, left knee (M25.462)  Stiffness of left knee, not elsewhere classified (J05.247)  Medical/Referring Diagnosis:  No admission diagnoses are documented for this encounter. Referring Physician:  Óscar Syed., *  MD Orders:  PT Eval and Treat   Date of Onset:  No data recorded   Allergies:  Atorvastatin and Metformin  Restrictions/Precautions:    No data recordedNo data recorded   Interventions Planned (Treatment may consist of any combination of the following):    Current Treatment Recommendations: Strengthening; ROM; Balance training     Subjective Comments:  Patient reports knee are doing ok. She states she hasn't been as tired as she was last week. Initial:     1/10 Post Session:     1/10  Medications Last Reviewed:  2022  Updated Objective Findings:  None Today  Treatment   THERAPEUTIC EXERCISE: 30:min  Exercises per grid below to improve mobility, strength and balance. Required minimal visual, verbal and manual cues to promote proper body alignment and promote proper body posture.   Progressed resistance and complexity of movement as indicated.     Date  22 Date  22 Date  6/22/22             X 30 X 30 X 30   X 30 X 30 X 30   X 30 X 30  X 30   X 30 X 30 X 30                       #25 3 x 10 #25 3 x 10 #25 3 x 10   17.5 3 x 10 flex/abd 17.5 3 x 10 flex/abd 17.5 3 x 10 flex/abd     Outside walking and the curb x 5            MANUAL THERAPY: (30 -minutes): Joint mobilization, Soft tissue mobilization was utilized and necessary because of the patient's restricted joint motion and restricted motion of soft tissue mobility.            Date  5/19/2022     Technique Used Grade  Level # Time(s) Effect while being performed   Retrograde massage and STM       To decrease tightness and stiffness in B knees   PAs (B)                                                                              Treatment/Session Summary:    · Treatment Assessment:   Patient tolerated treatment well. Good ambulation without any type of assistive device, but apprehensive about ascending and descending curbs. Good improvement in ROM in both knees, but needs to continue to work on strength. · Communication/Consultation:  None today  · Equipment provided today:  None  · Recommendations/Intent for next treatment session: Next visit will focus on manual techniques and strengthening.     Total Treatment Billable Duration:    Time In: 5561  Time Out: [address]       Post Session Pain  Charge Capture  [facility] Portal  MD Guidelines  Scanned Media  Benefits  MyChart

## 2022-06-24 ENCOUNTER — HOSPITAL ENCOUNTER (OUTPATIENT)
Dept: PHYSICAL THERAPY | Age: 52
Setting detail: RECURRING SERIES
Discharge: HOME | End: 2022-06-27
Payer: MEDICARE

## 2022-06-24 PROCEDURE — 97140 MANUAL THERAPY 1/> REGIONS: CPT

## 2022-06-24 PROCEDURE — 97110 THERAPEUTIC EXERCISES: CPT

## 2022-06-24 ASSESSMENT — PAIN SCALES - GENERAL: PAINLEVEL_OUTOF10: 1

## 2022-06-24 NOTE — PROGRESS NOTES
utilized and necessary because of the patient's restricted joint motion and restricted motion of soft tissue mobility.            Date  5/19/2022     Technique Used Grade  Level # Time(s) Effect while being performed   Retrograde massage and STM       To decrease tightness and stiffness in B knees   PAs (B)                                                                              Treatment/Session Summary:    · Treatment Assessment:   Patient continues to be pleased with progresss. Patient plans to start going to the gym to walk and ride the bike. · Communication/Consultation:  None today  · Equipment provided today:  None  · Recommendations/Intent for next treatment session: Next visit will focus on manual techniques and strengthening.     Total Treatment Billable Duration:    Time In: 9714  Time Out: 58 [place], [place]       Post Session Pain  Charge Capture  [facility]ish[facility] Portal  MD Guidelines  Scanned Media  Benefits  MyChart

## 2022-06-29 ENCOUNTER — HOSPITAL ENCOUNTER (OUTPATIENT)
Dept: PHYSICAL THERAPY | Age: 52
Setting detail: RECURRING SERIES
End: 2022-06-29
Payer: MEDICARE

## 2022-06-29 NOTE — PROGRESS NOTES
Christiane Britton Handy  : 1970  Primary: [place]p*  Secondary:  [address] @ [facility]  [address] [place] [address]  Phone: [phone]  Fax: [phone] No data recorded  No data recorded    PT Visit Info:  No data recorded       OUTPATIENT PHYSICAL THERAPY 2022     Appt Desk   Episode   MyChart      Ms. Handy cancelled appointment today due to illness.     [address], PTA    Future Appointments   Date Time Provider Delroy Lopez   2022 10:15 AM [address], [facility]   2022  4:30 PM Lg Patel DO [facility] AMB   2022 11:15 AM DO ANNAMARIE Ely [facility] AM[facility]   10/27/2022  9:15 AM MD LAILA Gabriel [facility] AMB

## 2022-07-01 ENCOUNTER — HOSPITAL ENCOUNTER (OUTPATIENT)
Dept: PHYSICAL THERAPY | Age: 52
Setting detail: RECURRING SERIES
End: 2022-07-01
Payer: MEDICARE

## 2022-07-01 NOTE — PROGRESS NOTES
Nola Lin Ole  : 1970  Primary: [facility] Hmo/p*  Secondary:  [address] @ [facility]  [address] [place] [address] [facility] [address]  Phone: [phone]  Fax: [phone] No data recorded  No data recorded    PT Visit Info:  No data recorded       OUTPATIENT PHYSICAL THERAPY 2022     Appt Desk   Episode   MyChart      Ms. Handy cancelled appointment today due to illness.     Des Palomino PTA    Future Appointments   Date Time Provider Delroy Lopez   2022  7:00 PM Des Palomino [facility]   2022  4:30 PM DO IVELISSE RomoDG [facility] AMB   2022 11:15 AM Nevaeh Kiser DO [facility] AMB   10/27/2022  9:15 AM MD LAILA Pham [facility] AMB

## 2022-07-06 ENCOUNTER — OFFICE VISIT (OUTPATIENT)
Dept: CARDIOLOGY CLINIC | Age: 52
End: 2022-07-06
Payer: MEDICARE

## 2022-07-06 VITALS
HEART RATE: 60 BPM | SYSTOLIC BLOOD PRESSURE: 112 MMHG | HEIGHT: 67 IN | BODY MASS INDEX: 39.71 KG/M2 | DIASTOLIC BLOOD PRESSURE: 68 MMHG | WEIGHT: 253 LBS

## 2022-07-06 DIAGNOSIS — I25.118 CORONARY ARTERY DISEASE OF NATIVE ARTERY OF NATIVE HEART WITH STABLE ANGINA PECTORIS (HCC): ICD-10-CM

## 2022-07-06 DIAGNOSIS — I49.3 PVC'S (PREMATURE VENTRICULAR CONTRACTIONS): ICD-10-CM

## 2022-07-06 DIAGNOSIS — I50.22 SYSTOLIC CHF, CHRONIC (HCC): Primary | ICD-10-CM

## 2022-07-06 DIAGNOSIS — G47.33 OSA (OBSTRUCTIVE SLEEP APNEA): ICD-10-CM

## 2022-07-06 DIAGNOSIS — I42.8 NICM (NONISCHEMIC CARDIOMYOPATHY) (HCC): ICD-10-CM

## 2022-07-06 PROCEDURE — 99214 OFFICE O/P EST MOD 30 MIN: CPT | Performed by: INTERNAL MEDICINE

## 2022-07-06 RX ORDER — METOPROLOL SUCCINATE 50 MG/1
50 TABLET, EXTENDED RELEASE ORAL 2 TIMES DAILY
Qty: 180 TABLET | Refills: 3 | Status: SHIPPED | OUTPATIENT
Start: 2022-07-06

## 2022-07-06 NOTE — PROGRESS NOTES
[address], [address]  PHONE: [phone]     22    NAME:  Kaushik Rubio  : 1970  MRN: [id number]       SUBJECTIVE:   Kaushik Rbuio is a 45 y.o. female seen for a follow up visit regarding the following:     Chief Complaint   Patient presents with    Congestive Heart Failure       HPI:    Here for eval of NICM/SHF. Echo 2017: EF 30%     2018:  PVC ablation by Dr. Torie Chavez. Echo 3/2018: EF 45%   LHC 3/9/2020: EF 20%, LAD 40%   Echo 10/2021: EF 40-45%   NST 2021: Left ventricular perfusion is normal. Myocardial perfusion imaging supports a low risk stress test.     Holter 2022: Sinus rhythm. Rare PACs accounting for less than 1% total beat count. Occasional PVCs accounting for 1.5% of the total beat count. Patient symptomatic events of palpitations appear to correspond to symptomatic PVCs. [facility]. Better now. Still with some palp. No new CP, pressure. No new SUTHERLAND, SOB. NYHA Class II sx now. Patient denies recent history of orthopnea, PND, excessive dizziness and/or syncope. Former teacher at [employer]. Past Medical History, Past Surgical History, Family history, Social History, and Medications were all reviewed with the patient today and updated as necessary.      Current Outpatient Medications   Medication Sig Dispense Refill    Zinc Sulfate (ZINC 15 PO) Take by mouth      metoprolol succinate (TOPROL XL) 50 MG extended release tablet Take 1 tablet by mouth in the morning and at bedtime 180 tablet 3    omeprazole (PRILOSEC) 40 MG delayed release capsule Take 40 mg by mouth daily      simvastatin (ZOCOR) 40 MG tablet       vitamin C (ASCORBIC ACID) 500 MG tablet Take 500 mg by mouth daily      aspirin 81 MG EC tablet Take 81 mg by mouth every 12 hours      vitamin D3 (CHOLECALCIFEROL) 10 MCG (400 UNIT) TABS tablet Take 400 Units by mouth daily       diclofenac sodium (VOLTAREN) 1 % GEL Apply topically as needed      methocarbamol (ROBAXIN) 500 MG tablet Take 500 mg by mouth 3 times daily as needed      polyethylene glycol (GLYCOLAX) 17 GM/SCOOP powder Take 17 g by mouth daily      promethazine (PHENERGAN) 25 MG tablet Take 25 mg by mouth every 8 hours as needed      sacubitril-valsartan (ENTRESTO)  MG per tablet Take 1 tablet by mouth 2 times daily      spironolactone (ALDACTONE) 25 MG tablet Take 25 mg by mouth daily        No current facility-administered medications for this visit.         Allergies   Allergen Reactions    Atorvastatin Other (See Comments)    Metformin Other (See Comments)     Patient Active Problem List    Diagnosis Date Noted    S/P ablation of ventricular arrhythmia 02/12/2018     Priority: High    Status post left knee replacement 04/20/2022    Osteoarthritis of left knee 04/20/2022    Status post right knee replacement 01/26/2022    Osteoarthritis of right knee 01/26/2022    Noncompliance with CPAP treatment 01/05/2022    SULEIMAN (obstructive sleep apnea) 04/19/2021    Obesity (BMI 30-39.9) 12/15/2020    Abnormal uterine bleeding (AUB) 02/12/2019    S/P abdominal supracervical subtotal hysterectomy 02/12/2019    Severe obesity with body mass index (BMI) of 35.0 to 39.9 with serious comorbidity ([address] 07/05/2018    Menorrhagia with regular cycle 05/07/2018    Coronary artery disease of native artery of native heart with stable angina pectoris ([address] 12/18/2017    Chronic fatigue 12/18/2017    PVC's (premature ventricular contractions) 25/13/8777    Systolic CHF, chronic ([address] 12/18/2017    NICM (nonischemic cardiomyopathy) ([address] 12/18/2017    Intramural, submucous, and subserous leiomyoma of uterus 11/01/2017      Past Surgical History:   Procedure Laterality Date    ABLATION OF DYSRHYTHMIC FOCUS  02/2018    Ablation for pvc's     CARDIAC CATHETERIZATION      HYSTEROSCOPY      ORTHOPEDIC SURGERY Right     toe removal     OTHER SURGICAL HISTORY left hip and gluteal surgery    FRANSISCA AND BSO (CERVIX REMOVED)  02/12/2019    Supracervical    TOTAL KNEE ARTHROPLASTY Right 01/26/2022    TUBAL LIGATION      WISDOM TOOTH EXTRACTION       Family History   Problem Relation Age of Onset    Breast Cancer Neg Hx     Cancer Father         Lymphoma    Heart Disease Mother      Social History     Tobacco Use    Smoking status: Never Smoker    Smokeless tobacco: Never Used   Substance Use Topics    Alcohol use: No         ROS:    No obvious pertinent positives on review of systems except for what was outlined in the HPI today.       PHYSICAL EXAM:     /[birth year]   Pulse 60   Ht 5' 7\" (1.702 m)   Wt 253 lb (114.8 kg)   BMI 39.63 kg/m²    General/Constitutional:   Alert and oriented x 3, no acute distress  HEENT:   normocephalic, atraumatic, moist mucous membranes  Neck:   No JVD or carotid bruits bilaterally  Cardiovascular:   regular rate and rhythm, no murmur/rub/gallop appreciated  Pulmonary:   clear to auscultation bilaterally, no respiratory distress  Abdomen:   soft, non-tender, non-distended  Ext:   No sig LE edema bilaterally  Skin:  warm and dry, no obvious rashes seen  Neuro:   no obvious sensory or motor deficits  Psychiatric:   normal mood and affect      Lab Results   Component Value Date/Time     06/10/2022 04:21 PM    K 4.0 06/10/2022 04:21 PM     06/10/2022 04:21 PM    CO2 25 06/10/2022 04:21 PM    BUN 16 06/10/2022 04:21 PM    CREATININE 0.80 06/10/2022 04:21 PM    GLUCOSE 77 06/10/2022 04:21 PM    CALCIUM 9.9 06/10/2022 04:21 PM        Lab Results   Component Value Date    WBC 6.2 06/10/2022    HGB 11.1 (L) 06/10/2022    HCT 35.5 (L) 06/10/2022    MCV 82.6 06/10/2022     06/10/2022       No results found for: TSHFT4, TSH    Lab Results   Component Value Date    LABA1C 5.8 04/13/2022     Lab Results   Component Value Date     04/13/2022       No results found for: CHOL  No results found for: TRIG  No results found for: HDL  No results found for: LDLCHOLESTEROL, LDLCALC  No results found for: LABVLDL, VLDL  No results found for: CHOLHDLRATIO        I have Independently reviewed prior care notes, any ER records available, cardiac testing, labs and results with the patient and before seeing the patient today. Also independently reviewed outside records when available. ASSESSMENT:    Tracy Stockton was seen today for congestive heart failure. Diagnoses and all orders for this visit:    Systolic CHF, chronic (HCC)    PVC's (premature ventricular contractions)    NICM (nonischemic cardiomyopathy) ([facility]    Coronary artery disease of native artery of native heart with stable angina pectoris (HCC)    SULEIMAN (obstructive sleep apnea)    Other orders  -     metoprolol succinate (TOPROL XL) 50 MG extended release tablet; Take 1 tablet by mouth in the morning and at bedtime          PLAN:      1. PVCs: more sx now. On coreg BID. Stop coreg and norvasc, trial of toprol BID. Getting CPAP, this will help as well. 2. CAD:  Remain on ASA and statin now. Follow for angina with rehab.      3. SHF/NICM:   On entresto,  remain on coreg, aldactone. EF better,    Needs better diet and more exercise as reviewed. 4. HTN:  Remain on coreg, entresto. Follow with above changes. Patient has been instructed and agrees to call our office with any issues or other concerns related to their cardiac condition(s) and/or complaint(s). Return in about 3 weeks (around 7/27/2022).        MARTINA CULVER, DO  7/6/2022

## 2022-07-08 ENCOUNTER — HOSPITAL ENCOUNTER (OUTPATIENT)
Dept: PHYSICAL THERAPY | Age: 52
Setting detail: RECURRING SERIES
Discharge: HOME | End: 2022-07-11
Payer: MEDICARE

## 2022-07-08 PROCEDURE — 97110 THERAPEUTIC EXERCISES: CPT

## 2022-07-08 PROCEDURE — 97140 MANUAL THERAPY 1/> REGIONS: CPT

## 2022-07-08 ASSESSMENT — PAIN SCALES - GENERAL: PAINLEVEL_OUTOF10: 2

## 2022-07-08 NOTE — PROGRESS NOTES
Nikolas Handy  : 1970  Primary: Roberto [place] Medicare Hmo/p*  Secondary:  [address] @ [facility]  [address] [place] [address]  Phone: [phone]  Fax: [phone] No data recorded  No data recorded    PT Visit Info:    No data recorded    OUTPATIENT PHYSICAL THERAPY:OP NOTE TYPE: Treatment Note 2022     Appt Desk   Episode      Treatment Diagnosis: Tx Diagnosis:  Pain in left knee (M25.562)  Effusion, left knee (M25.462)  Stiffness of left knee, not elsewhere classified (T64.544)  Medical/Referring Diagnosis:  No admission diagnoses are documented for this encounter. Referring Physician:  Vandana Collins, *  MD Orders:  PT Eval and Treat   Date of Onset:  No data recorded   Allergies:  Atorvastatin and Metformin  Restrictions/Precautions:    No data recordedNo data recorded   Interventions Planned (Treatment may consist of any combination of the following):    Current Treatment Recommendations: Strengthening; ROM; Balance training     Subjective Comments:  Patient reports feeling better since having covid. She states her knees have been swollen and stiff since being sick. Initial:     2/10 Post Session:     1/10  Medications Last Reviewed:  2022  Updated Objective Findings:  ROM B left flex 124 right 115 ext left -1 right 0  Treatment   THERAPEUTIC EXERCISE: 15min  Exercises per grid below to improve mobility, strength and balance. Required minimal visual, verbal and manual cues to promote proper body alignment and promote proper body posture.   Progressed resistance and complexity of movement as indicated.     Date  22 Date  22 Date  7/8/22             X 30 X 30    X 30 x30    X 30 X 30    X 30 X 30           X 10 min          X 20    #25 3 x 10 #25 3 x 10 #25 3 x 10   17.5 3 x 10 flex/abd 17.5 3 x 10 flex/abd 17.5 3 x 10 flex/abd   Outside walking and the curb x 5              MANUAL THERAPY: (30 -minutes): Joint mobilization, Soft tissue mobilization was utilized and necessary because of the patient's restricted joint motion and restricted motion of soft tissue mobility.            Date  7/8/2022     Technique Used Grade  Level # Time(s) Effect while being performed   Retrograde massage and STM       To decrease tightness and stiffness in B knees   PAs (B)                                                                              Treatment/Session Summary:    · Treatment Assessment:   Patient tolerated treatment well today. Less tightness following therapy session. Continues to show good improvement in ROM. · Communication/Consultation:  None today  · Equipment provided today:  None  · Recommendations/Intent for next treatment session: Next visit will focus on manual techniques and strengthening.     Total Treatment Billable Duration:    Time In: 0845  Time Out: CAMI Pillai       Post Session Pain  Charge Capture  Met[employer] Portal  MD Guidelines  Scanned Media  Benefits  MyChart

## 2022-07-08 NOTE — PROGRESS NOTES
Patsy Handy  : 1970  Primary: [place] H[place]/p*  Secondary:  [address] @ [facility]  [address] [address]  Phone: [phone]  Fax: [phone] No data recorded  No data recorded    PT Visit Info:    No data recorded    OUTPATIENT PHYSICAL THERAPY:OP NOTE TYPE: Progress Report 2022               Episode  Appt Desk         Treatment Diagnosis:  Pain in left knee (M25.562)  Effusion, left knee (M25.462)  Stiffness of left knee, not elsewhere classified (M25.662)     * No diagnoses found *  Medical/Referring Diagnosis:  No admission diagnoses are documented for this encounter. Referring Physician:  Germania Abebe., *  MD Orders:  PT Eval and Treat   Return MD Appt:  *10/27/22  Date of Onset:  No data recorded   Allergies:  Atorvastatin and Metformin  Restrictions/Precautions:    No data recordedNo data recorded   Medications Last Reviewed:  2022        PROGRESS ASSESSMENT:    Continuing therapy and doing very well. She is no longer using cane to ambulate and ROM is continually improving. Progressing well towards DC goals. Patient verbalizes some difficulty with squatting to  objects. ROM improved from 116 to 124 degrees actively and 125 degres passive flexion. 0 degrees extension.     LEFT Knee ROM:    0   124 deg      Keanues Anna will benefit from skilled PT (medically necessary) in order to address above deficits affecting participation in basic ADLs and overall functional tolerance. 4+/5 Strength L LE        ASSESSMENT     Problem List: (Impacting functional limitations): Body Structures, Functions, Activity Limitations Requiring Skilled Therapeutic Intervention: Decreased functional mobility ; Decreased ADL status;  Decreased ROM     Therapy Prognosis: Con  Therapy Prognosis: Good     Assessment Complexity:      PLAN   Frequency/Duration: No data recorded   Interventions Planned (Treatment may consist of any combination of the following):    Current Treatment Recommendations: Strengthening; ROM; Balance training        GOALS: (Goals have been discussed and agreed upon with patient.)    Short-Term Goals~ 12 weeks  Goal Met   1. Kaushik Rubio will be independent with HEP for strength and ROM 1.  [x]? Date: 7/8/2022    2. Kaushik Rubio will improve MMT LEFT LE to >=4+/5 to improve current level of independence and community reintegration. 2.  [x]? 7/8/2022 Date:   3. Kaushik Rubio will demonstrate LEFT knee flexion >= 105 degrees to improve functional mobility and tolerance of ADLs. 3.  [x]? Date: 7/8/2022    4. Kaushik Bis will demonstrate LEFT knee Extension >= 1 degrees to improve functional mobility and tolerance of ADLs. 4.  [x]? Date:7/8/2022    5. Kaushik Rubio will be able to go up and down stairs with <=2/10 with minimal to no difficulty 5. [x]? Date 7/8/2022 :   6. Kaushik Rubio toimprove KOOS by 8 points to show improvement in areas of difficulty 6. []? Date:                                     Tool Used: Knee Injury and Osteoarthritis Outcome Form (KOOS-JR.)  SCORE: None (0) Mild (1) Moderate (2) Severe (3) Extreme (4)   Stiffness:              1. How severe is your knee stiffness after first waking in the morning? []?  []?  [x]?  []?  []?    Pain:              What amount of knee pain have you experienced in the last week   doing the following activities?          2. Twisting/pivoting on your knee: []?  []?  [x]?  []?  []?    3. Straightening knee fully: []?  [x]?  []?  []?  []?    4. Going up or down stairs []?  [x]?  []?  []?  []?    5. Standing upright []?  [x]?  []?  []?  []?    Function, daily living             6.  Rising from sitting []?  [x]?  []?  []?  []?    7. Bending to floor/ an object []?  []?  [x]?  []?  []?       Score:  Initial: 21 (Interval: 34.174) 5/16/2022/28 Most Recent: 10/28 (Date: -7 8.22- )   Interpretation of Score: Questions each scored on a 4 point scale with 4 representing the worst possible score and 0 representing the best possible score. The higher the point total, the worse the knee pain translating to a lower percentage of patient functioning. Medical Necessity:   Skilled intervention continues to be required due to current impairments. .  Reason For Services/Other Comments:  Patient continues to require skilled intervention due to eccentric strength. Progress Assessmet performed today. See additional note for treatment rendered by Gloria Peñaloza PTA       Regarding Nola Rosalesanchi Ole's therapy, I certify that the treatment plan above will be carried out by a therapist or under their direction. Thank you for this referral,  Frankie Rasmussen, PT     Referring Physician Signature: Madison Judd., * No Signature is Required for this note.          Future Appointments   Date Time Provider Delroy Lopez   8/1/2022 12:30 PM DO ANNAMARIE Romo [facility] AMB   10/27/2022  9:15 AM MD LAILA Pham         Post Session Pain  Charge Capture  PT Visit Info  POC Link  Treatment Note Link  MD Guidelines  [facility]

## 2022-07-26 ENCOUNTER — HOSPITAL ENCOUNTER (OUTPATIENT)
Dept: PHYSICAL THERAPY | Age: 52
Setting detail: RECURRING SERIES
Discharge: HOME | End: 2022-07-29
Payer: MEDICARE

## 2022-07-26 PROCEDURE — 97140 MANUAL THERAPY 1/> REGIONS: CPT

## 2022-07-26 PROCEDURE — 97110 THERAPEUTIC EXERCISES: CPT

## 2022-07-26 ASSESSMENT — PAIN SCALES - GENERAL: PAINLEVEL_OUTOF10: 2

## 2022-07-26 NOTE — PROGRESS NOTES
[facility]  [facility] 1970  Primary: [place] Hmo/p*  Secondary:  [address] @ [facility]  [address] [place] [address] [facility] [address]  Phone: [phone]  Fax: [phone] No data recorded  No data recorded    PT Visit Info:    No data recorded    OUTPATIENT PHYSICAL THERAPY:OP NOTE TYPE: Treatment Note 2022     Appt Desk   Episode      Treatment Diagnosis: Tx Diagnosis:  Pain in left knee (M25.562)  Effusion, left knee (M25.462)  Stiffness of left knee, not elsewhere classified (F00.837)  Medical/Referring Diagnosis:  Presence of left artificial knee joint [G25.178]  Referring Physician:  Regina Ortiz, *  MD Orders:  PT Eval and Treat   Date of Onset:  No data recorded   Allergies:  Atorvastatin and Metformin  Restrictions/Precautions:    No data recordedNo data recorded   Interventions Planned (Treatment may consist of any combination of the following):    Current Treatment Recommendations: Strengthening; ROM; Balance training     Subjective Comments:  Patient reports knees have been feeling tight, but not painful. Initial:     2/10 Post Session:     1/10  Medications Last Reviewed:  2022  Updated Objective Findings:   ROM B left flex 124 right 115 ext left -1 right 0  Treatment   THERAPEUTIC EXERCISE: 20min  Exercises per grid below to improve mobility, strength and balance. Required minimal visual, verbal and manual cues to promote proper body alignment and promote proper body posture. Progressed resistance and complexity of movement as indicated.      Date  22 Date  22 Date  22 Date  7/26/22               X 30 X 30     X 30 x30     X 30 X 30     X 30 X 30  X 10           X 10 min            X 20  #125 3 x 10   #25 3 x 10 #25 3 x 10 #25 3 x 10 25 3 x 10   17.5 3 x 10 flex/abd 17.5 3 x 10 flex/abd 17.5 3 x 10 flex/abd 17.5 3 x 10 flex/abd   Outside walking and the curb x 5               MANUAL THERAPY: (25 -minutes): Joint mobilization, Soft tissue mobilization was utilized and necessary because of the patient's restricted joint motion and restricted motion of soft tissue mobility. Date  7/8/2022     Technique Used Grade  Level # Time(s) Effect while being performed   Retrograde massage and STM       To decrease tightness and stiffness in B knees   PAs (B)                                                                              Treatment/Session Summary:    Treatment Assessment:   Patient demonstrated good effort with all exercises. Good improvement in strength. Communication/Consultation:  None today  Equipment provided today:  None  Recommendations/Intent for next treatment session: Next visit will focus on manual techniques and strengthening.     Total Treatment Billable Duration:    Time In: 0845  Time Out: CAMI Pillai       Post Session Pain  Charge Capture  [employer] Portal  MD Guidelines  Scanned Media  Benefits  MyChart

## 2022-07-28 ENCOUNTER — HOSPITAL ENCOUNTER (OUTPATIENT)
Dept: PHYSICAL THERAPY | Age: 52
Setting detail: RECURRING SERIES
Discharge: HOME | End: 2022-07-31
Payer: MEDICARE

## 2022-07-28 PROCEDURE — 97110 THERAPEUTIC EXERCISES: CPT

## 2022-07-28 PROCEDURE — 97140 MANUAL THERAPY 1/> REGIONS: CPT

## 2022-07-28 ASSESSMENT — PAIN SCALES - GENERAL: PAINLEVEL_OUTOF10: 1

## 2022-07-28 NOTE — PROGRESS NOTES
Gonzalo Wharton Ole  : 1970  Primary: [place]p*  Secondary:  Shawnee Miller @ [facility]  [address] [facility] [address]  Phone: [phone]  Fax: [phone] No data recorded  No data recorded    PT Visit Info:    No data recorded    OUTPATIENT PHYSICAL THERAPY:OP NOTE TYPE: Treatment Note 2022     Appt Desk   Episode      Treatment Diagnosis: Tx Diagnosis:  Pain in left knee (M25.562)  Effusion, left knee (M25.462)  Stiffness of left knee, not elsewhere classified (X78.735)  Medical/Referring Diagnosis:  No admission diagnoses are documented for this encounter. Referring Physician:  Gita Oneil., *  MD Orders:  PT Eval and Treat   Date of Onset:  No data recorded   Allergies:  Atorvastatin and Metformin  Restrictions/Precautions:    No data recordedNo data recorded   Interventions Planned (Treatment may consist of any combination of the following):    Current Treatment Recommendations: Strengthening; ROM; Balance training     Subjective Comments:  Knees are hurting alittle this morning. Initial:     1/10 Post Session:     1/10  Medications Last Reviewed:  2022  Updated Objective Findings:   ROM B left flex 124 right 115 ext left -1 right 0  Treatment   THERAPEUTIC EXERCISE: 25 min  Exercises per grid below to improve mobility, strength and balance. Required minimal visual, verbal and manual cues to promote proper body alignment and promote proper body posture. Progressed resistance and complexity of movement as indicated.      Date  22 Date  22 Date  7/26/22 7/28/22               X 30      x30      X 30      X 30  X 10 X 15          X 10 min  X 10 min          X 20  #125 3 x 10 # 120 3 x 10   #25 3 x 10 #25 3 x 10 25 3 x 10 25 3 x 10   17.5 3 x 10 flex/abd 17.5 3 x 10 flex/abd 17.5 3 x 10 flex/abd 17.5 3 x 10 flex/abd      X 5             MANUAL THERAPY: (20 -minutes): Joint mobilization, Soft tissue mobilization was utilized and necessary because of the patient's restricted joint motion and restricted motion of soft tissue mobility. Date  7/8/2022     Technique Used Grade  Level # Time(s) Effect while being performed   Retrograde massage and STM       To decrease tightness and stiffness in B knees   PAs (B)                                                                              Treatment/Session Summary:    Treatment Assessment:   Patient tolerated treatment well. Less tightness following therapy. Good motivation  Communication/Consultation:  None today  Equipment provided today:  None  Recommendations/Intent for next treatment session: Next visit will focus on manual techniques and strengthening.     Total Treatment Billable Duration:    Time In: 0845  Time Out: CAMI Pillai       Post Session Pain  Charge Capture  [employer] Portal  MD Guidelines  Scanned Media  Benefits  MyChart

## 2022-08-01 ENCOUNTER — OFFICE VISIT (OUTPATIENT)
Dept: CARDIOLOGY CLINIC | Age: 52
End: 2022-08-01
Payer: MEDICARE

## 2022-08-01 VITALS
SYSTOLIC BLOOD PRESSURE: 138 MMHG | WEIGHT: 256 LBS | HEIGHT: 67 IN | HEART RATE: 60 BPM | DIASTOLIC BLOOD PRESSURE: 100 MMHG | BODY MASS INDEX: 40.18 KG/M2

## 2022-08-01 DIAGNOSIS — I25.118 CORONARY ARTERY DISEASE OF NATIVE ARTERY OF NATIVE HEART WITH STABLE ANGINA PECTORIS (HCC): ICD-10-CM

## 2022-08-01 DIAGNOSIS — I49.3 PVC'S (PREMATURE VENTRICULAR CONTRACTIONS): Primary | ICD-10-CM

## 2022-08-01 DIAGNOSIS — I42.8 NICM (NONISCHEMIC CARDIOMYOPATHY) (HCC): ICD-10-CM

## 2022-08-01 DIAGNOSIS — I50.22 SYSTOLIC CHF, CHRONIC (HCC): ICD-10-CM

## 2022-08-01 DIAGNOSIS — G47.33 OSA (OBSTRUCTIVE SLEEP APNEA): ICD-10-CM

## 2022-08-01 PROCEDURE — 99214 OFFICE O/P EST MOD 30 MIN: CPT | Performed by: INTERNAL MEDICINE

## 2022-08-01 RX ORDER — SPIRONOLACTONE 25 MG/1
25 TABLET ORAL DAILY
Qty: 90 TABLET | Refills: 3 | Status: SHIPPED | OUTPATIENT
Start: 2022-08-01

## 2022-08-01 RX ORDER — TIZANIDINE 4 MG/1
4 TABLET ORAL 3 TIMES DAILY PRN
COMMUNITY
Start: 2021-08-31

## 2022-08-01 RX ORDER — TERBINAFINE HYDROCHLORIDE 250 MG/1
TABLET ORAL
COMMUNITY
Start: 2022-05-31

## 2022-08-01 RX ORDER — OXYCODONE HYDROCHLORIDE 5 MG/1
TABLET ORAL AS NEEDED
COMMUNITY
Start: 2022-05-01

## 2022-08-01 NOTE — PROGRESS NOTES
[address], [address]  PHONE: [phone]     22    NAME:  [place]  : 1970  MRN: [id number]       SUBJECTIVE:   [place] is a 45 y.o. female seen for a follow up visit regarding the following:     Chief Complaint   Patient presents with    Congestive Heart Failure       HPI:    Here for eval of NICM/SHF. Echo 2017: EF 30%     2018:  PVC ablation by Dr. Chago Urena. Echo 3/2018: EF 45%   LHC 3/9/2020: EF 20%, LAD 40%   Echo 10/2021: EF 40-45%   NST 2021:  low risk stress test.     Holter 2022: Sinus rhythm. Rare PACs accounting for less than 1% total beat count. Occasional PVCs accounting for 1.5% of the total beat count. Patient symptomatic events of palpitations appear to correspond to symptomatic PVCs. Still with palp, on toprol BID. No new SUTHERLAND, SOB. NYHA Class II sx now. No new edema. Patient denies recent history of orthopnea, PND, excessive dizziness and/or syncope. Former teacher at [employer]. Past Medical History, Past Surgical History, Family history, Social History, and Medications were all reviewed with the patient today and updated as necessary. Current Outpatient Medications   Medication Sig Dispense Refill    oxyCODONE (ROXICODONE) 5 MG immediate release tablet as needed. terbinafine (LAMISIL) 250 MG tablet       tiZANidine (ZANAFLEX) 4 MG tablet Take 4 mg by mouth 3 times daily as needed      spironolactone (ALDACTONE) 25 MG tablet Take 1 tablet by mouth in the morning. 1/2 tab.  90 tablet 3    Zinc Sulfate (ZINC 15 PO) Take by mouth      metoprolol succinate (TOPROL XL) 50 MG extended release tablet Take 1 tablet by mouth in the morning and at bedtime 180 tablet 3    omeprazole (PRILOSEC) 40 MG delayed release capsule Take 40 mg by mouth daily      simvastatin (ZOCOR) 40 MG tablet       vitamin C (ASCORBIC ACID) 500 MG tablet Take 500 mg by mouth daily      aspirin 81 MG EC tablet Take 81 mg by mouth every 12 hours      vitamin D3 (CHOLECALCIFEROL) 10 MCG (400 UNIT) TABS tablet Take 400 Units by mouth daily       diclofenac sodium (VOLTAREN) 1 % GEL Apply topically as needed      methocarbamol (ROBAXIN) 500 MG tablet Take 500 mg by mouth 3 times daily as needed      polyethylene glycol (GLYCOLAX) 17 GM/SCOOP powder Take 17 g by mouth daily      promethazine (PHENERGAN) 25 MG tablet Take 25 mg by mouth every 8 hours as needed      sacubitril-valsartan (ENTRESTO)  MG per tablet Take 1 tablet by mouth 2 times daily       No current facility-administered medications for this visit.         Allergies   Allergen Reactions    Atorvastatin Other (See Comments)    Metformin Other (See Comments)     Patient Active Problem List    Diagnosis Date Noted    S/P ablation of ventricular arrhythmia 02/12/2018     Priority: High    Status post left knee replacement 04/20/2022    Osteoarthritis of left knee 04/20/2022    Status post right knee replacement 01/26/2022    Osteoarthritis of right knee 01/26/2022    Noncompliance with CPAP treatment 01/05/2022    SULEIMAN (obstructive sleep apnea) 04/19/2021    Obesity (BMI 30-39.9) 12/15/2020    Abnormal uterine bleeding (AUB) 02/12/2019    S/P abdominal supracervical subtotal hysterectomy 02/12/2019    Severe obesity with body mass index (BMI) of 35.0 to 39.9 with serious comorbidity ([address] 07/05/2018    Menorrhagia with regular cycle 05/07/2018    Coronary artery disease of native artery of native heart with stable angina pectoris ([address] 12/18/2017    Chronic fatigue 12/18/2017    PVC's (premature ventricular contractions) 77/28/2467    Systolic CHF, chronic ([address] 12/18/2017    NICM (nonischemic cardiomyopathy) ([address] 12/18/2017    Intramural, submucous, and subserous leiomyoma of uterus 11/01/2017      Past Surgical History:   Procedure Laterality Date    ABLATION OF DYSRHYTHMIC FOCUS  02/2018    Ablation for pvc's     CARDIAC CATHETERIZATION      HYSTEROSCOPY      ORTHOPEDIC SURGERY Right     toe removal     OTHER SURGICAL HISTORY      left hip and gluteal surgery    FRANSISCA AND BSO (CERVIX REMOVED)  02/12/2019    Supracervical    TOTAL KNEE ARTHROPLASTY Right 01/26/2022    TUBAL LIGATION      WISDOM TOOTH EXTRACTION       Family History   Problem Relation Age of Onset    Breast Cancer Neg Hx     Cancer Father         Lymphoma    Heart Disease Mother      Social History     Tobacco Use    Smoking status: Never    Smokeless tobacco: Never   Substance Use Topics    Alcohol use: No         ROS:    No obvious pertinent positives on review of systems except for what was outlined in the HPI today.       PHYSICAL EXAM:     BP (!) 138/100   Pulse 60   Ht 5' 7\" (1.702 m)   Wt 256 lb (116.1 kg)   BMI 40.10 kg/m²    General/Constitutional:   Alert and oriented x 3, no acute distress  HEENT:   normocephalic, atraumatic, moist mucous membranes  Neck:   No JVD or carotid bruits bilaterally  Cardiovascular:   regular rate and rhythm, no murmur/rub/gallop appreciated  Pulmonary:   clear to auscultation bilaterally, no respiratory distress  Abdomen:   soft, non-tender, non-distended  Ext:   No sig LE edema bilaterally  Skin:  warm and dry, no obvious rashes seen  Neuro:   no obvious sensory or motor deficits  Psychiatric:   normal mood and affect      Lab Results   Component Value Date/Time     06/10/2022 04:21 PM    K 4.0 06/10/2022 04:21 PM     06/10/2022 04:21 PM    CO2 25 06/10/2022 04:21 PM    BUN 16 06/10/2022 04:21 PM    CREATININE 0.80 06/10/2022 04:21 PM    GLUCOSE 77 06/10/2022 04:21 PM    CALCIUM 9.9 06/10/2022 04:21 PM        Lab Results   Component Value Date    WBC 6.2 06/10/2022    HGB 11.1 (L) 06/10/2022    HCT 35.5 (L) 06/10/2022    MCV 82.6 06/10/2022     06/10/2022       No results found for: TSHFT4, TSH    Lab Results   Component Value Date    LABA1C 5.8 04/13/2022     Lab Results   Component Value Date     04/13/2022       No results found for: CHOL  No results found for: TRIG  No results found for: HDL  No results found for: LDLCHOLESTEROL, LDLCALC  No results found for: LABVLDL, VLDL  No results found for: CHOLHDLRATIO        I have Independently reviewed prior care notes, any ER records available, cardiac testing, labs and results with the patient and before seeing the patient today. Also independently reviewed outside records when available. ASSESSMENT:    Daron Case was seen today for congestive heart failure. Diagnoses and all orders for this visit:    PVC's (premature ventricular contractions)    Systolic CHF, chronic (HCC)    NICM (nonischemic cardiomyopathy) ([facility]    Coronary artery disease of native artery of native heart with stable angina pectoris (HCC)    SULEIMAN (obstructive sleep apnea)    Other orders  -     spironolactone (ALDACTONE) 25 MG tablet; Take 1 tablet by mouth in the morning. 1/2 tab. PLAN:        1. PVCs: more sx now. Worse on coreg BID, changed to toprol BID. Getting CPAP, this will help as well. Waiting on mask now. Refer back to EP for eval and mgmt of PVCs. 2. CAD:  Remain on ASA and statin now. The patient has been instructed to call with any angina or equivalent as reviewed today. All questions were answered with the patient voicing complete understanding. 3. SHF/NICM:   On entresto,  remain on coreg, aldactone. EF better,   Needs better diet and more exercise as reviewed. 4. HTN:  Remain on toprol, entresto. Follow with above changes. Patient has been instructed and agrees to call our office with any issues or other concerns related to their cardiac condition(s) and/or complaint(s). Return in about 6 months (around 2/1/2023).        MARTINA CULVER DO  8/1/2022

## 2022-08-02 ENCOUNTER — HOSPITAL ENCOUNTER (OUTPATIENT)
Dept: PHYSICAL THERAPY | Age: 52
Setting detail: RECURRING SERIES
Discharge: HOME | End: 2022-08-05
Payer: MEDICARE

## 2022-08-02 PROCEDURE — 97140 MANUAL THERAPY 1/> REGIONS: CPT

## 2022-08-02 PROCEDURE — 97110 THERAPEUTIC EXERCISES: CPT

## 2022-08-02 ASSESSMENT — PAIN SCALES - GENERAL: PAINLEVEL_OUTOF10: 1

## 2022-08-02 NOTE — PROGRESS NOTES
Arleen Peralta  : 1970  Primary: [place] H[place]p*  Secondary:  [address] @ [facility]  [address] [address]  Phone: [phone]  Fax: [phone] No data recorded  No data recorded    PT Visit Info:    No data recorded    OUTPATIENT PHYSICAL THERAPY:OP NOTE TYPE: Treatment Note 2022     Appt Desk   Episode      Treatment Diagnosis: Tx Diagnosis:  Pain in left knee (M25.562)  Effusion, left knee (M25.462)  Stiffness of left knee, not elsewhere classified (F08.935)  Medical/Referring Diagnosis:  Presence of left artificial knee joint [O58.346]  Referring Physician:  Mark Fermin, *  MD Orders:  PT Eval and Treat   Date of Onset:  No data recorded   Allergies:  Atorvastatin and Metformin  Restrictions/Precautions:    No data recordedNo data recorded   Interventions Planned (Treatment may consist of any combination of the following):    Current Treatment Recommendations: Strengthening; ROM; Balance training     Subjective Comments:  Patient reports knees are doing better today hurt more yesterday. Initial:     1/10 Post Session:     1/10  Medications Last Reviewed:  2022  Updated Objective Findings:   ROM B left flex 124 right 115 ext left -1 right 0  Treatment   THERAPEUTIC EXERCISE: 25 min  Exercises per grid below to improve mobility, strength and balance. Required minimal visual, verbal and manual cues to promote proper body alignment and promote proper body posture. Progressed resistance and complexity of movement as indicated.      Date  22 Date  22 Date  22 8/2                 X 30       x30       X 30       X 30  X 10 X 15            X 10 min  X 10 min X 10 min           X 20  #125 3 x 10 # 120 3 x 10 # 120 3 x 10   #25 3 x 10 #25 3 x 10 25 3 x 10 25 3 x 10 25 3 x 10   17.5 3 x 10 flex/abd 17.5 3 x 10 flex/abd 17.5 3 x 10 flex/abd 17.5 3 x 10 flex/abd 17.5 3 x 10 flex/abd # 25 ext      X 5  X 1- MANUAL THERAPY: (20 -minutes): Joint mobilization, Soft tissue mobilization was utilized and necessary because of the patient's restricted joint motion and restricted motion of soft tissue mobility. Date  7/8/2022     Technique Used Grade  Level # Time(s) Effect while being performed   Retrograde massage and STM       To decrease tightness and stiffness in B knees   PAs (B)                                                                              Treatment/Session Summary:    Treatment Assessment:   Patient tolerated treatment well. Continues to be pleased with progress. Communication/Consultation:  None today  Equipment provided today:  None  Recommendations/Intent for next treatment session: Next visit will focus on manual techniques and strengthening.     Total Treatment Billable Duration:    Time In: 0845  Time Out: CAMI Pillai       Post Session Pain  Charge Capture  [employer] Portal  MD Guidelines  Scanned Media  Benefits  MyChart Pregnancy

## 2022-08-04 ENCOUNTER — HOSPITAL ENCOUNTER (OUTPATIENT)
Dept: PHYSICAL THERAPY | Age: 52
Setting detail: RECURRING SERIES
Discharge: HOME | End: 2022-08-07
Payer: MEDICARE

## 2022-08-04 PROCEDURE — 97140 MANUAL THERAPY 1/> REGIONS: CPT

## 2022-08-04 PROCEDURE — 97110 THERAPEUTIC EXERCISES: CPT

## 2022-08-04 ASSESSMENT — PAIN SCALES - GENERAL: PAINLEVEL_OUTOF10: 0

## 2022-08-04 NOTE — PROGRESS NOTES
Sukumar Danish  : 1970  Primary: [facility] Medicare Hmo/p*  Secondary:  Alma Rosa Gonzales @ [facility]  [address] [facility] [address] [employer] [facility] [address]  Phone: [phone]  Fax: [phone] No data recorded  No data recorded    PT Visit Info:    No data recorded    OUTPATIENT PHYSICAL THERAPY:OP NOTE TYPE: Treatment Note 2022     Appt Desk   Episode      Treatment Diagnosis: Tx Diagnosis:  Pain in left knee (M25.562)  Effusion, left knee (M25.462)  Stiffness of left knee, not elsewhere classified (C26.497)  Medical/Referring Diagnosis:  Presence of left artificial knee joint [C78.556]  Referring Physician:  Stacey Sandoval., *  MD Orders:  PT Eval and Treat   Date of Onset:  No data recorded   Allergies:  Atorvastatin and Metformin  Restrictions/Precautions:    No data recordedNo data recorded   Interventions Planned (Treatment may consist of any combination of the following):    Current Treatment Recommendations: Strengthening; ROM; Balance training     Subjective Comments:  Patient reports knees are feeling pretty good today. Initial:     0/10 Post Session:     0/10  Medications Last Reviewed:  2022  Updated Objective Findings:   ROM B left flex 124 right 115 ext left -1 right 0  Treatment   THERAPEUTIC EXERCISE: 25 min  Exercises per grid below to improve mobility, strength and balance. Required minimal visual, verbal and manual cues to promote proper body alignment and promote proper body posture. Progressed resistance and complexity of movement as indicated.      Date  22 Date  22 Date  22 8/4                   X 30        x30        X 30        X 30  X 10 X 15  X 15            X 10 min  X 10 min X 10 min X 10 min            X 20  #125 3 x 10 # 120 3 x 10 # 120 3 x 10 # 120 3 x 10   #25 3 x 10 #25 3 x 10 25 3 x 10 25 3 x 10 25 3 x 10 5 3 x 10   17.5 3 x 10 flex/abd 17.5 3 x 10 flex/abd 17.5 3 x 10 flex/abd 17.5 3 x 10 flex/abd 17.5 3 x 10 flex/abd # 25 ext 17.5 3 x 10 flex/abd # 25 ext      X 5  X 10             MANUAL THERAPY: (20 -minutes): Joint mobilization, Soft tissue mobilization was utilized and necessary because of the patient's restricted joint motion and restricted motion of soft tissue mobility. Date  7/8/2022     Technique Used Grade  Level # Time(s) Effect while being performed   Retrograde massage and STM       To decrease tightness and stiffness in B knees   PAs (B)                                                                              Treatment/Session Summary:    Treatment Assessment:   Patient continues to be pleased with progress. She indicates she has been doing alot more walking. Communication/Consultation:  None today  Equipment provided today:  None  Recommendations/Intent for next treatment session: Next visit will focus on manual techniques and strengthening.     Total Treatment Billable Duration:    Time In: 0930  Time Out: [address]       Post Session Pain  Charge Capture  [employer] Portal  MD Guidelines  Scanned Media  Benefits  MyChart

## 2022-08-05 ENCOUNTER — TELEPHONE (OUTPATIENT)
Dept: CARDIOLOGY CLINIC | Age: 52
End: 2022-08-05

## 2022-08-05 NOTE — TELEPHONE ENCOUNTER
Pharmacist called and said they need the instructions for patient's Spironolactone Clarified.  [employer] [phone]

## 2022-08-09 ENCOUNTER — HOSPITAL ENCOUNTER (OUTPATIENT)
Dept: PHYSICAL THERAPY | Age: 52
Setting detail: RECURRING SERIES
Discharge: HOME | End: 2022-08-12
Payer: MEDICARE

## 2022-08-09 PROCEDURE — 97110 THERAPEUTIC EXERCISES: CPT

## 2022-08-09 ASSESSMENT — PAIN SCALES - GENERAL: PAINLEVEL_OUTOF10: 0

## 2022-08-09 NOTE — PROGRESS NOTES
Arleen Peralta  : 1970  Primary: [place]/p*  Secondary:  Nataliya Huang @ [facility]  [address] [facility] [address] [facility] [address]  Phone: [phone]  Fax: [phone] No data recorded  No data recorded    PT Visit Info:    No data recorded    OUTPATIENT PHYSICAL THERAPY:OP NOTE TYPE: Treatment Note 2022     Appt Desk   Episode      Treatment Diagnosis: Tx Diagnosis:  Pain in left knee (M25.562)  Effusion, left knee (M25.462)  Stiffness of left knee, not elsewhere classified (O84.435)  Medical/Referring Diagnosis:  Presence of left artificial knee joint [U56.012]  Referring Physician:  Mark Fermin, *  MD Orders:  PT Eval and Treat   Date of Onset:  No data recorded   Allergies:  Atorvastatin and Metformin  Restrictions/Precautions:    No data recordedNo data recorded   Interventions Planned (Treatment may consist of any combination of the following):    Current Treatment Recommendations: Strengthening; ROM; Balance training     Subjective Comments:  Patient reports knees are doing ok today. Initial:     0/10 Post Session:     0/10  Medications Last Reviewed:  2022  Updated Objective Findings:   ROM B left flex 124 right 115 ext left -1 right 0  Treatment   THERAPEUTIC EXERCISE: 45 min  Exercises per grid below to improve mobility, strength and balance. Required minimal visual, verbal and manual cues to promote proper body alignment and promote proper body posture. Progressed resistance and complexity of movement as indicated.      Date  22 Date  8/9                                      X 10 X 15  X 15 X 15           X 10 min X 10 min X 10 min X 10 min          #125 3 x 10 # 120 3 x 10 # 120 3 x 10 # 120 3 x 10 # 120 3 x 10   25 3 x 10 25 3 x 10 25 3 x 10 5 3 x 10 #25 3 x 10   17.5 3 x 10 flex/abd 17.5 3 x 10 flex/abd 17.5 3 x 10 flex/abd # 25 ext 17.5 3 x 10 flex/abd # 25 ext 17.5 3 x 10 flex/abd # 25 ext    X 5  X 10  X 10 MANUAL THERAPY: (-minutes): Joint mobilization, Soft tissue mobilization was utilized and necessary because of the patient's restricted joint motion and restricted motion of soft tissue mobility. Date  7/8/2022     Technique Used Grade  Level # Time(s) Effect while being performed   Retrograde massage and STM       To decrease tightness and stiffness in B knees   PAs (B)                                                                              Treatment/Session Summary:    Treatment Assessment:   Continues to be pleased with progress. Patient shows good improvement in strength. Communication/Consultation:  None today  Equipment provided today:  None  Recommendations/Intent for next treatment session: Next visit will focus on manual techniques and strengthening.     Total Treatment Billable Duration:    Time In: 0845  Time Out: CAMI Pillai       Post Session Pain  Charge Capture  [employer] Portal  MD Guidelines  Scanned Media  Benefits  MyChart

## 2022-08-11 ENCOUNTER — HOSPITAL ENCOUNTER (OUTPATIENT)
Dept: PHYSICAL THERAPY | Age: 52
Setting detail: RECURRING SERIES
Discharge: HOME | End: 2022-08-14
Payer: MEDICARE

## 2022-08-11 PROCEDURE — 97110 THERAPEUTIC EXERCISES: CPT

## 2022-08-11 ASSESSMENT — PAIN SCALES - GENERAL: PAINLEVEL_OUTOF10: 0

## 2022-08-11 NOTE — PROGRESS NOTES
Arlet Naranjoino  : 1970  Primary: [place] Hmo/p*  Secondary:  Derrick Ceballos @ [facility]  [address] [place] [address]  Phone: [phone]  Fax: [phone] No data recorded  No data recorded    PT Visit Info:    No data recorded    OUTPATIENT PHYSICAL THERAPY:OP NOTE TYPE: Treatment Note 2022     Appt Desk   Episode      Treatment Diagnosis: Tx Diagnosis:  Pain in left knee (M25.562)  Effusion, left knee (M25.462)  Stiffness of left knee, not elsewhere classified (J25.572)  Medical/Referring Diagnosis:  Presence of left artificial knee joint [W22.474]  Referring Physician:  Jaleel Singh, *  MD Orders:  PT Eval and Treat   Date of Onset:  No data recorded   Allergies:  Atorvastatin and Metformin  Restrictions/Precautions:    No data recordedNo data recorded   Interventions Planned (Treatment may consist of any combination of the following):    Current Treatment Recommendations: Strengthening; ROM; Balance training     Subjective Comments:  Patient reports feeling pretty good today. Initial:     0/10 Post Session:     0/10  Medications Last Reviewed:  2022  Updated Objective Findings:  None Today  Treatment   THERAPEUTIC EXERCISE: 45 min  Exercises per grid below to improve mobility, strength and balance. Required minimal visual, verbal and manual cues to promote proper body alignment and promote proper body posture. Progressed resistance and complexity of movement as indicated.      Date  22 Date   Date  8/11/22                                           X 10 X 15  X 15 X 15 X 15             X 10 min X 10 min X 10 min X 10 min X 12 min           #125 3 x 10 # 120 3 x 10 # 120 3 x 10 # 120 3 x 10 # 120 3 x 10 # 120 3 x 10   25 3 x 10 25 3 x 10 25 3 x 10 5 3 x 10 #25 3 x 10 #25 3 x 10   17.5 3 x 10 flex/abd 17.5 3 x 10 flex/abd 17.5 3 x 10 flex/abd # 25 ext 17.5 3 x 10 flex/abd # 25 ext 17.5 3 x 10 flex/abd # 25 ext

## 2022-08-15 ENCOUNTER — HOSPITAL ENCOUNTER (OUTPATIENT)
Dept: PHYSICAL THERAPY | Age: 52
Setting detail: RECURRING SERIES
Discharge: HOME | End: 2022-08-18
Payer: MEDICARE

## 2022-08-15 PROCEDURE — 97110 THERAPEUTIC EXERCISES: CPT

## 2022-08-15 ASSESSMENT — PAIN SCALES - GENERAL: PAINLEVEL_OUTOF10: 0

## 2022-08-15 NOTE — PROGRESS NOTES
Coral Benton  : 1970  [place]*  Secondary:  [address] @ [facility]  [address] [place] [address]  Phone: [phone]  Fax: [phone] No data recorded  No data recorded    PT Visit Info:    No data recorded    OUTPATIENT PHYSICAL THERAPY:OP NOTE TYPE: Treatment Note 8/15/2022     Appt Desk   Episode      Treatment Diagnosis: Tx Diagnosis:  Pain in left knee (M25.562)  Effusion, left knee (M25.462)  Stiffness of left knee, not elsewhere classified (O71.814)  Medical/Referring Diagnosis:  Presence of left artificial knee joint [K17.246]  Referring Physician:  Kirsten Kitchen, *  MD Orders:  PT Eval and Treat   Date of Onset:  No data recorded   Allergies:  Atorvastatin and Metformin  Restrictions/Precautions:    No data recordedNo data recorded   Interventions Planned (Treatment may consist of any combination of the following):    Current Treatment Recommendations: Strengthening; ROM; Balance training     Subjective Comments:  Patient reports feeling pretty good. She states she has been doing alot more getting out. Initial:     0/10 Post Session:     0/10  Medications Last Reviewed:  8/15/2022  Updated Objective Findings:  None Today  Treatment   THERAPEUTIC EXERCISE: 45 min  Exercises per grid below to improve mobility, strength and balance. Required minimal visual, verbal and manual cues to promote proper body alignment and promote proper body posture. Progressed resistance and complexity of movement as indicated.      Date  22 Date   Date  22 Date  8/15/22                            #87 2 x 10          X 10 X 15 X 15 X 15  X 15            X 10 min X 10 min X 12 min X 12 min          #125 3 x 10 # 120 3 x 10 # 120 3 x 10 # 120 3 x 10 # 120 3 x 10   25 3 x 10 5 3 x 10 #25 3 x 10 #25 3 x 10 25 3 x 10   17.5 3 x 10 flex/abd 17.5 3 x 10 flex/abd # 25 ext 17.5 3 x 10 flex/abd # 25 ext 17.5 3 x 10 flex/abd # 25 ext 17.5 3 x 10 flex/abd # 25 ext     X 10 X 10 X 10            MANUAL THERAPY: (-minutes): Joint mobilization, Soft tissue mobilization was utilized and necessary because of the patient's restricted joint motion and restricted motion of soft tissue mobility. Date  7/8/2022     Technique Used Grade  Level # Time(s) Effect while being performed   Retrograde massage and STM       To decrease tightness and stiffness in B knees   PAs (B)                                                                              Treatment/Session Summary:    Treatment Assessment:   Patient toleratated treatment well. Continues to be really pleased with progress. Communication/Consultation:  None today  Equipment provided today:  None  Recommendations/Intent for next treatment session: Next visit will focus on manual techniques and strengthening.     Total Treatment Billable Duration:    Time In: 0845  Time Out: 120[facility]       Post Session Pain  Charge Capture  [employer] Portal  MD Guidelines  Scanned Media  Benefits  MyChart

## 2022-08-17 ENCOUNTER — HOSPITAL ENCOUNTER (OUTPATIENT)
Dept: PHYSICAL THERAPY | Age: 52
Setting detail: RECURRING SERIES
End: 2022-08-17
Payer: MEDICARE

## 2022-08-17 NOTE — PROGRESS NOTES
Mckayla Grullon  : 1970  Primary[place]*  Secondary:  [address] @ [facility]  [address] [place] [address] Indira Galvan [place] [address]  Phone: [phone]  Fax: [phone] No data recorded  No data recorded    PT Visit Info:  No data recorded       OUTPATIENT PHYSICAL THERAPY 2022     Appt Desk   Episode   MyChart      Ms. Demond Duenas cancelled appointment today due to sickness.     [address], PTA    Future Appointments   Date Time Provider Delroy Natarajani   2022  2:15 PM Cassie Baum MD [facility] AMB   10/27/2022  9:15 AM MD LAILA Mullins AMB   2023  9:00 AM Pippa Ferro DO [facility] L AMB

## 2022-08-18 ENCOUNTER — TELEPHONE (OUTPATIENT)
Dept: SLEEP MEDICINE | Age: 52
End: 2022-08-18

## 2022-08-22 ENCOUNTER — HOSPITAL ENCOUNTER (OUTPATIENT)
Dept: PHYSICAL THERAPY | Age: 52
Setting detail: RECURRING SERIES
Discharge: HOME | End: 2022-08-25
Payer: MEDICARE

## 2022-08-22 PROCEDURE — 97110 THERAPEUTIC EXERCISES: CPT

## 2022-08-22 ASSESSMENT — PAIN SCALES - GENERAL: PAINLEVEL_OUTOF10: 0

## 2022-08-22 NOTE — PROGRESS NOTES
Nikolas Lanza  : 1970  Primary[place]*  Secondary:  Effie Yi @ [facility]  [address] [place] [facility] [address]  Phone: [phone]  Fax: [phone] No data recorded  No data recorded    PT Visit Info:    No data recorded    OUTPATIENT PHYSICAL THERAPY:OP NOTE TYPE: Treatment Note 2022     Appt Desk   Episode      Treatment Diagnosis: Tx Diagnosis:  Pain in left knee (M25.562)  Effusion, left knee (M25.462)  Stiffness of left knee, not elsewhere classified (B37.020)  Medical/Referring Diagnosis:  Presence of left artificial knee joint [H88.919]  Referring Physician:  Vandana Collins, *  MD Orders:  PT Eval and Treat   Date of Onset:  No data recorded   Allergies:  Atorvastatin and Metformin  Restrictions/Precautions:    No data recordedNo data recorded   Interventions Planned (Treatment may consist of any combination of the following):    Current Treatment Recommendations: Strengthening; ROM; Balance training     Subjective Comments:  Patient reports knee is starting to turn the corner some with both knee. Initial:     0/10 Post Session:     0/10  Medications Last Reviewed:  2022  Updated Objective Findings:  None Today  Treatment   THERAPEUTIC EXERCISE: 45 min  Exercises per grid below to improve mobility, strength and balance. Required minimal visual, verbal and manual cues to promote proper body alignment and promote proper body posture. Progressed resistance and complexity of movement as indicated.      Date  22 Date   Date  22 Date  8/15/22 Date                                 #87 2 x 10            X 10 X 15 X 15 X 15  X 15  X 20            X 10 min X 10 min X 12 min X 12 min X 10 min           #125 3 x 10 # 120 3 x 10 # 120 3 x 10 # 120 3 x 10 # 120 3 x 10 # 120 3 x 10   25 3 x 10 5 3 x 10 #25 3 x 10 #25 3 x 10 25 3 x 10 #25 3 x 10   17.5 3 x 10 flex/abd 17.5 3 x 10 flex/abd # 25 ext 17.5 3 x 10 flex/abd # 25 ext 17.5 3 x 10 flex/abd # 25 ext 17.5 3 x 10 flex/abd # 25 ext 17.5 3 x 10 flex/abd # 25 ext     X 10 X 10 X 10 X 10            MANUAL THERAPY: (-minutes): Joint mobilization, Soft tissue mobilization was utilized and necessary because of the patient's restricted joint motion and restricted motion of soft tissue mobility. Date  7/8/2022     Technique Used Grade  Level # Time(s) Effect while being performed   Retrograde massage and STM       To decrease tightness and stiffness in B knees   PAs (B)                                                                              Treatment/Session Summary:    Treatment Assessment:   Patient continues to be pleased with progress. Patiet hopes to become become independent with all exercises in next 3-4 weeks. Communication/Consultation:  None today  Equipment provided today:  None  Recommendations/Intent for next treatment session: Next visit will focus on manual techniques and strengthening.     Total Treatment Billable Duration:    Time In: 0845  Time Out: CAMI Pillai       Post Session Pain  Charge Capture  [employer] Portal  MD Guidelines  Scanned Media  Benefits  [employer]

## 2022-08-29 NOTE — PROGRESS NOTES
Coral Benton  : 1970  Primary[place]rupti [place]anes [place]p*  Secondary:  Joanna Michaud @ [facility]  [address] [facility] [address]  Phone: [phone]  Fax: [phone] Plan Frequency: 2-3 x/week  No data recorded    PT Visit Info:    No data recorded    OUTPATIENT PHYSICAL THERAPY:OP NOTE TYPE: Recertification                Episode  Appt Desk         Treatment Diagnosis:  Pain in left knee (M25.562)  Effusion, left knee (M25.462)  Stiffness of left knee, not elsewhere classified (M25.662)     * No diagnoses found *  Medical/Referring Diagnosis:  Presence of left artificial knee joint [N22.853]  Referring Physician:  Kirsten Kitchen, *  MD Orders:  PT Eval and Treat   Return MD Appt:  *10/27/22  Date of Onset:  No data recorded   Allergies:  Atorvastatin and Metformin  Restrictions/Precautions:    No data recordedNo data recorded   Medications Last Reviewed:  2022        Coral Benton has done extremely well with PT. Overall excellent compliance. She has met ROM goals; however, continues to walk with slight sway and limp. If she continues to walk in this manner I do believe she may put herself at risk for back or hip pain. Recommending a few more sessions (8) PT to address gait and improve current impairments. POC date ended 22 and she will benefit from extending it another 60 days (Oct 29). ROM is as seen below. LEFT Knee ROM:    0   124 deg          4+/5 Strength L LE        ASSESSMENT     Problem List: (Impacting functional limitations): Body Structures, Functions, Activity Limitations Requiring Skilled Therapeutic Intervention: Decreased functional mobility ; Decreased ADL status;  Decreased ROM     Therapy Prognosis: Con  Therapy Prognosis: Good     Assessment Complexity:      PLAN   Frequency/Duration: Plan Frequency: 2-3 x/week   POC date ends 60 days from today (Oct 29)  Interventions Planned (Treatment may consist of any combination of the following):    Current Treatment Recommendations: Strengthening; ROM; Balance training        GOALS: (Goals have been discussed and agreed upon with patient.)    Short-Term Goals~ 12 weeks  Goal Met   1. Akhil Henderson will be independent with HEP for strength and ROM 1. [x] Date: 8/30/2022    2. Akhil Henderson will improve MMT LEFT LE to >=4+/5 to improve current level of independence and community reintegration. 2.  [x] 8/30/2022 Date:   3. Akhil Henderson will demonstrate LEFT knee flexion >= 105 degrees to improve functional mobility and tolerance of ADLs. 3.  [x] Date: 8/30/2022    4. Akhil Henderson will demonstrate LEFT knee Extension >= 1 degrees to improve functional mobility and tolerance of ADLs. 4.  [x] Date:8/30/2022    5. Akhil Henderson will be able to go up and down stairs with <=2/10 with minimal to no difficulty 5. [x] Date 8/30/2022 :   6. Akhil Henderson toimprove KOOS by 8 points to show improvement in areas of difficulty 6. [] Date:    7. Gonzalo Wharton will be able to perform 10 sit to stand without use of UE and with equal weightbearing and no sway. Tool Used: Knee Injury and Osteoarthritis Outcome Form (KOOS-JR.)  SCORE: None (0) Mild (1) Moderate (2) Severe (3) Extreme (4)   Stiffness:              1. How severe is your knee stiffness after first waking in the morning? []  []  [x]  []  []    Pain:              What amount of knee pain have you experienced in the last week   doing the following activities? 2. Twisting/pivoting on your knee: []  []  [x]  []  []    3. Straightening knee fully: []  [x]  []  []  []    4. Going up or down stairs []  [x]  []  []  []    5. Standing upright []  [x]  []  []  []    Function, daily living             6. Rising from sitting []  [x]  []  []  []    7.  Bending to floor/ an object []  []  [x]  []  []       Score:  Initial: 21 (Interval: 34.174) 5/16/2022/28 Most Recent: 10/28 (Date: -7 8.22- )   Interpretation of Score: Questions each scored on a 4 point scale with 4 representing the worst possible score and 0 representing the best possible score. The higher the point total, the worse the knee pain translating to a lower percentage of patient functioning. Medical Necessity:   Skilled intervention continues to be required due to current impairments. .  Reason For Services/Other Comments:  Patient continues to require skilled intervention due to eccentric strength. P       Regarding [email or web address] therapy, I certify that the treatment plan above will be carried out by a therapist or under their direction. Thank you for this referral,  Almita Harvey, PT     Referring Physician Signature: Sarkis Motley., * No Signature is Required for this note.          Future Appointments   Date Time Provider Delroy Lopez   9/14/2022  2:15 PM MD ANNAMARIE Mitchell [facility] AMB   10/27/2022  9:15 AM MD LAILA Blackwell AMB   2/1/2023  9:00 AM Fina Chun DO [facility] AMB         Post Session Pain  Charge Capture  PT Visit Info  POC Link  Treatment Note Link  MD Guidelines  Steven

## 2022-09-06 ENCOUNTER — TELEPHONE (OUTPATIENT)
Dept: ORTHOPEDIC SURGERY | Age: 52
End: 2022-09-06

## 2022-09-14 ENCOUNTER — INITIAL CONSULT (OUTPATIENT)
Dept: CARDIOLOGY CLINIC | Age: 52
End: 2022-09-14
Payer: MEDICARE

## 2022-09-14 VITALS
SYSTOLIC BLOOD PRESSURE: 110 MMHG | HEART RATE: 61 BPM | HEIGHT: 68 IN | DIASTOLIC BLOOD PRESSURE: 78 MMHG | BODY MASS INDEX: 37.13 KG/M2 | WEIGHT: 245 LBS

## 2022-09-14 DIAGNOSIS — I50.22 SYSTOLIC CHF, CHRONIC (HCC): ICD-10-CM

## 2022-09-14 DIAGNOSIS — I49.3 PVC'S (PREMATURE VENTRICULAR CONTRACTIONS): Primary | ICD-10-CM

## 2022-09-14 DIAGNOSIS — I42.8 NICM (NONISCHEMIC CARDIOMYOPATHY) (HCC): ICD-10-CM

## 2022-09-14 PROCEDURE — 93000 ELECTROCARDIOGRAM COMPLETE: CPT | Performed by: INTERNAL MEDICINE

## 2022-09-14 PROCEDURE — 99204 OFFICE O/P NEW MOD 45 MIN: CPT | Performed by: INTERNAL MEDICINE

## 2022-09-14 RX ORDER — AMLODIPINE BESYLATE 10 MG/1
TABLET ORAL
COMMUNITY
Start: 2022-08-31

## 2022-09-14 NOTE — PROGRESS NOTES
[facility], [address] [place] [address], [address] [place] [address], [address] [place] [address]  PHONE: [phone]  Tiffanie Ignacio  1970    Chief Complant:    Chief Complaint   Patient presents with    Consultation    Irregular Heart Beat      Consultation is requested by [unfilled] for evaluation of Consultation and Irregular Heart Beat    Reason for Consultation: PVCs    History:  Tiffanie Ignacio is a very pleasant 45 y.o. female with a past medical and cardiac history significant for NICM, PVCs and presents for follow up of PVCs. She feels she has had increased PVCs over the past few weeks, some improvement on metorpolol, no chest pain, no syncope. Cardiac PMH: (Old records have been reviewed and summarized below)  LHC 3/9/2020: EF 20%, LAD 40%  Echo 10/2021: EF 40-45%  NST 6/2021:  low risk stress test.  Monior (6/10/22): 1.5% PVCs (18.7% PVC burden prior to ablation)    Past Medical History, Past Surgical History, Family history, Social History, and Medications were all reviewed with the patient today and updated as necessary. Current Outpatient Medications   Medication Sig Dispense Refill    amLODIPine (NORVASC) 10 MG tablet       oxyCODONE (ROXICODONE) 5 MG immediate release tablet as needed. terbinafine (LAMISIL) 250 MG tablet       tiZANidine (ZANAFLEX) 4 MG tablet Take 4 mg by mouth 3 times daily as needed      spironolactone (ALDACTONE) 25 MG tablet Take 1 tablet by mouth in the morning. 1/2 tab.  (Patient taking differently: Take 12.5 mg by mouth daily 1/2 tab) 90 tablet 3    Zinc Sulfate (ZINC 15 PO) Take by mouth      metoprolol succinate (TOPROL XL) 50 MG extended release tablet Take 1 tablet by mouth in the morning and at bedtime 180 tablet 3    omeprazole (PRILOSEC) 40 MG delayed release capsule Take 40 mg by mouth daily      simvastatin (ZOCOR) 40 MG tablet       vitamin C (ASCORBIC ACID) 500 MG tablet Take 500 mg by mouth daily      aspirin 81 MG EC tablet Take 81 mg by mouth every 12 hours gluteal surgery    FRANSISCA AND BSO (CERVIX REMOVED)  02/12/2019    Supracervical    TOTAL KNEE ARTHROPLASTY Right 01/26/2022    TUBAL LIGATION      WISDOM TOOTH EXTRACTION       Family History   Problem Relation Age of Onset    Breast Cancer Neg Hx     Cancer Father         Lymphoma    Heart Disease Mother      Social History     Tobacco Use    Smoking status: Never    Smokeless tobacco: Never   Substance Use Topics    Alcohol use: No       PHYSICAL EXAM:   /78   Pulse 61   Ht 5' 8\" (1.727 m)   Wt 245 lb (111.1 kg)   BMI 37.25 kg/m²      Wt Readings from Last 3 Encounters:   09/14/22 245 lb (111.1 kg)   08/01/22 256 lb (116.1 kg)   07/06/22 253 lb (114.8 kg)     BP Readings from Last 3 Encounters:   09/14/22 110/78   08/01/22 (!) 138/100   07/06/22 112/68       Gen: well appearing, well developed, NAD  Eyes: Pupils equal, EOMI  CV: RRR, no M/R/G, normal JVD, normal distal pulses, no DONALD  Pulm: CTAB, no accessory muscle uses, no wheezes, crackles  GI: soft, NT, ND  Neuro: Alert and oriented    Medical problems and test results were reviewed with the patient today. No results found for any visits on 09/14/22. EKG:  (EKG has been independently visualized by me with interpretation below)  Sinus  Rhythm, rate 61  PRWP    Meredeth Loop was seen today for consultation and irregular heart beat. Diagnoses and all orders for this visit:    PVC's (premature ventricular contractions)  -     EKG 12 lead    Systolic CHF, chronic (HCC)    NICM (nonischemic cardiomyopathy) ([facility] [address]      ASSESSMENT and PLAN  1. PVCs: 1.5% PVCs (18.7% PVC burden prior to ablation), not a high enough burden to allow for ablation, discussed options of amio and multaq, pt feels better on toprol, wants to monitor for now. 2. NICM: last echo with stable EF, cont OMT    Patient has been instructed and agrees to call our office with any issues or other concerns related to their cardiac condition(s) and/or complaint(s).     No follow-up provider specified. Alyx Gavin MD, MS  Cardiology/Electrophysiology  09/14/22  2:21 PM

## 2022-10-06 ENCOUNTER — TELEPHONE (OUTPATIENT)
Dept: ORTHOPEDIC SURGERY | Age: 52
End: 2022-10-06

## 2022-10-06 NOTE — TELEPHONE ENCOUNTER
Left VM for patient in regards to returning her call. We will be more than glad to answer her questions and see what we can do.

## 2022-10-06 NOTE — TELEPHONE ENCOUNTER
Spoke with patient and informed her of controlling pain and swelling at home after a recent fall. Patient will continue to ice and elevate, also is scheduled to come in to check left tka tomorrow with the PA. Pt understands place and time.

## 2022-10-07 ENCOUNTER — OFFICE VISIT (OUTPATIENT)
Dept: ORTHOPEDIC SURGERY | Age: 52
End: 2022-10-07
Payer: MEDICARE

## 2022-10-07 VITALS — HEIGHT: 68 IN | BODY MASS INDEX: 37.13 KG/M2 | WEIGHT: 245 LBS

## 2022-10-07 DIAGNOSIS — Z96.651 STATUS POST RIGHT KNEE REPLACEMENT: ICD-10-CM

## 2022-10-07 DIAGNOSIS — Z96.652 STATUS POST LEFT KNEE REPLACEMENT: Primary | ICD-10-CM

## 2022-10-07 PROBLEM — M17.12 OSTEOARTHRITIS OF LEFT KNEE: Status: RESOLVED | Noted: 2022-04-20 | Resolved: 2022-10-07

## 2022-10-07 PROBLEM — M17.11 OSTEOARTHRITIS OF RIGHT KNEE: Status: RESOLVED | Noted: 2022-01-26 | Resolved: 2022-10-07

## 2022-10-07 PROCEDURE — 99214 OFFICE O/P EST MOD 30 MIN: CPT | Performed by: PHYSICIAN ASSISTANT

## 2022-10-07 RX ORDER — DICLOFENAC POTASSIUM 50 MG/1
50 TABLET, FILM COATED ORAL 3 TIMES DAILY
Qty: 90 TABLET | Refills: 0 | Status: SHIPPED | OUTPATIENT
Start: 2022-10-07

## 2022-10-07 RX ORDER — TRAMADOL HYDROCHLORIDE 50 MG/1
50 TABLET ORAL EVERY 6 HOURS PRN
Qty: 20 TABLET | Refills: 0 | Status: SHIPPED | OUTPATIENT
Start: 2022-10-07 | End: 2022-10-12

## 2022-10-07 NOTE — PROGRESS NOTES
Name: Malcolm Wolfe  YOB: 1970  Gender: female  MRN: [id number]    CC:   Chief Complaint   Patient presents with    Knee Pain     Bilateral knee TKA patient fell on 10/05/2022         HPI: Malcolm Wolfe is a 45 y.o. female with a PMHx of HTN, SULEIMAN, and HLD here for evaluation of left knee pain status post left TKA which was performed 4/20/2022 by Dr. Lillian Weber. she has done well in the postoperative period thus far.  she complains of pain after a fall x2 days ago where she fell onto her left knee directly after turning to the left from the counter. Here for evaluation of this. She states she has pain all over the knee, but it is worse and most tender over the lateral side. She also notes she has a lot of swelling and some bruising over the medial aspect of the knee. She describes the pain as a throbbing ache and notes she also has some instability. She states the pain is somewhat better today, but she is still having difficulty weightbearing, with lateral movements, and putting on socks. She is currently using a cane. So far she is treated with limited weightbearing, Tylenol, and states on the night this happened she took an old prescription of oxybutynin to 1 pill. She is only had mild relief from these. She states she also tried to wear an old brace but this made her pain worse. Allergies   Allergen Reactions    Atorvastatin Other (See Comments)    Metformin Other (See Comments)       Review of Systems:  As per HPI. Pertinent positives and negatives are addressed with the patient, particularly those related to musculoskeletal concerns. Non-orthopaedic concerns were referred back to the primary care physician. PE:  left Knee  Patient is comfortable and in no distress. The lower extremities are as described below. Circulation is normal with palpable pedal pulses bilaterally and no edema. There is no lymph adenopathy in the popliteal or malleolar region.   The skin is without stasis disease distally bilaterally. Sensation is intact to light touch bilaterally. There is severe tenderness to palpation over the lateral joint line of the left knee(s)  The gait is noted to be moderate antalgic and and ambulates with a Cane  ROM: 0 to 95 degrees  AP translation: 4 mm  M-L laxity: 2 degrees  Alignment: 4 degrees of valgus  Quadriceps strength: excellent  There is increased swelling globally over the knee  There is bruising over the medial aspect of the knee  Incision: well healed    Radiographs: AP/Lateral and sunrise of the bilateral knee taken in the office today reveal a good bone, prosthetic appearance. The patella is balanced. No fractures or lucencies noted. Radiographic Diagnosis:  Stable total knee arthroplasty. Recommendations:  Reviewed x-ray findings with the patient. While I do not see any acute findings on imaging today with the patient's history and point tenderness over the lateral aspect of the proximal tibia, I feel that stress fracture cannot be entirely ruled out. I would like to treat this as a stress fracture for now. Patient should limit weightbearing and use a cane and/or walker. I recommend ice and elevation, continue Tylenol. I will send in a short supply of tramadol for her to take at night if needed patient we discussed the risks, benefits, and side effects of the medications including drowsiness and that patient should use these sparingly as this is a short-term prescription. ----The patient will be treated observantly with self directed symptomatic care. Instructions were given to follow up if there is any neurologic or functional decline.  ---- NSAID: The patient is agreeable to a trial of nonsteroidal anti-inflammatory drugs (NSAIDs). We discussed risks associated with their use, including GI tract or other bleeding, and also some cardiac risk.  Instructions were given to discontinue the NSAID if there is any sign of GI bleed, chest pain, or shortness of breath. Return appointment in 2 weeks for follow up and repeat x-rays.           GEORGE Nelson

## 2022-10-12 ENCOUNTER — TELEPHONE (OUTPATIENT)
Dept: ORTHOPEDIC SURGERY | Age: 52
End: 2022-10-12

## 2022-10-13 NOTE — PROGRESS NOTES
[address] [place] [address] Court, [address] [facility]  [phone]    Patient Name:  Azar Da Silva  YOB: 1970      Office Visit 10/17/2022    CHIEF COMPLAINT:    No chief complaint on file. HISTORY OF PRESENT ILLNESS:  Patient is a 46 yo female seen today for follow up of SULEIMAN. Patient was diagnosed with mild sleep apnea with AHI 7.6/hr with desaturations to 89%. She is prescribed cpap therapy with a humidifier set at 7-10 cm with a full face mask. Most recent download reveals AHI on PAP therapy is 3.5, leak is 18.4 and the hourly usage is 5 hours 40 minutes nightly. The overall use is 334 hours with days greater than four hours at 51/90. She reports she really did not get started back on CPAP therapy until late August but has used it daily since then. She does notice a difference as she feels when she is using the CPAP therapy and reports that her daytime sleepiness and fatigue has decreased but has not completely resolved. Edmond score is 12/24. States that she had a rash on her face due to the fullface mask back when she initially started therapy back. She then had a new foam protector put on the mask and has not had that issue anymore. She sometimes feels like she may not be getting enough pressure. Otherwise she states she is tolerating CPAP better than she did when she tried years ago. She did inquire about potentially using a nasal mask but then stated that she knows she breathes through her mouth. I showed her the F&P Dakota fullface mask and she would like to try this. Also noted that when we did compliance report, her CPAP machine still has another person's name linked to it instead of hers. When she goes to get new facemask, she will talk to them about getting the name changed on her machine. She denies any big changes in weight up or down over the last 6 months. She denies any swelling in her ankles or feet.   Her blood pressure is controlled today.       Sleep Medicine 10/17/2022 6/20/2022 2/18/2022   Sitting and reading 3 2 3   Watching TV 3 3 1   Sitting, inactive in a public place (e.g. a theatre or a meeting) 1 0 1   As a passenger in a car for an hour without a break 2 1 3   Lying down to rest in the afternoon when circumstances permit 0 0 0   Sitting and talking to someone 0 0 0   Sitting quietly after a lunch without alcohol 3 2 0   In a car, while stopped for a few minutes in traffic 0 0 0   [place] Sleepiness Score 12 8 8        Past Medical History:   Diagnosis Date    Abnormal Papanicolaou smear of cervix     Adverse effect of anesthesia     \"takes more\" for anesthesia to be effective     Arthritis     CAD (coronary artery disease)     no stents; [facility] 3/2017 mod LAD dz; followed by Dr Erven Holstein ([facility])    CHF (congestive heart failure) ([facility]     ECHO 12/18/18: EF 40-45%; managed with medication and followed by [address] Card[facility]    Fibroid, uterine     GERD (gastroesophageal reflux disease)     diet controlled     H/O echocardiogram 10/04/2021    EF 40-45%    Heart murmur     Echo (10/4/21) EF 40-45% trace regurgitation in mitral and tricuspid valve    History of anemia     Hypercholesterolemia     Controlled with meds     Hypertension     Controlled with meds     Menorrhagia with regular cycle     Morbid obesity ([facility]     NICM (nonischemic cardiomyopathy) ([facility]     Followed by [facility] cardiology     SULEIMAN on CPAP     Ovarian cyst     PVCs (premature ventricular contractions)     Uterine fibroid          Patient Active Problem List   Diagnosis    Intramural, submucous, and subserous leiomyoma of uterus    Noncompliance with CPAP treatment    Abnormal uterine bleeding (AUB)    Status post right knee replacement    Coronary artery disease of native artery of native heart with stable angina pectoris (HCC)    Chronic fatigue    PVC's (premature ventricular contractions)    Menorrhagia with regular cycle    S/P ablation of ventricular arrhythmia SULEIMAN (obstructive sleep apnea)    S/P abdominal supracervical subtotal hysterectomy    Obesity (BMI 30-39. 9)    Systolic CHF, chronic (HCC)    NICM (nonischemic cardiomyopathy) (HCC)    Severe obesity with body mass index (BMI) of 35.0 to 39.9 with serious comorbidity ([facility] [address] [facility]    Status post left knee replacement          Past Surgical History:   Procedure Laterality Date    ABLATION OF DYSRHYTHMIC FOCUS  02/2018    Ablation for pvc's     CARDIAC CATHETERIZATION      HYSTEROSCOPY      ORTHOPEDIC SURGERY Right     toe removal     OTHER SURGICAL HISTORY      left hip and gluteal surgery    FRANSISCA AND BSO (CERVIX REMOVED)  02/12/2019    Supracervical    TOTAL KNEE ARTHROPLASTY Right 01/26/2022    TUBAL LIGATION      WISDOM TOOTH EXTRACTION             Social History     Socioeconomic History    Marital status: Single     Spouse name: Not on file    Number of children: Not on file    Years of education: Not on file    Highest education level: Not on file   Occupational History    Not on file   Tobacco Use    Smoking status: Never    Smokeless tobacco: Never   Substance and Sexual Activity    Alcohol use: No    Drug use: No    Sexual activity: Not on file     Comment: tubal / hysterectomy   Other Topics Concern    Not on file   Social History Narrative    Not on file     Social Determinants of Health     Financial Resource Strain: Not on file   Food Insecurity: Not on file   Transportation Needs: Not on file   Physical Activity: Not on file   Stress: Not on file   Social Connections: Not on file   Intimate Partner Violence: Not on file   Housing Stability: Not on file         Family History   Problem Relation Age of Onset    Breast Cancer Neg Hx     Cancer Father         Lymphoma    Heart Disease Mother          Allergies   Allergen Reactions    Atorvastatin Other (See Comments)    Metformin Other (See Comments)         Current Outpatient Medications   Medication Sig    diclofenac (CATAFLAM) 50 MG tablet Take 1 tablet by mouth 3 times daily    amLODIPine (NORVASC) 10 MG tablet     oxyCODONE (ROXICODONE) 5 MG immediate release tablet as needed. terbinafine (LAMISIL) 250 MG tablet     spironolactone (ALDACTONE) 25 MG tablet Take 1 tablet by mouth in the morning. 1/2 tab. (Patient taking differently: Take 12.5 mg by mouth daily 1/2 tab)    Zinc Sulfate (ZINC 15 PO) Take by mouth    metoprolol succinate (TOPROL XL) 50 MG extended release tablet Take 1 tablet by mouth in the morning and at bedtime    omeprazole (PRILOSEC) 40 MG delayed release capsule Take 40 mg by mouth daily    simvastatin (ZOCOR) 40 MG tablet     vitamin C (ASCORBIC ACID) 500 MG tablet Take 500 mg by mouth daily    vitamin D3 (CHOLECALCIFEROL) 10 MCG (400 UNIT) TABS tablet Take 400 Units by mouth daily     diclofenac sodium (VOLTAREN) 1 % GEL Apply topically as needed    methocarbamol (ROBAXIN) 500 MG tablet Take 500 mg by mouth 3 times daily as needed    polyethylene glycol (GLYCOLAX) 17 GM/SCOOP powder Take 17 g by mouth daily    promethazine (PHENERGAN) 25 MG tablet Take 25 mg by mouth every 8 hours as needed    sacubitril-valsartan (ENTRESTO)  MG per tablet Take 1 tablet by mouth 2 times daily    tiZANidine (ZANAFLEX) 4 MG tablet Take 4 mg by mouth 3 times daily as needed (Patient not taking: Reported on 10/17/2022)    aspirin 81 MG EC tablet Take 81 mg by mouth every 12 hours     No current facility-administered medications for this visit. REVIEW OF SYSTEMS:   CONSTITUTIONAL:   There is no history of fever, chills, night sweats, weight loss, weight gain, persistent fatigue, or lethargy/hypersomnolence. CARDIAC:   No chest pain, pressure, discomfort, palpitations, orthopnea, murmurs, or edema. GI:   No dysphagia, heartburn reflux, nausea/vomiting, diarrhea, abdominal pain, or bleeding. NEURO:   There is no history of AMS, persistent headache, decreased level of consciousness, seizures, or motor or sensory deficits.       PHYSICAL EXAM:    Vitals: 10/17/22 0918   BP: 108/68   Pulse: 64   Resp: 14   Temp: 97.2 °F (36.2 °C)   SpO2: 99%   Weight: 247 lb (112 kg)   Height: 5' 8\" (1.727 m)        BMI: 37.56       GENERAL APPEARANCE:   The patient is normal weight and in no respiratory distress. HEENT:   PERRL. Conjunctivae unremarkable. Nasal mucosa is without epistaxis, exudate, or polyps. Nares patent bilateral.  Gums and dentition are unremarkable. There is oropharyngeal narrowing. Vidal score 3. NECK/LYMPHATIC:   Symmetrical with no elevation of jugular venous pulsation. Trachea midline. No thyroid enlargement. No cervical adenopathy. LUNGS:   Normal respiratory effort with symmetrical lung expansion. Breath sounds clear. HEART:   There is a regular rate and rhythm. No murmur, rub, or gallop. There is no edema in the lower extremities. ABDOMEN:   Soft and non-tender. No hepatosplenomegaly. Bowel sounds are normal.     NEURO:   The patient is alert and oriented to person, place, and time. Memory appears intact and mood is normal.  No gross sensorimotor deficits are present. ASSESSMENT:  (Medical Decision Making)      Diagnosis Orders   1. SULEIMAN (obstructive sleep apnea)  DME - DURABLE MEDICAL EQUIPMENT - Patient is using, compliant and benefiting from Pap therapy. Continue current settings as AHI is down to 3.5 events per hour. 2. Hypersomnia  Should continue to improve with consistent use of CPAP. 3. Obesity (BMI 35.0-39.9 without comorbidity)  Patient can lose weight including ambulating 8-10,000 steps. Can Build Up Slowly as tolerated to this goal.    Also modifying dietary intake with decrease carbohydrates and sweets. Told to use avoid the P's --> Pizza, Pasta, Pastry, etc.  Increase fruits, vegetables, and lean meats e.g. [place] chicken or game meat. Patient is aware the goal is to consume less than 2000 heather/day, since patient is a nondiabetic. We discussed using the Marya LOSE IT to count calories. Patient can police themselves. If the future, if still having issues can use a more regimented diet e.g. [place], [address] etc. Clinical correlation suggested. PLAN:    Continue CPAP with pressure change of 7-12 cm H2O with nightly compliance  New supplies ordered  Recommendations as above  Follow-up in 4 months or sooner if needed    Orders Placed This Encounter   Procedures    DME - [address] [place]  Phone: [address] [facility] [address] [address]  Dept: [phone]      Patient Name: Kailee Lizarraga  : 1970  Gender: female  Address: [address] [place] [address]  Patient phone: [phone] (home)       Primary Insurance: Payor: Nora Fleming / Plan: [place] / Product Type: *No Product type* /   Subscriber ID: [id number] - (Medicare Managed)      AMB Supply Order  Order Details     DME Location: [facility]   Order Date: 10/17/2022   The primary encounter diagnosis was SULEIMAN (obstructive sleep apnea). Diagnoses of Hypersomnia and Obesity (BMI 35.0-39.9 without comorbidity) were also pertinent to this visit.              (  X   )Supplies Needed       CPAP Machine   (     ) CPAP Unit  (  x   ) Auto CPAP Unit  (     ) BiLevel Unit  (     ) Auto BiLevel Unit  (     ) ASV   (     ) Bilevel ST      Length of need: 12 months    Pressure:  7-12 cmH20  EPR: 2     Starting Ramp Pressure:  5 cm H20  Ramp Time: min  20    Patient had a diagnostic Apnea Hypopnea Index (AHI) of :  7.6  *SUPPLIES* Replace all as needed, or per coverage guidelines     Masks Type:  ( x   ) -Full Face Mask (1 per 3 mon)   <<<< would like to try F&P Dakota full face mask>>>  (  x  ) -Full Mask (1 per month) Interface/Cushion      (  ) -Nasal Mask (1 per 3 mon)  (  ) - Nasal Mask (1 per month) Interface/Cushion  (     ) -Pillow (2 per mon)  (     ) -Izohvkekg (1 per 6 mon)            Other Supplies:    (   X  )-Hltnnjgl (1 per 6 mon)  (   X  )-Ieyijr Tubing (1 per 3 mon)  (   X  )- Disposable Filter (2 per mon)  (   x  )-Kplgjf Humidifier (1 per year)     ( x    )-Vfhcvvnsf (sometimes used with Full Face Mask) (1 per 6 mos)  (    )-Tubing-without heat (1 per 3 mos)  (     )-Non-Disposable Filter (1 per 6 mos)  (  x   )-Water Chamber (1 per 6 mos)  (     )-Humidifier non-heated (1 per 5 yrs)      Signed Date: 10/17/2022  Electronically Signed By: MILY Kwan CNP  Electronically Dated:  10/17/2022             Collaborating Physician: Dr. Isela Lanier    Over 50% of today's office visit was spent in face to face time reviewing test results, prognosis, importance of compliance, education about disease process, benefits of medications, instructions for management of acute flare-ups, and follow up plans. Total face to face time spent with patient was 20 minutes.         MILY Kwan CNP  Electronically signed

## 2022-10-17 ENCOUNTER — OFFICE VISIT (OUTPATIENT)
Dept: SLEEP MEDICINE | Age: 52
End: 2022-10-17
Payer: MEDICARE

## 2022-10-17 VITALS
SYSTOLIC BLOOD PRESSURE: 108 MMHG | WEIGHT: 247 LBS | HEART RATE: 64 BPM | OXYGEN SATURATION: 99 % | DIASTOLIC BLOOD PRESSURE: 68 MMHG | TEMPERATURE: 97.2 F | RESPIRATION RATE: 14 BRPM | BODY MASS INDEX: 37.44 KG/M2 | HEIGHT: 68 IN

## 2022-10-17 DIAGNOSIS — G47.10 HYPERSOMNIA: ICD-10-CM

## 2022-10-17 DIAGNOSIS — E66.9 OBESITY (BMI 35.0-39.9 WITHOUT COMORBIDITY): ICD-10-CM

## 2022-10-17 DIAGNOSIS — G47.33 OSA (OBSTRUCTIVE SLEEP APNEA): Primary | ICD-10-CM

## 2022-10-17 PROCEDURE — 99213 OFFICE O/P EST LOW 20 MIN: CPT | Performed by: NURSE PRACTITIONER

## 2022-10-17 ASSESSMENT — SLEEP AND FATIGUE QUESTIONNAIRES
ESS TOTAL SCORE: 12
HOW LIKELY ARE YOU TO NOD OFF OR FALL ASLEEP WHILE LYING DOWN TO REST IN THE AFTERNOON WHEN CIRCUMSTANCES PERMIT: 0
HOW LIKELY ARE YOU TO NOD OFF OR FALL ASLEEP WHILE SITTING QUIETLY AFTER LUNCH WITHOUT ALCOHOL: 3
HOW LIKELY ARE YOU TO NOD OFF OR FALL ASLEEP WHILE SITTING INACTIVE IN A PUBLIC PLACE: 1
HOW LIKELY ARE YOU TO NOD OFF OR FALL ASLEEP WHILE SITTING AND TALKING TO SOMEONE: 0
HOW LIKELY ARE YOU TO NOD OFF OR FALL ASLEEP WHILE SITTING AND READING: 3
HOW LIKELY ARE YOU TO NOD OFF OR FALL ASLEEP IN A CAR, WHILE STOPPED FOR A FEW MINUTES IN TRAFFIC: 0
HOW LIKELY ARE YOU TO NOD OFF OR FALL ASLEEP WHEN YOU ARE A PASSENGER IN A CAR FOR AN HOUR WITHOUT A BREAK: 2
HOW LIKELY ARE YOU TO NOD OFF OR FALL ASLEEP WHILE WATCHING TV: 3

## 2022-10-17 NOTE — PATIENT INSTRUCTIONS
Continue CPAP with pressure change of 7-12 cm H2O with nightly compliance  New supplies ordered  Recommendations as above  Follow-up in 4 months or sooner if needed

## 2022-10-19 ENCOUNTER — OFFICE VISIT (OUTPATIENT)
Dept: ORTHOPEDIC SURGERY | Age: 52
End: 2022-10-19
Payer: MEDICARE

## 2022-10-19 VITALS — WEIGHT: 247 LBS | BODY MASS INDEX: 37.44 KG/M2 | HEIGHT: 68 IN

## 2022-10-19 DIAGNOSIS — Z96.652 STATUS POST LEFT KNEE REPLACEMENT: Primary | ICD-10-CM

## 2022-10-19 DIAGNOSIS — M25.562 ACUTE PAIN OF LEFT KNEE: ICD-10-CM

## 2022-10-19 PROCEDURE — 99213 OFFICE O/P EST LOW 20 MIN: CPT | Performed by: PHYSICIAN ASSISTANT

## 2022-10-19 NOTE — PROGRESS NOTES
Name: Claudy Hidalgo  YOB: 1970  Gender: female  MRN: [id number]    CC:   Chief Complaint   Patient presents with    Follow-up     Recheck after fall with repeat xrays          HPI: Claudy Hidalgo is a 45 y.o. female with a PMHx of HTN, SULEIMAN, and HLD here for follow up evaluation of left knee pain status post left TKA which was performed 4/20/2022 by Dr. Wendy Mariee. At last office visit 2 weeks ago patient complained of pain after a fall x2 days ago where she fell onto her left knee directly after turning to the left from the counter. She states she has pain all over the knee, but it is worse and most tender over the lateral side. She also notes she has a lot of swelling and some bruising over the medial aspect of the knee. She describes the pain as a throbbing ache and notes she also has some instability. She states the pain is somewhat better today, but she is still having difficulty weightbearing, with lateral movements, and putting on socks. She is currently using a cane. So far she is treated with limited weightbearing, Tylenol, and states on the night this happened she took an old prescription of oxybutynin to 1 pill. At last office visit, Stress fracture was unable to be entirely ruled out there are no acute fractures were noted on x-ray. I recommended we treat as a stress fracture until repeat x-rays can be done. Recommended weightbearing with a cane or walker, rest, ice, and NSAIDs. Patient returns today noting some improvement in pain. She states she still feels very tender over the lateral aspect of the knee, but overall pain is improved with conservative management. No new symptoms to report today. Allergies   Allergen Reactions    Atorvastatin Other (See Comments)    Metformin Other (See Comments)       Review of Systems:  As per HPI. Pertinent positives and negatives are addressed with the patient, particularly those related to musculoskeletal concerns. Non-orthopaedic concerns were referred back to the primary care physician. PE:  left Knee  Patient is comfortable and in no distress. The lower extremities are as described below. Circulation is normal with palpable pedal pulses bilaterally and no edema. There is no lymph adenopathy in the popliteal or malleolar region. The skin is without stasis disease distally bilaterally. Sensation is intact to light touch bilaterally. There is severe tenderness to palpation over the lateral joint line of the left knee(s)  The gait is noted to be moderate antalgic and and ambulates with a Cane  ROM: 0 to 95 degrees  AP translation: 4 mm  M-L laxity: 2 degrees  Alignment: 4 degrees of valgus  Quadriceps strength: excellent  There is increased swelling globally over the knee  There is bruising over the medial aspect of the knee  Incision: well healed    Radiographs: AP/Lateral and sunrise of the bilateral knee taken in the office today reveal a good bone, prosthetic appearance. The patella is balanced. No fractures or lucencies noted. Radiographic Diagnosis:  Stable total knee arthroplasty. Recommendations:  Reviewed x-ray findings with the patient. I do not see any evidence of fracture, stress fracture or other acute findings on imaging today. Patient symptoms are improving with conservative management. I discussed with the patient that I think her symptoms are more related to a contusion of the bone from her fall. I discussed with her that this can take 8 to 12 weeks to completely resolve but should get better with time and conservative management. I recommend ice and elevation, continue Tylenol. I recommend follow-up in 8 weeks to see how she is doing, but if her symptoms worsen in the meantime she should call back for sooner appointment.  ----The patient will be treated observantly with self directed symptomatic care.  Instructions were given to follow up if there is any neurologic or functional decline.  ---- NSAID: The patient is agreeable to a trial of nonsteroidal anti-inflammatory drugs (NSAIDs). We discussed risks associated with their use, including GI tract or other bleeding, and also some cardiac risk. Instructions were given to discontinue the NSAID if there is any sign of GI bleed, chest pain, or shortness of breath.               GEORGE Eugene

## 2022-10-21 ENCOUNTER — TELEPHONE (OUTPATIENT)
Dept: SLEEP MEDICINE | Age: 52
End: 2022-10-21

## 2022-10-21 DIAGNOSIS — G47.33 OSA (OBSTRUCTIVE SLEEP APNEA): Primary | ICD-10-CM

## 2022-10-21 NOTE — TELEPHONE ENCOUNTER
Called and spoke with patient and she reports that she feels like the pressure is too high on her machine and she is having difficulty using it. She was supposed to go to [facility] and get the machine put into her name so we could access it online. She states she did go by [facility] but I cannot find her name in the system to make any changes. Told her I would send in a new order to change the pressures back to 7-10 cm H2O and she can go by [facility] for them to adjust her machine. Spoke to Romy Vásquez at [facility] and informed her that the patient may be coming by for pressure change. Orders Placed This Encounter   Procedures    DME - [address] [place]  Phone: [facility] [address] [address]  Dept: [phone]      Patient Name: Kailee Lizarraga  : 1970  Gender: female  Address: [place] [address]  Patient phone: [phone] (home)       Primary Insurance: Payor: Nora Fleming / Plan: [facility] MEDICARE HMO/PPO / Product Type: *No Product type* /   Subscriber ID: [id number] - (Medicare Managed)      AMB Supply Order  Order Details     DME Location: [facility]   Order Date: 10/21/2022   The encounter diagnosis was SULEIMAN (obstructive sleep apnea).              (  X   )Supplies Needed       CPAP Machine   (     ) CPAP Unit  (  x   ) Auto CPAP Unit  (     ) BiLevel Unit  (     ) Auto BiLevel Unit  (     ) ASV   (     ) Bilevel ST      Length of need: 12 months    Pressure:  7-10 cmH20  EPR:      Starting Ramp Pressure:   cm H20  Ramp Time: min      Patient had a diagnostic Apnea Hypopnea Index (AHI) of :  7.6  *SUPPLIES* Replace all as needed, or per coverage guidelines     Masks Type:  ( x   ) -Full Face Mask (1 per 3 mon)  (  x  ) -Full Mask (1 per month) Interface/Cushion      (  ) -Nasal Mask (1 per 3 mon)  (  ) - Nasal Mask (1 per month) Interface/Cushion  (     ) -Pillow (2 per mon)  (     ) -Znfxpwdjl (1 per 6 mon)            Other Supplies:    (   X  )-Qurqkndl (1 per 6 mon)  (   X  )-Trlmyz Tubing (1 per 3 mon)  (   X  )- Disposable Filter (2 per mon)  (   x  )-Mxrhnd Humidifier (1 per year)     ( x    )-Bdelqwmlx (sometimes used with Full Face Mask) (1 per 6 mos)  (    )-Tubing-without heat (1 per 3 mos)  (     )-Non-Disposable Filter (1 per 6 mos)  (  x   )-Water Chamber (1 per 6 mos)  (     )-Humidifier non-heated (1 per 5 yrs)      Signed Date: 10/21/2022  Electronically Signed By: MILY Doe - CHANG  Electronically Dated:  10/21/2022

## 2022-10-25 ENCOUNTER — TELEPHONE (OUTPATIENT)
Dept: SLEEP MEDICINE | Age: 52
End: 2022-10-25

## 2022-10-25 NOTE — TELEPHONE ENCOUNTER
Patient says that she went back over to [address] [place] [address] had the pressure changed . Still the cpap is loud keeping her awake. She is unsure what the problem is.

## 2022-10-31 NOTE — TELEPHONE ENCOUNTER
Advised patient I will have someone from [address] [place] [address] call her to schedule time with a RT to listen to the cpap machine while she is wearing the cpap to figure out the problem. Patient has agreed.

## 2022-12-14 ENCOUNTER — OFFICE VISIT (OUTPATIENT)
Dept: ORTHOPEDIC SURGERY | Age: 52
End: 2022-12-14
Payer: MEDICARE

## 2022-12-14 VITALS — BODY MASS INDEX: 37.44 KG/M2 | HEIGHT: 68 IN | WEIGHT: 247 LBS

## 2022-12-14 DIAGNOSIS — M25.562 ACUTE PAIN OF LEFT KNEE: ICD-10-CM

## 2022-12-14 DIAGNOSIS — Z96.652 STATUS POST LEFT KNEE REPLACEMENT: Primary | ICD-10-CM

## 2022-12-14 PROCEDURE — 99214 OFFICE O/P EST MOD 30 MIN: CPT | Performed by: PHYSICIAN ASSISTANT

## 2022-12-14 RX ORDER — PREDNISONE 10 MG/1
10 TABLET ORAL SEE ADMIN INSTRUCTIONS
Qty: 48 EACH | Refills: 0 | Status: SHIPPED | OUTPATIENT
Start: 2022-12-14 | End: 2022-12-26

## 2022-12-14 NOTE — PROGRESS NOTES
Name: Loida Arana  YOB: 1970  Gender: female  MRN: [id number]    CC:   Chief Complaint   Patient presents with    Follow-up     Left tka          HPI: Loida Arana is a 45 y.o. female with a PMHx of HTN, SULEIMAN, and HLD here for follow up evaluation of left knee pain status post left TKA which was performed 4/20/2022 by Dr. Michelle Douglas. At last office visit 10 weeks ago patient complained of pain after a fall x2 days prior where she fell onto her left knee directly after turning to the left from the counter. She states she has pain all over the knee, but it is worse and most tender over the lateral side. She also notes she has a lot of swelling and some bruising over the medial aspect of the knee. She describes the pain as a throbbing ache and notes she also has some instability. She states the pain is somewhat better today, but she is still having difficulty weightbearing, with lateral movements, and putting on socks. She is currently using a cane. So far she is treated with limited weightbearing, Tylenol, and states on the night this happened she took an old prescription of oxybutynin to 1 pill. Patient returns today noting some improvement in pain. She states she still feels very tender over the lateral aspect of the knee, but overall pain is improved with conservative management. No new symptoms to report today. Allergies   Allergen Reactions    Atorvastatin Other (See Comments)    Metformin Other (See Comments)       Review of Systems:  As per HPI. Pertinent positives and negatives are addressed with the patient, particularly those related to musculoskeletal concerns. Non-orthopaedic concerns were referred back to the primary care physician. PE:  left Knee  Patient is comfortable and in no distress. The lower extremities are as described below. Circulation is normal with palpable pedal pulses bilaterally and no edema.   There is no lymph adenopathy in the popliteal or malleolar region. The skin is without stasis disease distally bilaterally. Sensation is intact to light touch bilaterally. There is severe tenderness to palpation over the lateral joint line of the left knee(s)  The gait is noted to be moderate antalgic  ROM: 0 to 95 degrees  AP translation: 4 mm  M-L laxity: 2 degrees  Alignment: 4 degrees of valgus  Quadriceps strength: excellent  There is increased swelling globally over the knee  There is marked tenderness to palpation over the lateral inferior knee and infrapatellar area  Incision: well healed    Radiographs: AP/Lateral and sunrise of the bilateral knee taken in the office today reveal a good bone, prosthetic appearance. The patella is balanced. No fractures or lucencies noted. Radiographic Diagnosis:  Stable total knee arthroplasty. Recommendations:  Reviewed x-ray findings with the patient. I do not see any evidence of fracture, stress fracture or other acute findings on imaging today. Patient symptoms are improving slowly with conservative management but she is still markedly tender over the infrapatellar area. Differential at this point includes contusion vs infrapatellar bursitis. I recommend ice and elevation, continue Tylenol. I recommend follow-up in 3 weeks with Dr. Kamryn Bolivar to see how she is doing, but if her symptoms worsen in the meantime she should call back for sooner appointment.  ----The patient will be treated observantly with self directed symptomatic care. Instructions were given to follow up if there is any neurologic or functional decline.  ---- Topical NSAID: The patient is agreeable to a trial of topical nonsteroidal anti-inflammatory drugs (NSAIDs). The patient was prescribed Diclofenac topical.  ---- Oral Steroids: A short course of oral steroids was prescribed in an attempt to bring the acute inflammatory symptoms under control.  The patient understands the risks and side effects of oral steroids including immunosuppression, hypertension, mood swings, increased blood sugar, including glaucoma. The steroid taper can be followed by NSAIDs once completed.             GEORGE Morris

## 2022-12-22 ENCOUNTER — APPOINTMENT (RX ONLY)
Dept: URBAN - METROPOLITAN AREA CLINIC 330 | Facility: CLINIC | Age: 52
Setting detail: DERMATOLOGY
End: 2022-12-22

## 2022-12-22 DIAGNOSIS — L81.0 POSTINFLAMMATORY HYPERPIGMENTATION: ICD-10-CM | Status: INADEQUATELY CONTROLLED

## 2022-12-22 DIAGNOSIS — L20.89 OTHER ATOPIC DERMATITIS: ICD-10-CM | Status: INADEQUATELY CONTROLLED

## 2022-12-22 PROBLEM — L20.84 INTRINSIC (ALLERGIC) ECZEMA: Status: ACTIVE | Noted: 2022-12-22

## 2022-12-22 PROCEDURE — ? PHOTO-DOCUMENTATION

## 2022-12-22 PROCEDURE — ? COUNSELING

## 2022-12-22 PROCEDURE — ? PRESCRIPTION MEDICATION MANAGEMENT

## 2022-12-22 PROCEDURE — ? PRESCRIPTION

## 2022-12-22 PROCEDURE — 99214 OFFICE O/P EST MOD 30 MIN: CPT

## 2022-12-22 PROCEDURE — ? OTHER

## 2022-12-22 RX ORDER — PHARMACY COMPOUNDING ACCESSORY
EACH MISCELLANEOUS
Qty: 30 | Refills: 2 | Status: ERX | COMMUNITY
Start: 2022-12-22

## 2022-12-22 RX ADMIN — Medication: at 00:00

## 2022-12-22 ASSESSMENT — SEVERITY ASSESSMENT 2020: SEVERITY 2020: ALMOST CLEAR

## 2022-12-22 ASSESSMENT — LOCATION SIMPLE DESCRIPTION DERM
LOCATION SIMPLE: RIGHT UPPER BACK
LOCATION SIMPLE: POSTERIOR NECK
LOCATION SIMPLE: LEFT ANTERIOR NECK

## 2022-12-22 ASSESSMENT — LOCATION DETAILED DESCRIPTION DERM
LOCATION DETAILED: RIGHT MEDIAL TRAPEZIAL NECK
LOCATION DETAILED: RIGHT INFERIOR UPPER BACK
LOCATION DETAILED: LEFT CLAVICULAR NECK

## 2022-12-22 ASSESSMENT — LOCATION ZONE DERM
LOCATION ZONE: NECK
LOCATION ZONE: TRUNK

## 2022-12-22 NOTE — PROCEDURE: PRESCRIPTION MEDICATION MANAGEMENT
Detail Level: Zone
Render In Strict Bullet Format?: No
Continue Regimen: triamcinolone acetonide 0.1 % topical cream \\nApply to rash on body twice daily for two weeks as needed for flares.\\n\\nPatient declines needing refills at this time.\\nDiscussed topical steroid risk and appropriate use.
Plan: Discussed bleaching cream such as 6% hydroquinone.

## 2023-01-19 ENCOUNTER — OFFICE VISIT (OUTPATIENT)
Dept: ORTHOPEDIC SURGERY | Age: 53
End: 2023-01-19

## 2023-01-19 DIAGNOSIS — Z96.652 STATUS POST LEFT KNEE REPLACEMENT: Primary | ICD-10-CM

## 2023-01-19 NOTE — PROGRESS NOTES
Name: Audie Fried  YOB: 1970  Gender: female  MRN: [id number]    CC: Follow-up (Leia lt tka)       HPI: Audie Fried is a 45 y.o. female who presents with Follow-up (Leia lt tka)  The patient returns today for follow-up of staged bilateral total knee arthroplasty. Right total knee performed in January 2022 and left total knee performed in April 2022. She is doing well with guard to both knees but continues to have some soreness over the anterior aspect of both knees with the right being worse than left. Has some difficulty getting out of a chair and going up and down stairs. She has had 2 falls one on each knee. She currently is able to do all activities of daily living with few restrictions. History was obtained by patient    ROS/Meds/PSH/PMH/FH/SH: I personally reviewed the patients standard intake form. Current Outpatient Medications:     diclofenac sodium (VOLTAREN) 1 % GEL, Apply 4 g topically 4 times daily as needed for Pain, Disp: 150 g, Rfl: 2    diclofenac (CATAFLAM) 50 MG tablet, Take 1 tablet by mouth 3 times daily, Disp: 90 tablet, Rfl: 0    amLODIPine (NORVASC) 10 MG tablet, , Disp: , Rfl:     oxyCODONE (ROXICODONE) 5 MG immediate release tablet, as needed. , Disp: , Rfl:     terbinafine (LAMISIL) 250 MG tablet, , Disp: , Rfl:     tiZANidine (ZANAFLEX) 4 MG tablet, Take 4 mg by mouth 3 times daily as needed (Patient not taking: Reported on 10/17/2022), Disp: , Rfl:     spironolactone (ALDACTONE) 25 MG tablet, Take 1 tablet by mouth in the morning. 1/2 tab.  (Patient taking differently: Take 12.5 mg by mouth daily 1/2 tab), Disp: 90 tablet, Rfl: 3    Zinc Sulfate (ZINC 15 PO), Take by mouth, Disp: , Rfl:     metoprolol succinate (TOPROL XL) 50 MG extended release tablet, Take 1 tablet by mouth in the morning and at bedtime, Disp: 180 tablet, Rfl: 3    omeprazole (PRILOSEC) 40 MG delayed release capsule, Take 40 mg by mouth daily, Disp: , Rfl:     simvastatin (ZOCOR) 40 MG tablet, , Disp: , Rfl:     vitamin C (ASCORBIC ACID) 500 MG tablet, Take 500 mg by mouth daily, Disp: , Rfl:     aspirin 81 MG EC tablet, Take 81 mg by mouth every 12 hours, Disp: , Rfl:     vitamin D3 (CHOLECALCIFEROL) 10 MCG (400 UNIT) TABS tablet, Take 400 Units by mouth daily , Disp: , Rfl:     methocarbamol (ROBAXIN) 500 MG tablet, Take 500 mg by mouth 3 times daily as needed, Disp: , Rfl:     polyethylene glycol (GLYCOLAX) 17 GM/SCOOP powder, Take 17 g by mouth daily, Disp: , Rfl:     promethazine (PHENERGAN) 25 MG tablet, Take 25 mg by mouth every 8 hours as needed, Disp: , Rfl:     sacubitril-valsartan (ENTRESTO)  MG per tablet, Take 1 tablet by mouth 2 times daily, Disp: , Rfl:    Past Medical History:   Diagnosis Date    Abnormal Papanicolaou smear of cervix     Adverse effect of anesthesia     \"takes more\" for anesthesia to be effective     Arthritis     CAD (coronary artery disease)     no stents; [facility] 3/2017 mod LAD dz; followed by Dr Jose Cochran ([facility])    CHF (congestive heart failure) ([facility] [address]     ECHO 12/18/18: EF 40-45%; managed with medication and followed by [facility]    Fibroid, uterine     GERD (gastroesophageal reflux disease)     diet controlled     H/O echocardiogram 10/04/2021    EF 40-45%    Heart murmur     Echo (10/4/21) EF 40-45% trace regurgitation in mitral and tricuspid valve    History of anemia     Hypercholesterolemia     Controlled with meds     Hypertension     Controlled with meds     Menorrhagia with regular cycle     Morbid obesity ([facility] [address]     NICM (nonischemic cardiomyopathy) ([facility] [address]     Followed by [facility] cardiology     SULEIMAN on CPAP     Ovarian cyst     PVCs (premature ventricular contractions)     Uterine fibroid         Physical Examination:  Alert and oriented x3. Ambulatory without any walking aids. Incisions both knees are well-healed. No effusion present in either knee with no increased warmth or swelling.   Good range of motion of both knees 0 to 120 degrees bilaterally. Good quad strength with no extensor lag. No instability to varus valgus stress of either knee. No AP instability of either knee. Good quad strength bilaterally. No extensor lag. No popliteal masses. Calves are soft and nontender. Neurovascular exam is normal.    Imaging:   X-rays were reviewed from December 14, 2022    Assessment:   The patient is doing as expected following staged bilateral total knee arthroplasties. Hopefully she will continue to improve over the next year. She is 1 year status post right total knee arthroplasty and 10 months status post left total knee arthroplasty. She has more pain and discomfort in her right knee than she does her left knee. She could be having some increased discomfort and pain in the right knee because of an on resurfaced patella. She does note some pain on getting out of a chair or going up and down stairs. I have recommended that we observe this at this point in time and hope that her symptoms will improve over the next year. She will be seen in 12 months for follow-up but sooner if she has any increasing symptoms in either knee.

## 2023-02-01 ENCOUNTER — OFFICE VISIT (OUTPATIENT)
Dept: CARDIOLOGY CLINIC | Age: 53
End: 2023-02-01
Payer: COMMERCIAL

## 2023-02-01 VITALS
HEART RATE: 62 BPM | BODY MASS INDEX: 36.68 KG/M2 | HEIGHT: 68 IN | WEIGHT: 242 LBS | SYSTOLIC BLOOD PRESSURE: 130 MMHG | DIASTOLIC BLOOD PRESSURE: 80 MMHG

## 2023-02-01 DIAGNOSIS — I42.8 NICM (NONISCHEMIC CARDIOMYOPATHY) (HCC): Primary | ICD-10-CM

## 2023-02-01 DIAGNOSIS — I50.22 SYSTOLIC CHF, CHRONIC (HCC): ICD-10-CM

## 2023-02-01 DIAGNOSIS — I49.3 PVC'S (PREMATURE VENTRICULAR CONTRACTIONS): ICD-10-CM

## 2023-02-01 DIAGNOSIS — I25.118 CORONARY ARTERY DISEASE OF NATIVE ARTERY OF NATIVE HEART WITH STABLE ANGINA PECTORIS (HCC): ICD-10-CM

## 2023-02-01 PROCEDURE — 99214 OFFICE O/P EST MOD 30 MIN: CPT | Performed by: INTERNAL MEDICINE

## 2023-02-01 NOTE — PROGRESS NOTES
[address], [address] [facility], [address] [place] [address]  PHONE: [phone]     23    NAME:  Courtney Alcantara  : 1970  MRN: [id number]       SUBJECTIVE:   Courtney Alcantara is a 45 y.o. female seen for a follow up visit regarding the following:     Chief Complaint   Patient presents with    Congestive Heart Failure    Coronary Artery Disease       HPI:  Here for eval of NICM/SHF. Echo 2017: EF 30%     2018:  PVC ablation by Dr. Burton Neville. Echo 3/2018: EF 45%   LHC 3/9/2020: EF 20%, LAD 40%   Echo 10/2021: EF 40-45%   NST 2021:  low risk stress test.    Feeling better on toprol BID now, some rare palp now. Walking more now. No new SUTHERLAND, SOB. NYHA Class II sx now. No new edema. Patient denies recent history of orthopnea, PND, excessive dizziness and/or syncope. Former teacher at [employer]. Past Medical History, Past Surgical History, Family history, Social History, and Medications were all reviewed with the patient today and updated as necessary. Current Outpatient Medications   Medication Sig Dispense Refill    Specialty Vitamins Products (ECHINACEA C COMPLETE PO) Take by mouth      diclofenac sodium (VOLTAREN) 1 % GEL Apply 4 g topically 4 times daily as needed for Pain 150 g 2    amLODIPine (NORVASC) 10 MG tablet       oxyCODONE (ROXICODONE) 5 MG immediate release tablet as needed. terbinafine (LAMISIL) 250 MG tablet as needed      spironolactone (ALDACTONE) 25 MG tablet Take 1 tablet by mouth in the morning. 1/2 tab.  (Patient taking differently: Take 12.5 mg by mouth daily 1/2 tab) 90 tablet 3    Zinc Sulfate (ZINC 15 PO) Take by mouth      metoprolol succinate (TOPROL XL) 50 MG extended release tablet Take 1 tablet by mouth in the morning and at bedtime 180 tablet 3    omeprazole (PRILOSEC) 40 MG delayed release capsule Take 40 mg by mouth daily      simvastatin (ZOCOR) 40 MG tablet       vitamin C (ASCORBIC ACID) 500 MG tablet Take 500 mg by mouth daily aspirin 81 MG EC tablet Take 81 mg by mouth every 12 hours      vitamin D3 (CHOLECALCIFEROL) 10 MCG (400 UNIT) TABS tablet Take 400 Units by mouth daily       methocarbamol (ROBAXIN) 500 MG tablet Take 500 mg by mouth 3 times daily as needed      polyethylene glycol (GLYCOLAX) 17 GM/SCOOP powder Take 17 g by mouth daily      promethazine (PHENERGAN) 25 MG tablet Take 25 mg by mouth every 8 hours as needed      sacubitril-valsartan (ENTRESTO)  MG per tablet Take 1 tablet by mouth 2 times daily      diclofenac (CATAFLAM) 50 MG tablet Take 1 tablet by mouth 3 times daily (Patient not taking: Reported on 2/1/2023) 90 tablet 0    tiZANidine (ZANAFLEX) 4 MG tablet Take 4 mg by mouth 3 times daily as needed (Patient not taking: No sig reported)       No current facility-administered medications for this visit.         Allergies   Allergen Reactions    Atorvastatin Other (See Comments)    Metformin Other (See Comments)     Patient Active Problem List    Diagnosis Date Noted    S/P ablation of ventricular arrhythmia 02/12/2018     Priority: High    Status post left knee replacement 04/20/2022    Status post right knee replacement 01/26/2022    Noncompliance with CPAP treatment 01/05/2022    SULEIMAN (obstructive sleep apnea) 04/19/2021    Obesity (BMI 30-39.9) 12/15/2020    Abnormal uterine bleeding (AUB) 02/12/2019    S/P abdominal supracervical subtotal hysterectomy 02/12/2019    Severe obesity with body mass index (BMI) of 35.0 to 39.9 with serious comorbidity ([address] 07/05/2018    Menorrhagia with regular cycle 05/07/2018    Coronary artery disease of native artery of native heart with stable angina pectoris ([address] 12/18/2017    Chronic fatigue 12/18/2017    PVC's (premature ventricular contractions) 24/96/6120    Systolic CHF, chronic ([address] 12/18/2017    NICM (nonischemic cardiomyopathy) ([address] 12/18/2017    Intramural, submucous, and subserous leiomyoma of uterus 11/01/2017      Past Surgical History:   Procedure Laterality Date    ABLATION OF DYSRHYTHMIC FOCUS  02/2018    Ablation for pvc's     CARDIAC CATHETERIZATION      HYSTEROSCOPY      ORTHOPEDIC SURGERY Right     toe removal     OTHER SURGICAL HISTORY      left hip and gluteal surgery    FRANSISCA AND BSO (CERVIX REMOVED)  02/12/2019    Supracervical    TOTAL KNEE ARTHROPLASTY Right 01/26/2022    TUBAL LIGATION      WISDOM TOOTH EXTRACTION       Family History   Problem Relation Age of Onset    Breast Cancer Neg Hx     Cancer Father         Lymphoma    Heart Disease Mother      Social History     Tobacco Use    Smoking status: Never    Smokeless tobacco: Never   Substance Use Topics    Alcohol use: No         ROS:    No obvious pertinent positives on review of systems except for what was outlined in the HPI today.       PHYSICAL EXAM:     /80   Pulse 62   Ht 5' 8\" (1.727 m)   Wt 242 lb (109.8 kg)   BMI 36.80 kg/m²    General/Constitutional:   Alert and oriented x 3, no acute distress  HEENT:   normocephalic, atraumatic, moist mucous membranes  Neck:   No JVD or carotid bruits bilaterally  Cardiovascular:   regular rate and rhythm, no murmur/rub/gallop appreciated  Pulmonary:   clear to auscultation bilaterally, no respiratory distress  Abdomen:   soft, non-tender, non-distended  Ext:   No sig LE edema bilaterally  Skin:  warm and dry, no obvious rashes seen  Neuro:   no obvious sensory or motor deficits  Psychiatric:   normal mood and affect      Lab Results   Component Value Date/Time     06/10/2022 04:21 PM    K 4.0 06/10/2022 04:21 PM     06/10/2022 04:21 PM    CO2 25 06/10/2022 04:21 PM    BUN 16 06/10/2022 04:21 PM    CREATININE 0.80 06/10/2022 04:21 PM    GLUCOSE 77 06/10/2022 04:21 PM    CALCIUM 9.9 06/10/2022 04:21 PM        Lab Results   Component Value Date    WBC 6.2 06/10/2022    HGB 11.1 (L) 06/10/2022    HCT 35.5 (L) 06/10/2022    MCV 82.6 06/10/2022     06/10/2022       No results found for: TSHFT4, TSH    Lab Results   Component Value Date    LABA1C 5.8 04/13/2022     Lab Results   Component Value Date     04/13/2022       No results found for: CHOL  No results found for: TRIG  No results found for: HDL  No results found for: LDLCHOLESTEROL, LDLCALC  No results found for: LABVLDL, VLDL  No results found for: CHOLHDLRATIO        I have Independently reviewed prior care notes, any ER records available, cardiac testing, labs and results with the patient and before seeing the patient today. Also independently reviewed outside records when available. ASSESSMENT:    Albertina Gaxiola was seen today for congestive heart failure and coronary artery disease. Diagnoses and all orders for this visit:    NICM (nonischemic cardiomyopathy) ([facility] [address]    PVC's (premature ventricular contractions)    Systolic CHF, chronic (HCC)    Coronary artery disease of native artery of native heart with stable angina pectoris ([facility] [address]        PLAN:     1. PVCs: better on toprol, back to EP for more sx. Better now. Needs to wear CPAP. 2. CAD:  Remain on ASA and statin now. The patient has been instructed to call with any angina or equivalent as reviewed today. All questions were answered with the patient voicing complete understanding. 3. SHF/NICM:   On entresto,  GDMT, follow K and Cr. EF better,   Needs better diet and more exercise as reviewed. 4. HTN:  Remain on toprol, entresto. Patient has been instructed and agrees to call our office with any issues or other concerns related to their cardiac condition(s) and/or complaint(s). Return in about 6 months (around 8/1/2023).        MARTINA CULVER DO  2/1/2023

## 2023-02-02 ENCOUNTER — OFFICE VISIT (OUTPATIENT)
Dept: OBGYN CLINIC | Age: 53
End: 2023-02-02
Payer: COMMERCIAL

## 2023-02-02 VITALS — DIASTOLIC BLOOD PRESSURE: 68 MMHG | BODY MASS INDEX: 36.77 KG/M2 | WEIGHT: 241.8 LBS | SYSTOLIC BLOOD PRESSURE: 122 MMHG

## 2023-02-02 DIAGNOSIS — Z01.419 WELL WOMAN EXAM: Primary | ICD-10-CM

## 2023-02-02 DIAGNOSIS — Z13.89 SCREENING FOR GENITOURINARY CONDITION: ICD-10-CM

## 2023-02-02 DIAGNOSIS — Z12.31 VISIT FOR SCREENING MAMMOGRAM: ICD-10-CM

## 2023-02-02 LAB
BILIRUBIN, URINE, POC: NEGATIVE
BLOOD URINE, POC: NEGATIVE
GLUCOSE URINE, POC: NEGATIVE
KETONES, URINE, POC: NEGATIVE
LEUKOCYTE ESTERASE, URINE, POC: NEGATIVE
NITRITE, URINE, POC: NEGATIVE
PH, URINE, POC: 5.5 (ref 4.6–8)
PROTEIN,URINE, POC: NEGATIVE
SPECIFIC GRAVITY, URINE, POC: 1.02 (ref 1–1.03)
URINALYSIS CLARITY, POC: CLEAR
URINALYSIS COLOR, POC: YELLOW
UROBILINOGEN, POC: NORMAL

## 2023-02-02 PROCEDURE — 81002 URINALYSIS NONAUTO W/O SCOPE: CPT | Performed by: OBSTETRICS & GYNECOLOGY

## 2023-02-02 PROCEDURE — 99396 PREV VISIT EST AGE 40-64: CPT | Performed by: OBSTETRICS & GYNECOLOGY

## 2023-02-02 NOTE — PROGRESS NOTES
HPI: Ms. Keyana Hill is a 45 y.o.   OB History          5    Para        Term   3            AB   2    Living   3         SAB        IAB        Ectopic        Molar        Multiple        Live Births                 who is here today for a well woman exam.      Date Performed Result   PAP 10/12/21 Negative HR HPV Negative   Mammogram 21 Benign   Colonoscopy [place]- up to day 2019? WNL   Dexa NA      GYN History         No LMP recorded. Patient has had a hysterectomy.   ( Supracervical)    Past Medical History:  Past Medical History:   Diagnosis Date    Abnormal Papanicolaou smear of cervix     Adverse effect of anesthesia     \"takes more\" for anesthesia to be effective     Arthritis     CAD (coronary artery disease)     no stents; [facility] 3/2017 mod LAD dz; [place]    CHF (congestive heart failure) ([facility] [address]     ECHO 18: EF 40-45%; managed with medication and followed by [facility]    Fibroid, uterine     GERD (gastroesophageal reflux disease)     diet controlled     H/O echocardiogram 10/04/2021    EF 40-45%    Heart murmur     Echo (10/4/21) EF 40-45% trace regurgitation in mitral and tricuspid valve    History of anemia     Hypercholesterolemia     Controlled with meds     Hypertension     Controlled with meds     Menorrhagia with regular cycle     Morbid obesity ([address]     NICM (nonischemic cardiomyopathy) ([address]     Followed by [facility] cardiology     SULEIMAN on CPAP     Ovarian cyst     PVCs (premature ventricular contractions)     Uterine fibroid        Past Surgical History:  Past Surgical History:   Procedure Laterality Date    ABLATION OF DYSRHYTHMIC FOCUS  2018    Ablation for pvc's     CARDIAC CATHETERIZATION      HYSTEROSCOPY      ORTHOPEDIC SURGERY Right     toe removal     OTHER SURGICAL HISTORY      left hip and gluteal surgery    FRANSISCA AND BSO (CERVIX REMOVED)  2019    Supracervical    TOTAL KNEE ARTHROPLASTY Right 2022    TUBAL LIGATION WISDOM TOOTH EXTRACTION         Allergies: Allergies   Allergen Reactions    Atorvastatin Other (See Comments)    Metformin Other (See Comments)       Medication History:  Current Outpatient Medications   Medication Sig Dispense Refill    Specialty Vitamins Products (ECHINACEA C COMPLETE PO) Take by mouth      diclofenac sodium (VOLTAREN) 1 % GEL Apply 4 g topically 4 times daily as needed for Pain 150 g 2    diclofenac (CATAFLAM) 50 MG tablet Take 1 tablet by mouth 3 times daily 90 tablet 0    amLODIPine (NORVASC) 10 MG tablet       oxyCODONE (ROXICODONE) 5 MG immediate release tablet as needed. terbinafine (LAMISIL) 250 MG tablet as needed      tiZANidine (ZANAFLEX) 4 MG tablet Take 4 mg by mouth 3 times daily as needed      spironolactone (ALDACTONE) 25 MG tablet Take 1 tablet by mouth in the morning. 1/2 tab. (Patient taking differently: Take 12.5 mg by mouth daily 1/2 tab) 90 tablet 3    metoprolol succinate (TOPROL XL) 50 MG extended release tablet Take 1 tablet by mouth in the morning and at bedtime 180 tablet 3    omeprazole (PRILOSEC) 40 MG delayed release capsule Take 40 mg by mouth daily      simvastatin (ZOCOR) 40 MG tablet       vitamin C (ASCORBIC ACID) 500 MG tablet Take 500 mg by mouth daily      aspirin 81 MG EC tablet Take 81 mg by mouth every 12 hours      vitamin D3 (CHOLECALCIFEROL) 10 MCG (400 UNIT) TABS tablet Take 400 Units by mouth daily       methocarbamol (ROBAXIN) 500 MG tablet Take 500 mg by mouth 3 times daily as needed      polyethylene glycol (GLYCOLAX) 17 GM/SCOOP powder Take 17 g by mouth daily      promethazine (PHENERGAN) 25 MG tablet Take 25 mg by mouth every 8 hours as needed      sacubitril-valsartan (ENTRESTO)  MG per tablet Take 1 tablet by mouth 2 times daily      Zinc Sulfate (ZINC 15 PO) Take by mouth       No current facility-administered medications for this visit.        Social History:  Social History     Socioeconomic History    Marital status: Single Spouse name: Not on file    Number of children: Not on file    Years of education: Not on file    Highest education level: Not on file   Occupational History    Not on file   Tobacco Use    Smoking status: Never    Smokeless tobacco: Never   Vaping Use    Vaping Use: Never used   Substance and Sexual Activity    Alcohol use: No    Drug use: No    Sexual activity: Not on file     Comment: tubal / hysterectomy   Other Topics Concern    Not on file   Social History Narrative    Not on file     Social Determinants of Health     Financial Resource Strain: Not on file   Food Insecurity: Not on file   Transportation Needs: Not on file   Physical Activity: Not on file   Stress: Not on file   Social Connections: Not on file   Intimate Partner Violence: Not on file   Housing Stability: Not on file       Family History:  Family History   Problem Relation Age of Onset    Breast Cancer Neg Hx     Cancer Father         Lymphoma    Heart Disease Mother        Review of Systems - General ROS: negative except for that discussed in HPI      ROS:  Feeling well. No dyspnea or chest pain on exertion. No abdominal pain, change in bowel habits, black or bloody stools. No urinary tract symptoms. No neurological complaints.     Objective:   /68   Wt 241 lb 12.8 oz (109.7 kg)   BMI 36.77 kg/m²     Results for orders placed or performed in visit on 02/02/23   AMB POC URINALYSIS DIP STICK MANUAL W/O MICRO   Result Value Ref Range    Color (UA POC) Yellow     Clarity (UA POC) Clear     Glucose, Urine, POC Negative Negative    Bilirubin, Urine, POC Negative Negative    Ketones, Urine, POC Negative Negative    Specific Gravity, Urine, POC 1.025 1.001 - 1.035    Blood (UA POC) Negative Negative    pH, Urine, POC 5.5 4.6 - 8.0    Protein, Urine, POC Negative Negative    Urobilinogen, POC 0.2 mg/dL     Nitrite, Urine, POC Negative Negative    Leukocyte Esterase, Urine, POC Negative Negative        The patient appears well, alert, oriented x 3, in no distress. ENT normal.  Neck supple. No adenopathy or thyromegaly. Lungs:  clear, good air entry, no wheezes, rhonchi or rales. Heart:  S1 and S2 normal, no murmurs, regular rate and rhythm. Abdomen:  soft without tenderness, guarding, mass or organomegaly. Extremities show no edema, normal peripheral pulses. Neurological is normal, no focal findings. BREAST EXAM: breasts appear normal, no suspicious masses, no skin or nipple changes or axillary nodes, symmetric fibrous changes bilaterally    PELVIC EXAM: External genitalia is within normal limits, urethra, urethra meatus and bladder are midline well supported. Vagina is atropic, Cervix comes into full view and is within normal limits. Uterus is absent , no ovarian masses palpated    Assessment/Plan:      Diagnosis Orders   1. Well woman exam  AMB POC URINALYSIS DIP STICK MANUAL W/O MICRO      2. Visit for screening mammogram  [facility] LINETTE DIGITAL SCREEN BILATERAL      3. Screening for genitourinary condition  AMB POC URINALYSIS DIP STICK MANUAL W/O MICRO        Encounter Diagnoses   Name Primary? Well woman exam Yes    Visit for screening mammogram     Screening for genitourinary condition      Orders Placed This Encounter   Procedures    [facility] LINETTE DIGITAL SCREEN BILATERAL     Standing Status:   Future     Standing Expiration Date:   4/2/2024    AMB POC URINALYSIS DIP STICK MANUAL W/O MICRO     Adama Pimentel was seen today for annual exam.    Diagnoses and all orders for this visit:    Well woman exam  -     AMB POC URINALYSIS DIP STICK MANUAL W/O MICRO    Visit for screening mammogram  -     [facility] LINETTE DIGITAL SCREEN BILATERAL;  Future    Screening for genitourinary condition  -     AMB POC URINALYSIS DIP STICK MANUAL W/O MICRO        current treatment plan is effective, no change in therapy  Does not want HRT  Mammogram- she will call to schedule  pap smear not due  return annually or prn

## 2023-02-21 ENCOUNTER — TELEPHONE (OUTPATIENT)
Dept: CARDIOLOGY CLINIC | Age: 53
End: 2023-02-21

## 2023-02-21 NOTE — TELEPHONE ENCOUNTER
Pt called back wondering if forms have jhony faxed. Asked pt to drop off forms so can fill out and will send.

## 2023-03-02 ENCOUNTER — HOSPITAL ENCOUNTER (OUTPATIENT)
Dept: MAMMOGRAPHY | Age: 53
Discharge: HOME | End: 2023-03-02
Payer: COMMERCIAL

## 2023-03-02 DIAGNOSIS — Z12.31 VISIT FOR SCREENING MAMMOGRAM: ICD-10-CM

## 2023-03-02 PROCEDURE — 77067 SCR MAMMO BI INCL CAD: CPT

## 2023-03-31 ENCOUNTER — TRANSCRIBE ORDERS (OUTPATIENT)
Dept: SCHEDULING | Age: 53
End: 2023-03-31

## 2023-03-31 DIAGNOSIS — M79.89 SWELLING OF RIGHT HAND: Primary | ICD-10-CM

## 2023-04-03 ENCOUNTER — OFFICE VISIT (OUTPATIENT)
Dept: ORTHOPEDIC SURGERY | Age: 53
End: 2023-04-03
Payer: COMMERCIAL

## 2023-04-03 DIAGNOSIS — Z96.651 STATUS POST RIGHT KNEE REPLACEMENT: ICD-10-CM

## 2023-04-03 DIAGNOSIS — M25.561 RIGHT KNEE PAIN, UNSPECIFIED CHRONICITY: Primary | ICD-10-CM

## 2023-04-03 PROCEDURE — 99214 OFFICE O/P EST MOD 30 MIN: CPT | Performed by: PHYSICIAN ASSISTANT

## 2023-04-03 NOTE — PROGRESS NOTES
Name: Narinder Slaughter  YOB: 1970  Gender: female  MRN: [id number]    CC:   Chief Complaint   Patient presents with    Follow-up     Right knee pain, RT TKA 01/2022         HPI: Narinder Slaughter is a 45 y.o. female here for evaluation right knee pain and instability status post right TKA which was performed 01/2022 by Dr. Christina Ann. she has done well in the postoperative period thus far. The pain has been present for about 3 months and is becoming worse. The pain is primarily on the medial side, but she also has popping on the Lateral side. she describes the pain as dull, aching, throbbing, intermittent catching, and and often feels unstable  The pain is worse with going up and/or down stairs and walking  The pain does not radiate down the leg. she denies numbness and tingling down the leg. Treatment so far has been prescription and OTC NSAIDS which have not effectively relieved pain/inflammation, activity modification, and muscle relaxers with little relief. Allergies   Allergen Reactions    Atorvastatin Other (See Comments)    Metformin Other (See Comments)       Review of Systems:  As per HPI. Pertinent positives and negatives are addressed with the patient, particularly those related to musculoskeletal concerns. Non-orthopaedic concerns were referred back to the primary care physician. PE:  right Knee  Patient is comfortable and in no distress. The lower extremities are as described below. Circulation is normal with palpable pedal pulses bilaterally and no edema. There is no lymph adenopathy in the popliteal or malleolar region. The skin is without stasis disease distally bilaterally. Sensation is intact to light touch bilaterally.   There is mild tenderness to palpation over the medial joint line of the right knee(s)  The gait is noted to be with a slight trendelenburg and antalgia  ROM: 5 to 115 degrees  AP translation: 4 mm  M-L laxity: 2 degrees  Alignment: 4 degrees of

## 2023-04-04 ENCOUNTER — HOSPITAL ENCOUNTER (OUTPATIENT)
Dept: ULTRASOUND IMAGING | Age: 53
Discharge: HOME | End: 2023-04-07

## 2023-04-04 DIAGNOSIS — M79.89 SWELLING OF RIGHT HAND: ICD-10-CM

## 2023-06-13 DIAGNOSIS — Z96.651 STATUS POST RIGHT KNEE REPLACEMENT: ICD-10-CM

## 2023-06-13 LAB
BASOPHILS # BLD: 0.1 K/UL (ref 0–0.2)
BASOPHILS NFR BLD: 1 % (ref 0–2)
CRP SERPL-MCNC: <0.3 MG/DL (ref 0–0.9)
DIFFERENTIAL METHOD BLD: ABNORMAL
EOSINOPHIL # BLD: 0.2 K/UL (ref 0–0.8)
EOSINOPHIL NFR BLD: 2 % (ref 0.5–7.8)
ERYTHROCYTE [DISTWIDTH] IN BLOOD BY AUTOMATED COUNT: 15 % (ref 11.9–14.6)
ERYTHROCYTE [SEDIMENTATION RATE] IN BLOOD: 25 MM/HR (ref 0–30)
HCT VFR BLD AUTO: 39.7 % (ref 35.8–46.3)
HGB BLD-MCNC: 12.4 G/DL (ref 11.7–15.4)
IMM GRANULOCYTES # BLD AUTO: 0 K/UL (ref 0–0.5)
IMM GRANULOCYTES NFR BLD AUTO: 0 % (ref 0–5)
LYMPHOCYTES # BLD: 1.7 K/UL (ref 0.5–4.6)
LYMPHOCYTES NFR BLD: 21 % (ref 13–44)
MCH RBC QN AUTO: 26.8 PG (ref 26.1–32.9)
MCHC RBC AUTO-ENTMCNC: 31.2 G/DL (ref 31.4–35)
MCV RBC AUTO: 85.7 FL (ref 82–102)
MONOCYTES # BLD: 0.6 K/UL (ref 0.1–1.3)
MONOCYTES NFR BLD: 7 % (ref 4–12)
NEUTS SEG # BLD: 5.8 K/UL (ref 1.7–8.2)
NEUTS SEG NFR BLD: 69 % (ref 43–78)
NRBC # BLD: 0 K/UL (ref 0–0.2)
PLATELET # BLD AUTO: 357 K/UL (ref 150–450)
PMV BLD AUTO: 11.6 FL (ref 9.4–12.3)
RBC # BLD AUTO: 4.63 M/UL (ref 4.05–5.2)
WBC # BLD AUTO: 8.3 K/UL (ref 4.3–11.1)

## 2023-06-30 ENCOUNTER — OFFICE VISIT (OUTPATIENT)
Dept: ORTHOPEDIC SURGERY | Age: 53
End: 2023-06-30

## 2023-06-30 VITALS — BODY MASS INDEX: 36.53 KG/M2 | WEIGHT: 241 LBS | HEIGHT: 68 IN

## 2023-06-30 DIAGNOSIS — M43.16 SPONDYLOLISTHESIS OF LUMBAR REGION: Primary | ICD-10-CM

## 2023-06-30 DIAGNOSIS — M51.36 DDD (DEGENERATIVE DISC DISEASE), LUMBAR: ICD-10-CM

## 2023-06-30 DIAGNOSIS — M47.816 LUMBAR SPONDYLOSIS: ICD-10-CM

## 2023-06-30 DIAGNOSIS — M54.16 LUMBAR RADICULOPATHY: ICD-10-CM

## 2023-06-30 RX ORDER — TIZANIDINE 4 MG/1
4 TABLET ORAL NIGHTLY PRN
Qty: 30 TABLET | Refills: 2 | Status: SHIPPED | OUTPATIENT
Start: 2023-06-30

## 2023-06-30 RX ORDER — TIZANIDINE 2 MG/1
TABLET ORAL
Qty: 30 TABLET | Refills: 0 | Status: CANCELLED | OUTPATIENT
Start: 2023-06-30

## 2023-07-10 ENCOUNTER — OFFICE VISIT (OUTPATIENT)
Dept: SLEEP MEDICINE | Age: 53
End: 2023-07-10
Payer: COMMERCIAL

## 2023-07-10 ENCOUNTER — TELEPHONE (OUTPATIENT)
Dept: SLEEP MEDICINE | Age: 53
End: 2023-07-10

## 2023-07-10 VITALS
SYSTOLIC BLOOD PRESSURE: 118 MMHG | TEMPERATURE: 97.2 F | OXYGEN SATURATION: 98 % | BODY MASS INDEX: 36.22 KG/M2 | HEIGHT: 68 IN | WEIGHT: 239 LBS | DIASTOLIC BLOOD PRESSURE: 61 MMHG | HEART RATE: 56 BPM

## 2023-07-10 DIAGNOSIS — G47.33 OSA (OBSTRUCTIVE SLEEP APNEA): Primary | ICD-10-CM

## 2023-07-10 DIAGNOSIS — G47.10 HYPERSOMNIA: ICD-10-CM

## 2023-07-10 DIAGNOSIS — E66.01 CLASS 3 SEVERE OBESITY DUE TO EXCESS CALORIES WITHOUT SERIOUS COMORBIDITY WITH BODY MASS INDEX (BMI) OF 40.0 TO 44.9 IN ADULT (HCC): ICD-10-CM

## 2023-07-10 PROBLEM — E66.813 CLASS 3 SEVERE OBESITY DUE TO EXCESS CALORIES WITHOUT SERIOUS COMORBIDITY WITH BODY MASS INDEX (BMI) OF 40.0 TO 44.9 IN ADULT (HCC): Status: ACTIVE | Noted: 2018-07-05

## 2023-07-10 PROCEDURE — 99213 OFFICE O/P EST LOW 20 MIN: CPT | Performed by: NURSE PRACTITIONER

## 2023-07-10 ASSESSMENT — SLEEP AND FATIGUE QUESTIONNAIRES
HOW LIKELY ARE YOU TO NOD OFF OR FALL ASLEEP IN A CAR, WHILE STOPPED FOR A FEW MINUTES IN TRAFFIC: 0
HOW LIKELY ARE YOU TO NOD OFF OR FALL ASLEEP WHILE SITTING QUIETLY AFTER LUNCH WITHOUT ALCOHOL: 3
HOW LIKELY ARE YOU TO NOD OFF OR FALL ASLEEP WHILE WATCHING TV: 3
HOW LIKELY ARE YOU TO NOD OFF OR FALL ASLEEP WHILE SITTING AND READING: 3
HOW LIKELY ARE YOU TO NOD OFF OR FALL ASLEEP WHILE SITTING AND TALKING TO SOMEONE: 1
HOW LIKELY ARE YOU TO NOD OFF OR FALL ASLEEP WHILE SITTING INACTIVE IN A PUBLIC PLACE: 0
HOW LIKELY ARE YOU TO NOD OFF OR FALL ASLEEP WHILE LYING DOWN TO REST IN THE AFTERNOON WHEN CIRCUMSTANCES PERMIT: 2
HOW LIKELY ARE YOU TO NOD OFF OR FALL ASLEEP WHEN YOU ARE A PASSENGER IN A CAR FOR AN HOUR WITHOUT A BREAK: 2
ESS TOTAL SCORE: 14

## 2023-07-10 NOTE — PATIENT INSTRUCTIONS
Continue CPAP 7-10 cm H2O nightly compliance strongly encouraged  New supplies ordered  Recommendations as above  Follow-up in 4 months or sooner if needed

## 2023-07-21 ENCOUNTER — TELEPHONE (OUTPATIENT)
Dept: ORTHOPEDIC SURGERY | Age: 53
End: 2023-07-21

## 2023-08-02 ENCOUNTER — OFFICE VISIT (OUTPATIENT)
Age: 53
End: 2023-08-02
Payer: COMMERCIAL

## 2023-08-02 VITALS
HEART RATE: 52 BPM | DIASTOLIC BLOOD PRESSURE: 72 MMHG | BODY MASS INDEX: 35.61 KG/M2 | SYSTOLIC BLOOD PRESSURE: 138 MMHG | HEIGHT: 68 IN | WEIGHT: 235 LBS

## 2023-08-02 DIAGNOSIS — G47.33 OSA (OBSTRUCTIVE SLEEP APNEA): ICD-10-CM

## 2023-08-02 DIAGNOSIS — I49.3 PVC'S (PREMATURE VENTRICULAR CONTRACTIONS): ICD-10-CM

## 2023-08-02 DIAGNOSIS — I42.8 NICM (NONISCHEMIC CARDIOMYOPATHY) (HCC): ICD-10-CM

## 2023-08-02 DIAGNOSIS — I50.22 SYSTOLIC CHF, CHRONIC (HCC): Primary | ICD-10-CM

## 2023-08-02 DIAGNOSIS — I25.118 CORONARY ARTERY DISEASE OF NATIVE ARTERY OF NATIVE HEART WITH STABLE ANGINA PECTORIS (HCC): ICD-10-CM

## 2023-08-02 PROCEDURE — 99214 OFFICE O/P EST MOD 30 MIN: CPT | Performed by: INTERNAL MEDICINE

## 2023-08-02 PROCEDURE — 93000 ELECTROCARDIOGRAM COMPLETE: CPT | Performed by: INTERNAL MEDICINE

## 2023-08-02 RX ORDER — AMLODIPINE BESYLATE 5 MG/1
TABLET ORAL
COMMUNITY
Start: 2023-06-23

## 2023-08-02 RX ORDER — METOPROLOL SUCCINATE 25 MG/1
TABLET, EXTENDED RELEASE ORAL
COMMUNITY
Start: 2023-06-23

## 2023-08-02 NOTE — PROGRESS NOTES
4. HTN:  Remain on toprol, entresto. Patient has been instructed and agrees to call our office with any issues or other concerns related to their cardiac condition(s) and/or complaint(s). Return in about 3 months (around 11/2/2023).        Genna Raphael DO  8/2/2023

## 2023-08-08 ENCOUNTER — NURSE ONLY (OUTPATIENT)
Age: 53
End: 2023-08-08

## 2023-08-08 VITALS — SYSTOLIC BLOOD PRESSURE: 118 MMHG | HEART RATE: 80 BPM | DIASTOLIC BLOOD PRESSURE: 70 MMHG

## 2023-08-08 NOTE — PROGRESS NOTES
Patient came in stating she was at therapy and her blood pressure was dropping. Her blood pressure at therapy was 146/78. She states she hasnt been feeling well, she saw Dr. Kylah Rosales on 08/02/2023, a holter monitor was applied, however, the battery was not staying charged and she only had one more day to wear it so she removed it and will return it. Her blood pressure today is 118/70 with heart rate of 80bpm. I recommended she speak to her primary care as well. Biopsy Photograph Reviewed: Yes

## 2023-08-18 ENCOUNTER — OFFICE VISIT (OUTPATIENT)
Dept: ORTHOPEDIC SURGERY | Age: 53
End: 2023-08-18
Payer: COMMERCIAL

## 2023-08-18 VITALS — HEIGHT: 68 IN | WEIGHT: 235 LBS | BODY MASS INDEX: 35.61 KG/M2

## 2023-08-18 DIAGNOSIS — M47.816 LUMBAR SPONDYLOSIS: Primary | ICD-10-CM

## 2023-08-18 DIAGNOSIS — M54.16 LUMBAR RADICULOPATHY: ICD-10-CM

## 2023-08-18 PROCEDURE — 99213 OFFICE O/P EST LOW 20 MIN: CPT | Performed by: PHYSICIAN ASSISTANT

## 2023-08-18 NOTE — PROGRESS NOTES
08/18/23        Name: Perez Palacio  YOB: 1970  Gender: female  MRN: [id number]    CC: Follow-up       HPI: Perez Palacio is a 52 y.o. female who returns for Follow-up         Patient returns the office today for follow-up. She has been undergoing physical therapy for the last 6 weeks. She does feel that her back pain and leg pain have improved with the therapy and the tizanidine. History was obtained by patient      Meds/PSH/PMH/FH/SH: This information has been reviewed. ALLERGIES:   Allergies   Allergen Reactions    Atorvastatin Other (See Comments)    Metformin Other (See Comments)              Physical Examination:            Imaging:         MRI Result (most recent): MRI LUMBAR SPINE WO CONTRAST 06/26/2023    Narrative  EXAMINATION: MRI LUMBAR SPINE 6/26/2023 3:26 PM    ACCESSION NUMBER: [id number]    INDICATION: Low back pain, unspecified    COMPARISON: None available    TECHNIQUE: Multisequence, multiplanar MR images were obtained of the lumbar  spine without the administration of intravenous contrast.    CONTRAST: None. FINDINGS:  SPINAL CORD: Normal morphology. The conus medullaris terminates at the L1-L2  disc level. NUMBERING: There are 5 non-rib bearing lumbar vertebrae with the lowest disc  space numbered L5-S1.  BONES: No acute fracture. Modic type I degenerative endplate changes O5-5 and  L5-S1.  ALIGNMENT: Grade 1 anterolisthesis L3 on L4 and L4 and L5. Grade 1  retrolisthesis L5 on S1. Mild dextrocurvature of the lumbar spine centered at  L3-4. SOFT TISSUES: Mild paraspinal muscular atrophy. INTER-VERTEBRAL LEVELS:  T12-L1: No central canal or foraminal narrowing. L1-L2: No central canal or foraminal narrowing. L2-L3: Mild facet ligamentum flavum hypertrophy. Mild spinal canal stenosis. No  foraminal stenosis. L3-L4: Grade 1 anterolisthesis with disc uncovering and diffuse disc bulge with  bilateral foraminal extension.  Severe facet ligamentum flavum

## 2023-09-05 ENCOUNTER — TELEPHONE (OUTPATIENT)
Age: 53
End: 2023-09-05

## 2023-09-05 NOTE — TELEPHONE ENCOUNTER
----- Message from Angel Corral DO sent at 9/5/2023 12:54 PM EDT -----  Echo was ok, EF better now. How does she feel? PVCs ok?   Thanks

## 2023-09-11 NOTE — TELEPHONE ENCOUNTER
Called pt advised of Dr. Bard Nelson response for both procedures. Pt gave a verbal understanding. Pt states still having PVCs but not as frequently.

## 2023-09-11 NOTE — TELEPHONE ENCOUNTER
----- Message from Malcolm Hardin, DO sent at 9/8/2023  4:21 PM EDT -----  Please try to call her back, how is she feeling with PVCs, better? She had 2% PVCs on monitor, this is overall good. Not bad. See me as planned. We can adjust meds if needed and feeling worse.   Thanks

## 2023-10-09 ENCOUNTER — OFFICE VISIT (OUTPATIENT)
Age: 53
End: 2023-10-09
Payer: COMMERCIAL

## 2023-10-09 VITALS
DIASTOLIC BLOOD PRESSURE: 72 MMHG | WEIGHT: 234 LBS | SYSTOLIC BLOOD PRESSURE: 118 MMHG | BODY MASS INDEX: 35.46 KG/M2 | HEART RATE: 64 BPM | HEIGHT: 68 IN

## 2023-10-09 DIAGNOSIS — I25.118 CORONARY ARTERY DISEASE OF NATIVE ARTERY OF NATIVE HEART WITH STABLE ANGINA PECTORIS (HCC): ICD-10-CM

## 2023-10-09 DIAGNOSIS — I49.3 PVC'S (PREMATURE VENTRICULAR CONTRACTIONS): ICD-10-CM

## 2023-10-09 DIAGNOSIS — I42.8 NICM (NONISCHEMIC CARDIOMYOPATHY) (HCC): Primary | ICD-10-CM

## 2023-10-09 DIAGNOSIS — I50.22 SYSTOLIC CHF, CHRONIC (HCC): ICD-10-CM

## 2023-10-09 PROCEDURE — 99214 OFFICE O/P EST MOD 30 MIN: CPT | Performed by: INTERNAL MEDICINE

## 2023-10-09 NOTE — PROGRESS NOTES
[address] [facility], [facility], [address]  PHONE: [phone]     10/09/23    NAME:  Aminata Roca  : 1970  MRN: [id number]       SUBJECTIVE:   Aminata Roca is a 52 y.o. female seen for a follow up visit regarding the following:     Chief Complaint   Patient presents with    Congestive Heart Failure       HPI:   Here for eval of NICM/SHF. Echo 2017: EF 30%     2018:  PVC ablation by Dr. Sirena Reynoso. Echo 3/2018: EF 45%   LHC 3/9/2020: EF 20%, LAD 40%   Echo 10/2021: EF 40-45%   NST 2021:  low risk stress test.  Echo 2023: EF 50-55%  No AF seen, 2% PVCs, sinus, avg HR 68. Mostly sinus with sx. Feeling better now. No angina, CP. Some stress. Palp are better now. NYHA Class II sx now. No new edema. Patient denies recent history of orthopnea, PND, excessive dizziness and/or syncope. Former teacher at [employer]. Past Medical History, Past Surgical History, Family history, Social History, and Medications were all reviewed with the patient today and updated as necessary. Current Outpatient Medications   Medication Sig Dispense Refill    amLODIPine (NORVASC) 5 MG tablet       metoprolol succinate (TOPROL XL) 25 MG extended release tablet       tiZANidine (ZANAFLEX) 4 MG tablet Take 1 tablet by mouth nightly as needed (muscle spasms) 30 tablet 2    sacubitril-valsartan (ENTRESTO)  MG per tablet Take 1 tablet by mouth 2 times daily 180 tablet 3    Specialty Vitamins Products (ECHINACEA C COMPLETE PO) Take by mouth      diclofenac sodium (VOLTAREN) 1 % GEL Apply 4 g topically 4 times daily as needed for Pain 150 g 2    diclofenac (CATAFLAM) 50 MG tablet Take 1 tablet by mouth 3 times daily (Patient taking differently: Take 1 tablet by mouth 3 times daily as needed) 90 tablet 0    oxyCODONE (ROXICODONE) 5 MG immediate release tablet as needed.       terbinafine (LAMISIL) 250 MG tablet as needed      spironolactone (ALDACTONE) 25 MG tablet Take 1

## 2024-01-19 ENCOUNTER — OFFICE VISIT (OUTPATIENT)
Dept: ORTHOPEDIC SURGERY | Age: 54
End: 2024-01-19
Payer: COMMERCIAL

## 2024-01-19 VITALS — HEIGHT: 68 IN | WEIGHT: 235 LBS | BODY MASS INDEX: 35.61 KG/M2

## 2024-01-19 DIAGNOSIS — M54.16 LUMBAR RADICULOPATHY: Primary | ICD-10-CM

## 2024-01-19 PROCEDURE — 99213 OFFICE O/P EST LOW 20 MIN: CPT | Performed by: PHYSICIAN ASSISTANT

## 2024-01-19 NOTE — PATIENT INSTRUCTIONS
Patient Education        Sciatica: Exercises  Introduction  Here are some examples of typical rehabilitation exercises for your condition. Start each exercise slowly. Ease off the exercise if you start to have pain.  Your doctor or physical therapist will tell you when you can start these exercises and which ones will work best for you.  When you are not being active, find a comfortable position for rest. Some people are comfortable on the floor or a medium-firm bed with a small pillow under their head and another under their knees. Some people prefer to lie on their side with a pillow between their knees. Don't stay in one position for too long.  Take short walks (10 to 20 minutes) every 2 to 3 hours. Avoid slopes, hills, and stairs until you feel better. Walk only distances you can manage without pain, especially leg pain.  How to do the exercises  Cat-cow    Get on your hands and knees. Your shoulders should be directly above your wrists, and your hips should be above your knees. Your back should be flat, and your neck should extend out straight from your spine. Your gaze should be toward the floor below.  Relax your head and allow it to droop. Round your back up toward the ceiling until you feel a nice stretch in your upper, middle, and lower back. Hold this stretch for as long as it feels comfortable, or about 15 to 30 seconds.  Then let your back curve down by pressing your stomach toward the floor. Lift your buttocks toward the ceiling. If it doesn't bother your neck, you can raise your head as you allow your back to sway. Hold this position for 15 to 30 seconds.  Go back and forth smoothly 2 to 4 times between the rounded back and swayed back positions.  Follow-up care is a key part of your treatment and safety. Be sure to make and go to all appointments, and call your doctor if you are having problems. It's also a good idea to know your test results and keep a list of the medicines you take.  Current as of:

## 2024-01-19 NOTE — PROGRESS NOTES
01/19/24        Name: Vickie Power  YOB: 1970  Gender: female  MRN: [id number]    CC: Follow-up       HPI: Vickie Power is a 53 y.o. female who returns for Follow-up         Patient returns today to the office for follow-up.  She is established patient of mine, I last saw her in the office 8/18/2023.  She underwent a course of physical therapy for lower back and right leg pain which resolved with conservative management.  She states that over the last few weeks her right leg pain has returned to become more prominent.  It is getting severe at this point up to a 7 or 8 out of 10.  She has been taking tizanidine.  She is having pain radiating down the back of her leg to her calf.  This is similar in fashion to what she had before.  Her last MRI was in June of last year.  Having difficulty getting dressed, cleaning and maintaining her home.  She is continued her back exercise program daily since her last injection without significant improvement in her pain.    History was obtained by patient      Meds/PSH/PMH/FH/SH: This information has been reviewed.      ALLERGIES:   Allergies   Allergen Reactions    Atorvastatin Other (See Comments)    Metformin Other (See Comments)              Physical Examination:            Imaging:         MRI Result (most recent):  MRI LUMBAR SPINE WO CONTRAST 06/26/2023    Narrative  EXAMINATION: MRI LUMBAR SPINE 6/26/2023 3:26 PM    ACCESSION NUMBER: [id number]    INDICATION: Low back pain, unspecified    COMPARISON: None available    TECHNIQUE: Multisequence, multiplanar MR images were obtained of the lumbar  spine without the administration of intravenous contrast.    CONTRAST: None.    FINDINGS:  SPINAL CORD: Normal morphology. The conus medullaris terminates at the L1-L2  disc level.  NUMBERING: There are 5 non-rib bearing lumbar vertebrae with the lowest disc  space numbered L5-S1.  BONES: No acute fracture. Modic type I degenerative endplate changes L3-4

## 2024-01-30 ENCOUNTER — OFFICE VISIT (OUTPATIENT)
Age: 54
End: 2024-01-30
Payer: COMMERCIAL

## 2024-01-30 VITALS
HEIGHT: 68 IN | WEIGHT: 236.1 LBS | BODY MASS INDEX: 35.78 KG/M2 | SYSTOLIC BLOOD PRESSURE: 132 MMHG | DIASTOLIC BLOOD PRESSURE: 84 MMHG | HEART RATE: 48 BPM

## 2024-01-30 DIAGNOSIS — I25.118 CORONARY ARTERY DISEASE OF NATIVE ARTERY OF NATIVE HEART WITH STABLE ANGINA PECTORIS (HCC): ICD-10-CM

## 2024-01-30 DIAGNOSIS — I49.3 PVC'S (PREMATURE VENTRICULAR CONTRACTIONS): ICD-10-CM

## 2024-01-30 DIAGNOSIS — I50.22 SYSTOLIC CHF, CHRONIC (HCC): Primary | ICD-10-CM

## 2024-01-30 DIAGNOSIS — G47.33 OSA (OBSTRUCTIVE SLEEP APNEA): ICD-10-CM

## 2024-01-30 DIAGNOSIS — I42.8 NICM (NONISCHEMIC CARDIOMYOPATHY) (HCC): ICD-10-CM

## 2024-01-30 PROCEDURE — 99214 OFFICE O/P EST MOD 30 MIN: CPT | Performed by: INTERNAL MEDICINE

## 2024-01-30 NOTE — PROGRESS NOTES
[address] [facility], [address] [place] [address]  PHONE: [phone]     24    NAME:  Vickie Power  : 1970  MRN: [id number]       SUBJECTIVE:   Vickie Power is a 53 y.o. female seen for a follow up visit regarding the following:     Chief Complaint   Patient presents with    Congestive Heart Failure    Coronary Artery Disease       HPI:   Here for eval of NICM/SHF.   Echo 2017: EF 30%     2018:  PVC ablation by Dr. Avila.   Echo 3/2018: EF 45%   LHC 3/9/2020: EF 20%, LAD 40%   Echo 10/2021: EF 40-45%   NST 2021:  low risk stress test.  Echo 2023: EF 50-55%  No AF seen, 2% PVCs, sinus, avg HR 68.  Mostly sinus with sx.      Feeling better now.  No angina, CP.  Some stress.  Palp are better now.    HR low, but seems asx.    NYHA Class II sx now.  No new edema.    Patient denies recent history of orthopnea, PND, excessive dizziness and/or syncope.      Former teacher at [employer].           Past Medical History, Past Surgical History, Family history, Social History, and Medications were all reviewed with the patient today and updated as necessary.     Current Outpatient Medications   Medication Sig Dispense Refill    Cyanocobalamin (VITAMIN B-12 PO) Take by mouth      CINNAMON PO Take by mouth      amLODIPine (NORVASC) 5 MG tablet       metoprolol succinate (TOPROL XL) 25 MG extended release tablet       sacubitril-valsartan (ENTRESTO)  MG per tablet Take 1 tablet by mouth 2 times daily 180 tablet 3    diclofenac sodium (VOLTAREN) 1 % GEL Apply 4 g topically 4 times daily as needed for Pain 150 g 2    diclofenac (CATAFLAM) 50 MG tablet Take 1 tablet by mouth 3 times daily (Patient taking differently: Take 1 tablet by mouth 3 times daily as needed) 90 tablet 0    oxyCODONE (ROXICODONE) 5 MG immediate release tablet as needed.      terbinafine (LAMISIL) 250 MG tablet as needed      spironolactone (ALDACTONE) 25 MG tablet Take 1 tablet by mouth in the morning. 1/2 tab.

## 2024-05-02 ENCOUNTER — OFFICE VISIT (OUTPATIENT)
Dept: SLEEP MEDICINE | Age: 54
End: 2024-05-02
Payer: COMMERCIAL

## 2024-05-02 VITALS
DIASTOLIC BLOOD PRESSURE: 77 MMHG | OXYGEN SATURATION: 96 % | RESPIRATION RATE: 16 BRPM | TEMPERATURE: 98.1 F | BODY MASS INDEX: 35.28 KG/M2 | SYSTOLIC BLOOD PRESSURE: 122 MMHG | HEART RATE: 64 BPM | HEIGHT: 68 IN | WEIGHT: 232.8 LBS

## 2024-05-02 DIAGNOSIS — G47.33 OSA (OBSTRUCTIVE SLEEP APNEA): Primary | ICD-10-CM

## 2024-05-02 PROCEDURE — 99214 OFFICE O/P EST MOD 30 MIN: CPT | Performed by: INTERNAL MEDICINE

## 2024-05-02 ASSESSMENT — SLEEP AND FATIGUE QUESTIONNAIRES
HOW LIKELY ARE YOU TO NOD OFF OR FALL ASLEEP WHILE SITTING AND READING: HIGH CHANCE OF DOZING
HOW LIKELY ARE YOU TO NOD OFF OR FALL ASLEEP WHILE SITTING INACTIVE IN A PUBLIC PLACE: SLIGHT CHANCE OF DOZING
HOW LIKELY ARE YOU TO NOD OFF OR FALL ASLEEP WHEN YOU ARE A PASSENGER IN A CAR FOR AN HOUR WITHOUT A BREAK: MODERATE CHANCE OF DOZING
HOW LIKELY ARE YOU TO NOD OFF OR FALL ASLEEP WHILE WATCHING TV: HIGH CHANCE OF DOZING
HOW LIKELY ARE YOU TO NOD OFF OR FALL ASLEEP WHILE SITTING AND TALKING TO SOMEONE: WOULD NEVER DOZE
HOW LIKELY ARE YOU TO NOD OFF OR FALL ASLEEP WHILE LYING DOWN TO REST IN THE AFTERNOON WHEN CIRCUMSTANCES PERMIT: MODERATE CHANCE OF DOZING
HOW LIKELY ARE YOU TO NOD OFF OR FALL ASLEEP IN A CAR, WHILE STOPPED FOR A FEW MINUTES IN TRAFFIC: WOULD NEVER DOZE
HOW LIKELY ARE YOU TO NOD OFF OR FALL ASLEEP WHILE SITTING QUIETLY AFTER LUNCH WITHOUT ALCOHOL: MODERATE CHANCE OF DOZING
ESS TOTAL SCORE: 13

## 2024-05-02 NOTE — ASSESSMENT & PLAN NOTE
Patient has borderline mild obstructive sleep apnea, is really struggling with CPAP and even when she wears it all night she has not noticed a difference in her sleep quality or daytime energy.  We reviewed her original sleep study and she was very positional with all of her events in the supine position and we discussed positional therapy for obstructive sleep apnea and she is willing to give this a try, we pointed out devices on [employer] that she can buy for under $100.  For oral breathing we also showed her minute tape, she is to try to do this and we will check a home sleep test on her side to make sure this is effective in controlling her mild obstructive sleep apnea.  She is to keep her CPAP safe until that time in case we need it again.  She has seen Dr. Olea in the past and was a bit put off by the $200 fee but said she was told she would be a candidate for oral appliance therapy which may be another option.  We will tentatively plan to follow-up in 4 months.

## 2024-05-02 NOTE — PROGRESS NOTES
[facility]  [address]  [place] [address]  [phone]    PATIENT ID    Ms. Vickie Power  1970  Her primary provider is Tamela Welch MD    CC: Ms. Power returns to the clinic today for follow up of her obstructive sleep apnea.    INTERVAL HISTORY  Ms. Power was originally diagnosed with mild obstructive sleep apnea in 2022 with an AHI of 8 and a low SPO2 of 89%, positional.  She has past medical history notable for heart failure with reduced EF, PVCs/ventricular arrhythmia status post ablation, obesity with a BMI of 35.  I am meeting her for the first time today.  She is currently on CPAP 7-10 cm H2O, she really does not like CPAP therapy and is very bothered by having a fullface mask, she notes she is an oral breather and had a mouth leak when she was using her nasal mask, she never really has had any clinical benefit with improved sleep quality or improved daytime sleepiness even when using CPAP and is looking for other options.  She did see [facility] [place] [facility] about 3 years ago and was told she would be a candidate for an oral appliance but had some difficulty paying the $200 visit fee and thinks she cannot afford that at this time.  She was unaware that her sleep apnea is positional and is willing to give positional therapy a try.     Her most recent echocardiogram was done in September 2023 shows an EF of 50 to 55% which is much improved after her ablation procedure.  She denies any other major health changes since last seen and her living situation is more stable.  She has no new sleep concerns otherwise today.    She was last seen by Johnny in 7/10/2023 and was struggling with CPAP therapy.  Since implementing CPAP therapy Ms. Power does not feels more rested and less fatigued.  On CPAP download review today she is utilizing her CPAP machine 52% of the time, greater than 4 hours per night, for a median daily usage of 4 hours 56 minutes per

## 2024-05-02 NOTE — PATIENT INSTRUCTIONS
It was a pleasure meeting you today.  Here are some items that we discussed:    1.   We will try positional therapy for your mild obstructive sleep apnea.  Go on [employer] to look for some devices like we did today.    2.   We will test you with a home sleep test using your positional device to make sure its work    3.   Keep you cpap machine safe in case we need it again.    Allen Herr MD  [phone]

## 2024-07-24 ENCOUNTER — OFFICE VISIT (OUTPATIENT)
Dept: ORTHOPEDIC SURGERY | Age: 54
End: 2024-07-24
Payer: COMMERCIAL

## 2024-07-24 VITALS — BODY MASS INDEX: 35.77 KG/M2 | HEIGHT: 68 IN | WEIGHT: 236 LBS

## 2024-07-24 DIAGNOSIS — M51.36 DDD (DEGENERATIVE DISC DISEASE), LUMBAR: Primary | ICD-10-CM

## 2024-07-24 PROCEDURE — 99214 OFFICE O/P EST MOD 30 MIN: CPT | Performed by: PHYSICIAN ASSISTANT

## 2024-07-24 NOTE — PROGRESS NOTES
Name: Vickie Power  YOB: 1970  Gender: female  MRN: [id number]         *ANNUAL VISIT *        CC:   Chief Complaint   Patient presents with    Follow-up         HPI:   Vickie Power is a 54 y.o. female with  PMHx below.  There are an established  patient of mine, and present here for Annual Visit.        We have been managing this patient's back and right leg pain with conservative treatment.  She has tried over-the-counter analgesics as well as muscle relaxers in the past.  She is also undergone a home exercise program for the last 6 months without meaningful improvement.  She continues to have back pain rating to the posterior right leg with occasional numbness and tingling.  She underwent a lumbar MRI in June 2023 with results below.  She is quite miserable now.  Pain up to an 8 or 9 out of 10.  Having difficulty sleeping, walking for exercise.  She continues to be concerned about more aggressive treatment as she is quite phobic of needles and injections, as well as fearful of the complications of potential surgery for her back.      Delayed        Past Medical History Includes:   Past Medical History:   Diagnosis Date    Abnormal Papanicolaou smear of cervix     Adverse effect of anesthesia     \"takes more\" for anesthesia to be effective     Arthritis     CAD (coronary artery disease)     no stents; [facility] 3/2017 mod LAD dz; followed by Dr Dickson ([facility])    CHF (congestive heart failure) ([facility])     ECHO 12/18/18: EF 40-45%; managed with medication and followed by [facility]    Fibroid, uterine     GERD (gastroesophageal reflux disease)     diet controlled     H/O echocardiogram 10/04/2021    EF 40-45%    Heart murmur     Echo (10/4/21) EF 40-45% trace regurgitation in mitral and tricuspid valve    History of anemia     Hypercholesterolemia     Controlled with meds     Hypertension     Controlled with meds     Menorrhagia with regular cycle     Morbid obesity (HCC)     NICM (nonischemic

## 2024-08-19 ENCOUNTER — APPOINTMENT (RX ONLY)
Dept: URBAN - METROPOLITAN AREA CLINIC 330 | Facility: CLINIC | Age: 54
Setting detail: DERMATOLOGY
End: 2024-08-19

## 2024-08-19 DIAGNOSIS — L20.89 OTHER ATOPIC DERMATITIS: ICD-10-CM | Status: WELL CONTROLLED

## 2024-08-19 DIAGNOSIS — L21.8 OTHER SEBORRHEIC DERMATITIS: ICD-10-CM | Status: INADEQUATELY CONTROLLED

## 2024-08-19 DIAGNOSIS — L81.0 POSTINFLAMMATORY HYPERPIGMENTATION: ICD-10-CM | Status: RESOLVED

## 2024-08-19 PROCEDURE — ? PRESCRIPTION

## 2024-08-19 PROCEDURE — ? PRESCRIPTION MEDICATION MANAGEMENT

## 2024-08-19 PROCEDURE — ? TREATMENT REGIMEN

## 2024-08-19 PROCEDURE — 99214 OFFICE O/P EST MOD 30 MIN: CPT

## 2024-08-19 PROCEDURE — ? OTHER

## 2024-08-19 PROCEDURE — ? MDM - TREATMENT GOALS

## 2024-08-19 PROCEDURE — ? COUNSELING

## 2024-08-19 RX ORDER — TRIAMCINOLONE ACETONIDE 1 MG/G
CREAM TOPICAL BID
Qty: 80 | Refills: 5 | Status: ERX | COMMUNITY
Start: 2024-08-19

## 2024-08-19 RX ORDER — OXICONAZOLE NITRATE 10 MG/G
CREAM TOPICAL
Qty: 30 | Refills: 5 | Status: ERX | COMMUNITY
Start: 2024-08-19

## 2024-08-19 RX ADMIN — OXICONAZOLE NITRATE: 10 CREAM TOPICAL at 00:00

## 2024-08-19 RX ADMIN — TRIAMCINOLONE ACETONIDE: 1 CREAM TOPICAL at 00:00

## 2024-08-19 ASSESSMENT — LOCATION DETAILED DESCRIPTION DERM
LOCATION DETAILED: LEFT PROXIMAL PRETIBIAL REGION
LOCATION DETAILED: LEFT INFERIOR LATERAL NECK
LOCATION DETAILED: RIGHT INFERIOR UPPER BACK
LOCATION DETAILED: RIGHT MEDIAL TRAPEZIAL NECK
LOCATION DETAILED: LEFT CAVUM CONCHA
LOCATION DETAILED: LEFT CLAVICULAR NECK

## 2024-08-19 ASSESSMENT — LOCATION SIMPLE DESCRIPTION DERM
LOCATION SIMPLE: LEFT EAR
LOCATION SIMPLE: POSTERIOR NECK
LOCATION SIMPLE: LEFT PRETIBIAL REGION
LOCATION SIMPLE: RIGHT UPPER BACK
LOCATION SIMPLE: LEFT ANTERIOR NECK

## 2024-08-19 ASSESSMENT — LOCATION ZONE DERM
LOCATION ZONE: EAR
LOCATION ZONE: TRUNK
LOCATION ZONE: LEG
LOCATION ZONE: NECK

## 2024-08-19 ASSESSMENT — SEVERITY ASSESSMENT 2020: SEVERITY 2020: MILD

## 2024-08-19 ASSESSMENT — ITCH NUMERIC RATING SCALE: HOW SEVERE IS YOUR ITCHING?: 0

## 2024-08-19 ASSESSMENT — BSA RASH: BSA RASH: 5

## 2024-08-19 ASSESSMENT — SEVERITY ASSESSMENT
SEVERITY: CLEAR
HOW SEVERE IS THIS PATIENT'S CONDITION?: MILD

## 2024-08-19 NOTE — PROCEDURE: TREATMENT REGIMEN
Samples Given: Cerave cream, eucerin advanced repair cream, cerave hydrating cleanser, Cerave cream to foam cleanser.
Detail Level: Zone

## 2024-08-19 NOTE — PROCEDURE: PRESCRIPTION MEDICATION MANAGEMENT
Detail Level: Zone
Render In Strict Bullet Format?: No
Continue Regimen: triamcinolone acetonide 0.1 % topical cream \\nApply to rash on body twice daily for two weeks as needed for flares.\\n\\nRefill will be sent today. \\nDiscussed topical steroid risk and appropriate use.
Continue Regimen: hydroquinone 6% - Apply to areas of brown spots BID\\nNo refills needed today.

## 2024-09-11 ENCOUNTER — TELEPHONE (OUTPATIENT)
Dept: PRIMARY CARE CLINIC | Facility: CLINIC | Age: 54
End: 2024-09-11

## 2024-10-21 ENCOUNTER — RX ONLY (OUTPATIENT)
Age: 54
Setting detail: RX ONLY
End: 2024-10-21

## 2024-10-21 RX ORDER — KETOCONAZOLE 20 MG/G
CREAM TOPICAL
Qty: 30 | Refills: 2 | Status: ERX | COMMUNITY
Start: 2024-10-21

## 2025-01-29 ENCOUNTER — RX ONLY (RX ONLY)
Age: 55
End: 2025-01-29

## 2025-01-29 RX ORDER — TRIAMCINOLONE ACETONIDE 1 MG/G
OINTMENT TOPICAL
Qty: 80 | Refills: 3 | Status: ERX | COMMUNITY
Start: 2025-01-29

## 2025-01-29 RX ORDER — TRIAMCINOLONE ACETONIDE 1 MG/G
OINTMENT TOPICAL
Qty: 80 | Refills: 5 | Status: ERX

## 2025-07-22 ENCOUNTER — TELEPHONE (OUTPATIENT)
Dept: ORTHOPEDIC SURGERY | Age: 55
End: 2025-07-22

## 2025-07-22 NOTE — TELEPHONE ENCOUNTER
Patient was seen last week at [facility] with knee XR:   Patient has a right knee arthroplasty. Alignment is anatomic. No loosening of the hardware. There is a small bony density seen cephalad to the patella. An avulsion injury possibly of an enthesophyte is not excluded. This is of indeterminate age. Recommend correlation with acute pain or trauma to this region. No significant effusion. No other obvious fractures seen.

## 2025-07-30 ENCOUNTER — TELEPHONE (OUTPATIENT)
Dept: ORTHOPEDIC SURGERY | Age: 55
End: 2025-07-30

## 2025-07-31 ENCOUNTER — TELEPHONE (OUTPATIENT)
Dept: ORTHOPEDIC SURGERY | Age: 55
End: 2025-07-31

## 2025-07-31 NOTE — TELEPHONE ENCOUNTER
Spoke with Dr Lenz and reviewed the xray report and he said that the PA could see the patient for rt tka pain and avulsion fx of the patella

## 2025-08-04 ENCOUNTER — OFFICE VISIT (OUTPATIENT)
Dept: ORTHOPEDIC SURGERY | Age: 55
End: 2025-08-04

## 2025-08-04 DIAGNOSIS — Z96.651 S/P TOTAL KNEE ARTHROPLASTY, RIGHT: Primary | ICD-10-CM

## 2025-08-04 PROCEDURE — 99215 OFFICE O/P EST HI 40 MIN: CPT | Performed by: PHYSICIAN ASSISTANT

## 2025-08-04 RX ORDER — DICLOFENAC POTASSIUM 50 MG/1
50 TABLET, FILM COATED ORAL 3 TIMES DAILY
Qty: 90 TABLET | Refills: 0 | Status: SHIPPED | OUTPATIENT
Start: 2025-08-04

## 2025-08-20 ENCOUNTER — HOSPITAL ENCOUNTER (OUTPATIENT)
Dept: NUCLEAR MEDICINE | Age: 55
Discharge: HOME | End: 2025-08-22
Payer: MEDICARE

## 2025-08-20 DIAGNOSIS — Z96.651 S/P TOTAL KNEE ARTHROPLASTY, RIGHT: ICD-10-CM

## 2025-08-20 PROCEDURE — A9503 TC99M MEDRONATE: HCPCS | Performed by: PHYSICIAN ASSISTANT

## 2025-08-20 PROCEDURE — 3430000000 HC RX DIAGNOSTIC RADIOPHARMACEUTICAL: Performed by: PHYSICIAN ASSISTANT

## 2025-08-20 PROCEDURE — 78306 BONE IMAGING WHOLE BODY: CPT | Performed by: PHYSICIAN ASSISTANT

## 2025-08-20 RX ORDER — TC 99M MEDRONATE 20 MG/10ML
26 INJECTION, POWDER, LYOPHILIZED, FOR SOLUTION INTRAVENOUS
Status: COMPLETED | OUTPATIENT
Start: 2025-08-20 | End: 2025-08-20

## 2025-08-20 RX ADMIN — TC 99M MEDRONATE 26 MILLICURIE: 20 INJECTION, POWDER, LYOPHILIZED, FOR SOLUTION INTRAVENOUS at 10:16

## 2025-08-28 ENCOUNTER — OFFICE VISIT (OUTPATIENT)
Dept: ORTHOPEDIC SURGERY | Age: 55
End: 2025-08-28
Payer: MEDICARE

## 2025-08-28 DIAGNOSIS — M51.361 DEGENERATION OF INTERVERTEBRAL DISC OF LUMBAR REGION WITH LOWER EXTREMITY PAIN: Primary | ICD-10-CM

## 2025-08-28 DIAGNOSIS — M54.16 LUMBAR RADICULOPATHY: ICD-10-CM

## 2025-08-28 PROCEDURE — 1036F TOBACCO NON-USER: CPT | Performed by: ORTHOPAEDIC SURGERY

## 2025-08-28 PROCEDURE — G8421 BMI NOT CALCULATED: HCPCS | Performed by: ORTHOPAEDIC SURGERY

## 2025-08-28 PROCEDURE — 3017F COLORECTAL CA SCREEN DOC REV: CPT | Performed by: ORTHOPAEDIC SURGERY

## 2025-08-28 PROCEDURE — 99214 OFFICE O/P EST MOD 30 MIN: CPT | Performed by: ORTHOPAEDIC SURGERY

## 2025-08-28 PROCEDURE — G8428 CUR MEDS NOT DOCUMENT: HCPCS | Performed by: ORTHOPAEDIC SURGERY

## (undated) DEVICE — SUTURE ABSORBABLE MONOFILAMENT 1-0 CTX 60 CM 48 MM STRATAFIX ETSXPP1A445

## (undated) DEVICE — DRAPE,T,LAPARO,TRANS,STERILE: Brand: MEDLINE

## (undated) DEVICE — SET SEALS HYSTEROSCOPE DISP -- MYOSURE  EA=10

## (undated) DEVICE — DISPOSABLE DRAPE, STERILE, FOR A CDS-3060 5 FOOT TABLE: Brand: PEDIGO PRODUCTS, INC.

## (undated) DEVICE — SUT ETHBND 2 30IN LR DA GRN --

## (undated) DEVICE — SUT CHRMC 0 27IN CT1 BRN --

## (undated) DEVICE — BLADE SAW PAT RMR PILT H 46MM --

## (undated) DEVICE — DRAPE,TOP,102X53,STERILE: Brand: MEDLINE

## (undated) DEVICE — TOTAL KNEE DR JENNINGS: Brand: MEDLINE INDUSTRIES, INC.

## (undated) DEVICE — Z DISCONTINUED PER MEDLINE USE 2741944 DRESSING AQUACEL 12 IN SURG W9XL30CM SIL CVR WTRPRF VIR BACT BARR ANTIMIC

## (undated) DEVICE — TRAY PREP DRY W/ PREM GLV 2 APPL 6 SPNG 2 UNDPD 1 OVERWRAP

## (undated) DEVICE — KENDALL SCD EXPRESS SLEEVES, KNEE LENGTH, MEDIUM: Brand: KENDALL SCD

## (undated) DEVICE — HANDPIECE ABLAT DIA6MM ENDOMET IMPED CTRL DEV DISP NOVASURE

## (undated) DEVICE — X-LARGE COTTON GLOVE: Brand: DEROYAL

## (undated) DEVICE — BIPOLAR SEALER 23-112-1 AQM 6.0: Brand: AQUAMANTYS ®

## (undated) DEVICE — TUBE ST FLD CTRL AQUILEX INFLO --

## (undated) DEVICE — SOLUTION IRRIG 3000ML 0.9% SOD CHL FLX CONT 0797208] ICU MEDICAL INC]

## (undated) DEVICE — GOWN,REINF,POLY,ECL,PP SLV,XL: Brand: MEDLINE

## (undated) DEVICE — 2000CC GUARDIAN II: Brand: GUARDIAN

## (undated) DEVICE — KIT PROC KNE TRACKING PK/1 -- VIZADISC MAKO

## (undated) DEVICE — BLADE SURG SAW STD S STL OSC W/ SERR EDGE DISP

## (undated) DEVICE — 450 ML BOTTLE OF 0.05% CHLORHEXIDINE GLUCONATE IN 99.95% STERILE WATER FOR IRRIGATION, USP AND APPLICATOR.: Brand: IRRISEPT ANTIMICROBIAL WOUND LAVAGE

## (undated) DEVICE — CYSTO: Brand: MEDLINE INDUSTRIES, INC.

## (undated) DEVICE — SUTURE VCRL SZ 2-0 L18IN ABSRB UD CT-1 L36MM 1/2 CIR J839D

## (undated) DEVICE — TUBE ST FLD AQUILEX OUTFLO --

## (undated) DEVICE — 3M™ STERI-DRAPE™ INSTRUMENT POUCH 1018: Brand: STERI-DRAPE™

## (undated) DEVICE — STERILE PRESSURE PROTECTOR PAD® FOR DE MAYO UNIVERSAL DISTRACTOR® (10/CASE): Brand: DE MAYO UNIVERSAL DISTRACTOR®

## (undated) DEVICE — SUTURE ABSRB X-1 REV CUT 1/2 CIR 22MM UD BRAID 27IN SZ 3-0 J458H

## (undated) DEVICE — SUTURE ABS ANTIBACT 1-0 CTX 24IN STRATAFIX PDS+ SXPP1A445

## (undated) DEVICE — CYSTO/BLADDER IRRIGATION SET, REGULATING CLAMP

## (undated) DEVICE — (D)PREP SKN CHLRAPRP APPL 26ML -- CONVERT TO ITEM 371833

## (undated) DEVICE — PVC URETHRAL CATHETER: Brand: DOVER

## (undated) DEVICE — SUTURE VCRL SZ 4-0 L27IN ABSRB UD L60MM KS STR REV CUT NDL J662H

## (undated) DEVICE — DRAPE,UNDERBUTTOCKS,PCH,STERILE: Brand: MEDLINE

## (undated) DEVICE — APPLICATOR BNDG 1MM ADH PREMIERPRO EXOFIN

## (undated) DEVICE — CORD RETRCT SIL

## (undated) DEVICE — GUIDEPIN ORTHOPEDIC NAVIGATION 4X110 MM 2P SCREW STRL

## (undated) DEVICE — REM POLYHESIVE ADULT PATIENT RETURN ELECTRODE: Brand: VALLEYLAB

## (undated) DEVICE — SOLUTION IV 250ML 0.9% SOD CHL CLR INJ FLX BG CONT PRT CLSR

## (undated) DEVICE — HANDPIECE SET WITH COAXIAL HIGH FLOW TIP AND SUCTION TUBE: Brand: INTERPULSE

## (undated) DEVICE — SUTURE PLN GUT SZ 2-0 L27IN ABSRB YELLOWISH TAN L40MM CT 853H

## (undated) DEVICE — Device

## (undated) DEVICE — KIT INT FIX FEM TIB CKPT MAKOPLASTY

## (undated) DEVICE — KIT DRP FOR RIO ROBOTIC ARM ASST SYS

## (undated) DEVICE — SOLUTION IV 1000ML 0.9% SOD CHL

## (undated) DEVICE — SURGICAL PROCEDURE PACK BASIC ST FRANCIS

## (undated) DEVICE — FLEXIBLE YANKAUER,MEDIUM TIP, NO VACUUM CONTROL: Brand: ARGYLE

## (undated) DEVICE — PERI-PAD,MODERATE: Brand: CURITY

## (undated) DEVICE — SYR 50ML LR LCK 1ML GRAD NSAF --

## (undated) DEVICE — CURVED, LARGE JAW, OPEN SEALER/DIVIDER NANO-COATED: Brand: LIGASURE IMPACT

## (undated) DEVICE — DEVICE TISS REMOVAL -- ORDER AS BX     APO CODE = DRP

## (undated) DEVICE — SUTURE ABSORBABLE BRAIDED 0 CT-1 8X27 IN UD VICRYL JJ41G

## (undated) DEVICE — DRAPE TWL SURG 16X26IN BLU ORB04] ALLCARE INC]

## (undated) DEVICE — TELFA NON-ADHERENT ABSORBENT DRESSING: Brand: TELFA

## (undated) DEVICE — Z DISCONTINUED USE 2744636  DRESSING AQUACEL 14 IN ALG W3.5XL14IN POLYUR FLM CVR W/ HYDRCOLL

## (undated) DEVICE — BARRIER TISS ADH ABSRB 3X4IN -- GYNECARE INTERCEED

## (undated) DEVICE — BLADE ELECTRODE: Brand: VALLEYLAB

## (undated) DEVICE — CARDINAL HEALTH FLEXIBLE LIGHT HANDLE COVER: Brand: CARDINAL HEALTH

## (undated) DEVICE — GUIDEPIN ORTHOPEDIC NAVIGATION 4X140 MM 2P SCREW STRL

## (undated) DEVICE — CONTAINER SPEC HISTOLOGY 900ML POLYPR

## (undated) DEVICE — T4 HOOD

## (undated) DEVICE — SPONGE LAP 18X18IN STRL -- 5/PK

## (undated) DEVICE — SUT VCRL + 3-0 27IN X1 VIO --

## (undated) DEVICE — TRAY CATH 16F DRN BG LTX -- CONVERT TO ITEM 363158

## (undated) DEVICE — SUTURE PDS II SZ 0 L27IN ABSRB VLT L36MM CT-1 1/2 CIR Z340H